# Patient Record
Sex: FEMALE | Race: BLACK OR AFRICAN AMERICAN | NOT HISPANIC OR LATINO | Employment: UNEMPLOYED | URBAN - METROPOLITAN AREA
[De-identification: names, ages, dates, MRNs, and addresses within clinical notes are randomized per-mention and may not be internally consistent; named-entity substitution may affect disease eponyms.]

---

## 2017-01-19 RX ORDER — AMLODIPINE BESYLATE 10 MG/1
TABLET ORAL
Refills: 1 | COMMUNITY
Start: 2016-10-29 | End: 2019-02-11 | Stop reason: SDUPTHER

## 2019-02-08 NOTE — PROGRESS NOTES
Subjective:       Patient ID: Shabnam Noble is a 63 y.o. female who  has a past medical history of CVA (cerebral vascular accident), DM2 (diabetes mellitus, type 2), and Essential hypertension.    Chief Complaint: Establish Care; Hypertension; and Diabetes     HPI    History was obtained from the patient and supplemented through chart review.  There were no prior records to review.  -Reviewed outside records from [facility] regarding HTN, CVA.  Used to see [facility], Dr. Emma Cabrera.    HTN:  Compliant with Lisinopril 40.  Ran out of Norvasc 10, HCTZ 12.5 yesterday.  Tolerating meds well. Pt denies cp/sob/vision or neuro changes.     DM2:  Currently pt is taking Metformin 500 once a day.  A1c improved from 10->6.9.  No results found for: HGBA1C  Already on an ACE.   No known end organ signs/symptoms. Unclear when last retinal exams or foot exams.    CVA: h/o 2 strokes at [facility].  Had weakness in her LLE, L facial drop 9/2017.  No residual weakness.   No longer taking Plavix 75 due to nausea.  On Crestor 40 (but too expensive), ASA 81.      Obesity:  Doesn't walk due to chronic ankle pain.  No show to PT at [facility].    Tobacco use:  She smokes 0.5 ppd.  Started smoking when she was a teenager.  Stress lately from her wife .     Review of Systems   Constitutional: Negative for fever and unexpected weight change.   HENT: Negative for rhinorrhea and sneezing.    Eyes: Negative for redness and itching.   Respiratory: Negative for shortness of breath and wheezing.    Cardiovascular: Negative for chest pain and palpitations.   Gastrointestinal: Negative for abdominal pain and constipation.   Genitourinary: Negative for dysuria and hematuria.   Musculoskeletal: Positive for arthralgias. Negative for gait problem.   Skin: Negative for color change and rash.   Neurological: Negative for dizziness and weakness.   Hematological: Negative for adenopathy.   Psychiatric/Behavioral: Negative for self-injury and suicidal ideas.        Past Medical History:   Diagnosis Date    CVA (cerebral vascular accident)     DM2 (diabetes mellitus, type 2)     Essential hypertension      Past Surgical History:   Procedure Laterality Date    ANKLE FRACTURE SURGERY Right     HYSTERECTOMY      no h/o  malignancy     Family History   Problem Relation Age of Onset    Alzheimer's disease Mother     Prostate cancer Father     Diabetes Father     Colon cancer Brother     Breast cancer Paternal Aunt     Heart disease Neg Hx     Stroke Neg Hx      Social History     Socioeconomic History    Marital status: Single     Spouse name: Not on file    Number of children: Not on file    Years of education: Not on file    Highest education level: Not on file   Social Needs    Financial resource strain: Not on file    Food insecurity - worry: Not on file    Food insecurity - inability: Not on file    Transportation needs - medical: Not on file    Transportation needs - non-medical: Not on file   Occupational History    Not on file   Tobacco Use    Smoking status: Light Tobacco Smoker    Smokeless tobacco: Never Used   Substance and Sexual Activity    Alcohol use: Not on file    Drug use: Not on file    Sexual activity: Not on file   Other Topics Concern    Not on file   Social History Narrative    Not on file     Objective:      Vitals:    02/11/19 1335   BP: (!) 148/88   Pulse: 102   SpO2: 98%   Weight: 87.3 kg (192 lb 7.4 oz)      Physical Exam   Constitutional: She appears well-developed and well-nourished. No distress.   HENT:   Head: Normocephalic and atraumatic.   Nose: Nose normal.   Mouth/Throat: Oropharynx is clear and moist. No oropharyngeal exudate.   Eyes: Conjunctivae and EOM are normal. Pupils are equal, round, and reactive to light. Right eye exhibits no discharge. Left eye exhibits no discharge. No scleral icterus.   Neck: Neck supple. No tracheal deviation present. No thyromegaly present.   Cardiovascular: Normal rate,  regular rhythm, normal heart sounds and intact distal pulses.   No murmur heard.  Pulmonary/Chest: Effort normal and breath sounds normal. No respiratory distress. She has no wheezes.   Abdominal: Soft. Bowel sounds are normal. There is no tenderness.   Musculoskeletal: She exhibits no edema or deformity.   Lymphadenopathy:     She has no cervical adenopathy.   Neurological: She is alert. She has normal strength. No cranial nerve deficit. Gait normal.   Skin: Skin is warm and dry. Capillary refill takes less than 2 seconds. She is not diaphoretic. No erythema.   Psychiatric: She has a normal mood and affect. Her behavior is normal.         Lab Results   Component Value Date    WBC 12.07 (H) 05/04/2005    HGB 13.1 05/04/2005    HCT 39.1 05/04/2005     05/04/2005    CHOL 195 06/30/2004    TRIG 92 06/30/2004    HDL 55.0 06/30/2004     05/04/2005    K 3.6 05/04/2005     05/04/2005    CREATININE 0.9 05/04/2005    BUN 14 05/04/2005    CO2 27 05/04/2005    TSH 0.83 05/04/2005       The ASCVD Risk score ([place] JAQUELIN Jr., et al., 2013) failed to calculate for the following reasons:    The patient has a prior MI or stroke diagnosis    (Imaging have been independently reviewed)  None    Assessment:       1. Essential hypertension    2. Type 2 diabetes mellitus with complication, without long-term current use of insulin    3. Cerebrovascular accident (CVA), unspecified mechanism    4. Light cigarette smoker (1-9 cigs/day)    5. Healthcare maintenance    6. Encounter for lipid screening for cardiovascular disease    7. Financial difficulties          Plan:       Shabnam was seen today for establish care, hypertension and diabetes.    Diagnoses and all orders for this visit:    Essential hypertension  Comments:  BP elevated, but ran out of meds. Nurse visit 1-2 wks for BP check. Refilling:  Orders:  -     amLODIPine (NORVASC) 10 MG tablet; Take 1 tablet (10 mg total) by mouth once daily.  -     hydroCHLOROthiazide  (HYDRODIURIL) 12.5 MG Tab; Take 1 tablet (12.5 mg total) by mouth once daily.  -     lisinopril (PRINIVIL,ZESTRIL) 40 MG tablet; Take 1 tablet (40 mg total) by mouth once daily.    Type 2 diabetes mellitus with complication, without long-term current use of insulin  Comments:  Much improved. Increase Metformin to 500 BID. Already on ACE. Unclear last foot, eye exam  Orders:  -     Hemoglobin A1c; Future  -     Microalbumin/creatinine urine ratio; Future  -     Ambulatory referral to Podiatry  -     Diabetic Eye Screening Photo; Future  -     metFORMIN (GLUCOPHAGE) 500 MG tablet; Take 1 tablet (500 mg total) by mouth 2 (two) times daily with meals.    Cerebrovascular accident (CVA), unspecified mechanism  Comments:  CVA x 2, no residual weakness. Switch from Crestor to Lipitor due to cost. Cont ASA. Refer to Neuro; unclear if she should take Plavix  Orders:  -     atorvastatin (LIPITOR) 80 MG tablet; Take 1 tablet (80 mg total) by mouth once daily.  -     Ambulatory Referral to Neurology  -     aspirin 81 MG Chew; Take 1 tablet (81 mg total) by mouth once daily.    Light cigarette smoker (1-9 cigs/day)  Comments:  Counseled on tobacco cessation for 5 min., especially in light of recurrent CVA    Healthcare maintenance  -     CBC auto differential; Future  -     Comprehensive metabolic panel; Future    Encounter for lipid screening for cardiovascular disease  Comments:  On high-intensity statin due to CVA  Orders:  -     Lipid panel; Future  -     atorvastatin (LIPITOR) 80 MG tablet; Take 1 tablet (80 mg total) by mouth once daily.    Financial difficulties  Comments:  Has difficulty affording her medications.  Orders:  -     Ambulatory Referral to Social Work    Other orders  -     Cancel: losartan (COZAAR) 50 MG tablet; Take 1 tablet (50 mg total) by mouth once daily.         Side effects of medication(s) were discussed in detail and patient voiced understanding.  Patient will call back for any issues or  complications.     RTC in 3 month(s) or sooner PRN for HTN, DM, CVA, or sooner depending on labs.  Nurse visit for BP check in 1-2 weeks.

## 2019-02-11 ENCOUNTER — OFFICE VISIT (OUTPATIENT)
Dept: INTERNAL MEDICINE | Facility: CLINIC | Age: 64
End: 2019-02-11
Payer: COMMERCIAL

## 2019-02-11 VITALS
HEART RATE: 102 BPM | SYSTOLIC BLOOD PRESSURE: 148 MMHG | DIASTOLIC BLOOD PRESSURE: 88 MMHG | WEIGHT: 192.44 LBS | OXYGEN SATURATION: 98 %

## 2019-02-11 DIAGNOSIS — Z59.9 FINANCIAL DIFFICULTIES: ICD-10-CM

## 2019-02-11 DIAGNOSIS — I63.9 CEREBROVASCULAR ACCIDENT (CVA), UNSPECIFIED MECHANISM: ICD-10-CM

## 2019-02-11 DIAGNOSIS — Z13.220 ENCOUNTER FOR LIPID SCREENING FOR CARDIOVASCULAR DISEASE: ICD-10-CM

## 2019-02-11 DIAGNOSIS — E11.8 TYPE 2 DIABETES MELLITUS WITH COMPLICATION, WITHOUT LONG-TERM CURRENT USE OF INSULIN: ICD-10-CM

## 2019-02-11 DIAGNOSIS — I10 ESSENTIAL HYPERTENSION: Primary | ICD-10-CM

## 2019-02-11 DIAGNOSIS — F17.210 LIGHT CIGARETTE SMOKER (1-9 CIGS/DAY): ICD-10-CM

## 2019-02-11 DIAGNOSIS — Z13.6 ENCOUNTER FOR LIPID SCREENING FOR CARDIOVASCULAR DISEASE: ICD-10-CM

## 2019-02-11 DIAGNOSIS — Z00.00 HEALTHCARE MAINTENANCE: ICD-10-CM

## 2019-02-11 PROCEDURE — 99205 PR OFFICE/OUTPT VISIT, NEW, LEVL V, 60-74 MIN: ICD-10-PCS | Mod: 25,S$GLB,, | Performed by: INTERNAL MEDICINE

## 2019-02-11 PROCEDURE — 99999 PR PBB SHADOW E&M-EST. PATIENT-LVL IV: ICD-10-PCS | Mod: PBBFAC,,, | Performed by: INTERNAL MEDICINE

## 2019-02-11 PROCEDURE — 99406 PR TOBACCO USE CESSATION INTERMEDIATE 3-10 MINUTES: ICD-10-PCS | Mod: S$GLB,,, | Performed by: INTERNAL MEDICINE

## 2019-02-11 PROCEDURE — 99999 PR PBB SHADOW E&M-EST. PATIENT-LVL IV: CPT | Mod: PBBFAC,,, | Performed by: INTERNAL MEDICINE

## 2019-02-11 PROCEDURE — 99205 OFFICE O/P NEW HI 60 MIN: CPT | Mod: 25,S$GLB,, | Performed by: INTERNAL MEDICINE

## 2019-02-11 PROCEDURE — 99406 BEHAV CHNG SMOKING 3-10 MIN: CPT | Mod: S$GLB,,, | Performed by: INTERNAL MEDICINE

## 2019-02-11 RX ORDER — HYDROCHLOROTHIAZIDE 12.5 MG/1
12.5 TABLET ORAL DAILY
COMMUNITY
End: 2019-02-11 | Stop reason: SDUPTHER

## 2019-02-11 RX ORDER — LISINOPRIL 40 MG/1
40 TABLET ORAL DAILY
COMMUNITY
End: 2019-02-11 | Stop reason: SDUPTHER

## 2019-02-11 RX ORDER — NAPROXEN SODIUM 220 MG/1
81 TABLET, FILM COATED ORAL DAILY
Qty: 90 TABLET | Refills: 0 | Status: SHIPPED | OUTPATIENT
Start: 2019-02-11 | End: 2020-07-21 | Stop reason: SDUPTHER

## 2019-02-11 RX ORDER — NAPROXEN SODIUM 220 MG/1
81 TABLET, FILM COATED ORAL DAILY
COMMUNITY
End: 2019-02-11 | Stop reason: SDUPTHER

## 2019-02-11 RX ORDER — LOSARTAN POTASSIUM 50 MG/1
50 TABLET ORAL DAILY
Qty: 30 TABLET | Refills: 1 | Status: CANCELLED | OUTPATIENT
Start: 2019-02-11 | End: 2020-02-11

## 2019-02-11 RX ORDER — HYDROCHLOROTHIAZIDE 12.5 MG/1
12.5 TABLET ORAL DAILY
Qty: 90 TABLET | Refills: 0 | Status: SHIPPED | OUTPATIENT
Start: 2019-02-11 | End: 2020-01-21 | Stop reason: SDUPTHER

## 2019-02-11 RX ORDER — CLOPIDOGREL BISULFATE 75 MG/1
TABLET ORAL
COMMUNITY
Start: 2018-12-27 | End: 2020-02-11 | Stop reason: SDUPTHER

## 2019-02-11 RX ORDER — METFORMIN HYDROCHLORIDE 500 MG/1
500 TABLET ORAL 2 TIMES DAILY WITH MEALS
COMMUNITY
End: 2019-02-11 | Stop reason: SDUPTHER

## 2019-02-11 RX ORDER — ATORVASTATIN CALCIUM 80 MG/1
80 TABLET, FILM COATED ORAL DAILY
Qty: 90 TABLET | Refills: 0 | Status: SHIPPED | OUTPATIENT
Start: 2019-02-11 | End: 2019-12-14 | Stop reason: SDUPTHER

## 2019-02-11 RX ORDER — LANCETS 28 GAUGE
1 EACH MISCELLANEOUS
COMMUNITY
Start: 2017-10-12 | End: 2020-11-20

## 2019-02-11 RX ORDER — GLIPIZIDE 5 MG/1
5 TABLET ORAL
COMMUNITY
Start: 2018-01-17 | End: 2020-07-21

## 2019-02-11 RX ORDER — AMLODIPINE BESYLATE 10 MG/1
10 TABLET ORAL DAILY
Qty: 90 TABLET | Refills: 0 | Status: SHIPPED | OUTPATIENT
Start: 2019-02-11 | End: 2020-01-21

## 2019-02-11 RX ORDER — METFORMIN HYDROCHLORIDE 500 MG/1
500 TABLET ORAL 2 TIMES DAILY WITH MEALS
Qty: 180 TABLET | Refills: 0 | Status: SHIPPED | OUTPATIENT
Start: 2019-02-11 | End: 2019-08-22 | Stop reason: SDUPTHER

## 2019-02-11 RX ORDER — ROSUVASTATIN CALCIUM 40 MG/1
10 TABLET, COATED ORAL NIGHTLY
COMMUNITY
End: 2019-02-11

## 2019-02-11 RX ORDER — LISINOPRIL 40 MG/1
40 TABLET ORAL DAILY
Qty: 90 TABLET | Refills: 0 | Status: SHIPPED | OUTPATIENT
Start: 2019-02-11 | End: 2020-01-21

## 2019-02-11 SDOH — SOCIAL DETERMINANTS OF HEALTH (SDOH): PROBLEM RELATED TO HOUSING AND ECONOMIC CIRCUMSTANCES, UNSPECIFIED: Z59.9

## 2019-02-15 DIAGNOSIS — Z12.11 COLON CANCER SCREENING: ICD-10-CM

## 2019-02-15 DIAGNOSIS — Z12.39 BREAST CANCER SCREENING: ICD-10-CM

## 2019-02-21 ENCOUNTER — HOSPITAL ENCOUNTER (EMERGENCY)
Facility: OTHER | Age: 64
Discharge: HOME | End: 2019-02-21
Attending: EMERGENCY MEDICINE

## 2019-02-21 VITALS
WEIGHT: 192 LBS | HEART RATE: 84 BPM | RESPIRATION RATE: 20 BRPM | SYSTOLIC BLOOD PRESSURE: 141 MMHG | HEIGHT: 65 IN | TEMPERATURE: 98 F | DIASTOLIC BLOOD PRESSURE: 65 MMHG | OXYGEN SATURATION: 98 % | BODY MASS INDEX: 31.99 KG/M2

## 2019-02-21 DIAGNOSIS — B34.9 VIRAL SYNDROME: Primary | ICD-10-CM

## 2019-02-21 DIAGNOSIS — R07.9 CHEST PAIN: ICD-10-CM

## 2019-02-21 DIAGNOSIS — R50.9 FEVER: ICD-10-CM

## 2019-02-21 LAB
ALBUMIN SERPL BCP-MCNC: 3.5 G/DL
ALP SERPL-CCNC: 79 U/L
ALT SERPL W/O P-5'-P-CCNC: 16 U/L
ANION GAP SERPL CALC-SCNC: 10 MMOL/L
AST SERPL-CCNC: 13 U/L
BACTERIA #/AREA URNS HPF: ABNORMAL /HPF
BASOPHILS # BLD AUTO: 0.01 K/UL
BASOPHILS NFR BLD: 0.1 %
BILIRUB SERPL-MCNC: 0.7 MG/DL
BILIRUB UR QL STRIP: NEGATIVE
BNP SERPL-MCNC: 118 PG/ML
BUN SERPL-MCNC: 15 MG/DL
CALCIUM SERPL-MCNC: 8.9 MG/DL
CHLORIDE SERPL-SCNC: 107 MMOL/L
CLARITY UR: CLEAR
CO2 SERPL-SCNC: 24 MMOL/L
COLOR UR: YELLOW
CREAT SERPL-MCNC: 0.9 MG/DL
DIFFERENTIAL METHOD: ABNORMAL
EOSINOPHIL # BLD AUTO: 0 K/UL
EOSINOPHIL NFR BLD: 0.2 %
ERYTHROCYTE [DISTWIDTH] IN BLOOD BY AUTOMATED COUNT: 13.5 %
EST. GFR  (AFRICAN AMERICAN): >60 ML/MIN/1.73 M^2
EST. GFR  (NON AFRICAN AMERICAN): >60 ML/MIN/1.73 M^2
GLUCOSE SERPL-MCNC: 103 MG/DL
GLUCOSE UR QL STRIP: NEGATIVE
HCT VFR BLD AUTO: 35.8 %
HGB BLD-MCNC: 12 G/DL
HGB UR QL STRIP: NEGATIVE
INFLUENZA A, MOLECULAR: NEGATIVE
INFLUENZA B, MOLECULAR: NEGATIVE
KETONES UR QL STRIP: NEGATIVE
LACTATE SERPL-SCNC: 1.1 MMOL/L
LEUKOCYTE ESTERASE UR QL STRIP: ABNORMAL
LYMPHOCYTES # BLD AUTO: 0.4 K/UL
LYMPHOCYTES NFR BLD: 5.2 %
MCH RBC QN AUTO: 27.8 PG
MCHC RBC AUTO-ENTMCNC: 33.5 G/DL
MCV RBC AUTO: 83 FL
MICROSCOPIC COMMENT: ABNORMAL
MONOCYTES # BLD AUTO: 0.6 K/UL
MONOCYTES NFR BLD: 6.6 %
NEUTROPHILS # BLD AUTO: 7.5 K/UL
NEUTROPHILS NFR BLD: 87.7 %
NITRITE UR QL STRIP: NEGATIVE
PH UR STRIP: 6 [PH] (ref 5–8)
PLATELET # BLD AUTO: 211 K/UL
PMV BLD AUTO: 10.9 FL
POTASSIUM SERPL-SCNC: 3.4 MMOL/L
PROT SERPL-MCNC: 7.2 G/DL
PROT UR QL STRIP: ABNORMAL
RBC # BLD AUTO: 4.32 M/UL
RBC #/AREA URNS HPF: 2 /HPF (ref 0–4)
SODIUM SERPL-SCNC: 141 MMOL/L
SP GR UR STRIP: 1.02 (ref 1–1.03)
SPECIMEN SOURCE: NORMAL
SQUAMOUS #/AREA URNS HPF: 5 /HPF
URN SPEC COLLECT METH UR: ABNORMAL
UROBILINOGEN UR STRIP-ACNC: ABNORMAL EU/DL
WBC # BLD AUTO: 8.5 K/UL
WBC #/AREA URNS HPF: 5 /HPF (ref 0–5)
YEAST URNS QL MICRO: ABNORMAL

## 2019-02-21 PROCEDURE — 85025 COMPLETE CBC W/AUTO DIFF WBC: CPT

## 2019-02-21 PROCEDURE — 93005 ELECTROCARDIOGRAM TRACING: CPT

## 2019-02-21 PROCEDURE — 87086 URINE CULTURE/COLONY COUNT: CPT

## 2019-02-21 PROCEDURE — 36415 COLL VENOUS BLD VENIPUNCTURE: CPT

## 2019-02-21 PROCEDURE — 93010 EKG 12-LEAD: ICD-10-PCS | Mod: ,,, | Performed by: INTERNAL MEDICINE

## 2019-02-21 PROCEDURE — 83605 ASSAY OF LACTIC ACID: CPT

## 2019-02-21 PROCEDURE — 63600175 PHARM REV CODE 636 W HCPCS: Performed by: EMERGENCY MEDICINE

## 2019-02-21 PROCEDURE — 87502 INFLUENZA DNA AMP PROBE: CPT

## 2019-02-21 PROCEDURE — 99285 EMERGENCY DEPT VISIT HI MDM: CPT | Mod: 25

## 2019-02-21 PROCEDURE — 87040 BLOOD CULTURE FOR BACTERIA: CPT

## 2019-02-21 PROCEDURE — 80053 COMPREHEN METABOLIC PANEL: CPT

## 2019-02-21 PROCEDURE — 83880 ASSAY OF NATRIURETIC PEPTIDE: CPT

## 2019-02-21 PROCEDURE — 93010 ELECTROCARDIOGRAM REPORT: CPT | Mod: ,,, | Performed by: INTERNAL MEDICINE

## 2019-02-21 PROCEDURE — 96375 TX/PRO/DX INJ NEW DRUG ADDON: CPT

## 2019-02-21 PROCEDURE — 96361 HYDRATE IV INFUSION ADD-ON: CPT

## 2019-02-21 PROCEDURE — 96374 THER/PROPH/DIAG INJ IV PUSH: CPT

## 2019-02-21 PROCEDURE — 81000 URINALYSIS NONAUTO W/SCOPE: CPT

## 2019-02-21 PROCEDURE — 25000003 PHARM REV CODE 250: Performed by: EMERGENCY MEDICINE

## 2019-02-21 RX ORDER — KETOROLAC TROMETHAMINE 30 MG/ML
30 INJECTION, SOLUTION INTRAMUSCULAR; INTRAVENOUS
Status: COMPLETED | OUTPATIENT
Start: 2019-02-21 | End: 2019-02-21

## 2019-02-21 RX ORDER — ACETAMINOPHEN 500 MG
1000 TABLET ORAL
Status: COMPLETED | OUTPATIENT
Start: 2019-02-21 | End: 2019-02-21

## 2019-02-21 RX ORDER — DIPHENHYDRAMINE HYDROCHLORIDE 50 MG/ML
25 INJECTION INTRAMUSCULAR; INTRAVENOUS
Status: COMPLETED | OUTPATIENT
Start: 2019-02-21 | End: 2019-02-21

## 2019-02-21 RX ORDER — METOCLOPRAMIDE HYDROCHLORIDE 5 MG/ML
10 INJECTION INTRAMUSCULAR; INTRAVENOUS
Status: COMPLETED | OUTPATIENT
Start: 2019-02-21 | End: 2019-02-21

## 2019-02-21 RX ADMIN — KETOROLAC TROMETHAMINE 30 MG: 30 INJECTION, SOLUTION INTRAMUSCULAR; INTRAVENOUS at 04:02

## 2019-02-21 RX ADMIN — ACETAMINOPHEN 1000 MG: 500 TABLET ORAL at 04:02

## 2019-02-21 RX ADMIN — DIPHENHYDRAMINE HYDROCHLORIDE 25 MG: 50 INJECTION, SOLUTION INTRAMUSCULAR; INTRAVENOUS at 07:02

## 2019-02-21 RX ADMIN — SODIUM CHLORIDE 1000 ML: 0.9 INJECTION, SOLUTION INTRAVENOUS at 04:02

## 2019-02-21 RX ADMIN — SODIUM CHLORIDE 1000 ML: 0.9 INJECTION, SOLUTION INTRAVENOUS at 07:02

## 2019-02-21 RX ADMIN — METOCLOPRAMIDE 10 MG: 5 INJECTION, SOLUTION INTRAMUSCULAR; INTRAVENOUS at 07:02

## 2019-02-21 NOTE — ED PROVIDER NOTES
Encounter Date: 2/21/2019    SCRIBE #1 NOTE: I, Haja Faustin, am scribing for, and in the presence of, Dr. Schwarz.       History     Chief Complaint   Patient presents with    Influenza     pt to er with c/o flu like symptoms  x 2 days . pt curenttly with 102.4 temp per ems     Time seen by provider: 4:12 PM    This is a 63 y.o. female who presents with complaint of myalgias that began yesterday. The patient reports that she is also experiencing chills, weakness, and headache. She has also been having a cough. She denies any sick contacts. The patient denies fever, sore throat, congestion, shortness of breath, chest pain, abdominal pain, vomiting, diarrhea, and dysuria. She states that she took aspirin prior to arrival.      The history is provided by the patient.     Review of patient's allergies indicates:  No Known Allergies  Past Medical History:   Diagnosis Date    CVA (cerebral vascular accident)     DM2 (diabetes mellitus, type 2)     Essential hypertension      Past Surgical History:   Procedure Laterality Date    ANKLE FRACTURE SURGERY Right     HYSTERECTOMY      no h/o  malignancy     Family History   Problem Relation Age of Onset    Alzheimer's disease Mother     Prostate cancer Father     Diabetes Father     Colon cancer Brother     Breast cancer Paternal Aunt     Heart disease Neg Hx     Stroke Neg Hx      Social History     Tobacco Use    Smoking status: Light Tobacco Smoker    Smokeless tobacco: Never Used   Substance Use Topics    Alcohol use: Not on file    Drug use: Not on file     Review of Systems   Constitutional: Positive for chills. Negative for fever.   HENT: Negative for congestion and sore throat.    Respiratory: Positive for cough. Negative for shortness of breath.    Cardiovascular: Negative for chest pain.   Gastrointestinal: Negative for abdominal pain, diarrhea, nausea and vomiting.   Genitourinary: Negative for dysuria.   Musculoskeletal: Positive for myalgias.  Negative for back pain.   Skin: Negative for rash.   Neurological: Positive for weakness and headaches.   Hematological: Does not bruise/bleed easily.       Physical Exam     Initial Vitals [02/21/19 1546]   BP Pulse Resp Temp SpO2   (!) 159/85 99 17 (!) 101.5 °F (38.6 °C) 96 %      MAP       --         Physical Exam    Nursing note and vitals reviewed.  Constitutional: She appears well-developed and well-nourished. She is not diaphoretic. No distress.   HENT:   Head: Normocephalic and atraumatic.   Mouth/Throat: Oropharynx is clear and moist.   Eyes: Conjunctivae and EOM are normal.   Neck: Normal range of motion. Neck supple.   No meningismus   Cardiovascular: Normal rate, regular rhythm and normal heart sounds.   Pulmonary/Chest: Breath sounds normal. No respiratory distress. She has no wheezes. She has no rhonchi. She has no rales.   Abdominal: Soft. Bowel sounds are normal. She exhibits no distension. There is no tenderness. There is no rebound and no guarding.   Musculoskeletal: Normal range of motion.   Neurological: She is alert and oriented to person, place, and time.   Ambulatory with steady gait.     Skin: Skin is warm and dry. No rash noted. No erythema. No pallor.   Psychiatric: She has a normal mood and affect. Her behavior is normal. Judgment and thought content normal.         ED Course   Procedures  Labs Reviewed   COMPREHENSIVE METABOLIC PANEL - Abnormal; Notable for the following components:       Result Value    Potassium 3.4 (*)     All other components within normal limits   CBC W/ AUTO DIFFERENTIAL - Abnormal; Notable for the following components:    Hematocrit 35.8 (*)     Lymph # 0.4 (*)     Gran% 87.7 (*)     Lymph% 5.2 (*)     All other components within normal limits   URINALYSIS - Abnormal; Notable for the following components:    Protein, UA Trace (*)     Urobilinogen, UA 2.0-3.0 (*)     Leukocytes, UA Trace (*)     All other components within normal limits   URINALYSIS MICROSCOPIC -  Abnormal; Notable for the following components:    Bacteria, UA Moderate (*)     Yeast, UA Rare (*)     All other components within normal limits   B-TYPE NATRIURETIC PEPTIDE - Abnormal; Notable for the following components:     (*)     All other components within normal limits   INFLUENZA A & B BY MOLECULAR   CULTURE, URINE   CULTURE, BLOOD   CULTURE, BLOOD   CULTURE, URINE   LACTIC ACID, PLASMA   B-TYPE NATRIURETIC PEPTIDE     EKG Readings: (Independently Interpreted)   16:07  Rate of 91. Normal sinus rhythm. Left axis deviation. Normal axis. Normal intervals. No ST or ischemic changes.          Imaging Results          X-Ray Chest AP Portable (Final result)  Result time 02/21/19 17:02:21    Final result by Bolivar Dozier MD (02/21/19 17:02:21)                 Impression:      1. Interstitial findings may reflect edema, correlation advised.      Electronically signed by: Bolivar Dozier MD  Date:    02/21/2019  Time:    17:02             Narrative:    EXAMINATION:  XR CHEST AP PORTABLE    CLINICAL HISTORY:  Fever, unspecified    TECHNIQUE:  Single frontal view of the chest was performed.    COMPARISON:  None    FINDINGS:  The cardiomediastinal silhouette is prominent noting calcification of the aorta..  There is no pleural effusion.  The trachea is midline.  The lungs are symmetrically expanded bilaterally with prominent interstitial attenuation bilaterally..  No large focal consolidation seen.  There is no pneumothorax.  The osseous structures are remarkable for degenerative changes..                              X-Rays:   Independently Interpreted Readings:   Chest X-Ray: Trachea midline. No cardiomegaly. No effusion, edema or pneumothorax.        Medical Decision Making:   History:   Old Medical Records: I decided to obtain old medical records.  Old Records Summarized: other records and records from clinic visits.  Initial Assessment:   4:12PM:  Patient is a 63-year-old female who presents to the  emergency department with fever, cough, myalgias, headache. Patient appears well, nontoxic.  She is febrile upon arrival.  She has no known sick contacts.  Will plan for labs, fluids, cultures, will continue to follow and reassess.  Independently Interpreted Test(s):   I have ordered and independently interpreted X-rays - see prior notes.  Clinical Tests:   Lab Tests: Ordered and Reviewed  Radiological Study: Ordered and Reviewed      8:15 PM: Pt doing well. She is feeling much better.  Her fever has defervesced and her vitals remained stable. Her labs are unremarkable with a normal white blood cell count and a normal lactic acid.  Her flu is negative and I suspect that she likely has a viral syndrome causing her symptoms. I updated the patient regarding the results and I counseled the patient regarding supportive care measures.  I have discussed with the pt ED return warnings and need for close PCP f/u.  Pt agreeable to plan and all questions answered.  I feel that pt is stable for discharge and management as an outpatient and no further intervention is needed at this time.  Pt is comfortable returning to the ED if needed.  Will DC home in stable condition.                Scribe Attestation:   Scribe #1: I performed the above scribed service and the documentation accurately describes the services I performed. I attest to the accuracy of the note.    Attending Attestation:           Physician Attestation for Scribe:  Physician Attestation Statement for Scribe #1: I, Dr. Schwarz, reviewed documentation, as scribed by Haja Faustin in my presence, and it is both accurate and complete.                    Clinical Impression:     1. Viral syndrome    2. Fever    3. Chest pain                                 Cassidy Schwarz MD  02/21/19 2058

## 2019-02-21 NOTE — ED TRIAGE NOTES
Pt brought in via EMS with c/o of cp, sob, and flu like symptoms and ha since yesterday. Pt reports having mild left sided cp this morning but has resolved upon arrival. Pt appears to be uncomfortable and complains that she is cold. Pt took one 81mg aspirin. Pt has hx of stroke pt does not display facial drop, body weakness, pt answers questions appropriately and has clear speech. Pt denies any numbness, or loss of sensation. Pt c/o L sided ha described as stabbing rating a 8 out of 10 but denies n/v or any visual changes.

## 2019-02-21 NOTE — ED NOTES
Pt laying in bed. Resp even and unlabored. NAD noted. Pt updated on poc. Side rails up x 2. Call bell within reach. Will continue to monitor.

## 2019-02-22 NOTE — DISCHARGE INSTRUCTIONS
Drink plenty of fluids.  Take tylenol/ibuprofen as needed for pain.    Please return to the ER if you have chest pain, difficulty breathing, fevers, altered mental status, dizziness, weakness, or any other concerns.      Follow up with your primary care physician.

## 2019-02-22 NOTE — ED NOTES
Pt states ha medication relieved ha completely. Pt had no complaints or needs at this time. Pt sitting up in bed comfortably. Side rails up x 2. Call light within reach. Will continue to monitor. Provider notified of status.

## 2019-02-22 NOTE — ED NOTES
Pt resting in bed. C/o ha, pt states it is worse and rates pain as a 9. NAD noted. Provider notified.

## 2019-02-23 LAB
BACTERIA UR CULT: NORMAL
BACTERIA UR CULT: NORMAL

## 2019-02-26 LAB
BACTERIA BLD CULT: NORMAL
BACTERIA BLD CULT: NORMAL

## 2019-04-30 ENCOUNTER — PATIENT OUTREACH (OUTPATIENT)
Dept: ADMINISTRATIVE | Facility: HOSPITAL | Age: 64
End: 2019-04-30

## 2019-04-30 PROBLEM — E11.9 DIABETES MELLITUS: Status: ACTIVE | Noted: 2019-04-30

## 2019-04-30 PROBLEM — F32.A DEPRESSION: Status: ACTIVE | Noted: 2018-01-22

## 2019-04-30 PROBLEM — R51.9 GENERALIZED HEADACHES: Status: ACTIVE | Noted: 2018-01-25

## 2019-04-30 PROBLEM — I16.1 HYPERTENSIVE EMERGENCY: Status: ACTIVE | Noted: 2019-04-30

## 2019-04-30 PROBLEM — R41.3 MEMORY DEFICIT: Status: ACTIVE | Noted: 2018-01-31

## 2019-04-30 PROBLEM — I63.9 STROKE: Status: ACTIVE | Noted: 2017-09-23

## 2019-04-30 PROBLEM — R63.4 WEIGHT LOSS, UNINTENTIONAL: Status: ACTIVE | Noted: 2018-01-31

## 2019-04-30 NOTE — PROGRESS NOTES
Chart review complete. The patient was phoned about scheduling her health maintenance. I left a voice message.    Krystal KAY LPN  Clinical Care Coordinator  Internal Medicine  [facility]

## 2019-08-22 DIAGNOSIS — E11.8 TYPE 2 DIABETES MELLITUS WITH COMPLICATION, WITHOUT LONG-TERM CURRENT USE OF INSULIN: ICD-10-CM

## 2019-08-23 RX ORDER — METFORMIN HYDROCHLORIDE 500 MG/1
TABLET ORAL
Qty: 180 TABLET | Refills: 0 | Status: SHIPPED | OUTPATIENT
Start: 2019-08-23 | End: 2020-02-11 | Stop reason: SDUPTHER

## 2019-12-14 DIAGNOSIS — I63.9 CEREBROVASCULAR ACCIDENT (CVA), UNSPECIFIED MECHANISM: ICD-10-CM

## 2019-12-14 DIAGNOSIS — Z13.6 ENCOUNTER FOR LIPID SCREENING FOR CARDIOVASCULAR DISEASE: ICD-10-CM

## 2019-12-14 DIAGNOSIS — Z13.220 ENCOUNTER FOR LIPID SCREENING FOR CARDIOVASCULAR DISEASE: ICD-10-CM

## 2019-12-16 RX ORDER — ATORVASTATIN CALCIUM 80 MG/1
TABLET, FILM COATED ORAL
Qty: 90 TABLET | Refills: 0 | Status: SHIPPED | OUTPATIENT
Start: 2019-12-16 | End: 2020-01-21 | Stop reason: SDUPTHER

## 2020-01-21 ENCOUNTER — HOSPITAL ENCOUNTER (OUTPATIENT)
Dept: RADIOLOGY | Facility: HOSPITAL | Age: 65
Discharge: HOME | End: 2020-01-21
Attending: FAMILY MEDICINE
Payer: MEDICARE

## 2020-01-21 ENCOUNTER — OFFICE VISIT (OUTPATIENT)
Dept: PRIMARY CARE CLINIC | Facility: CLINIC | Age: 65
End: 2020-01-21
Payer: MEDICARE

## 2020-01-21 VITALS
DIASTOLIC BLOOD PRESSURE: 90 MMHG | TEMPERATURE: 98 F | WEIGHT: 199.75 LBS | OXYGEN SATURATION: 99 % | HEART RATE: 75 BPM | BODY MASS INDEX: 34.1 KG/M2 | SYSTOLIC BLOOD PRESSURE: 166 MMHG | HEIGHT: 64 IN

## 2020-01-21 DIAGNOSIS — Z00.00 ANNUAL PHYSICAL EXAM: Primary | ICD-10-CM

## 2020-01-21 DIAGNOSIS — E11.9 ENCOUNTER FOR DIABETES TYPE 2 EYE EXAM: ICD-10-CM

## 2020-01-21 DIAGNOSIS — E66.09 CLASS 1 OBESITY DUE TO EXCESS CALORIES WITH SERIOUS COMORBIDITY AND BODY MASS INDEX (BMI) OF 34.0 TO 34.9 IN ADULT: ICD-10-CM

## 2020-01-21 DIAGNOSIS — I63.9 CEREBROVASCULAR ACCIDENT (CVA), UNSPECIFIED MECHANISM: ICD-10-CM

## 2020-01-21 DIAGNOSIS — Z01.00 ENCOUNTER FOR DIABETES TYPE 2 EYE EXAM: ICD-10-CM

## 2020-01-21 DIAGNOSIS — F17.200 NEEDS SMOKING CESSATION EDUCATION: ICD-10-CM

## 2020-01-21 DIAGNOSIS — E05.90 SUBCLINICAL HYPERTHYROIDISM: ICD-10-CM

## 2020-01-21 DIAGNOSIS — Z11.59 NEED FOR HEPATITIS C SCREENING TEST: ICD-10-CM

## 2020-01-21 DIAGNOSIS — Z13.6 ENCOUNTER FOR LIPID SCREENING FOR CARDIOVASCULAR DISEASE: ICD-10-CM

## 2020-01-21 DIAGNOSIS — Z23 NEED FOR SHINGLES VACCINE: ICD-10-CM

## 2020-01-21 DIAGNOSIS — Z13.220 ENCOUNTER FOR LIPID SCREENING FOR CARDIOVASCULAR DISEASE: ICD-10-CM

## 2020-01-21 DIAGNOSIS — E11.59 TYPE 2 DIABETES MELLITUS WITH OTHER CIRCULATORY COMPLICATION, WITHOUT LONG-TERM CURRENT USE OF INSULIN: ICD-10-CM

## 2020-01-21 DIAGNOSIS — I10 ESSENTIAL HYPERTENSION: ICD-10-CM

## 2020-01-21 DIAGNOSIS — E78.5 HYPERLIPIDEMIA LDL GOAL <70: ICD-10-CM

## 2020-01-21 DIAGNOSIS — R05.9 COUGH: ICD-10-CM

## 2020-01-21 DIAGNOSIS — Z23 NEED FOR INFLUENZA VACCINATION: ICD-10-CM

## 2020-01-21 DIAGNOSIS — F17.210 CIGARETTE NICOTINE DEPENDENCE WITHOUT COMPLICATION: ICD-10-CM

## 2020-01-21 PROCEDURE — 99406 PR TOBACCO USE CESSATION INTERMEDIATE 3-10 MINUTES: ICD-10-PCS | Mod: S$GLB,,, | Performed by: FAMILY MEDICINE

## 2020-01-21 PROCEDURE — 3078F PR MOST RECENT DIASTOLIC BLOOD PRESSURE < 80 MM HG: ICD-10-PCS | Mod: CPTII,S$GLB,, | Performed by: FAMILY MEDICINE

## 2020-01-21 PROCEDURE — 90686 IIV4 VACC NO PRSV 0.5 ML IM: CPT | Mod: S$GLB,,, | Performed by: FAMILY MEDICINE

## 2020-01-21 PROCEDURE — 99406 BEHAV CHNG SMOKING 3-10 MIN: CPT | Mod: S$GLB,,, | Performed by: FAMILY MEDICINE

## 2020-01-21 PROCEDURE — 71046 XR CHEST PA AND LATERAL: ICD-10-PCS | Mod: 26,,, | Performed by: RADIOLOGY

## 2020-01-21 PROCEDURE — 99214 PR OFFICE/OUTPT VISIT, EST, LEVL IV, 30-39 MIN: ICD-10-PCS | Mod: 25,S$GLB,, | Performed by: FAMILY MEDICINE

## 2020-01-21 PROCEDURE — 3078F DIAST BP <80 MM HG: CPT | Mod: CPTII,S$GLB,, | Performed by: FAMILY MEDICINE

## 2020-01-21 PROCEDURE — 99214 OFFICE O/P EST MOD 30 MIN: CPT | Mod: 25,S$GLB,, | Performed by: FAMILY MEDICINE

## 2020-01-21 PROCEDURE — 82043 UR ALBUMIN QUANTITATIVE: CPT

## 2020-01-21 PROCEDURE — 99999 PR PBB SHADOW E&M-EST. PATIENT-LVL IV: ICD-10-PCS | Mod: PBBFAC,,, | Performed by: FAMILY MEDICINE

## 2020-01-21 PROCEDURE — 71046 X-RAY EXAM CHEST 2 VIEWS: CPT | Mod: 26,,, | Performed by: RADIOLOGY

## 2020-01-21 PROCEDURE — G0008 ADMIN INFLUENZA VIRUS VAC: HCPCS | Mod: S$GLB,,, | Performed by: FAMILY MEDICINE

## 2020-01-21 PROCEDURE — 90686 FLU VACCINE (QUAD) GREATER THAN OR EQUAL TO 3YO PRESERVATIVE FREE IM: ICD-10-PCS | Mod: S$GLB,,, | Performed by: FAMILY MEDICINE

## 2020-01-21 PROCEDURE — G0008 FLU VACCINE (QUAD) GREATER THAN OR EQUAL TO 3YO PRESERVATIVE FREE IM: ICD-10-PCS | Mod: S$GLB,,, | Performed by: FAMILY MEDICINE

## 2020-01-21 PROCEDURE — 3077F SYST BP >= 140 MM HG: CPT | Mod: CPTII,S$GLB,, | Performed by: FAMILY MEDICINE

## 2020-01-21 PROCEDURE — 99999 PR PBB SHADOW E&M-EST. PATIENT-LVL IV: CPT | Mod: PBBFAC,,, | Performed by: FAMILY MEDICINE

## 2020-01-21 PROCEDURE — 3077F PR MOST RECENT SYSTOLIC BLOOD PRESSURE >= 140 MM HG: ICD-10-PCS | Mod: CPTII,S$GLB,, | Performed by: FAMILY MEDICINE

## 2020-01-21 PROCEDURE — 71046 X-RAY EXAM CHEST 2 VIEWS: CPT | Mod: TC,PN

## 2020-01-21 RX ORDER — ATORVASTATIN CALCIUM 80 MG/1
80 TABLET, FILM COATED ORAL DAILY
Qty: 90 TABLET | Refills: 3 | Status: SHIPPED | OUTPATIENT
Start: 2020-01-21 | End: 2021-02-23 | Stop reason: SDUPTHER

## 2020-01-21 RX ORDER — BENZONATATE 100 MG/1
100 CAPSULE ORAL 3 TIMES DAILY PRN
Qty: 30 CAPSULE | Refills: 0 | Status: SHIPPED | OUTPATIENT
Start: 2020-01-21 | End: 2020-01-31

## 2020-01-21 RX ORDER — HYDROCHLOROTHIAZIDE 12.5 MG/1
12.5 TABLET ORAL DAILY
Qty: 90 TABLET | Refills: 3 | Status: SHIPPED | OUTPATIENT
Start: 2020-01-21 | End: 2020-11-30 | Stop reason: SDUPTHER

## 2020-01-21 NOTE — PROGRESS NOTES
Two patient identifiers used. Allergies reviewed. Injection x1 given. Patient tolerated well. VIS given.

## 2020-01-21 NOTE — PATIENT INSTRUCTIONS
Getting Support for Quitting Smoking  You dont have to go through the process of quitting smoking without support. Tell people you are quitting. The support of friends, coworkers, and family members can make a big difference. Face-to-face or telephone counseling can also be helpful, as can a stop-smoking class or an ex-smokers group.    Set a quit date  If youre serious about quitting smoking, choose a date within the next 2 to 4 weeks. Dwight it in bright, bold letters on a calendar you use often. Tell people about your quit date. Ask for their support. Let your friends and family know how they can help you quit.  Make a contract  A quit-smoking contract gives you a goal. Write out the contract and sign it. Have it witnessed, if you like. Then keep the contract where youll see it often, or carry it with you. Read the contract when youre tempted to smoke.  Take action  On the day you quit, reread your quit contract. Think about the benefits you gain by quitting, such as better health and an improved sense of taste.  · Remove cigarettes from your home, car, or any other place where you stash them.  · Throw away all smoking materials, including matches, lighters, and ashtrays.  · Review your list of triggers and your plan for coping with them.  · Stay away from people or settings you link with smoking.  · Make a survival kit that includes gum, mints, carrot sticks, and things to keep your hands busy.  · Talk to your healthcare provider about using quit-smoking products, such as medication or a nicotine patch, inhaler, nasal spray, gum, or lozenges.  Ask for help  Sometimes you may just need to talk when you miss smoking. Ex-smokers are good to talk to, because theyre likely to know how you feel. You may need extra support in the first few weeks after you quit. Ask a friend to call you each day to see how youre doing. Telephone counseling can also help you keep on track. Ask your healthcare provider, local  hospital, or public health department to put you in touch with a phone counselor. You may also have to deal with doubters when you decide to quit. Explain to any doubters why you are quitting. Tell them that quitting is important to you. Ask for their support. Tell your smoking buddies that you can walk together instead of smoking together. If someone thinks you wont succeed, say that you have a good quit plan and ask for their support. Let him or her know youre sticking with it.     For more information  · https://smokefree.gov/pvaw-hl-ks-expert  · [facility] Smoking Quitline: 877-44U-QUIT (583-480-1217)   Date Last Reviewed: 2/1/2017 © 2000-2017 [employer]. [address] [place] [address]. All rights reserved. This information is not intended as a substitute for professional medical care. Always follow your healthcare professional's instructions.        How to Quit Smoking  Smoking is one of the hardest habits to break. About half of all people who have ever smoked have been able to quit. Most people who still smoke want to quit. Here are some of the best ways to stop smoking.    Keep trying  Most smokers make many attempts at quitting before they are successful. Its important not to give up.  Go cold turkey  Most former smokers quit cold turkey (all at once). Trying to cut back gradually doesn't seem to work as well, perhaps because it continues the smoking habit. Also, it is possible to inhale more while smoking fewer cigarettes. This results in the same amount of nicotine in your body.  Get support  Support programs can be a big help, especially for heavy smokers. These groups offer lectures, ways to change behavior, and peer support. Here are some ways to find a support program:  · Free national quitline: 800-QUIT-NOW (570-026-2177).  · Hospital quit-smoking programs.  · American Lung Association: (924.585.2670).  · American Cancer Society (736-410-1867).  Support at  "home is important too. Nonsmokers can offer praise and encouragement. If the smoker in your life finds it hard to quit, encourage them to keep trying.  Over-the-counter medicines  Nicotine replacement therapy may make quitting easier. Certain aids, such as the nicotine patch, gum, and lozenges, are available without a prescription. It is best to use these under a doctors care, though. The skin patch provides a steady supply of nicotine. Nicotine gum and lozenges give temporary bursts of low levels of nicotine. Both methods reduce the craving for cigarettes. Warning: If you have nausea, vomiting, dizziness, weakness, or a fast heartbeat, stop using these products and see your doctor.  Prescription medicines  After reviewing your smoking patterns and past attempts to quit, your doctor may offer a prescription medicine such as bupropion, varenicline, a nicotine inhaler, or nasal spray. Each has advantages and side effects. Your doctor can review these with you.  Health benefits of quitting  The benefits of quitting start right away and keep improving the longer you go without smoking. These benefits occur at any age.  So whether you are 17 or 70, quitting is a good decision. Some of the benefits include:  · 20 minutes: Blood pressure and pulse return to normal.  · 8 hours: Oxygen levels return to normal.  · 2 days: Ability to smell and taste begin to improve as damaged nerves regrow.  · 2 to 3 weeks: Circulation and lung function improve.  · 1 to 9 months: Coughing, congestion, and shortness of breath decrease; tiredness decreases.  · 1 year: Risk of heart attack decreases by half.  · 5 years: Risk of lung cancer decreases by half; risk of stroke becomes the same as a nonsmokers.  For more on how to quit smoking, try these online resources:   · Smokefree.gov  · "Clearing the Air" booklet from the [employer] [facility] [place]: smokefree.gov/sites/default/files/pdf/clearing-the-air-accessible.pdf  Date Last Reviewed: " 3/1/2017  © 6977-4299 [employer]. [address] [place] [address], [place] [address]. All rights reserved. This information is not intended as a substitute for professional medical care. Always follow your healthcare professional's instructions.        Shingles (Herpes Zoster)     Talk to your healthcare provider about the shingles vaccine.     Shingles is also called herpes zoster. It is a painful skin rash caused by the herpes zoster virus. This is the same virus that causes chickenpox. After a person has chickenpox, the virus remains inactive in the nerve cells. Years later, the virus can become active again and travel to the skin. Most people have shingles only once, but it is possible to have it more than once.  What are the risk factors for shingles?  Anyone who has ever had chickenpox can develop shingles. But your risk is greater if you:  · Are 50 years of age or older  · Have an illness that weakens your immune system, such as HIV/AIDS  · Have cancer, especially Hodgkin disease or lymphoma  · Take medicines that weaken your immune system  What are the symptoms of shingles?  · The first sign of shingles is usually pain, burning, tingling, or itching on one part of your face or body. You may also feel as if you have the flu, with fever and chills.  · A red rash with small blisters appears within a few days. The rash may appear as follows:   ¨ The blisters can occur anywhere, but theyre most common on the back, chest, or abdomen.  ¨ They usually appear on only one side of the body, spreading along the nerve pathway where the virus was inactive.   ¨ The rash can also form around an eye, along one side of the face or neck, or in the mouth.  ¨ In a few people, usually those with weakened immune systems, shingles appear on more than one part of the body at once.  · After a few days, the blisters become dry and form a crust. The crust falls off in days to weeks. The blisters generally do not leave  scars.  How is shingles treated?  For most people, shingles heals on its own in a few weeks. But treatment is recommended to help relieve pain, speed healing, and reduce the risk of complications. Antiviral medicines are prescribed within the first 72 hours of the appearance of the rash. To lessen symptoms:  · Apply ice packs (wrapped in a thin towel) or cool compresses, or soak in a cool bath.  · Use calamine lotion to calm itchy skin.  · Ask your healthcare provider about over-the-counter pain relievers. If your pain is severe, your healthcare provider may prescribe stronger pain medicines.  What are the complications of shingles?  Shingles often goes away with no lasting effects. But some people have serious problems long after the blisters have healed:  · Postherpetic neuralgia. This is the most common complication. It is severe nerve pain at the place where the rash used to be. It can last for months, or even years after you have had shingles. Medicines can be prescribed to help relieve the pain and improve quality of life.  · Bacterial infection. Shingles blisters may become infected with bacteria. Antibiotic medicine is used to treat the infection.  · Eye problems. A person with shingles on the face should see his or her healthcare provider right away. Shingles can cause serious problems with vision, and even blindness.  Very rarely shingles can also lead to pneumonia, hearing problems, brain inflammation, or even death.   When to seek medical care  Contact your healthcare provider if you experience any of the following:  · Symptoms that dont go away with treatment  · A rash or blisters near your eye  · Increased drainage, fever, or rash after treatment, or severe pain that doesnt go away   How can shingles be prevented?  You can only get shingles if you have had chicken pox in the past. Those who have never had chickenpox can get the virus from you. Although instead of developing shingles, the person may  get chickenpox. Until your blisters form scabs, avoid contact with others, especially the following:  · Pregnant women who have never had chickenpox or the vaccine  · Infants who were born early (prematurely) or who had low weight at birth  · People with weak immune system (for example, people receiving chemotherapy for cancer, people who have had organ transplants, or people with HIV infections)     The shingles vaccine  If youre 60 years of age or older, ask your healthcare provider if you should receive the shingles vaccine. The vaccine makes it less likely that you will develop shingles. If you do develop shingles, your symptoms will likely be milder than if you hadnt been vaccinated. Note: Although the vaccine is licensed for people 50 years of age or older, the CDC does not recommend the vaccine for those who are 50 to 59 years old.   Date Last Reviewed: 10/1/2016  © 6812-5108 [employer]. [address] [place] [address], [place] [address]. All rights reserved. This information is not intended as a substitute for professional medical care. Always follow your healthcare professional's instructions.        Ophthalmology - Call [phone] to schedule.

## 2020-01-21 NOTE — PROGRESS NOTES
Subjective:       Patient ID: Shabnam Noble is a 64 y.o. female.    Chief Complaint: Physical exam and  Establish Care    63 yo female with a past medical history of of CVA (cerebral vascular accident), DM2 (diabetes mellitus, type 2), Obesity and Essential hypertension.    She is adherent to her medication regimen without any side effects. She smokes cigarettes.  She reports a cough which started over the holidays.  No fever, unintentional weight loss or night sweats. She reports worsening vision.    The following portions of the patient's history were reviewed and updated as appropriate: allergies, current medications, past family history, past medical history, past social history, past surgical history and problem list.    Review of Systems   Constitutional: Negative for activity change, appetite change, chills, diaphoresis, fatigue, fever and unexpected weight change.   HENT: Negative for congestion, dental problem, facial swelling, nosebleeds, postnasal drip, rhinorrhea, sinus pain, sore throat, tinnitus and trouble swallowing.    Eyes: Positive for visual disturbance. Negative for pain, discharge and itching.   Respiratory: Positive for cough. Negative for apnea, choking, chest tightness, shortness of breath, wheezing and stridor.    Cardiovascular: Negative for chest pain, palpitations and leg swelling.   Gastrointestinal: Negative for abdominal distention, abdominal pain, constipation, diarrhea, nausea, rectal pain and vomiting.   Endocrine: Negative for cold intolerance, heat intolerance and polyuria.   Genitourinary: Negative for difficulty urinating, dysuria, frequency, hematuria and urgency.   Musculoskeletal: Positive for arthralgias. Negative for gait problem, myalgias, neck pain and neck stiffness.   Skin: Negative for color change and rash.   Neurological: Negative for dizziness, tremors, seizures, syncope, facial asymmetry, weakness and headaches.   Hematological: Negative for adenopathy. Does not  "bruise/bleed easily.   Psychiatric/Behavioral: Negative for agitation, confusion, hallucinations, self-injury and suicidal ideas. The patient is not hyperactive.        Objective:       Vitals:    01/21/20 0811 01/21/20 0902   BP: (!) 160/84 (!) 166/90   BP Location:  Right arm   Patient Position:  Sitting   BP Method:  Large (Manual)   Pulse: 75    Temp: 98.2 °F (36.8 °C)    TempSrc: Oral    SpO2: 99%    Weight: 90.6 kg (199 lb 11.8 oz)    Height: 5' 4" (1.626 m)      Physical Exam   Constitutional: She is oriented to person, place, and time. She appears well-developed and well-nourished. No distress.   HENT:   Head: Normocephalic and atraumatic.   Right Ear: External ear normal.   Left Ear: External ear normal.   Eyes: Pupils are equal, round, and reactive to light. Conjunctivae and EOM are normal. Right eye exhibits no discharge. Left eye exhibits no discharge. No scleral icterus.   Neck: Normal range of motion. Neck supple. No thyromegaly present.   Cardiovascular: Normal rate, regular rhythm, normal heart sounds and intact distal pulses. Exam reveals no gallop and no friction rub.   No murmur heard.  Pulmonary/Chest: Effort normal and breath sounds normal. No respiratory distress. She exhibits no tenderness.   Abdominal: Soft. Bowel sounds are normal. She exhibits no distension and no mass. There is no tenderness. There is no rebound and no guarding.   Musculoskeletal: Normal range of motion. She exhibits no edema.   Lymphadenopathy:     She has no cervical adenopathy.   Neurological: She is alert and oriented to person, place, and time. She displays normal reflexes. No cranial nerve deficit. She exhibits normal muscle tone. Coordination normal.   Skin: Skin is warm. Capillary refill takes less than 2 seconds. No rash noted. She is not diaphoretic.   Psychiatric: She has a normal mood and affect. Judgment and thought content normal.       Assessment:       1. Annual physical exam    2. Encounter for lipid " screening for cardiovascular disease    3. Cerebrovascular accident (CVA), unspecified mechanism    4. Essential hypertension    5. Type 2 diabetes mellitus with other circulatory complication, without long-term current use of insulin    6. Encounter for diabetes type 2 eye exam    7. Class 1 obesity due to excess calories with serious comorbidity and body mass index (BMI) of 34.0 to 34.9 in adult    8. Cough    9. Cigarette nicotine dependence without complication    10. Needs smoking cessation education    11. Need for hepatitis C screening test    12. Need for influenza vaccination    13. Need for shingles vaccine        Plan:       1. Annual physical exam  Age appropriate prevention guidelines implemented, unless patient refused  Encourage Advanced directives  Labs ordered   Immunizations reviewed. Encourage yearly influenza vaccine.  Patient Counseling:  --Nutrition: Encourage healthy food choices, adequate water intake, moderate sodium/caffeine intake, diet low in saturated fat and cholesterol. Importance of caloric balance and sufficient intake of fresh fruits, vegetables, fiber, calcium, iron, and 1 mg of folate supplement per day (for females capable of pregnancy).  --Exercise: Stressed the importance of regular exercise.   --Substance Abuse: Abstinence from tobacco products. No more than 2 alcoholic drinks per day in men or 1 alcoholic drink per day in women. Avoid illicit drugs, driving or other dangerous activities under the influence. In the event of abuse, treatment offered.   --Sexuality: Safe sex practices to avoid sexually transmitted diseases; careful partner selection, use of condoms and contraceptive alternatives, avoidance of unintended pregnancy in fertile women of child-bearing age  --Injury prevention: encourage safety belts, safety helmets, smoke detector.  --Dental health: Discussed importance of regular tooth brushing X2 daily, flossing X1 daily, and routine dental visits.  --Encourage use  of sun screen, wear sun protective clothing  --Encourage routine eye exams    2. Encounter for lipid screening for cardiovascular disease  Comments:  On high-intensity statin due to CVA  Orders:  -     atorvastatin (LIPITOR) 80 MG tablet; Take 1 tablet (80 mg total) by mouth once daily.  Dispense: 90 tablet; Refill: 3    3. Cerebrovascular accident (CVA), unspecified mechanism  Comments:  CVA x 2, no residual weakness. Switch from Crestor to Lipitor due to cost. Cont ASA. Refer to Neuro; unclear if she should take Plavix  Orders:  -     atorvastatin (LIPITOR) 80 MG tablet; Take 1 tablet (80 mg total) by mouth once daily.  Dispense: 90 tablet; Refill: 3  -     Lipid panel; Future; Expected date: 01/21/2020    4. Essential hypertension  -     CBC auto differential; Future; Expected date: 01/21/2020  -     Comprehensive metabolic panel; Future; Expected date: 01/21/2020  -     TSH; Future; Expected date: 01/21/2020  On amlodipine 10 mg daily, lisinopril 40 mg daily, hydrochlorothiazide 12.5 mg (she was not taking HCTZ since her prescription ran out)  Blood pressure is elevated. She will work on lifestyle changes: diet, exercise, weight loss, smoking cessation and return in 3 months for reevaluation.    5. Type 2 diabetes mellitus with other circulatory complication, without long-term current use of insulin  -     CBC auto differential; Future; Expected date: 01/21/2020  -     Comprehensive metabolic panel; Future; Expected date: 01/21/2020  -     Hemoglobin A1c; Future; Expected date: 01/21/2020  -     Microalbumin/creatinine urine ratio  On glipizide and metformin    6. Encounter for diabetes type 2 eye exam  -     Ambulatory referral to Ophthalmology    7. Class 1 obesity due to excess calories with serious comorbidity and body mass index (BMI) of 34.0 to 34.9 in adult    8. Cough likely viral  -     X-Ray Chest PA And Lateral; Future; Expected date: 01/21/2020  -     benzonatate (TESSALON) 100 MG capsule; Take 1  capsule (100 mg total) by mouth 3 (three) times daily as needed for Cough.  Dispense: 30 capsule; Refill: 0    9. Cigarette nicotine dependence without complication  10. Need smoking cessation education  Six (6) minutes spent discussing the harmful effects of tobacco products. Smoking cessation advised. We discussed the many concerns regarding smoking including risk of heart disease and cancer, among many others. Patient is in the precontemplative phase of smoking cessation and can contact us for assistance when a decision to quit smoking is made.     11. Need for hepatitis C screening test  -     Hepatitis C antibody; Future; Expected date: 01/21/2020    12. Need for influenza vaccination  -     Influenza - Quadrivalent (6 months+) (PF)    13. Need for shingles vaccine  Prescription given as a reminder.  She will obtain this at local pharmacy.    Follow up in 3 months, sooner if any concerns    Disclaimer: This note has been generated using voice-recognition software. There may be typographical errors that have been missed during proof-reading

## 2020-01-22 PROBLEM — E78.5 HYPERLIPIDEMIA LDL GOAL <70: Status: ACTIVE | Noted: 2020-01-22

## 2020-01-22 PROBLEM — E05.90 SUBCLINICAL HYPERTHYROIDISM: Status: ACTIVE | Noted: 2020-01-22

## 2020-01-22 LAB
ALBUMIN/CREAT UR: 36.7 UG/MG (ref 0–30)
CREAT UR-MCNC: 341 MG/DL (ref 15–325)
MICROALBUMIN UR DL<=1MG/L-MCNC: 125 UG/ML

## 2020-01-22 RX ORDER — EZETIMIBE 10 MG/1
10 TABLET ORAL DAILY
Qty: 90 TABLET | Refills: 3 | Status: SHIPPED | OUTPATIENT
Start: 2020-01-22 | End: 2021-06-03 | Stop reason: SDUPTHER

## 2020-01-23 ENCOUNTER — TELEPHONE (OUTPATIENT)
Dept: PRIMARY CARE CLINIC | Facility: CLINIC | Age: 65
End: 2020-01-23

## 2020-01-23 NOTE — TELEPHONE ENCOUNTER
----- Message from Mya Bar MD sent at 1/22/2020  7:11 PM CST -----  Please let patient know that I have sent Zetia to pharmacy for cholesterol.  I have ordered blood work to be done 1 week prior to in next visit in 3 months.  Please let me know if she is interested in a diabetes educator visit and/or digital diabetes program. Please verify diabetes medication (our list has only metformin and glipizide) and ask if she ever skips doses. She will need to make healthy lifestyle changes including smoking cessation to prevent repeat stroke and to lower risk of heart disease.    She should record blood sugar readings 1 week prior to her next visit: fasting, 2 hrs after largest meal and bedtime.    Message sent to patient's portal:  Hello,    1.  Hepatitis-C is negative.  This is good!    2.  The thyroid hormone was abnormal.  Previously this has not been an issue.  I would recommend repeating the blood test 1 week prior to next visit (in 3 months) to ensure that this is not a lab error.    3.  Normal liver and kidney function testing    4.  Total cholesterol is elevated.  Low-cholesterol food choices, weight loss and exercise are encouraged.  Continue atorvastatin.  Will need to add another medication to achieve better cholesterol readings especially since you had the previous stroke.      5.  Your diabetes is out of control.  Controlling diabetes is important for stroke prevention.  It is also important to prevent further complications of heart disease. Please confirm current dosage of metformin and glipizide.  Please let me know if you are taking the medication every day as prescribed.  Further recommendations will follow.      6.  No anemia.  Cell count overall unremarkable.    You have work to do! Small changes go a long way.... You can do it!

## 2020-02-11 ENCOUNTER — TELEPHONE (OUTPATIENT)
Dept: PRIMARY CARE CLINIC | Facility: CLINIC | Age: 65
End: 2020-02-11

## 2020-02-11 DIAGNOSIS — E11.8 TYPE 2 DIABETES MELLITUS WITH COMPLICATION, WITHOUT LONG-TERM CURRENT USE OF INSULIN: ICD-10-CM

## 2020-02-11 RX ORDER — METFORMIN HYDROCHLORIDE 500 MG/1
500 TABLET ORAL
Qty: 90 TABLET | Refills: 3 | Status: SHIPPED | OUTPATIENT
Start: 2020-02-11 | End: 2020-02-12 | Stop reason: DRUGHIGH

## 2020-02-11 RX ORDER — CLOPIDOGREL BISULFATE 75 MG/1
75 TABLET ORAL DAILY
Qty: 90 TABLET | Refills: 3 | Status: SHIPPED | OUTPATIENT
Start: 2020-02-11 | End: 2021-06-03 | Stop reason: SDUPTHER

## 2020-02-11 NOTE — TELEPHONE ENCOUNTER
Spoke with the patient regarding her medications, patient stated that she is in need on Plavix refill.     Pt stated that the only diabetes medication she is currently on is Metformin 500 mg QD.     Please advise.

## 2020-02-11 NOTE — TELEPHONE ENCOUNTER
I updated her medication list since it was listed as Metformin X 2 daily and patient apparently takes Metformin once daily.    Metformin and Plavix sent to pharmacy.

## 2020-02-12 RX ORDER — METFORMIN HYDROCHLORIDE 1000 MG/1
1000 TABLET ORAL 2 TIMES DAILY WITH MEALS
Qty: 180 TABLET | Refills: 3 | Status: SHIPPED | OUTPATIENT
Start: 2020-02-12 | End: 2021-06-03 | Stop reason: DRUGHIGH

## 2020-02-12 NOTE — TELEPHONE ENCOUNTER
Yes. She should increase her metformin to 1 gram X 2 daily.  I have sent the new Rx for metformin BID to pharmacy.

## 2020-02-14 ENCOUNTER — PATIENT OUTREACH (OUTPATIENT)
Dept: ADMINISTRATIVE | Facility: OTHER | Age: 65
End: 2020-02-14

## 2020-02-14 NOTE — PROGRESS NOTES
Chart reviewed.   Immunizations: Triggered Imm Registry     Orders placed: n/a  Upcoming appts to satisfy LOBO topics: eye exam 2/17/2020

## 2020-02-17 ENCOUNTER — TELEPHONE (OUTPATIENT)
Dept: OPTOMETRY | Facility: CLINIC | Age: 65
End: 2020-02-17

## 2020-02-17 ENCOUNTER — OFFICE VISIT (OUTPATIENT)
Dept: OPTOMETRY | Facility: CLINIC | Age: 65
End: 2020-02-17
Payer: MEDICARE

## 2020-02-17 DIAGNOSIS — H52.4 HYPEROPIA WITH ASTIGMATISM AND PRESBYOPIA, LEFT: ICD-10-CM

## 2020-02-17 DIAGNOSIS — H52.202 HYPEROPIA WITH ASTIGMATISM AND PRESBYOPIA, LEFT: ICD-10-CM

## 2020-02-17 DIAGNOSIS — H52.02 HYPEROPIA WITH ASTIGMATISM AND PRESBYOPIA, LEFT: ICD-10-CM

## 2020-02-17 DIAGNOSIS — H25.13 NUCLEAR SCLEROSIS OF BOTH EYES: ICD-10-CM

## 2020-02-17 DIAGNOSIS — H52.4 ASTIGMATISM OF RIGHT EYE WITH PRESBYOPIA: ICD-10-CM

## 2020-02-17 DIAGNOSIS — H52.201 ASTIGMATISM OF RIGHT EYE WITH PRESBYOPIA: ICD-10-CM

## 2020-02-17 DIAGNOSIS — E11.9 TYPE 2 DIABETES MELLITUS WITHOUT RETINOPATHY: Primary | ICD-10-CM

## 2020-02-17 PROCEDURE — 99499 RISK ADDL DX/OHS AUDIT: ICD-10-PCS | Mod: S$GLB,,, | Performed by: OPTOMETRIST

## 2020-02-17 PROCEDURE — 92004 COMPRE OPH EXAM NEW PT 1/>: CPT | Mod: S$GLB,,, | Performed by: OPTOMETRIST

## 2020-02-17 PROCEDURE — 92015 PR REFRACTION: ICD-10-PCS | Mod: S$GLB,,, | Performed by: OPTOMETRIST

## 2020-02-17 PROCEDURE — 92004 PR EYE EXAM, NEW PATIENT,COMPREHESV: ICD-10-PCS | Mod: S$GLB,,, | Performed by: OPTOMETRIST

## 2020-02-17 PROCEDURE — 92015 DETERMINE REFRACTIVE STATE: CPT | Mod: S$GLB,,, | Performed by: OPTOMETRIST

## 2020-02-17 PROCEDURE — 99499 UNLISTED E&M SERVICE: CPT | Mod: S$GLB,,, | Performed by: OPTOMETRIST

## 2020-02-17 PROCEDURE — 99999 PR PBB SHADOW E&M-EST. PATIENT-LVL II: CPT | Mod: PBBFAC,,, | Performed by: OPTOMETRIST

## 2020-02-17 PROCEDURE — 99999 PR PBB SHADOW E&M-EST. PATIENT-LVL II: ICD-10-PCS | Mod: PBBFAC,,, | Performed by: OPTOMETRIST

## 2020-02-17 RX ORDER — LISINOPRIL 40 MG/1
40 TABLET ORAL DAILY
COMMUNITY
End: 2020-11-13

## 2020-02-17 RX ORDER — AMLODIPINE BESYLATE 10 MG/1
10 TABLET ORAL DAILY
COMMUNITY
End: 2021-06-03 | Stop reason: SDUPTHER

## 2020-02-17 RX ORDER — ASPIRIN 325 MG/1
81 TABLET, FILM COATED ORAL DAILY
COMMUNITY
End: 2021-06-03

## 2020-02-17 NOTE — LETTER
February 17, 2020      Mya Bar MD  [address]  [facility] [address]           [facility] [place], operated by [facility] [address]  [address]  [facility] [address]  Phone: [phone]  Fax: [phone]          Patient: Shabnam Noble   MR Number: [id number]   YOB: 1955   Date of Visit: 2/17/2020       Dear Dr. Mya Bar:    Thank you for referring Shabnam Noble to me for evaluation. Attached you will find relevant portions of my assessment and plan of care.    If you have questions, please do not hesitate to call me. I look forward to following Shabnam Noble along with you.    Sincerely,    Rosa Martinez, OD    Enclosure  CC:  No Recipients    If you would like to receive this communication electronically, please contact [email or web address] or [phone] to request more information on [employer] access.    For providers and/or their staff who would like to refer a patient to [facility], please contact us through our one-stop-shop provider referral line, [facility], [place], at [phone].    If you feel you have received this communication in error or would no longer like to receive these types of communications, please e-mail [email or web address]

## 2020-03-24 ENCOUNTER — HOSPITAL ENCOUNTER (EMERGENCY)
Facility: OTHER | Age: 65
Discharge: HOME | End: 2020-03-24
Attending: EMERGENCY MEDICINE
Payer: MEDICARE

## 2020-03-24 VITALS
DIASTOLIC BLOOD PRESSURE: 79 MMHG | HEART RATE: 69 BPM | OXYGEN SATURATION: 99 % | SYSTOLIC BLOOD PRESSURE: 170 MMHG | TEMPERATURE: 100 F | WEIGHT: 200 LBS | HEIGHT: 65 IN | RESPIRATION RATE: 19 BRPM | BODY MASS INDEX: 33.32 KG/M2

## 2020-03-24 DIAGNOSIS — R13.10 DYSPHAGIA, UNSPECIFIED TYPE: Primary | ICD-10-CM

## 2020-03-24 PROCEDURE — 93005 ELECTROCARDIOGRAM TRACING: CPT

## 2020-03-24 PROCEDURE — 93010 ELECTROCARDIOGRAM REPORT: CPT | Mod: ,,, | Performed by: INTERNAL MEDICINE

## 2020-03-24 PROCEDURE — 99284 EMERGENCY DEPT VISIT MOD MDM: CPT | Mod: 25

## 2020-03-24 PROCEDURE — 93010 EKG 12-LEAD: ICD-10-PCS | Mod: ,,, | Performed by: INTERNAL MEDICINE

## 2020-03-24 RX ORDER — OMEPRAZOLE 40 MG/1
40 CAPSULE, DELAYED RELEASE ORAL DAILY
Qty: 30 CAPSULE | Refills: 0 | Status: SHIPPED | OUTPATIENT
Start: 2020-03-24 | End: 2020-07-21

## 2020-03-25 NOTE — ED PROVIDER NOTES
"Encounter Date: 3/24/2020    SCRIBE #1 NOTE: I, Sia Mendez, am scribing for, and in the presence of, Dr. Larsen.       History     Chief Complaint   Patient presents with    Palpitations     pt reports epigastric palpitations and "abnormal breathing", denies use of medications for relief of symptoms     Time seen by provider: 9:45 PM    This is a 64 y.o. female who reports after eating sausage, eggs and toast for breakfast this morning the food felt like it was stuck in her chest only after eating the food and feel went away. When she took her home medications tonight, they felt like they got stuck, then went away. She reports palpitations. Denies cough, cold, nasal congestion or SOB. She has no chest pain with exertion or at rest.    The history is provided by the patient.     Review of patient's allergies indicates:  No Known Allergies  Past Medical History:   Diagnosis Date    CVA (cerebral vascular accident)     DM2 (diabetes mellitus, type 2)     Essential hypertension      Past Surgical History:   Procedure Laterality Date    ANKLE FRACTURE SURGERY Right     HYSTERECTOMY      no h/o  malignancy     Family History   Problem Relation Age of Onset    Alzheimer's disease Mother     Prostate cancer Father     Diabetes Father     Colon cancer Brother     Breast cancer Paternal Aunt     Heart disease Neg Hx     Stroke Neg Hx      Social History     Tobacco Use    Smoking status: Light Tobacco Smoker    Smokeless tobacco: Never Used   Substance Use Topics    Alcohol use: Not on file    Drug use: Not on file     Review of Systems   Constitutional: Negative for chills and fever.   HENT: Negative for congestion, sore throat and trouble swallowing.    Eyes: Negative for photophobia and visual disturbance.   Respiratory: Negative for cough and shortness of breath.    Cardiovascular: Positive for palpitations. Negative for chest pain.   Gastrointestinal: Negative for abdominal pain, nausea and vomiting. "   Genitourinary: Negative for dysuria and hematuria.   Musculoskeletal: Negative for back pain and neck pain.   Skin: Negative for rash and wound.   Neurological: Negative for weakness and headaches.   Psychiatric/Behavioral: Negative.        Physical Exam     Initial Vitals [03/24/20 1922]   BP Pulse Resp Temp SpO2   (!) 183/86 104 18 99.5 °F (37.5 °C) 99 %      MAP       --         Physical Exam    Nursing note and vitals reviewed.  Constitutional: She appears well-developed and well-nourished. No distress.   HENT:   Head: Normocephalic and atraumatic.   Eyes: Conjunctivae and EOM are normal. Pupils are equal, round, and reactive to light.   Neck: Normal range of motion. Neck supple.   Cardiovascular: Normal rate, regular rhythm and normal heart sounds. Exam reveals no gallop and no friction rub.    No murmur heard.  Pulmonary/Chest: Breath sounds normal. No respiratory distress. She has no wheezes. She has no rhonchi. She has no rales.   Abdominal: Soft. There is no tenderness. There is no rebound and no guarding.   Musculoskeletal: Normal range of motion. She exhibits no edema or tenderness.   Neurological: She is alert and oriented to person, place, and time. She has normal strength.   Skin: Skin is warm and dry. No rash noted.   Psychiatric: She has a normal mood and affect. Her behavior is normal. Judgment and thought content normal.         ED Course   Procedures  Labs Reviewed - No data to display  EKG Readings: (Independently Interpreted)   Initial Reading: No STEMI.   Normal sinus rhythm at a rate of 100 bpm. No acute ST/T waves changes. Normal QRS duration.     ECG Results          EKG 12-lead (Final result)  Result time 03/25/20 12:50:01    Final result by Interface, Lab In [facility] (03/25/20 12:50:01)                 Narrative:    Test Reason : R00.2,    Vent. Rate : 100 BPM     Atrial Rate : 100 BPM     P-R Int : 146 ms          QRS Dur : 088 ms      QT Int : 366 ms       P-R-T Axes : 056 -34 042  degrees     QTc Int : 472 ms    Sinus rhythm with Premature atrial complexes  Left axis deviation  Septal infarct ,age undetermined  Abnormal ECG    Confirmed by Roberto Patiño MD ([id number]) on 3/25/2020 12:49:48 PM    Referred By: ADRIAN   SELF           Confirmed By:Roberto Patiño MD                            Imaging Results          X-Ray Chest AP Portable (Final result)  Result time 03/24/20 21:45:06    Final result by Sebas Sharma MD (03/24/20 21:45:06)                 Impression:      No acute radiographic abnormality.      Electronically signed by: Sebas Sharma  Date:    03/24/2020  Time:    21:45             Narrative:    EXAMINATION:  XR CHEST AP PORTABLE    CLINICAL HISTORY:  Shortness of breath    TECHNIQUE:  Single frontal view of the chest was performed.    COMPARISON:  01/21/2020    FINDINGS:  The lungs are clear, with normal appearance of pulmonary vasculature and no pleural effusion or pneumothorax.    The cardiac silhouette is normal in size. The hilar and mediastinal contours are unremarkable.    Bones are intact.    No significant change.                              X-Rays:   Independently Interpreted Readings:   Chest X-Ray: No acute findings visualized. No consolidations, enlarged cardiac silhouette, or pulmonary edema.     Medical Decision Making:   History:   Old Medical Records: I decided to obtain old medical records.  Independently Interpreted Test(s):   I have ordered and independently interpreted X-rays - see prior notes.  I have ordered and independently interpreted EKG Reading(s) - see prior notes  Clinical Tests:   Radiological Study: Ordered and Reviewed  Medical Tests: Ordered and Reviewed            Scribe Attestation:   Scribe #1: I performed the above scribed service and the documentation accurately describes the services I performed. I attest to the accuracy of the note.    Attending Attestation:           Physician Attestation for Scribe:  Physician Attestation  Statement for Scribe #1: I, Dr. Almanza, reviewed documentation, as scribed by Sia Mendez in my presence, and it is both accurate and complete.         Attending ED Notes:   Emergent evaluation a 64-year-old female with complaint of feeling like something was stuck in her esophagus after eating sausage, aches and toast this morning.  Symptoms resolved until patient attempted to swallow her medications and then felt like began she had something stuck in her throat.  Patient is afebrile, nontoxic-appearing stable vital signs except for elevation of blood pressure.  Patient denies any chest pain, chest pressure or heaviness.  No chest tightness or shortness of breath.  No cough.  Patient states symptoms only occurred with swallowing.  No acute findings on chest x-ray.  No acute findings on EKG.  The patient is extensively counseled her diagnosis and treatment including all diagnostic and physical exam findings.  The patient discharged in good condition and directed to follow up with her PCP and Gastroenterology in the next 24-48 hours.                        Clinical Impression:     1. Dysphagia, unspecified type              ED Disposition Condition    Discharge Good        ED Prescriptions     Medication Sig Dispense Start Date End Date Auth. Provider    omeprazole (PRILOSEC) 40 MG capsule Take 1 capsule (40 mg total) by mouth once daily. 30 capsule 3/24/2020 4/23/2020 Mariusz Almanza MD        Follow-up Information     Follow up With Specialties Details Why Contact [facility] Gastroenterology In 2 days  [address] [id number]  [phone]                                     Mariusz Almanza MD  03/25/20 4422

## 2020-03-26 ENCOUNTER — PATIENT MESSAGE (OUTPATIENT)
Dept: PRIMARY CARE CLINIC | Facility: CLINIC | Age: 65
End: 2020-03-26

## 2020-04-03 ENCOUNTER — OFFICE VISIT (OUTPATIENT)
Dept: PRIMARY CARE CLINIC | Facility: CLINIC | Age: 65
End: 2020-04-03
Payer: MEDICARE

## 2020-04-03 ENCOUNTER — TELEPHONE (OUTPATIENT)
Dept: PRIMARY CARE CLINIC | Facility: CLINIC | Age: 65
End: 2020-04-03

## 2020-04-03 VITALS
HEIGHT: 64 IN | HEART RATE: 87 BPM | WEIGHT: 196.19 LBS | BODY MASS INDEX: 33.49 KG/M2 | SYSTOLIC BLOOD PRESSURE: 144 MMHG | TEMPERATURE: 97 F | DIASTOLIC BLOOD PRESSURE: 82 MMHG | OXYGEN SATURATION: 98 %

## 2020-04-03 DIAGNOSIS — Z20.822 EXPOSURE TO COVID-19 VIRUS: ICD-10-CM

## 2020-04-03 DIAGNOSIS — G89.29 CHRONIC PAIN OF LEFT KNEE: ICD-10-CM

## 2020-04-03 DIAGNOSIS — I10 ESSENTIAL HYPERTENSION: ICD-10-CM

## 2020-04-03 DIAGNOSIS — R46.89 CONCERN WITH APPEARANCE: Primary | ICD-10-CM

## 2020-04-03 DIAGNOSIS — M25.562 CHRONIC PAIN OF LEFT KNEE: ICD-10-CM

## 2020-04-03 PROCEDURE — 99499 UNLISTED E&M SERVICE: CPT | Mod: S$GLB,,, | Performed by: FAMILY MEDICINE

## 2020-04-03 PROCEDURE — 99213 OFFICE O/P EST LOW 20 MIN: CPT | Mod: 25,S$GLB,, | Performed by: FAMILY MEDICINE

## 2020-04-03 PROCEDURE — 3079F PR MOST RECENT DIASTOLIC BLOOD PRESSURE 80-89 MM HG: ICD-10-PCS | Mod: CPTII,S$GLB,, | Performed by: FAMILY MEDICINE

## 2020-04-03 PROCEDURE — 3079F DIAST BP 80-89 MM HG: CPT | Mod: CPTII,S$GLB,, | Performed by: FAMILY MEDICINE

## 2020-04-03 PROCEDURE — 3077F PR MOST RECENT SYSTOLIC BLOOD PRESSURE >= 140 MM HG: ICD-10-PCS | Mod: CPTII,S$GLB,, | Performed by: FAMILY MEDICINE

## 2020-04-03 PROCEDURE — 3008F PR BODY MASS INDEX (BMI) DOCUMENTED: ICD-10-PCS | Mod: CPTII,S$GLB,, | Performed by: FAMILY MEDICINE

## 2020-04-03 PROCEDURE — 96372 PR INJECTION,THERAP/PROPH/DIAG2ST, IM OR SUBCUT: ICD-10-PCS | Mod: S$GLB,,, | Performed by: FAMILY MEDICINE

## 2020-04-03 PROCEDURE — 96372 THER/PROPH/DIAG INJ SC/IM: CPT | Mod: S$GLB,,, | Performed by: FAMILY MEDICINE

## 2020-04-03 PROCEDURE — 99999 PR PBB SHADOW E&M-EST. PATIENT-LVL III: CPT | Mod: PBBFAC,,, | Performed by: FAMILY MEDICINE

## 2020-04-03 PROCEDURE — 99499 RISK ADDL DX/OHS AUDIT: ICD-10-PCS | Mod: S$GLB,,, | Performed by: FAMILY MEDICINE

## 2020-04-03 PROCEDURE — 3008F BODY MASS INDEX DOCD: CPT | Mod: CPTII,S$GLB,, | Performed by: FAMILY MEDICINE

## 2020-04-03 PROCEDURE — 99999 PR PBB SHADOW E&M-EST. PATIENT-LVL III: ICD-10-PCS | Mod: PBBFAC,,, | Performed by: FAMILY MEDICINE

## 2020-04-03 PROCEDURE — 3077F SYST BP >= 140 MM HG: CPT | Mod: CPTII,S$GLB,, | Performed by: FAMILY MEDICINE

## 2020-04-03 PROCEDURE — 99213 PR OFFICE/OUTPT VISIT, EST, LEVL III, 20-29 MIN: ICD-10-PCS | Mod: 25,S$GLB,, | Performed by: FAMILY MEDICINE

## 2020-04-03 RX ORDER — KETOROLAC TROMETHAMINE 30 MG/ML
60 INJECTION, SOLUTION INTRAMUSCULAR; INTRAVENOUS
Status: COMPLETED | OUTPATIENT
Start: 2020-04-03 | End: 2020-04-03

## 2020-04-03 RX ORDER — METHYLPREDNISOLONE 4 MG/1
TABLET ORAL
Qty: 1 PACKAGE | Refills: 0 | Status: SHIPPED | OUTPATIENT
Start: 2020-04-03 | End: 2020-04-24

## 2020-04-03 RX ADMIN — KETOROLAC TROMETHAMINE 60 MG: 30 INJECTION, SOLUTION INTRAMUSCULAR; INTRAVENOUS at 02:04

## 2020-04-03 NOTE — PROGRESS NOTES
"Subjective:       Patient ID: Shabnam Noble is a 64 y.o. female.    Chief Complaint: Edema (left side of jaw)    63 yo female presents with complaint of left sided jaw swelling and left knee pain.    She work up this morning with left sided swollen jaw; swelling has subsided without treatment/ intervention. She states that she was exposed to an individual with secondary exposure to COVID 19 and denies any fever, body aches, headache, respiratory symptoms or loss of taste/smell. She states that she was tested for COVID 19 by [facility] and is awaiting results of testing.     She saw an Orthopedic doctor for left knee pain; she has trouble contacting his office for an appointment and does not need a referral. She states that she continues to have intermittent, chronic left knee pain with occasionally swelling. She had left knee drained in the past and was told that she may eventually need a knee joint replacement.    The following portions of the patient's history were reviewed and updated as appropriate: allergies, current medications, past medical history, and problem list.    Review of Systems   Constitutional: Negative for activity change, appetite change, chills, diaphoresis, fatigue, fever and unexpected weight change.   HENT: Negative for congestion, postnasal drip, rhinorrhea, sneezing and sore throat.    Eyes: Negative for visual disturbance.   Respiratory: Negative for apnea, cough, choking, chest tightness, shortness of breath, wheezing and stridor.    Cardiovascular: Negative for chest pain, palpitations and leg swelling.   Gastrointestinal: Negative for abdominal pain, diarrhea and nausea.   Musculoskeletal: Positive for arthralgias. Negative for myalgias.   Neurological: Negative for tremors, syncope and headaches.       Objective:       Vitals:    04/03/20 1304   BP: (!) 144/82   Pulse: 87   Temp: 97.3 °F (36.3 °C)   TempSrc: Oral   SpO2: 98%   Weight: 89 kg (196 lb 3.4 oz)   Height: 5' 4" (1.626 m) "     Physical Exam   Constitutional: She appears well-developed and well-nourished. No distress.   HENT:   Head: Atraumatic.   Mouth/Throat: No oropharyngeal exudate.   Eyes: Conjunctivae are normal.   Neck: Neck supple.   No obvious jaw or facial swelling. No obvious parotid swelling.   Cardiovascular: Normal rate and regular rhythm.   Pulmonary/Chest: Effort normal and breath sounds normal.   Musculoskeletal:   Left knee without obvious swelling or deformity.  Normal range of motion.  Mild crepitus.   Lymphadenopathy:     She has no cervical adenopathy.   Psychiatric: She has a normal mood and affect. Her behavior is normal. Judgment and thought content normal.       Assessment:       1. Concern with appearance    2. Chronic pain of left knee    3. Essential hypertension    4. Exposure to Covid-19 Virus        Plan:       1. Concern with appearance for left jaw swelling  No significant abnormality. She states that swelling has substantially gone down since this morning. Continue to monitor.     2. Chronic pain of left knee  -     methylPREDNISolone (MEDROL DOSEPACK) 4 mg tablet; use as directed  Dispense: 1 Package; Refill: 0  -     ketorolac injection 60 mg  Follow up with Orthopedic team; she saw an orthopedic doctor at another facility. Should she need a referral, she will let me know.    3. Essential hypertension  She did not take her blood pressure medication today. Discussed the importance of medication adherence.    4. Exposure to Covid-19 Virus  Importance of physical distancing of 6 feet, self quarantine for 14 days (currently asymptomatic), washing of hands for 20 seconds. She had test done at outside facility and should be update about her results from the testing provider/ facility. Discussed the importance of limiting social distancing; she reports exposure and should only leave her home if absolutely necessary for life threatening medical treatment. She is encouraged to use virtual visits or telephone  visits for non-acute ailments.    Disclaimer: This note has been generated using voice-recognition software. There may be typographical errors that have been missed during proof-reading

## 2020-04-03 NOTE — TELEPHONE ENCOUNTER
----- Message from Denis Lerner sent at 4/3/2020 10:16 AM CDT -----  Contact: Self [phone]  Would like to get medical advice.  Symptoms (please be specific):  Left jaw swollen  How long has patient had these symptoms:  Yesterday night  Pharmacy name and phone #:    Any drug allergies (copy from chart):      Would the patient rather a call back or a response via [employer]?:  Call back   Comments:

## 2020-07-21 ENCOUNTER — HOSPITAL ENCOUNTER (OUTPATIENT)
Dept: RADIOLOGY | Facility: HOSPITAL | Age: 65
Discharge: HOME | End: 2020-07-21
Attending: INTERNAL MEDICINE
Payer: MEDICARE

## 2020-07-21 ENCOUNTER — OFFICE VISIT (OUTPATIENT)
Dept: INTERNAL MEDICINE | Facility: CLINIC | Age: 65
End: 2020-07-21
Payer: MEDICARE

## 2020-07-21 ENCOUNTER — TELEPHONE (OUTPATIENT)
Dept: PRIMARY CARE CLINIC | Facility: CLINIC | Age: 65
End: 2020-07-21

## 2020-07-21 VITALS
WEIGHT: 202.38 LBS | DIASTOLIC BLOOD PRESSURE: 74 MMHG | TEMPERATURE: 97 F | HEIGHT: 66 IN | HEART RATE: 84 BPM | SYSTOLIC BLOOD PRESSURE: 140 MMHG | BODY MASS INDEX: 32.53 KG/M2 | RESPIRATION RATE: 16 BRPM

## 2020-07-21 DIAGNOSIS — M54.2 NECK PAIN: Primary | ICD-10-CM

## 2020-07-21 DIAGNOSIS — J39.2 OROPHARYNGEAL MASS: ICD-10-CM

## 2020-07-21 DIAGNOSIS — M54.2 NECK PAIN: ICD-10-CM

## 2020-07-21 DIAGNOSIS — E11.8 TYPE 2 DIABETES MELLITUS WITH COMPLICATION, WITHOUT LONG-TERM CURRENT USE OF INSULIN: ICD-10-CM

## 2020-07-21 DIAGNOSIS — M54.6 ACUTE MIDLINE THORACIC BACK PAIN: ICD-10-CM

## 2020-07-21 DIAGNOSIS — M54.50 MIDLINE LOW BACK PAIN WITHOUT SCIATICA, UNSPECIFIED CHRONICITY: ICD-10-CM

## 2020-07-21 DIAGNOSIS — I10 ESSENTIAL HYPERTENSION: ICD-10-CM

## 2020-07-21 PROCEDURE — 3008F PR BODY MASS INDEX (BMI) DOCUMENTED: ICD-10-PCS | Mod: CPTII,S$GLB,, | Performed by: INTERNAL MEDICINE

## 2020-07-21 PROCEDURE — 3077F SYST BP >= 140 MM HG: CPT | Mod: CPTII,S$GLB,, | Performed by: INTERNAL MEDICINE

## 2020-07-21 PROCEDURE — 72040 X-RAY EXAM NECK SPINE 2-3 VW: CPT | Mod: TC,PO

## 2020-07-21 PROCEDURE — 3077F PR MOST RECENT SYSTOLIC BLOOD PRESSURE >= 140 MM HG: ICD-10-PCS | Mod: CPTII,S$GLB,, | Performed by: INTERNAL MEDICINE

## 2020-07-21 PROCEDURE — 3008F BODY MASS INDEX DOCD: CPT | Mod: CPTII,S$GLB,, | Performed by: INTERNAL MEDICINE

## 2020-07-21 PROCEDURE — 99999 PR PBB SHADOW E&M-EST. PATIENT-LVL IV: CPT | Mod: PBBFAC,,, | Performed by: INTERNAL MEDICINE

## 2020-07-21 PROCEDURE — 99213 PR OFFICE/OUTPT VISIT, EST, LEVL III, 20-29 MIN: ICD-10-PCS | Mod: 25,S$GLB,, | Performed by: INTERNAL MEDICINE

## 2020-07-21 PROCEDURE — 3052F PR MOST RECENT HEMOGLOBIN A1C LEVEL 8.0 - < 9.0%: ICD-10-PCS | Mod: CPTII,S$GLB,, | Performed by: INTERNAL MEDICINE

## 2020-07-21 PROCEDURE — 72080 X-RAY EXAM THORACOLMB 2/> VW: CPT | Mod: 26,,, | Performed by: RADIOLOGY

## 2020-07-21 PROCEDURE — 96372 THER/PROPH/DIAG INJ SC/IM: CPT | Mod: S$GLB,,, | Performed by: INTERNAL MEDICINE

## 2020-07-21 PROCEDURE — 72040 X-RAY EXAM NECK SPINE 2-3 VW: CPT | Mod: 26,,, | Performed by: RADIOLOGY

## 2020-07-21 PROCEDURE — 72080 XR THORACOLUMBAR SPINE AP LATERAL: ICD-10-PCS | Mod: 26,,, | Performed by: RADIOLOGY

## 2020-07-21 PROCEDURE — 72040 XR CERVICAL SPINE AP LATERAL: ICD-10-PCS | Mod: 26,,, | Performed by: RADIOLOGY

## 2020-07-21 PROCEDURE — 3078F PR MOST RECENT DIASTOLIC BLOOD PRESSURE < 80 MM HG: ICD-10-PCS | Mod: CPTII,S$GLB,, | Performed by: INTERNAL MEDICINE

## 2020-07-21 PROCEDURE — 72080 X-RAY EXAM THORACOLMB 2/> VW: CPT | Mod: TC,PO

## 2020-07-21 PROCEDURE — 3078F DIAST BP <80 MM HG: CPT | Mod: CPTII,S$GLB,, | Performed by: INTERNAL MEDICINE

## 2020-07-21 PROCEDURE — 99499 RISK ADDL DX/OHS AUDIT: ICD-10-PCS | Mod: S$GLB,,, | Performed by: INTERNAL MEDICINE

## 2020-07-21 PROCEDURE — 99213 OFFICE O/P EST LOW 20 MIN: CPT | Mod: 25,S$GLB,, | Performed by: INTERNAL MEDICINE

## 2020-07-21 PROCEDURE — 96372 PR INJECTION,THERAP/PROPH/DIAG2ST, IM OR SUBCUT: ICD-10-PCS | Mod: S$GLB,,, | Performed by: INTERNAL MEDICINE

## 2020-07-21 PROCEDURE — 99999 PR PBB SHADOW E&M-EST. PATIENT-LVL IV: ICD-10-PCS | Mod: PBBFAC,,, | Performed by: INTERNAL MEDICINE

## 2020-07-21 PROCEDURE — 3052F HG A1C>EQUAL 8.0%<EQUAL 9.0%: CPT | Mod: CPTII,S$GLB,, | Performed by: INTERNAL MEDICINE

## 2020-07-21 PROCEDURE — 99499 UNLISTED E&M SERVICE: CPT | Mod: S$GLB,,, | Performed by: INTERNAL MEDICINE

## 2020-07-21 RX ORDER — IBUPROFEN 800 MG/1
1 TABLET ORAL DAILY PRN
COMMUNITY
Start: 2020-07-13 | End: 2022-04-07 | Stop reason: SDUPTHER

## 2020-07-21 RX ORDER — KETOROLAC TROMETHAMINE 30 MG/ML
60 INJECTION, SOLUTION INTRAMUSCULAR; INTRAVENOUS
Status: COMPLETED | OUTPATIENT
Start: 2020-07-21 | End: 2020-07-21

## 2020-07-21 RX ORDER — TRAMADOL HYDROCHLORIDE 50 MG/1
TABLET ORAL
Qty: 60 TABLET | Refills: 0 | Status: SHIPPED | OUTPATIENT
Start: 2020-07-21 | End: 2020-08-12

## 2020-07-21 RX ADMIN — KETOROLAC TROMETHAMINE 60 MG: 30 INJECTION, SOLUTION INTRAMUSCULAR; INTRAVENOUS at 04:07
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
## 2020-07-21 NOTE — TELEPHONE ENCOUNTER
Spoke with the pt, she advised that she normally take Ibuprofen which helps, but today she did not. Pt advised that she is in severe pain today, and walked into the clinic for an appointment.  scheduled the patient for an appointment with Dr. Ruiz today.

## 2020-07-21 NOTE — TELEPHONE ENCOUNTER
----- Message from Lenora Starkey sent at 7/21/2020 10:13 AM CDT -----  Regarding: Back pain  [phone]    Patient stated that she is in extreme pain from neck to lower back.  She is requesting something for pain.  Please call to adivse.

## 2020-07-21 NOTE — TELEPHONE ENCOUNTER
This sounds like a new issue.  If symptoms not improved with Tylenol, make a follow-up appointment.  Patient may be fit into the schedule appropriately.

## 2020-08-12 ENCOUNTER — LAB VISIT (OUTPATIENT)
Dept: LAB | Facility: HOSPITAL | Age: 65
End: 2020-08-12
Attending: FAMILY MEDICINE
Payer: MEDICARE

## 2020-08-12 ENCOUNTER — OFFICE VISIT (OUTPATIENT)
Dept: PRIMARY CARE CLINIC | Facility: CLINIC | Age: 65
End: 2020-08-12
Payer: MEDICARE

## 2020-08-12 VITALS
WEIGHT: 200.19 LBS | BODY MASS INDEX: 34.18 KG/M2 | SYSTOLIC BLOOD PRESSURE: 180 MMHG | HEIGHT: 64 IN | TEMPERATURE: 99 F | OXYGEN SATURATION: 99 % | HEART RATE: 76 BPM | DIASTOLIC BLOOD PRESSURE: 94 MMHG

## 2020-08-12 DIAGNOSIS — E78.5 HYPERLIPIDEMIA LDL GOAL <70: ICD-10-CM

## 2020-08-12 DIAGNOSIS — F17.210 LIGHT CIGARETTE SMOKER (1-9 CIGS/DAY): ICD-10-CM

## 2020-08-12 DIAGNOSIS — E11.9 ENCOUNTER FOR DIABETIC FOOT EXAM: ICD-10-CM

## 2020-08-12 DIAGNOSIS — Z86.73 HISTORY OF STROKE: ICD-10-CM

## 2020-08-12 DIAGNOSIS — E66.09 CLASS 1 OBESITY DUE TO EXCESS CALORIES WITH SERIOUS COMORBIDITY AND BODY MASS INDEX (BMI) OF 34.0 TO 34.9 IN ADULT: ICD-10-CM

## 2020-08-12 DIAGNOSIS — E05.90 SUBCLINICAL HYPERTHYROIDISM: ICD-10-CM

## 2020-08-12 DIAGNOSIS — Z91.199 PATIENT NON ADHERENCE: ICD-10-CM

## 2020-08-12 DIAGNOSIS — Z12.11 ENCOUNTER FOR SCREENING COLONOSCOPY: Primary | ICD-10-CM

## 2020-08-12 DIAGNOSIS — E11.59 TYPE 2 DIABETES MELLITUS WITH OTHER CIRCULATORY COMPLICATION, WITHOUT LONG-TERM CURRENT USE OF INSULIN: ICD-10-CM

## 2020-08-12 DIAGNOSIS — Z12.31 ENCOUNTER FOR SCREENING MAMMOGRAM FOR MALIGNANT NEOPLASM OF BREAST: ICD-10-CM

## 2020-08-12 DIAGNOSIS — I10 ELEVATED BLOOD PRESSURE READING WITH DIAGNOSIS OF HYPERTENSION: ICD-10-CM

## 2020-08-12 DIAGNOSIS — E78.2 MIXED HYPERLIPIDEMIA: ICD-10-CM

## 2020-08-12 PROBLEM — E66.811 CLASS 1 OBESITY DUE TO EXCESS CALORIES WITH SERIOUS COMORBIDITY AND BODY MASS INDEX (BMI) OF 34.0 TO 34.9 IN ADULT: Status: ACTIVE | Noted: 2020-08-12

## 2020-08-12 LAB
ANION GAP SERPL CALC-SCNC: 7 MMOL/L (ref 8–16)
BUN SERPL-MCNC: 17 MG/DL (ref 8–23)
CALCIUM SERPL-MCNC: 9.1 MG/DL (ref 8.7–10.5)
CHLORIDE SERPL-SCNC: 105 MMOL/L (ref 95–110)
CHOLEST SERPL-MCNC: 102 MG/DL (ref 120–199)
CHOLEST/HDLC SERPL: 2.5 {RATIO} (ref 2–5)
CO2 SERPL-SCNC: 28 MMOL/L (ref 23–29)
CREAT SERPL-MCNC: 1.1 MG/DL (ref 0.5–1.4)
EST. GFR  (AFRICAN AMERICAN): >60 ML/MIN/1.73 M^2
EST. GFR  (NON AFRICAN AMERICAN): 53.2 ML/MIN/1.73 M^2
ESTIMATED AVG GLUCOSE: 303 MG/DL (ref 68–131)
GLUCOSE SERPL-MCNC: 250 MG/DL (ref 70–110)
HBA1C MFR BLD HPLC: 12.2 % (ref 4–5.6)
HDLC SERPL-MCNC: 41 MG/DL (ref 40–75)
HDLC SERPL: 40.2 % (ref 20–50)
LDLC SERPL CALC-MCNC: 46.6 MG/DL (ref 63–159)
NONHDLC SERPL-MCNC: 61 MG/DL
POTASSIUM SERPL-SCNC: 3.7 MMOL/L (ref 3.5–5.1)
SODIUM SERPL-SCNC: 140 MMOL/L (ref 136–145)
T3 SERPL-MCNC: 115 NG/DL (ref 60–180)
T4 FREE SERPL-MCNC: 1.01 NG/DL (ref 0.71–1.51)
TRIGL SERPL-MCNC: 72 MG/DL (ref 30–150)
TSH SERPL DL<=0.005 MIU/L-ACNC: 1.01 UIU/ML (ref 0.4–4)

## 2020-08-12 PROCEDURE — 99214 OFFICE O/P EST MOD 30 MIN: CPT | Mod: S$GLB,,, | Performed by: FAMILY MEDICINE

## 2020-08-12 PROCEDURE — 3080F DIAST BP >= 90 MM HG: CPT | Mod: CPTII,S$GLB,, | Performed by: FAMILY MEDICINE

## 2020-08-12 PROCEDURE — 99499 RISK ADDL DX/OHS AUDIT: ICD-10-PCS | Mod: S$GLB,,, | Performed by: FAMILY MEDICINE

## 2020-08-12 PROCEDURE — 83036 HEMOGLOBIN GLYCOSYLATED A1C: CPT

## 2020-08-12 PROCEDURE — 3052F PR MOST RECENT HEMOGLOBIN A1C LEVEL 8.0 - < 9.0%: ICD-10-PCS | Mod: CPTII,S$GLB,, | Performed by: FAMILY MEDICINE

## 2020-08-12 PROCEDURE — 99499 UNLISTED E&M SERVICE: CPT | Mod: S$GLB,,, | Performed by: FAMILY MEDICINE

## 2020-08-12 PROCEDURE — 84439 ASSAY OF FREE THYROXINE: CPT

## 2020-08-12 PROCEDURE — 3008F BODY MASS INDEX DOCD: CPT | Mod: CPTII,S$GLB,, | Performed by: FAMILY MEDICINE

## 2020-08-12 PROCEDURE — 84480 ASSAY TRIIODOTHYRONINE (T3): CPT

## 2020-08-12 PROCEDURE — 3080F PR MOST RECENT DIASTOLIC BLOOD PRESSURE >= 90 MM HG: ICD-10-PCS | Mod: CPTII,S$GLB,, | Performed by: FAMILY MEDICINE

## 2020-08-12 PROCEDURE — 99214 PR OFFICE/OUTPT VISIT, EST, LEVL IV, 30-39 MIN: ICD-10-PCS | Mod: S$GLB,,, | Performed by: FAMILY MEDICINE

## 2020-08-12 PROCEDURE — 3077F SYST BP >= 140 MM HG: CPT | Mod: CPTII,S$GLB,, | Performed by: FAMILY MEDICINE

## 2020-08-12 PROCEDURE — 99999 PR PBB SHADOW E&M-EST. PATIENT-LVL V: CPT | Mod: PBBFAC,,, | Performed by: FAMILY MEDICINE

## 2020-08-12 PROCEDURE — 3077F PR MOST RECENT SYSTOLIC BLOOD PRESSURE >= 140 MM HG: ICD-10-PCS | Mod: CPTII,S$GLB,, | Performed by: FAMILY MEDICINE

## 2020-08-12 PROCEDURE — 36415 COLL VENOUS BLD VENIPUNCTURE: CPT | Mod: PN

## 2020-08-12 PROCEDURE — 3008F PR BODY MASS INDEX (BMI) DOCUMENTED: ICD-10-PCS | Mod: CPTII,S$GLB,, | Performed by: FAMILY MEDICINE

## 2020-08-12 PROCEDURE — 3052F HG A1C>EQUAL 8.0%<EQUAL 9.0%: CPT | Mod: CPTII,S$GLB,, | Performed by: FAMILY MEDICINE

## 2020-08-12 PROCEDURE — 80048 BASIC METABOLIC PNL TOTAL CA: CPT

## 2020-08-12 PROCEDURE — 99999 PR PBB SHADOW E&M-EST. PATIENT-LVL V: ICD-10-PCS | Mod: PBBFAC,,, | Performed by: FAMILY MEDICINE

## 2020-08-12 PROCEDURE — 80061 LIPID PANEL: CPT

## 2020-08-12 PROCEDURE — 84443 ASSAY THYROID STIM HORMONE: CPT

## 2020-08-12 NOTE — PROGRESS NOTES
Subjective:       Patient ID: Shabnam Noble is a 64 y.o. female.    Chief Complaint: Rectal Pain    63 yo female states that for at least 2 weeks she has felt something funny in her rectum particularly when she wakes up in the morning. She describes the sensation as if sitting on concrete. States that she has more rectal discomfort than pain. She wants a colonoscopy done by tomorrow because she is leaving [place] for an unknown duration. She says that her cousin is dying from cancer (unknown etiology) and she plans to visit her cousin.  Last bowel movement this morning.  No change in bowel movement; no change in shape of stool, no blood in stool, no mucus. No pain. No itching. No wounds. No vomiting. No fever.    Her blood pressure is uncontrolled. Denies chest pain, SOB, palpitations, Orthopnea, PND, vision changes, abdominal pain, lower extremity swelling. She did not take her blood pressure medication today. She reports home systolic readings 134-164 mmHg.    She is not up to date on mammogram or diabetic foot exam. Since cousin has been diagnosed from cancer she would like to get back on track.  She has yet to schedule labs ordered to track her diabetes and thyroid function.    The following portions of the patient's history were reviewed and updated as appropriate: allergies, current medications, past family history, past medical history, past social history, past surgical history and problem list.    Review of Systems   Constitutional: Negative for activity change, appetite change, chills, diaphoresis, fatigue, fever and unexpected weight change.   HENT: Negative for nasal congestion, dental problem, facial swelling, nosebleeds, postnasal drip, rhinorrhea, sore throat, tinnitus and trouble swallowing.    Eyes: Negative for pain, discharge, itching and visual disturbance.   Respiratory: Negative for apnea, chest tightness, shortness of breath, wheezing and stridor.    Cardiovascular: Negative for chest  "pain, palpitations and leg swelling.   Gastrointestinal: Negative for abdominal distention, abdominal pain, constipation, diarrhea, nausea, rectal pain and vomiting.   Endocrine: Negative for cold intolerance, heat intolerance and polyuria.   Genitourinary: Negative for difficulty urinating, dysuria, frequency, hematuria and urgency.   Musculoskeletal: Positive for arthralgias. Negative for gait problem, myalgias, neck pain and neck stiffness.   Integumentary:  Negative for color change and rash.   Neurological: Negative for dizziness, tremors, seizures, syncope, facial asymmetry, weakness and headaches.   Hematological: Negative for adenopathy. Does not bruise/bleed easily.   Psychiatric/Behavioral: Negative for agitation, confusion, hallucinations, self-injury and suicidal ideas. The patient is not hyperactive.          Objective:       Vitals:    08/12/20 0807   BP: (!) 180/94   Pulse: 76   Temp: 99 °F (37.2 °C)   TempSrc: Oral   SpO2: 99%   Weight: 90.8 kg (200 lb 2.8 oz)   Height: 5' 4" (1.626 m)     Physical Exam  Constitutional:       General: She is not in acute distress.     Appearance: She is well-developed. She is obese. She is not diaphoretic.   HENT:      Head: Normocephalic and atraumatic.   Eyes:      General: No scleral icterus.        Right eye: No discharge.         Left eye: No discharge.      Conjunctiva/sclera: Conjunctivae normal.   Neck:      Musculoskeletal: Normal range of motion and neck supple.      Thyroid: No thyromegaly.   Cardiovascular:      Rate and Rhythm: Normal rate and regular rhythm.      Pulses:           Dorsalis pedis pulses are 2+ on the right side and 2+ on the left side.        Posterior tibial pulses are 2+ on the right side and 2+ on the left side.      Heart sounds: Normal heart sounds. No murmur. No friction rub. No gallop.    Pulmonary:      Effort: Pulmonary effort is normal. No respiratory distress.      Breath sounds: Normal breath sounds.   Chest:      Chest wall: " No tenderness.   Abdominal:      General: Bowel sounds are normal. There is distension (obese).      Palpations: Abdomen is soft. There is no mass.      Tenderness: There is no abdominal tenderness. There is no guarding or rebound.   Genitourinary:     Comments: Rectal:  Normal sphincter tone, no hemorrhoids or masses palpable. High stool burden: brown without blood.  Musculoskeletal: Normal range of motion.      Right foot: Normal range of motion. No deformity, bunion, Charcot foot, foot drop or prominent metatarsal heads.      Left foot: Normal range of motion. No deformity, bunion, Charcot foot, foot drop or prominent metatarsal heads.   Feet:      Right foot:      Protective Sensation: 5 sites tested. 5 sites sensed.      Skin integrity: No ulcer, blister, skin breakdown, erythema, warmth, dry skin or fissure.      Toenail Condition: Right toenails are abnormally thick.      Left foot:      Protective Sensation: 5 sites tested. 5 sites sensed.      Skin integrity: No ulcer, blister, skin breakdown, erythema, warmth, dry skin or fissure.      Toenail Condition: Left toenails are abnormally thick.      Comments: Well clipped/ trimmed  Lymphadenopathy:      Cervical: No cervical adenopathy.   Neurological:      Mental Status: She is alert and oriented to person, place, and time. Mental status is at baseline.      Motor: No abnormal muscle tone.   Psychiatric:         Thought Content: Thought content normal.         Judgment: Judgment normal.         Assessment:       1. Encounter for screening colonoscopy    2. Encounter for screening mammogram for malignant neoplasm of breast     3. Elevated blood pressure reading with diagnosis of hypertension    4. Type 2 diabetes mellitus with other circulatory complication, without long-term current use of insulin    5. Encounter for diabetic foot exam    6. Subclinical hyperthyroidism    7. Hyperlipidemia LDL goal <70    8. History of stroke    9. Class 1 obesity due to  excess calories with serious comorbidity and body mass index (BMI) of 34.0 to 34.9 in adult    10. Light cigarette smoker (1-9 cigs/day)    11. Patient non adherence        Plan:       1. Encounter for screening colonoscopy  -     Case request GI: COLONOSCOPY: discussed that getting a colonoscopy by tomorrow is not feasible.  BP is elevated; did not take blood pressure medications. Reports that BP readings average systolic of 150 mmHg.    2. Encounter for screening mammogram for malignant neoplasm of breast   -     Mammo Digital Screening Bilat w/ [facility]; Future    3. Elevated blood pressure reading with diagnosis of hypertension  Discussed addition of Betablocker: metoprolol 24 hr (to decrease bill burden) preferred to labetalol twice daily dosing. Patient is hesitant because of pill burden. Offered to combine amlodipine-benazepril (instead of individual amlodipine and lisinopril), may consider stopping HCTZ 12.5 mg daily (either way this is a low dosage and I would not increase any further in a patient who is concerned about the side effect of frequent urination with a diurectic).  Patient is going out of town and plans to stay on her current regimen of amlodipine, lisinopril and HCTZ  Complications of uncontrolled BP discussed.    4. Type 2 diabetes mellitus with other circulatory complication, without long-term current use of insulin  Patient needs to schedule her A1c repeat testing which was already ordered  Encourage healthy lifestyle change through diet and exercise. On Metformin.    5. Diabetic foot exam  No ulcers. Foot precautions advised.    6. Subclinical hyperthyroidism  -     T3; Future  -     T4, free; Future  Need to schedule TSH    7. Hyperlipidemia LDL goal <70  -     Lipid Panel; Future    8. History of stroke  Smoking cessation. BP and blood sugar control. Weight loss. Exercise.  On aspirin, statin, Zetia. Aim for LDL < 70.    9. Class 1 obesity due to excess calories with serious comorbidity and  body mass index (BMI) of 34.0 to 34.9 in adult  The importance of optimum diet & exercise discussed. The goals for weight management of 5-10 % of total body weight reduction in 3 months addressed.     The consumption of a reduced calorie diet, frequent self-weighing, and participation in a lifestyle intervention program are strategies to help maintain weight loss.     10. Light cigarette smoker (1-9 cigs/day)  Smoking cessation advised. We discussed the many concerns regarding smoking including risk of heart disease and cancer, among many others. The danger of second hand smoke is also an issue. Patient is in the precontemplative phase of smoking cessation and can contact us for assistance when a decision to quit smoking is made.     11. Patient non adherence  Discussed the importance of not skipping her BP medication. Instructed to take blood pressure medication as soon as she goes home.    She decided by the end of the visit that she will return to [place] in 2 weeks and will follow up with me.    Disclaimer: This note has been generated using voice-recognition software. There may be typographical errors that have been missed during proof-reading

## 2020-08-13 ENCOUNTER — TELEPHONE (OUTPATIENT)
Dept: PRIMARY CARE CLINIC | Facility: CLINIC | Age: 65
End: 2020-08-13

## 2020-08-13 NOTE — TELEPHONE ENCOUNTER
Patient has been informed, and verbalized understanding.    Appointment with NP Dianelys scheduled.

## 2020-08-13 NOTE — TELEPHONE ENCOUNTER
----- Message from Mya Bar MD sent at 8/13/2020 11:52 AM CDT -----  Patient plans on returning to [place]. Please set her up an appointment with Jeannie at her earliest convenience to avoid complications of diabetes.    Hello,    Your diabetes is uncontrolled. I would like you to meet with our diabetic specialist. You went from an A1c of 9 to 12.2. Aim for A1c of 7 or less. Terrible!    Normal thyroid function.    Kidney function normal. Make sure to stay hydrated.  Normal liver function.    The cholesterol medication is working. Continue taking cholesterol medication. Excellent!

## 2020-10-09 ENCOUNTER — PATIENT MESSAGE (OUTPATIENT)
Dept: ADMINISTRATIVE | Facility: HOSPITAL | Age: 65
End: 2020-10-09

## 2020-10-12 ENCOUNTER — TELEPHONE (OUTPATIENT)
Dept: PRIMARY CARE CLINIC | Facility: CLINIC | Age: 65
End: 2020-10-12

## 2020-10-20 ENCOUNTER — TELEPHONE (OUTPATIENT)
Dept: PRIMARY CARE CLINIC | Facility: CLINIC | Age: 65
End: 2020-10-20

## 2020-10-20 NOTE — TELEPHONE ENCOUNTER
Patient calling in regards to having medical records release to . Informed patient when request is received it will be sent to the medical records office. Patient verbalized understanding.

## 2020-10-20 NOTE — TELEPHONE ENCOUNTER
----- Message from Radha Segura sent at 10/14/2020  8:22 AM CDT -----  Patient returning call from 10/12.

## 2020-11-13 ENCOUNTER — LAB VISIT (OUTPATIENT)
Dept: LAB | Facility: HOSPITAL | Age: 65
End: 2020-11-13
Attending: FAMILY MEDICINE
Payer: MEDICARE

## 2020-11-13 ENCOUNTER — OFFICE VISIT (OUTPATIENT)
Dept: PRIMARY CARE CLINIC | Facility: CLINIC | Age: 65
End: 2020-11-13
Payer: MEDICARE

## 2020-11-13 VITALS
TEMPERATURE: 99 F | BODY MASS INDEX: 33.73 KG/M2 | WEIGHT: 197.56 LBS | OXYGEN SATURATION: 98 % | DIASTOLIC BLOOD PRESSURE: 82 MMHG | HEART RATE: 77 BPM | SYSTOLIC BLOOD PRESSURE: 158 MMHG | HEIGHT: 64 IN

## 2020-11-13 DIAGNOSIS — E05.90 SUBCLINICAL HYPERTHYROIDISM: ICD-10-CM

## 2020-11-13 DIAGNOSIS — T78.3XXS ANGIOEDEMA, SEQUELA: Primary | ICD-10-CM

## 2020-11-13 DIAGNOSIS — Z90.710 S/P HYSTERECTOMY: ICD-10-CM

## 2020-11-13 DIAGNOSIS — Z12.11 ENCOUNTER FOR SCREENING COLONOSCOPY: ICD-10-CM

## 2020-11-13 DIAGNOSIS — Z86.73 HISTORY OF STROKE: ICD-10-CM

## 2020-11-13 DIAGNOSIS — E11.59 TYPE 2 DIABETES MELLITUS WITH OTHER CIRCULATORY COMPLICATION, WITHOUT LONG-TERM CURRENT USE OF INSULIN: ICD-10-CM

## 2020-11-13 DIAGNOSIS — Z79.899 ON ANGIOTENSIN-CONVERTING ENZYME (ACE) INHIBITORS: ICD-10-CM

## 2020-11-13 DIAGNOSIS — Z87.19 HISTORY OF HEMORRHOIDS: ICD-10-CM

## 2020-11-13 DIAGNOSIS — E78.5 HYPERLIPIDEMIA LDL GOAL <70: ICD-10-CM

## 2020-11-13 DIAGNOSIS — I10 ESSENTIAL HYPERTENSION: ICD-10-CM

## 2020-11-13 DIAGNOSIS — E66.09 CLASS 1 OBESITY DUE TO EXCESS CALORIES WITH SERIOUS COMORBIDITY AND BODY MASS INDEX (BMI) OF 33.0 TO 33.9 IN ADULT: ICD-10-CM

## 2020-11-13 DIAGNOSIS — T78.3XXS ANGIOEDEMA, SEQUELA: ICD-10-CM

## 2020-11-13 DIAGNOSIS — F17.210 LIGHT CIGARETTE SMOKER (1-9 CIGS/DAY): ICD-10-CM

## 2020-11-13 DIAGNOSIS — R63.4 WEIGHT LOSS, UNINTENTIONAL: ICD-10-CM

## 2020-11-13 LAB
ALBUMIN SERPL BCP-MCNC: 3.8 G/DL (ref 3.5–5.2)
ALP SERPL-CCNC: 78 U/L (ref 55–135)
ALT SERPL W/O P-5'-P-CCNC: 11 U/L (ref 10–44)
ANION GAP SERPL CALC-SCNC: 9 MMOL/L (ref 8–16)
AST SERPL-CCNC: 11 U/L (ref 10–40)
BILIRUB SERPL-MCNC: 0.3 MG/DL (ref 0.1–1)
BUN SERPL-MCNC: 20 MG/DL (ref 8–23)
CALCIUM SERPL-MCNC: 9.5 MG/DL (ref 8.7–10.5)
CHLORIDE SERPL-SCNC: 107 MMOL/L (ref 95–110)
CO2 SERPL-SCNC: 27 MMOL/L (ref 23–29)
CREAT SERPL-MCNC: 1 MG/DL (ref 0.5–1.4)
EST. GFR  (AFRICAN AMERICAN): >60 ML/MIN/1.73 M^2
EST. GFR  (NON AFRICAN AMERICAN): 59.3 ML/MIN/1.73 M^2
ESTIMATED AVG GLUCOSE: 237 MG/DL (ref 68–131)
GLUCOSE SERPL-MCNC: 155 MG/DL (ref 70–110)
HBA1C MFR BLD HPLC: 9.9 % (ref 4–5.6)
POTASSIUM SERPL-SCNC: 4.1 MMOL/L (ref 3.5–5.1)
PROT SERPL-MCNC: 7.6 G/DL (ref 6–8.4)
SODIUM SERPL-SCNC: 143 MMOL/L (ref 136–145)

## 2020-11-13 PROCEDURE — 80053 COMPREHEN METABOLIC PANEL: CPT

## 2020-11-13 PROCEDURE — 1101F PR PT FALLS ASSESS DOC 0-1 FALLS W/OUT INJ PAST YR: ICD-10-PCS | Mod: CPTII,S$GLB,, | Performed by: FAMILY MEDICINE

## 2020-11-13 PROCEDURE — 3046F HEMOGLOBIN A1C LEVEL >9.0%: CPT | Mod: CPTII,S$GLB,, | Performed by: FAMILY MEDICINE

## 2020-11-13 PROCEDURE — 1125F PR PAIN SEVERITY QUANTIFIED, PAIN PRESENT: ICD-10-PCS | Mod: S$GLB,,, | Performed by: FAMILY MEDICINE

## 2020-11-13 PROCEDURE — 3046F PR MOST RECENT HEMOGLOBIN A1C LEVEL > 9.0%: ICD-10-PCS | Mod: CPTII,S$GLB,, | Performed by: FAMILY MEDICINE

## 2020-11-13 PROCEDURE — 3079F DIAST BP 80-89 MM HG: CPT | Mod: CPTII,S$GLB,, | Performed by: FAMILY MEDICINE

## 2020-11-13 PROCEDURE — 86160 COMPLEMENT ANTIGEN: CPT

## 2020-11-13 PROCEDURE — 3008F PR BODY MASS INDEX (BMI) DOCUMENTED: ICD-10-PCS | Mod: CPTII,S$GLB,, | Performed by: FAMILY MEDICINE

## 2020-11-13 PROCEDURE — 3288F PR FALLS RISK ASSESSMENT DOCUMENTED: ICD-10-PCS | Mod: CPTII,S$GLB,, | Performed by: FAMILY MEDICINE

## 2020-11-13 PROCEDURE — 99999 PR PBB SHADOW E&M-EST. PATIENT-LVL V: CPT | Mod: PBBFAC,,, | Performed by: FAMILY MEDICINE

## 2020-11-13 PROCEDURE — 1101F PT FALLS ASSESS-DOCD LE1/YR: CPT | Mod: CPTII,S$GLB,, | Performed by: FAMILY MEDICINE

## 2020-11-13 PROCEDURE — 83036 HEMOGLOBIN GLYCOSYLATED A1C: CPT

## 2020-11-13 PROCEDURE — 3079F PR MOST RECENT DIASTOLIC BLOOD PRESSURE 80-89 MM HG: ICD-10-PCS | Mod: CPTII,S$GLB,, | Performed by: FAMILY MEDICINE

## 2020-11-13 PROCEDURE — 99214 PR OFFICE/OUTPT VISIT, EST, LEVL IV, 30-39 MIN: ICD-10-PCS | Mod: S$GLB,,, | Performed by: FAMILY MEDICINE

## 2020-11-13 PROCEDURE — 3288F FALL RISK ASSESSMENT DOCD: CPT | Mod: CPTII,S$GLB,, | Performed by: FAMILY MEDICINE

## 2020-11-13 PROCEDURE — 3077F PR MOST RECENT SYSTOLIC BLOOD PRESSURE >= 140 MM HG: ICD-10-PCS | Mod: CPTII,S$GLB,, | Performed by: FAMILY MEDICINE

## 2020-11-13 PROCEDURE — 3008F BODY MASS INDEX DOCD: CPT | Mod: CPTII,S$GLB,, | Performed by: FAMILY MEDICINE

## 2020-11-13 PROCEDURE — 99999 PR PBB SHADOW E&M-EST. PATIENT-LVL V: ICD-10-PCS | Mod: PBBFAC,,, | Performed by: FAMILY MEDICINE

## 2020-11-13 PROCEDURE — 3077F SYST BP >= 140 MM HG: CPT | Mod: CPTII,S$GLB,, | Performed by: FAMILY MEDICINE

## 2020-11-13 PROCEDURE — 1125F AMNT PAIN NOTED PAIN PRSNT: CPT | Mod: S$GLB,,, | Performed by: FAMILY MEDICINE

## 2020-11-13 PROCEDURE — 99214 OFFICE O/P EST MOD 30 MIN: CPT | Mod: S$GLB,,, | Performed by: FAMILY MEDICINE

## 2020-11-13 RX ORDER — DIPHENHYDRAMINE HCL 12.5MG/5ML
25 LIQUID (ML) ORAL EVERY 6 HOURS PRN
Qty: 118 ML | Refills: 0 | Status: SHIPPED | OUTPATIENT
Start: 2020-11-13 | End: 2021-06-03 | Stop reason: SDUPTHER

## 2020-11-13 RX ORDER — METOPROLOL SUCCINATE 50 MG/1
50 TABLET, EXTENDED RELEASE ORAL DAILY
Qty: 30 TABLET | Refills: 3 | Status: SHIPPED | OUTPATIENT
Start: 2020-11-13 | End: 2021-05-05 | Stop reason: SDUPTHER

## 2020-11-13 NOTE — PROGRESS NOTES
Subjective:       Patient ID: Shabnam Noble is a 65 y.o. female.    Chief Complaint: Oral Swelling      She is accompanied by her wife.    66 yo female presents with complaint of tongue swelling this AM; feeling like tongue is in the way when she was swollowing. She is able to swallow saliva. No drooling. No lip or other facial swelling.   She states that since initially noticing tongue swelling she is feeling better progressively.  She attributes tongue swelling to elevated blood sugar because she ate candy.   Previously had left jaw selling in the past but this resolved without any treatment.    She is currently on lisinopril, taking for at least 1 year.    She reports unintentional weight loss. She is not up to date with cancer screenings: colonoscopy, mammogram. She reports history of hemorrhoids which she sometimes feel prolapsing. She denies constipation- states it is usual for her to have a bowel movement every 2-3 days; no painful stool; no incomplete evacuation. No blood in stool. No persistent abdominal pain. No fever.     Her last A1c was uncontrolled.    The following portions of the patient's history were reviewed and updated as appropriate: allergies, current medications, past family history, past medical history, past social history, past surgical history and problem list.    Review of Systems   Constitutional: Positive for unexpected weight change. Negative for activity change, appetite change, chills, diaphoresis, fatigue and fever.   HENT: Positive for facial swelling (tongue). Negative for nasal congestion, dental problem, nosebleeds, postnasal drip, rhinorrhea, sore throat, tinnitus and trouble swallowing.    Eyes: Negative for pain, discharge, itching and visual disturbance.   Respiratory: Negative for apnea, chest tightness, shortness of breath, wheezing and stridor.    Cardiovascular: Negative for chest pain, palpitations and leg swelling.   Gastrointestinal: Negative for abdominal distention,  "abdominal pain, constipation, diarrhea, nausea, rectal pain and vomiting.   Endocrine: Negative for cold intolerance, heat intolerance and polyuria.   Genitourinary: Negative for difficulty urinating, dysuria, frequency, hematuria and urgency.   Musculoskeletal: Negative for arthralgias, gait problem, myalgias, neck pain and neck stiffness.   Integumentary:  Negative for color change and rash.   Neurological: Negative for dizziness, tremors, seizures, syncope, facial asymmetry, weakness and headaches.   Hematological: Negative for adenopathy. Does not bruise/bleed easily.   Psychiatric/Behavioral: Negative for agitation, confusion, hallucinations, self-injury and suicidal ideas. The patient is not hyperactive.           Objective:       Vitals:    11/13/20 0828   BP: (!) 158/82   Pulse: 77   Temp: 99 °F (37.2 °C)   TempSrc: Oral   SpO2: 98%   Weight: 89.6 kg (197 lb 8.5 oz)   Height: 5' 4" (1.626 m)     Physical Exam  Constitutional:       General: She is not in acute distress.     Appearance: She is well-developed. She is not diaphoretic.   HENT:      Head: Normocephalic and atraumatic.      Right Ear: External ear normal.      Left Ear: External ear normal.      Mouth/Throat:      Comments: Tongue swelling, normal appearance of lips  Eyes:      General: No scleral icterus.        Right eye: No discharge.         Left eye: No discharge.      Conjunctiva/sclera: Conjunctivae normal.   Neck:      Musculoskeletal: Neck supple.      Thyroid: No thyromegaly.   Cardiovascular:      Rate and Rhythm: Normal rate and regular rhythm.      Heart sounds: Normal heart sounds. No murmur. No friction rub. No gallop.    Pulmonary:      Effort: Pulmonary effort is normal. No respiratory distress.      Breath sounds: Normal breath sounds.   Chest:      Chest wall: No tenderness.   Abdominal:      General: Bowel sounds are normal. There is no distension.      Palpations: Abdomen is soft. There is no mass.      Tenderness: There is no " abdominal tenderness. There is no guarding or rebound.   Musculoskeletal: Normal range of motion.   Lymphadenopathy:      Cervical: No cervical adenopathy.   Skin:     General: Skin is warm.      Capillary Refill: Capillary refill takes less than 2 seconds.      Findings: No rash.   Neurological:      Mental Status: She is alert and oriented to person, place, and time.      Cranial Nerves: No cranial nerve deficit.      Motor: No abnormal muscle tone.      Coordination: Coordination normal.      Deep Tendon Reflexes: Reflexes normal.   Psychiatric:         Thought Content: Thought content normal.         Judgment: Judgment normal.         Assessment:       1. Angioedema, sequela    2. On angiotensin-converting enzyme (ACE) inhibitors    3. Weight loss, unintentional    4. Subclinical hyperthyroidism    5. Light cigarette smoker (1-9 cigs/day)    6. Type 2 diabetes mellitus with other circulatory complication, without long-term current use of insulin    7. Essential hypertension    8. Hyperlipidemia LDL goal <70    9. History of stroke    10. Class 1 obesity due to excess calories with serious comorbidity and body mass index (BMI) of 33.0 to 33.9 in adult    11. Encounter for screening colonoscopy    12. History of hemorrhoids    13. S/P hysterectomy        Plan:       1. Angioedema, sequela  -     C1 ESTERASE INHIBITOR PANEL; Future; Expected date: 11/13/2020  -     Ambulatory referral/consult to Allergy; Future; Expected date: 11/20/2020  -     diphenhydrAMINE (BENADRYL ALLERGY) 12.5 mg/5 mL liquid; Take 10 mLs (25 mg total) by mouth every 6 (six) hours as needed for Allergies.  Dispense: 118 mL; Refill: 0  Stop lisinopril  Patient has elevated blood pressure and is not comfortable with taking prednisone in the event of temporary increase, even further, of Blood pressure.  Report to ED if any alarm symptoms: drooling, confusion, respiratory distress.    2. On angiotensin-converting enzyme (ACE)  inhibitors  Discussed the potential of lisinopril to cause swelling of tongue    3. Weight loss, unintentional  Discussed the importance of following through with cancer screening: colonoscopy and mammogram. Will assist with scheduling.    4. History of Subclinical hyperthyroidism  TSH, T3 and free T4 on 8/12/2020 all normal    5. Light cigarette smoker (1-9 cigs/day)  Has been counseled extensively about the ill effects of smoking.    6. Type 2 diabetes mellitus with other circulatory complication, without long-term current use of insulin  -     Hemoglobin A1C; Future; Expected date: 11/13/2020  -     Comprehensive Metabolic Panel; Future; Expected date: 11/13/2020  Will see our diabetes educator. Will start logging blood sugar readings. Discussed the role of continuous glucose monitor in minimizing finger sticks.    7. Essential hypertension  -     metoprolol succinate (TOPROL-XL) 50 MG 24 hr tablet; Take 1 tablet (50 mg total) by mouth once daily.  Dispense: 30 tablet; Refill: 3  She does not routinely check her blood pressure. Has yet to take medications today: continue on amlodipine and HCTZ (she is only on 12.5  Mg and is not interested in increasing medication because of concern for increased urination). Stop lisinopril. Instead start metoprolol.    8. Hyperlipidemia LDL goal <70  On atorvastatin.    9. History of stroke  Blood pressure control. Absence from tobacco products. Diabetes control. On statin.    10. Class 1 obesity due to excess calories with serious comorbidity and body mass index (BMI) of 33.0 to 33.9 in adult  Encourage healthy lifestyle changes through diet and exercise.    11. Encounter for screening colonoscopy  -     Case request GI: COLONOSCOPY    12. History of hemorrhoids  -     Ambulatory referral/consult to Colorectal Surgery; Future; Expected date: 11/20/2020    13. S/P hysterectomy    Advised to bring in all medications to follow up visit.    Disclaimer: This note has been generated  using voice-recognition software. There may be typographical errors that have been missed during proof-reading

## 2020-11-16 ENCOUNTER — HOSPITAL ENCOUNTER (OUTPATIENT)
Dept: RADIOLOGY | Facility: HOSPITAL | Age: 65
Discharge: HOME | End: 2020-11-16
Attending: FAMILY MEDICINE
Payer: MEDICARE

## 2020-11-16 DIAGNOSIS — Z12.31 ENCOUNTER FOR SCREENING MAMMOGRAM FOR MALIGNANT NEOPLASM OF BREAST: ICD-10-CM

## 2020-11-16 PROCEDURE — 77067 MAMMO DIGITAL SCREENING BILAT WITH TOMOSYNTHESIS_CAD: ICD-10-PCS | Mod: 26,,, | Performed by: RADIOLOGY

## 2020-11-16 PROCEDURE — 77063 BREAST TOMOSYNTHESIS BI: CPT | Mod: 26,,, | Performed by: RADIOLOGY

## 2020-11-16 PROCEDURE — 77063 MAMMO DIGITAL SCREENING BILAT WITH TOMOSYNTHESIS_CAD: ICD-10-PCS | Mod: 26,,, | Performed by: RADIOLOGY

## 2020-11-16 PROCEDURE — 77067 SCR MAMMO BI INCL CAD: CPT | Mod: 26,,, | Performed by: RADIOLOGY

## 2020-11-16 PROCEDURE — 77067 SCR MAMMO BI INCL CAD: CPT | Mod: TC,PN

## 2020-11-18 ENCOUNTER — TELEPHONE (OUTPATIENT)
Dept: ENDOSCOPY | Facility: HOSPITAL | Age: 65
End: 2020-11-18

## 2020-11-18 ENCOUNTER — TELEPHONE (OUTPATIENT)
Dept: PRIMARY CARE CLINIC | Facility: CLINIC | Age: 65
End: 2020-11-18

## 2020-11-18 NOTE — TELEPHONE ENCOUNTER
----- Message from Renee Allan sent at 11/18/2020 12:15 PM CST -----  Regarding: Self  She is needing a prescription for her diabetic meter , strips and everything that goes along with it sent to [facility] on file. Her insurance has also sent in a request for the pt to get a new machine so you should have the order request for this she states. Please advise once this been done.

## 2020-11-18 NOTE — TELEPHONE ENCOUNTER
----- Message from Mya Bar MD sent at 11/18/2020 11:13 AM CST -----  Please let patient know results     Hello,    Diabetes is not controlled.  Diabetes has improved from when measured 3 months ago.  Went from an A1c of 12.2-9.9 in 3 months.  The aim is to get to 7 or less than 7 without low blood sugar readings.  You will meet with the diabetic specialist.    Normal liver and kidney function testing

## 2020-11-18 NOTE — TELEPHONE ENCOUNTER
Pt has order for colonoscopy- Currently taking plavix. Can we hold x5 days prior to procedure per protocol. Please advise.

## 2020-11-19 NOTE — TELEPHONE ENCOUNTER
My Open Encounters    Mya Bar MD routed conversation to You 23 hours ago (12:21 PM)      Mya Bar MD 23 hours ago (12:21 PM)        Ok to hold plavix 5 days prior to procedure.         Documentation       Anika Jones MA routed conversation to Mya Bar MD 23 hours ago (12:17 PM)      You routed conversation to Mya Bar MD; Yosvany Roque Staff 23 hours ago (12:12 PM)      You 23 hours ago (12:12 PM)        Pt has order for colonoscopy- Currently taking plavix. Can we hold x5 days prior to procedure per protocol. Please advise.         Documentation

## 2020-11-20 ENCOUNTER — TELEPHONE (OUTPATIENT)
Dept: RADIOLOGY | Facility: HOSPITAL | Age: 65
End: 2020-11-20

## 2020-11-20 DIAGNOSIS — Z01.818 PRE-OP TESTING: ICD-10-CM

## 2020-11-20 DIAGNOSIS — Z12.11 SPECIAL SCREENING FOR MALIGNANT NEOPLASMS, COLON: Primary | ICD-10-CM

## 2020-11-20 DIAGNOSIS — E11.59 TYPE 2 DIABETES MELLITUS WITH OTHER CIRCULATORY COMPLICATION, WITHOUT LONG-TERM CURRENT USE OF INSULIN: Primary | ICD-10-CM

## 2020-11-20 LAB — C1INH SERPL-MCNC: 40 MG/DL (ref 21–39)

## 2020-11-20 RX ORDER — POLYETHYLENE GLYCOL 3350, SODIUM SULFATE ANHYDROUS, SODIUM BICARBONATE, SODIUM CHLORIDE, POTASSIUM CHLORIDE 236; 22.74; 6.74; 5.86; 2.97 G/4L; G/4L; G/4L; G/4L; G/4L
4 POWDER, FOR SOLUTION ORAL ONCE
Qty: 4000 ML | Refills: 0 | Status: SHIPPED | OUTPATIENT
Start: 2020-11-20 | End: 2020-11-20

## 2020-11-20 RX ORDER — INSULIN PUMP SYRINGE, 3 ML
EACH MISCELLANEOUS
Qty: 1 EACH | Refills: 0 | Status: SHIPPED | OUTPATIENT
Start: 2020-11-20 | End: 2020-11-30 | Stop reason: SDUPTHER

## 2020-11-20 NOTE — TELEPHONE ENCOUNTER
Pt is requesting orders for new testing supplies and a letter or order to be sent to her insurance recommending a home health aide.     Pt states that she needs an aide to be with her at night because she wakes up with confusion in the mild of the night, has been forgetting things, and needs assistance with medication management. Please advise if this can be done. F/U scheduled for 11/30/2020

## 2020-11-20 NOTE — TELEPHONE ENCOUNTER
Spoke with patient and explained mammogram findings.Patient expressed understanding of results. Patient scheduled abnormal mammogram follow up appointment at [facility] on 11/24/2020.

## 2020-11-24 PROBLEM — E11.69 HYPERLIPIDEMIA ASSOCIATED WITH TYPE 2 DIABETES MELLITUS: Status: ACTIVE | Noted: 2020-11-24

## 2020-11-24 PROBLEM — E78.5 HYPERLIPIDEMIA ASSOCIATED WITH TYPE 2 DIABETES MELLITUS: Status: ACTIVE | Noted: 2020-11-24

## 2020-11-30 ENCOUNTER — OFFICE VISIT (OUTPATIENT)
Dept: PRIMARY CARE CLINIC | Facility: CLINIC | Age: 65
End: 2020-11-30
Payer: MEDICARE

## 2020-11-30 VITALS
WEIGHT: 201.5 LBS | HEART RATE: 72 BPM | SYSTOLIC BLOOD PRESSURE: 150 MMHG | TEMPERATURE: 99 F | DIASTOLIC BLOOD PRESSURE: 80 MMHG | HEIGHT: 64 IN | OXYGEN SATURATION: 100 % | BODY MASS INDEX: 34.4 KG/M2

## 2020-11-30 DIAGNOSIS — E66.09 CLASS 1 OBESITY DUE TO EXCESS CALORIES WITH SERIOUS COMORBIDITY AND BODY MASS INDEX (BMI) OF 34.0 TO 34.9 IN ADULT: ICD-10-CM

## 2020-11-30 DIAGNOSIS — Z86.73 HISTORY OF STROKE: ICD-10-CM

## 2020-11-30 DIAGNOSIS — Z79.899 MEDICATION MANAGEMENT: Primary | ICD-10-CM

## 2020-11-30 DIAGNOSIS — I69.311 MEMORY DEFICIT AFTER CEREBRAL INFARCTION: ICD-10-CM

## 2020-11-30 DIAGNOSIS — E78.5 HYPERLIPIDEMIA LDL GOAL <70: ICD-10-CM

## 2020-11-30 DIAGNOSIS — I10 ESSENTIAL HYPERTENSION: ICD-10-CM

## 2020-11-30 DIAGNOSIS — E05.90 SUBCLINICAL HYPERTHYROIDISM: ICD-10-CM

## 2020-11-30 DIAGNOSIS — E11.59 TYPE 2 DIABETES MELLITUS WITH OTHER CIRCULATORY COMPLICATION, WITHOUT LONG-TERM CURRENT USE OF INSULIN: ICD-10-CM

## 2020-11-30 DIAGNOSIS — Z72.0 CURRENTLY ATTEMPTING TO QUIT SMOKING: ICD-10-CM

## 2020-11-30 PROCEDURE — 3046F HEMOGLOBIN A1C LEVEL >9.0%: CPT | Mod: CPTII,S$GLB,, | Performed by: FAMILY MEDICINE

## 2020-11-30 PROCEDURE — 1101F PR PT FALLS ASSESS DOC 0-1 FALLS W/OUT INJ PAST YR: ICD-10-PCS | Mod: CPTII,S$GLB,, | Performed by: FAMILY MEDICINE

## 2020-11-30 PROCEDURE — 1101F PT FALLS ASSESS-DOCD LE1/YR: CPT | Mod: CPTII,S$GLB,, | Performed by: FAMILY MEDICINE

## 2020-11-30 PROCEDURE — 99999 PR PBB SHADOW E&M-EST. PATIENT-LVL V: ICD-10-PCS | Mod: PBBFAC,,, | Performed by: FAMILY MEDICINE

## 2020-11-30 PROCEDURE — 3288F PR FALLS RISK ASSESSMENT DOCUMENTED: ICD-10-PCS | Mod: CPTII,S$GLB,, | Performed by: FAMILY MEDICINE

## 2020-11-30 PROCEDURE — 3008F PR BODY MASS INDEX (BMI) DOCUMENTED: ICD-10-PCS | Mod: CPTII,S$GLB,, | Performed by: FAMILY MEDICINE

## 2020-11-30 PROCEDURE — 3008F BODY MASS INDEX DOCD: CPT | Mod: CPTII,S$GLB,, | Performed by: FAMILY MEDICINE

## 2020-11-30 PROCEDURE — 1126F PR PAIN SEVERITY QUANTIFIED, NO PAIN PRESENT: ICD-10-PCS | Mod: S$GLB,,, | Performed by: FAMILY MEDICINE

## 2020-11-30 PROCEDURE — 99214 OFFICE O/P EST MOD 30 MIN: CPT | Mod: S$GLB,,, | Performed by: FAMILY MEDICINE

## 2020-11-30 PROCEDURE — 99999 PR PBB SHADOW E&M-EST. PATIENT-LVL V: CPT | Mod: PBBFAC,,, | Performed by: FAMILY MEDICINE

## 2020-11-30 PROCEDURE — 1126F AMNT PAIN NOTED NONE PRSNT: CPT | Mod: S$GLB,,, | Performed by: FAMILY MEDICINE

## 2020-11-30 PROCEDURE — 3077F PR MOST RECENT SYSTOLIC BLOOD PRESSURE >= 140 MM HG: ICD-10-PCS | Mod: CPTII,S$GLB,, | Performed by: FAMILY MEDICINE

## 2020-11-30 PROCEDURE — 99214 PR OFFICE/OUTPT VISIT, EST, LEVL IV, 30-39 MIN: ICD-10-PCS | Mod: S$GLB,,, | Performed by: FAMILY MEDICINE

## 2020-11-30 PROCEDURE — 3079F DIAST BP 80-89 MM HG: CPT | Mod: CPTII,S$GLB,, | Performed by: FAMILY MEDICINE

## 2020-11-30 PROCEDURE — 3077F SYST BP >= 140 MM HG: CPT | Mod: CPTII,S$GLB,, | Performed by: FAMILY MEDICINE

## 2020-11-30 PROCEDURE — 3046F PR MOST RECENT HEMOGLOBIN A1C LEVEL > 9.0%: ICD-10-PCS | Mod: CPTII,S$GLB,, | Performed by: FAMILY MEDICINE

## 2020-11-30 PROCEDURE — 3079F PR MOST RECENT DIASTOLIC BLOOD PRESSURE 80-89 MM HG: ICD-10-PCS | Mod: CPTII,S$GLB,, | Performed by: FAMILY MEDICINE

## 2020-11-30 PROCEDURE — 3288F FALL RISK ASSESSMENT DOCD: CPT | Mod: CPTII,S$GLB,, | Performed by: FAMILY MEDICINE

## 2020-11-30 RX ORDER — IBUPROFEN 200 MG
1 TABLET ORAL DAILY
Qty: 30 PATCH | Refills: 0 | Status: SHIPPED | OUTPATIENT
Start: 2020-12-30 | End: 2021-01-29

## 2020-11-30 RX ORDER — POLYETHYLENE GLYCOL-3350 AND ELECTROLYTES 236; 6.74; 5.86; 2.97; 22.74 G/274.31G; G/274.31G; G/274.31G; G/274.31G; G/274.31G
POWDER, FOR SOLUTION ORAL
Status: ON HOLD | COMMUNITY
Start: 2020-11-20 | End: 2020-12-11

## 2020-11-30 RX ORDER — HYDROCHLOROTHIAZIDE 25 MG/1
25 TABLET ORAL DAILY
Qty: 90 TABLET | Refills: 3
Start: 2020-11-30 | End: 2021-06-03 | Stop reason: DRUGHIGH

## 2020-11-30 RX ORDER — NICOTINE 7MG/24HR
1 PATCH, TRANSDERMAL 24 HOURS TRANSDERMAL DAILY
Qty: 30 PATCH | Refills: 0 | Status: SHIPPED | OUTPATIENT
Start: 2021-01-29 | End: 2021-02-28

## 2020-11-30 RX ORDER — IBUPROFEN 200 MG
1 TABLET ORAL DAILY
Qty: 30 PATCH | Refills: 0 | Status: SHIPPED | OUTPATIENT
Start: 2020-11-30 | End: 2020-12-30

## 2020-11-30 RX ORDER — INSULIN PUMP SYRINGE, 3 ML
EACH MISCELLANEOUS
Qty: 1 EACH | Refills: 0 | Status: SHIPPED | OUTPATIENT
Start: 2020-11-30 | End: 2022-12-05

## 2020-11-30 NOTE — PROGRESS NOTES
Subjective:       Patient ID: Shabnam Noble is a 65 y.o. female.    Chief Complaint: Diabetes      She is unaccompanied.    66 yo female presents for follow up.    She has no further episodes of tongue swelling since discontinuing lisinopril. She continues on amlodipine, metoprolol and HCTZ (only 12.5 mg daily). Her home Blood pressure readings are also elevated with systolic's of 150's. She is asymptomatic.  She has not been able to check her blood sugars; she is working with someone to get a meter.  She has her colonoscopy scheduled for routine screening and plans to meet with colorectal team re: hemorrhoids.     She expresses a concern for memory loss. She would like home health care. She quit smoking on 11/13/2020. She is not interested in joining a smoking cessation program.    The following portions of the patient's history were reviewed and updated as appropriate: allergies, current medications, past family history, past medical history, past social history, past surgical history and problem list.    Review of Systems   Constitutional: Positive for unexpected weight change. Negative for activity change, appetite change, chills, diaphoresis, fatigue and fever.   HENT: Negative for nasal congestion, dental problem, nosebleeds, postnasal drip, rhinorrhea, sore throat, tinnitus and trouble swallowing.    Eyes: Negative for pain, discharge, itching and visual disturbance.   Respiratory: Negative for apnea, chest tightness, shortness of breath, wheezing and stridor.    Cardiovascular: Negative for chest pain, palpitations and leg swelling.   Gastrointestinal: Negative for abdominal distention, abdominal pain, constipation, diarrhea, nausea, rectal pain and vomiting.   Endocrine: Negative for cold intolerance, heat intolerance and polyuria.   Genitourinary: Negative for difficulty urinating, dysuria, frequency, hematuria and urgency.   Musculoskeletal: Negative for arthralgias, gait problem, myalgias, neck pain  "and neck stiffness.   Integumentary:  Negative for color change and rash.   Neurological: Negative for dizziness, tremors, seizures, syncope, facial asymmetry, weakness and headaches.   Hematological: Negative for adenopathy. Does not bruise/bleed easily.   Psychiatric/Behavioral: Negative for agitation, confusion, hallucinations, self-injury and suicidal ideas. The patient is not hyperactive.           Objective:       Vitals:    11/30/20 0951   BP: (!) 150/80   Pulse: 72   Temp: 98.7 °F (37.1 °C)   TempSrc: Oral   SpO2: 100%   Weight: 91.4 kg (201 lb 8 oz)   Height: 5' 4" (1.626 m)     Physical Exam  Constitutional:       General: She is not in acute distress.     Appearance: She is well-developed. She is not diaphoretic.   HENT:      Head: Normocephalic and atraumatic.      Right Ear: External ear normal.      Left Ear: External ear normal.      Mouth/Throat:   Eyes:      General: No scleral icterus.        Right eye: No discharge.         Left eye: No discharge.      Conjunctiva/sclera: Conjunctivae normal.   Neck:      Musculoskeletal: Neck supple.      Thyroid: No thyromegaly.   Cardiovascular:      Rate and Rhythm: Normal rate and regular rhythm.      Heart sounds: Normal heart sounds. No murmur. No friction rub. No gallop.    Pulmonary:      Effort: Pulmonary effort is normal. No respiratory distress.      Breath sounds: Normal breath sounds.   Chest:      Chest wall: No tenderness.   Abdominal:      General: Bowel sounds are normal. There is no distension.      Palpations: Abdomen is soft. There is no mass.      Tenderness: There is no abdominal tenderness. There is no guarding or rebound.   Skin:     General: Skin is warm.      Findings: No rash.   Neurological:      Mental Status: She is alert and oriented to person, place, and time.   Psychiatric:         Thought Content: Thought content normal.         Judgment: Judgment normal.         Assessment:       1. Medication management    2. 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 History of stroke    4. Hyperlipidemia LDL goal <70    5. Subclinical hyperthyroidism    6. Essential hypertension    7. Type 2 diabetes mellitus with other circulatory complication, without long-term current use of insulin    8. Currently attempting to quit smoking    9. Class 1 obesity due to excess calories with serious comorbidity and body mass index (BMI) of 34.0 to 34.9 in adult        Plan:       1. Medication management  -     Ambulatory referral/consult to Home Health; Future; Expected date: 12/07/2020    2. 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 History of stroke  -     Ambulatory referral/consult to Neurology; Future; Expected date: 12/07/2020  Blood pressure control, cholesterol control, diabetes control. Exercise.    4. Hyperlipidemia LDL goal <70  On Zetia and atorvastatin.    5. History of Subclinical hyperthyroidism  TSH, T3 and free T4 on 8/12/2020 all normal    6. Essential hypertension  -     hydroCHLOROthiazide (HYDRODIURIL) 25 MG tablet; Take 1 tablet (25 mg total) by mouth once daily.  Dispense: 90 tablet; Refill: 3  Take two 12.5 mg HCTZ = 25 mg daily  On metoprolol and amlodipine  She has snoring but denies symptoms of MITZY- if BP persistently elevated will refer for sleep study  -     Ambulatory referral/consult to Home Health; Future; Expected date: 12/07/2020    7. Type 2 diabetes mellitus with other circulatory complication, without long-term current use of insulin  -     Ambulatory referral/consult to Home Health; Future; Expected date: 12/07/2020  -     blood-glucose meter kit; Use as instructed. ICD 10 code E11.59  Dispense: 1 each; Refill: 0  -     blood sugar diagnostic Strp; 1 strip by Misc.(Non-Drug; Combo Route) route 3 (three) times daily with meals. ICD 10 code: E11.59  Dispense: 300 strip; Refill: 3  -     blood glucose control, high Soln; 1 drop by Misc.(Non-Drug; Combo Route) route once daily. ICD 10 code: E11.59  Dispense: 3 each; Refill:  3  On metformin. She expressed concern for night sweats- I will like to have her check blood sugar routine and at the time of her night sweats. She needs meter and testing supplies.  She has an upcoming appointment on 12/7/2020 with the diabetic specialist.    8. Currently attempting to quit smoking  -     nicotine (NICODERM CQ) 14 mg/24 hr; Place 1 patch onto the skin once daily. Start 12/30/2020  Dispense: 30 patch; Refill: 0  -     nicotine (NICODERM CQ) 21 mg/24 hr; Place 1 patch onto the skin once daily. Start 11/30/2020  Dispense: 30 patch; Refill: 0  -     nicotine (NICODERM CQ) 7 mg/24 hr; Place 1 patch onto the skin once daily. Start 1/29/2021  Dispense: 30 patch; Refill: 0  Quit smoking on  11/13/2020 but discussed the importance of not starting again; she declines smoking cessation program.    9. Class 1 obesity due to excess calories with serious comorbidity and body mass index (BMI) of 34.0 to 34.9 in adult  Encourage healthy lifestyle changes through diet and exercise.    Follow up in 1 month.    Disclaimer: This note has been generated using voice-recognition software. There may be typographical errors that have been missed during proof-reading

## 2020-12-01 ENCOUNTER — TELEPHONE (OUTPATIENT)
Dept: RADIOLOGY | Facility: HOSPITAL | Age: 65
End: 2020-12-01

## 2020-12-01 ENCOUNTER — TELEPHONE (OUTPATIENT)
Dept: PRIMARY CARE CLINIC | Facility: CLINIC | Age: 65
End: 2020-12-01

## 2020-12-01 NOTE — TELEPHONE ENCOUNTER
----- Message from [place] Gaitan sent at 12/1/2020 10:10 AM CST -----  Contact: Jonane @ [facility] [phone]  Good Morning,  [facility] review last clinic visit and need clarification on why patient need a home health aid for Tuesday,Thurs and Sat as well a nurse visit for Monday,Wed,Fri.   Is patient homebound ?    [facility] also want to know why patient need to see a .      Claim order is pending and need clarification before it can be approved. Please call and advise.    Also [facility] asked for call before 4 pm

## 2020-12-01 NOTE — TELEPHONE ENCOUNTER
Patient was scheduled for a abnormal mammogram follow up on 11/24/2020 and did not show for follow up, called patient to reschedule. Scheduled patient for 1/11/2021 at [facility]. Per patient request.

## 2020-12-01 NOTE — TELEPHONE ENCOUNTER
Spoke with Joanne (Phone - [phone]) from [facility] regarding the HH referral. Pt does not qualify for HH Aide. Joanne asked if the patient is able to drive herself, I told her this information would have to be verified with the pt. Joanne stated that if the patient can & is not homebound then she will not qualify for HH, but a  can still be sent out to the pt.     Spoke with pt, who advised that she is driving. Advised the pt that since she can leave the house on her own she will not qualify for HH or the aide, but a  can be sent out. Pt agreed to the .     Joanne at [facility] advised of the conversation with the pt. Joanne stated that the HH order can be used for the  & that a new order is not required. PCP advised.

## 2020-12-01 NOTE — PROGRESS NOTES
CRS Office Visit History and Physical    Referring Md:   Mya Guallpa Md  [address] [place] [address]    SUBJECTIVE:     Chief Complaint: Hemorrhoids    History of Present Illness:  The patient is new patient to this practice.   Course is as follows:  Patient is a 65 y.o. female presents with below.  When she gets up to go the bathroom at night she feels something hanging out of her bottom when she goes to urinate.  Does not happened during the day.  Goes back in on it's own.  She has never actually felt this with her fingers.  It is just a vague sensation that she has.  She states that she is quite anxious, had 2 previous strokes overnight, and just wanted to get the area checked out to make sure nothing bad was happening.  Does not happen with bowel movements.  Denies straining to urinate or have a BM.  She is not able to reproduce this sensation with straining.  Symptoms have been present for 1-2 months.  No bleeding.  Previous anorectal procedures: no   Prior hysterectomy  Blood thinners: No    Last Colonoscopy: Scheduled soon, last was at age 50 (had polyps)  Family history of colorectal cancer or IBD: Unknown, dad and brother with cancer but she is unsure of what kind..    Review of patient's allergies indicates:   Allergen Reactions    Lisinopril        Past Medical History:   Diagnosis Date    CVA (cerebral vascular accident)     DM2 (diabetes mellitus, type 2)     Essential hypertension      Past Surgical History:   Procedure Laterality Date    ANKLE FRACTURE SURGERY Right     HYSTERECTOMY      no h/o  malignancy     Family History   Problem Relation Age of Onset    Alzheimer's disease Mother     Prostate cancer Father     Diabetes Father     Colon cancer Brother     Breast cancer Paternal Aunt     Heart disease Neg Hx     Stroke Neg Hx      Social History     Tobacco Use    Smoking status: Light Tobacco Smoker    Smokeless tobacco: Never Used   Substance Use  "Topics    Alcohol use: Not on file    Drug use: Not on file        Review of Systems:  Review of Systems   All other systems reviewed and are negative.      OBJECTIVE:     Vital Signs (Most Recent)  /78 (BP Location: Left arm, Patient Position: Sitting, BP Method: Large (Automatic))   Pulse 82   Ht 5' 7" (1.702 m)   Wt 91.8 kg (202 lb 6.1 oz)   LMP  (LMP Unknown)   BMI 31.70 kg/m²     Physical Exam:  General: Black or  female in no distress   Neuro: Alert and oriented x 4.  Moves all extremities.     HEENT: No icterus.  Trachea midline  Respiratory: Respirations are even and unlabored  Cardiac: Regular rate  Extremities: Warm dry and intact  Skin: No rashes    Anorectal:   External exam:  Perianal skin appears healthy.  No evidence of fissure.  No prolapse of hemorrhoids or rectal tissue with Valsalva.  No vaginal prolapse with Valsalva.  Digital exam revealed normal tone.  No palpable mass.  No gross blood.  Strong squeeze.  I am not able to appreciate any intussusception or prolapse with Valsalva.    Anoscopy:  Verbal consent was obtained.   Clear plastic anoscope inserted.    Grade I-II hemorrhoids seen.      ASSESSMENT/PLAN:     65-year-old female presenting with what sounds like intermittent hemorrhoid prolapse occurring occasionally in the evening when urinating, which is minimally symptomatic.  She is unable to reproduce this prolapse with straining.  Anorectal exam in the office today normal.  She is undergoing colonoscopy this month.  Discussed that I would not recommend any intervention at this time.  If symptoms progress or become more bothersome would recommend that she come back to clinic for repeat examination.  She seemed quite relieved at this recommendation, and states that she just wanted to get the area checked out to make sure that there was nothing dangerous.    Jeni Wilkes MD  Staff Surgeon, Colon and Rectal Surgery  [facility]    "

## 2020-12-02 ENCOUNTER — OFFICE VISIT (OUTPATIENT)
Dept: SURGERY | Facility: OTHER | Age: 65
End: 2020-12-02
Attending: COLON & RECTAL SURGERY
Payer: MEDICARE

## 2020-12-02 VITALS
WEIGHT: 202.38 LBS | HEART RATE: 82 BPM | HEIGHT: 67 IN | SYSTOLIC BLOOD PRESSURE: 128 MMHG | DIASTOLIC BLOOD PRESSURE: 78 MMHG | BODY MASS INDEX: 31.76 KG/M2

## 2020-12-02 DIAGNOSIS — Z87.19 HISTORY OF HEMORRHOIDS: ICD-10-CM

## 2020-12-02 PROCEDURE — 3288F PR FALLS RISK ASSESSMENT DOCUMENTED: ICD-10-PCS | Mod: CPTII,S$GLB,, | Performed by: COLON & RECTAL SURGERY

## 2020-12-02 PROCEDURE — 99203 PR OFFICE/OUTPT VISIT, NEW, LEVL III, 30-44 MIN: ICD-10-PCS | Mod: 25,S$GLB,, | Performed by: COLON & RECTAL SURGERY

## 2020-12-02 PROCEDURE — 46600 DIAGNOSTIC ANOSCOPY SPX: CPT | Mod: S$GLB,,, | Performed by: COLON & RECTAL SURGERY

## 2020-12-02 PROCEDURE — 1126F PR PAIN SEVERITY QUANTIFIED, NO PAIN PRESENT: ICD-10-PCS | Mod: S$GLB,,, | Performed by: COLON & RECTAL SURGERY

## 2020-12-02 PROCEDURE — 3008F BODY MASS INDEX DOCD: CPT | Mod: CPTII,S$GLB,, | Performed by: COLON & RECTAL SURGERY

## 2020-12-02 PROCEDURE — 3008F PR BODY MASS INDEX (BMI) DOCUMENTED: ICD-10-PCS | Mod: CPTII,S$GLB,, | Performed by: COLON & RECTAL SURGERY

## 2020-12-02 PROCEDURE — 1101F PT FALLS ASSESS-DOCD LE1/YR: CPT | Mod: CPTII,S$GLB,, | Performed by: COLON & RECTAL SURGERY

## 2020-12-02 PROCEDURE — 99999 PR PBB SHADOW E&M-EST. PATIENT-LVL V: ICD-10-PCS | Mod: PBBFAC,,, | Performed by: COLON & RECTAL SURGERY

## 2020-12-02 PROCEDURE — 46600 PR DIAG2STIC A2SCOPY: ICD-10-PCS | Mod: S$GLB,,, | Performed by: COLON & RECTAL SURGERY

## 2020-12-02 PROCEDURE — 3078F PR MOST RECENT DIASTOLIC BLOOD PRESSURE < 80 MM HG: ICD-10-PCS | Mod: CPTII,S$GLB,, | Performed by: COLON & RECTAL SURGERY

## 2020-12-02 PROCEDURE — 3288F FALL RISK ASSESSMENT DOCD: CPT | Mod: CPTII,S$GLB,, | Performed by: COLON & RECTAL SURGERY

## 2020-12-02 PROCEDURE — 99203 OFFICE O/P NEW LOW 30 MIN: CPT | Mod: 25,S$GLB,, | Performed by: COLON & RECTAL SURGERY

## 2020-12-02 PROCEDURE — 3074F SYST BP LT 130 MM HG: CPT | Mod: CPTII,S$GLB,, | Performed by: COLON & RECTAL SURGERY

## 2020-12-02 PROCEDURE — 1126F AMNT PAIN NOTED NONE PRSNT: CPT | Mod: S$GLB,,, | Performed by: COLON & RECTAL SURGERY

## 2020-12-02 PROCEDURE — 1101F PR PT FALLS ASSESS DOC 0-1 FALLS W/OUT INJ PAST YR: ICD-10-PCS | Mod: CPTII,S$GLB,, | Performed by: COLON & RECTAL SURGERY

## 2020-12-02 PROCEDURE — 99999 PR PBB SHADOW E&M-EST. PATIENT-LVL V: CPT | Mod: PBBFAC,,, | Performed by: COLON & RECTAL SURGERY

## 2020-12-02 PROCEDURE — 3078F DIAST BP <80 MM HG: CPT | Mod: CPTII,S$GLB,, | Performed by: COLON & RECTAL SURGERY

## 2020-12-02 PROCEDURE — 3074F PR MOST RECENT SYSTOLIC BLOOD PRESSURE < 130 MM HG: ICD-10-PCS | Mod: CPTII,S$GLB,, | Performed by: COLON & RECTAL SURGERY

## 2020-12-02 NOTE — LETTER
December 3, 2020      Mya Bar MD  [address] [facility] [address]           [facility] [address]  [address], [address]  [facility] [address]  Phone: [phone]  Fax: [phone]          Patient: Shabnam Noble   MR Number: [id number]   YOB: 1955   Date of Visit: 12/2/2020       Dear Dr. Mya Bar:    Thank you for referring Shabnam Noble to me for evaluation. Attached you will find relevant portions of my assessment and plan of care.    If you have questions, please do not hesitate to call me. I look forward to following Shabnam Noble along with you.    Sincerely,    Jeni Wilkes MD    Enclosure  CC:  No Recipients    If you would like to receive this communication electronically, please contact [email or web address] or [phone] to request more information on [employer] access.    For providers and/or their staff who would like to refer a patient to [facility], please contact us through our one-stop-shop provider referral line, [facility], at [phone].    If you feel you have received this communication in error or would no longer like to receive these types of communications, please e-mail [email or web address]

## 2020-12-07 ENCOUNTER — PATIENT OUTREACH (OUTPATIENT)
Dept: ADMINISTRATIVE | Facility: OTHER | Age: 65
End: 2020-12-07

## 2020-12-07 ENCOUNTER — OFFICE VISIT (OUTPATIENT)
Dept: PRIMARY CARE CLINIC | Facility: CLINIC | Age: 65
End: 2020-12-07
Payer: MEDICARE

## 2020-12-07 VITALS
TEMPERATURE: 98 F | DIASTOLIC BLOOD PRESSURE: 82 MMHG | RESPIRATION RATE: 16 BRPM | WEIGHT: 202.19 LBS | HEIGHT: 65 IN | SYSTOLIC BLOOD PRESSURE: 140 MMHG | HEART RATE: 72 BPM | BODY MASS INDEX: 33.69 KG/M2

## 2020-12-07 DIAGNOSIS — E11.69 HYPERLIPIDEMIA ASSOCIATED WITH TYPE 2 DIABETES MELLITUS: ICD-10-CM

## 2020-12-07 DIAGNOSIS — E11.59 HYPERTENSION ASSOCIATED WITH DIABETES: ICD-10-CM

## 2020-12-07 DIAGNOSIS — I15.2 HYPERTENSION ASSOCIATED WITH DIABETES: ICD-10-CM

## 2020-12-07 DIAGNOSIS — E11.59 TYPE 2 DIABETES MELLITUS WITH OTHER CIRCULATORY COMPLICATION, WITHOUT LONG-TERM CURRENT USE OF INSULIN: Primary | ICD-10-CM

## 2020-12-07 DIAGNOSIS — E05.90 SUBCLINICAL HYPERTHYROIDISM: ICD-10-CM

## 2020-12-07 DIAGNOSIS — E66.09 CLASS 1 OBESITY DUE TO EXCESS CALORIES WITH SERIOUS COMORBIDITY AND BODY MASS INDEX (BMI) OF 34.0 TO 34.9 IN ADULT: ICD-10-CM

## 2020-12-07 DIAGNOSIS — Z86.73 HISTORY OF STROKE: ICD-10-CM

## 2020-12-07 DIAGNOSIS — R41.3 MEMORY DEFICIT: ICD-10-CM

## 2020-12-07 DIAGNOSIS — E78.5 HYPERLIPIDEMIA ASSOCIATED WITH TYPE 2 DIABETES MELLITUS: ICD-10-CM

## 2020-12-07 LAB — GLUCOSE SERPL-MCNC: 192 MG/DL (ref 70–110)

## 2020-12-07 PROCEDURE — 3288F FALL RISK ASSESSMENT DOCD: CPT | Mod: CPTII,S$GLB,, | Performed by: NURSE PRACTITIONER

## 2020-12-07 PROCEDURE — 99999 PR PBB SHADOW E&M-EST. PATIENT-LVL V: ICD-10-PCS | Mod: PBBFAC,,, | Performed by: NURSE PRACTITIONER

## 2020-12-07 PROCEDURE — 3079F PR MOST RECENT DIASTOLIC BLOOD PRESSURE 80-89 MM HG: ICD-10-PCS | Mod: CPTII,S$GLB,, | Performed by: NURSE PRACTITIONER

## 2020-12-07 PROCEDURE — 3079F DIAST BP 80-89 MM HG: CPT | Mod: CPTII,S$GLB,, | Performed by: NURSE PRACTITIONER

## 2020-12-07 PROCEDURE — 1101F PT FALLS ASSESS-DOCD LE1/YR: CPT | Mod: CPTII,S$GLB,, | Performed by: NURSE PRACTITIONER

## 2020-12-07 PROCEDURE — 3077F SYST BP >= 140 MM HG: CPT | Mod: CPTII,S$GLB,, | Performed by: NURSE PRACTITIONER

## 2020-12-07 PROCEDURE — 99214 OFFICE O/P EST MOD 30 MIN: CPT | Mod: S$GLB,,, | Performed by: NURSE PRACTITIONER

## 2020-12-07 PROCEDURE — 82962 GLUCOSE BLOOD TEST: CPT | Mod: S$GLB,,, | Performed by: NURSE PRACTITIONER

## 2020-12-07 PROCEDURE — 3008F PR BODY MASS INDEX (BMI) DOCUMENTED: ICD-10-PCS | Mod: CPTII,S$GLB,, | Performed by: NURSE PRACTITIONER

## 2020-12-07 PROCEDURE — 99214 PR OFFICE/OUTPT VISIT, EST, LEVL IV, 30-39 MIN: ICD-10-PCS | Mod: S$GLB,,, | Performed by: NURSE PRACTITIONER

## 2020-12-07 PROCEDURE — 3288F PR FALLS RISK ASSESSMENT DOCUMENTED: ICD-10-PCS | Mod: CPTII,S$GLB,, | Performed by: NURSE PRACTITIONER

## 2020-12-07 PROCEDURE — 1126F AMNT PAIN NOTED NONE PRSNT: CPT | Mod: S$GLB,,, | Performed by: NURSE PRACTITIONER

## 2020-12-07 PROCEDURE — 3046F PR MOST RECENT HEMOGLOBIN A1C LEVEL > 9.0%: ICD-10-PCS | Mod: CPTII,S$GLB,, | Performed by: NURSE PRACTITIONER

## 2020-12-07 PROCEDURE — 1126F PR PAIN SEVERITY QUANTIFIED, NO PAIN PRESENT: ICD-10-PCS | Mod: S$GLB,,, | Performed by: NURSE PRACTITIONER

## 2020-12-07 PROCEDURE — 1101F PR PT FALLS ASSESS DOC 0-1 FALLS W/OUT INJ PAST YR: ICD-10-PCS | Mod: CPTII,S$GLB,, | Performed by: NURSE PRACTITIONER

## 2020-12-07 PROCEDURE — 99499 UNLISTED E&M SERVICE: CPT | Mod: S$GLB,,, | Performed by: NURSE PRACTITIONER

## 2020-12-07 PROCEDURE — 82962 POCT GLUCOSE, HAND-HELD DEVICE: ICD-10-PCS | Mod: S$GLB,,, | Performed by: NURSE PRACTITIONER

## 2020-12-07 PROCEDURE — 3008F BODY MASS INDEX DOCD: CPT | Mod: CPTII,S$GLB,, | Performed by: NURSE PRACTITIONER

## 2020-12-07 PROCEDURE — 3046F HEMOGLOBIN A1C LEVEL >9.0%: CPT | Mod: CPTII,S$GLB,, | Performed by: NURSE PRACTITIONER

## 2020-12-07 PROCEDURE — 99499 RISK ADDL DX/OHS AUDIT: ICD-10-PCS | Mod: S$GLB,,, | Performed by: NURSE PRACTITIONER

## 2020-12-07 PROCEDURE — 99999 PR PBB SHADOW E&M-EST. PATIENT-LVL V: CPT | Mod: PBBFAC,,, | Performed by: NURSE PRACTITIONER

## 2020-12-07 PROCEDURE — 3077F PR MOST RECENT SYSTOLIC BLOOD PRESSURE >= 140 MM HG: ICD-10-PCS | Mod: CPTII,S$GLB,, | Performed by: NURSE PRACTITIONER

## 2020-12-07 RX ORDER — EMPAGLIFLOZIN 10 MG/1
10 TABLET, FILM COATED ORAL DAILY
Qty: 30 TABLET | Refills: 6 | Status: SHIPPED | OUTPATIENT
Start: 2020-12-07 | End: 2020-12-08 | Stop reason: SDUPTHER

## 2020-12-07 NOTE — PATIENT INSTRUCTIONS
"Continue metformin 1000 mg po twice per day.     Start jardiance 10 mg tablet daily.     Start checking sugars every morning and write it down in log.     Bring log with you to next office visit.   Normal fasting blood sugars should be 90 - 120's.     Low Carb Snacks & Diabetes friendly foods   Aim for 30 - 40 grams of carbs for 3 meals per day (or 2 meals and  1 - 2 snacks - however you prefer)  Snacks can be 15 - 20 grams of carbs.     Eating small, frequent meals will help you to feel more satisfied, and more full so that you don't over eat or eat the wrong foods later.   Also, renetta meals/snacks allow you to spread carbohydrates evenly, which may stabilize blood sugars.   Below you will find a list of ideas of foods that I like/prefer.   Feel free to give me your ideas and tips if you find good ones too!   Overall - please remember to limit refined sugars such as soft drinks, juices, rices, pastas, breads, cakes.   Look at the back of the label - look at the amount of "carbohydrates", then look at the amount of "fiber" - subtract the amount of fiber from the amount of carbs - this is your "net carb intake".  The more fiber a food has, the better generally, as you get to subtract this from the net carbs.     0-5 gm carb   Crystal Light flavoring (I like fruit punch flavor!)   Vitamin Water Zero   Katja antioxidant drinks.    Sparkling ice (long skinny flavored water bottles for $1 that are sugar free).    Luisana water, WaterLoo - carbonated flavored haskins, various flavors.   Diet coke, diet barqs, sprite zero.   Diet sunkist (orange drink)   Sugar free powerade   Herbal tea, unsweetened   2 tsp peanut butter on celery or carrots   Indio's original Candies - (the Sugar Free ones!)   1/2 cup sugar-free jell-o   1 sugar-free popsicle   ¼ cup blueberries or strawberries   8oz Blue Olga unsweetened almond milk (or there is sugar free vanilla flavored as well).   5 baby carrots & celery sticks, " "cucumbers, bell peppers dipped in ¼ cup salsa, 2Tbsp light ranch dressing or 2Tbsp plain Greek yogurt   10 Goldfish crackers   ½ oz low-fat cheese or string cheese   1 closed handful of nuts, unsalted (example-->almonds, pistachios, cashews, peanuts, etc).   1 Tbsp of sunflower seeds, unsalted   1 cup Smart Pop popcorn   1 whole grain brown rice cake    "Think Thin" protein bars - my personal favorite is Creamy Peanut butter, chocolate brownie, or oreo flavors.   "Tang" bars  - can be found at [employer] grocery store - they have 5 grams of net carbohydrates.   Quest bars (my favorite is birthday cake, and also cinnamon roll is good melted for 10 seconds in the microwave - please remove the wrapper first!)   Premier protein shakes - sold at [employer], or other brand alternatives - usually 1 - 2 grams of carbs (strawberry, vanilla, chocolate flavors) Coffee flavor is my new morning favorite!    Scrambled eggs! Or a fried egg or boiled eggs - add sliced tomatoes, cilantro or some chopped green onions.    Eggs are good with any and all veggies! You can even eat them for dinner or in a low carb tortilla, or served with your favorite grilled meat/sausage or recinos is my favorite.    Veggie spirals - zucchini - can buy in the frozen foods section. Birds eye brand is usually cheap. Tip - saute with oil/onions or italian seasoning and use this as a pasta substitution.   Milk - is usually high in carbs/sugar - use Ium milk brand instead - it is "filtered" milk - with half the amount of carbs of regular milk. (next to the milk in milk aisle).    Smuckers sugar free Breakfast syrup (or 1 tablespoon of low sugar breakfast syrup) instead of regular syrup.        15 gm carb   1 small piece of fruit or ½ banana or 1/2 cup lite canned fruit   3 josé miguel cracker squares   3 cups Smart Pop popcorn, top spray butter, Lee lite salt or cinnamon and Truvia   5 Vanilla Wafers   ½ cup low fat, no added sugar ice " "cream or frozen yogurt (Blue bell, Blue Bunny, Weight Watchers, Skinny Cow)   1/2 - 1 cup Light n' fit Vanilla yogurt (has added protein in it to make you feel full).    ½ turkey, ham, or chicken sandwich   ½ c fruit with ½ c Cottage cheese   4-6 unsalted wheat crackers with 1 oz low fat cheese or 1 tbsp peanut butter    30-45 goldfish crackers (depending on flavor)    7-8 Catholic mini brown rice cakes (caramel, apple cinnamon, chocolate)    12 Catholic mini brown rice cakes (cheddar, bbq, ranch)    1/3 cup hummus dip with raw veg   1/2 whole wheat angeli, 1Tbsp hummus   Mini Pizza (1/2 whole wheat English muffin, low-fat  cheese, tomato sauce)   100 calorie snack pack (Oreo, Chips Ahoy, Ritz Mix, Baked Cheetos)   4-6 oz. light or Greek Style yogurt (Chobani, Yoplait, Okios, Stoneyfield)   ½ cup sugar-free pudding     6 in. wheat tortilla or angeli oven toasted chips (topped with spray butter flavoring, cinnamon, Truvia OR spray butter, garlic powder, chili powder)    18 BBQ Popchips (available at [employer], Whole Foods, [employer] Market)   Mini bagel (small size) toasted - add fat free cream cheese or avocado and sliced tomato on top - yum!    1/2 cup Halo top icecream - birthday cake is my go-to flavor.    Truth Bars - can be found at Fresh market - Net carbs is 11 - 12 grams depending on the flavor.    Kind bars = mostly nuts and dried berries - find at most local groceries stores, drug stores, whole foods, fresh market. Net carbs is around 10 grams.     Smoothie Jayson - I'm often asked "what smoothie is healthy for me".   Do not be decieved to think that all smoothies are "healthy". In fact, most are loaded with hidden sugars.   Here are some from the menu that you are allowed to have being diabetic:   The gladiator - any flavor.   Keto champ (berry, chocolate or coffee - all have 10 grams of carbs).   The Lean 1 smoothies all have 15 - 20 grams of carbs, so this is slightly more. But would be ok for a meal " "substitute or snack.   The Shredder in Vanilla or chocolate only. (the strawberry has more sugar considerably)    Tips and Tricks:     Eat an extra vegetable once per day - green veggies (such as broccoli, cauliflower, green beans) - make you feel full and are low in sugar.     My favorite "secret" seasoning to make things extra yummy is cinnamon for sweet taste   For an added secret "salty" taste - add "everything but the bagel" seasoning (you can get on amazon.com or at  joes. I have seen at local groceries such as [employer] too!).     Dark colored fruits are your friend -- blueberries, strawberries, raspberries, blackberries. They have a lower sugar content. So will be the best choice for you.   Light colored fruits are NOT your friend - they tend to be higher in sugar and are not the best options for an ADA diet - examples are oranges, bananas, peaches, watermelon, -- you can eat these, but carefully and in moderation.     WATER. I cannot emphasize drinking water enough - it will hydrate you, make you full. Your body needs it - it's a natural appetite suppressant.   Sometimes hunger and thirst can feel the same. Try drinking some water first, then eat if you are still hungry.     Other snack choices    Celery with peanut butter   Celery with tuna salad   Dill pickles and cheddar cheese (no kidding, it's a great combo)   Nuts (keep raw ones in the freezer if you think you'll overeat them)   Sunflower seeds (get them in the shell so it will take longer to eat them)   Other seeds (How to Toast Pumpkin or Squash Seeds)    Pistachios or almonds - will fill you up and are tasty!    Low-Carb Trail Mix   Jerky (beef or turkey -- try to find low-sugar varieties)   Salami slices (you can find in the deli section)   Cheese sticks, such as string cheese   Sugar-free Jello, alone or with cottage cheese and a sprinkling of nuts. Make sugar-free lime Jello with part coconut milk -- For a large package, dissolve " "the powder in a cup of boiling water, add a can of coconut milk, and then add the rest of the water. Stir well.   Pepperoni "chips" -- Zap the slices in the microwave   Cheese with a few apple slices   4-ounce plain or sugar-free yogurt with berries and flax seed meal   Smoked salmon and cream cheese on cucumber slices   Lettuce Roll-ups -- Roll luncheon meat, egg salad, tuna or other filling and veggies in lettuce leaves   Lunch Meat Roll-ups -- Roll cheese or veggies in lunch meat (read the labels for carbs on the lunch meat)   Spread bean dip, spinach dip, or other low-carb dip or spread on the lunch meat or lettuce and then roll it up   Raw veggies and spinach dip, or other low-carb dip   Pork rinds (Chicharrón), with or without dip   Ricotta cheese with fruit and/or nuts and/or flax seed meal   Mushrooms with cheese spread inside (or other spreads or dips)   Low-carb snack bars (watch out for sugar alcohols, especially maltitol)   Product Review: Atkins Advantage Bars   Pepperoni Chips -- Microwave pepperoni slices until crisp. Great with cheeses and dips   Garlic Parmesan Flax Seed Crackers   Parmesan Crisps -- Good when you want a crunchy snack.   Peanut Butter Protein Balls        "

## 2020-12-07 NOTE — PROGRESS NOTES
HPI: Shabnam Noble is a 65 y.o.  female c/I for visit to address Diabetes Type 2  This is the first time I am seeing them for care, follows with Dr. Mya Bar MD for primary care needs.   Has not seen endocrinology or diabetes specialist in the past.     was diagnosed with T2DM in 2018  Started on metformin 1000mg twice daily and is still taking.   Is not always remember to take medications.   States her best friend comes to visit her/makes sure she is taking her medication - but was often forgetting prior.     Has not steadily been checking her blood sugars recently.   Today's sugar 3 hour past prandial is 190 - ate sausage, egg and cheese biscuit - and hashbrowns.   Has never been hospitalized r/t DM.  Denies missing doses of DM medication.     Past medical History:   Past Medical History:   Diagnosis Date    CVA (cerebral vascular accident)     DM2 (diabetes mellitus, type 2)     Essential hypertension       Family hx:   Family History   Problem Relation Age of Onset    Alzheimer's disease Mother     Prostate cancer Father     Diabetes Father     Colon cancer Brother     Breast cancer Paternal Aunt     Heart disease Neg Hx     Stroke Neg Hx       Current meds:   Current Outpatient Medications:     amLODIPine (NORVASC) 10 MG tablet, Take 10 mg by mouth once daily., Disp: , Rfl:     aspirin (BUFFERIN) 325 MG Tab, Take 81 mg by mouth once daily., Disp: , Rfl:     atorvastatin (LIPITOR) 80 MG tablet, Take 1 tablet (80 mg total) by mouth once daily., Disp: 90 tablet, Rfl: 3    blood glucose control, high Soln, 1 drop by Misc.(Non-Drug; Combo Route) route once daily. ICD 10 code: E11.59, Disp: 3 each, Rfl: 3    blood sugar diagnostic Strp, 1 strip by Misc.(Non-Drug; Combo Route) route 3 (three) times daily with meals. ICD 10 code: E11.59, Disp: 300 strip, Rfl: 3    blood-glucose meter kit, Use as instructed. ICD 10 code E11.59, Disp: 1 each, Rfl: 0    clopidogreL (PLAVIX) 75 mg tablet,  Take 1 tablet (75 mg total) by mouth once daily., Disp: 90 tablet, Rfl: 3    diphenhydrAMINE (BENADRYL ALLERGY) 12.5 mg/5 mL liquid, Take 10 mLs (25 mg total) by mouth every 6 (six) hours as needed for Allergies., Disp: 118 mL, Rfl: 0    ezetimibe (ZETIA) 10 mg tablet, Take 1 tablet (10 mg total) by mouth once daily., Disp: 90 tablet, Rfl: 3    GAVILYTE-G 236-22.74-6.74 -5.86 gram suspension, , Disp: , Rfl:     hydroCHLOROthiazide (HYDRODIURIL) 25 MG tablet, Take 1 tablet (25 mg total) by mouth once daily., Disp: 90 tablet, Rfl: 3     mg tablet, , Disp: , Rfl:     lancets 32 gauge Misc, 1 lancet by Misc.(Non-Drug; Combo Route) route 3 (three) times daily with meals., Disp: 300 each, Rfl: 3    metFORMIN (GLUCOPHAGE) 1000 MG tablet, Take 1 tablet (1,000 mg total) by mouth 2 (two) times daily with meals., Disp: 180 tablet, Rfl: 3    metoprolol succinate (TOPROL-XL) 50 MG 24 hr tablet, Take 1 tablet (50 mg total) by mouth once daily., Disp: 30 tablet, Rfl: 3    [START ON 12/30/2020] nicotine (NICODERM CQ) 14 mg/24 hr, Place 1 patch onto the skin once daily. Start 12/30/2020, Disp: 30 patch, Rfl: 0    nicotine (NICODERM CQ) 21 mg/24 hr, Place 1 patch onto the skin once daily. Start 11/30/2020, Disp: 30 patch, Rfl: 0    [START ON 1/29/2021] nicotine (NICODERM CQ) 7 mg/24 hr, Place 1 patch onto the skin once daily. Start 1/29/2021, Disp: 30 patch, Rfl: 0    empagliflozin (JARDIANCE) 10 mg tablet, Take 1 tablet (10 mg total) by mouth once daily., Disp: 30 tablet, Rfl: 6     Current Diabetes medications:   Metformin 1000mg bid with food    Medications Tried and Failed:   Metformin as above.     Review of Pertinent co-morbidities/risk factors:   CV: history of 2 strokes. Deficit - stuttering, memory loss - so now has home health set up now.   CAD: yes   Takes aspirin 81mg tablet daily and plavix daily.   BP: has history of HTN  Statin: Taking  ACE/ARB: Not taking    Social History     Tobacco Use   Smoking  "Status Light Tobacco Smoker   Smokeless Tobacco Never Used      Social:   Lives at home by herself.   Life changes/stressors currently:   Diet: following ADA diet   Meals: 3 per day and snacks.        Breakfast - breakfast sandwich, hashbrowns. Chicken sausage.        Lunch - hot lunches with pastas, poboys       Dinner - spaghetti, baked macaroni,        Snacks - chips, peanut butter crackers, pecans,         Drinks -  Exercise: none.   Activities: not working, was a  then had to stop when the pandemic hit.     Glucose Monitoring:   Is not checking sugars.   Just got a new glucometer yesterday.     Standards of care:   Eyes: .: 02/17/2020  Foot exam: : 12/07/2020   Diabetes education: None.    Vital Signs  BP (!) 140/82 (BP Location: Right arm, Patient Position: Sitting, BP Method: Medium (Manual))   Pulse 72   Temp 97.6 °F (36.4 °C) (Temporal)   Resp 16   Ht 5' 5" (1.651 m)   Wt 91.7 kg (202 lb 2.6 oz)   LMP  (LMP Unknown)   BMI 33.64 kg/m²     Pertinent Labs:   Hgba1c   Lab Results   Component Value Date    HGBA1C 9.9 (H) 11/13/2020    HGBA1C 12.2 (H) 08/12/2020    HGBA1C 9.0 (H) 01/21/2020     Lipid panel   Lab Results   Component Value Date    CHOL 102 (L) 08/12/2020    CHOL 202 (H) 01/21/2020    CHOL 195 06/30/2004     Lab Results   Component Value Date    HDL 41 08/12/2020    HDL 48 01/21/2020    HDL 55.0 06/30/2004     Lab Results   Component Value Date    LDLCALC 46.6 (L) 08/12/2020    LDLCALC 135.4 01/21/2020    LDLCALC 121.6 06/30/2004     Lab Results   Component Value Date    TRIG 72 08/12/2020    TRIG 93 01/21/2020    TRIG 92 06/30/2004     Lab Results   Component Value Date    CHOLHDL 40.2 08/12/2020    CHOLHDL 23.8 01/21/2020    CHOLHDL 28.2 06/30/2004      CMP  Glucose   Date Value Ref Range Status   11/13/2020 155 (H) 70 - 110 mg/dL Final     BUN   Date Value Ref Range Status   11/13/2020 20 8 - 23 mg/dL Final     Creatinine   Date Value Ref Range Status   11/13/2020 1.0 0.5 - 1.4 " mg/dL Final     eGFR if    Date Value Ref Range Status   11/13/2020 >60.0 >60 mL/min/1.73 m^2 Final     eGFR if non    Date Value Ref Range Status   11/13/2020 59.3 (A) >60 mL/min/1.73 m^2 Final     Comment:     Calculation used to obtain the estimated glomerular filtration  rate (eGFR) is the CKD-EPI equation.        AST   Date Value Ref Range Status   11/13/2020 11 10 - 40 U/L Final     ALT   Date Value Ref Range Status   11/13/2020 11 10 - 44 U/L Final     Microalbumin creatinine ratio:   Lab Results   Component Value Date    MICALBCREAT 36.7 (H) 01/21/2020       Review Of Systems:   Gen: Appetite good, no weight gain or loss, denies fatigue and weakness. Denies polydipsia.  Skin: Skin is intact and heals well, denies any rashes or hair changes.   EENT: Denies any acute visual disturbances, nor blurred vision. Denies nasal congestion, no compressive symptoms.   Resp: Denies SOB or Dyspnea on exertion, denies cough.   Cardiac: Denies chest pain, palpitations, or swelling.   GI: Denies abdominal pain, nausea or vomiting, diarrhea, or constipation.   /GYN: Denies nocturia, nor burning, frequency or pain on urination.  MS/Neuro: Denies numbness/ tingling in BLE; Gait steady, speech clear  Psych: Denies drug/ETOH abuse, no hx of depression.  Other systems: negative.    Physical Exam:   GENERAL: Well developed, well nourished in appearance.   PSYCH: AAOx3, appropriate mood and affect, pleasant expression, conversant, appears relaxed, well groomed.   EYES: PERRL, Conjunctiva and corneas clear  NECK: Soft and Supple, trachea midline, No thyroid enlargement noted.  CHEST: Even, regular, and unlabored respirations  ABDOMEN: Soft, non-tender, non-distended. Normal bowel sounds.   VASCULAR: pedal pulses palpable bilaterally, no edema.  NEURO:  cranial nerves II - XII intact   MUSCULOSKELETAL: Good ROM, equal strength, equal hand grasp, steady gait.   SKIN: Skin warm, dry, and intact      Assessment and Plan of Care:     Shabnam was seen today for diabetes.    Diagnoses and all orders for this visit:    Type 2 diabetes mellitus with other circulatory complication, without long-term current use of insulin  -     POCT Glucose, Hand-Held Device  -     Ambulatory referral/consult to Diabetes Education; Future    Subclinical hyperthyroidism    Class 1 obesity due to excess calories with serious comorbidity and body mass index (BMI) of 34.0 to 34.9 in adult    Hyperlipidemia associated with type 2 diabetes mellitus    Hypertension associated with diabetes    History of stroke    Memory deficit    Other orders  -     Discontinue: empagliflozin (JARDIANCE) 10 mg tablet; Take 1 tablet (10 mg total) by mouth once daily.  -     empagliflozin (JARDIANCE) 10 mg tablet; Take 1 tablet (10 mg total) by mouth once daily.     1. T2DM with hyperglycemia- Hgba1c goal is 7.5% or less without hypoglycemia - 9%---> 12.2%---> 9.9% current.   Continue metformin 1000 bid.   Start jardiance 10mg tablet daily.   discussed DM, progression of disease, long term complications, CV risk factors and tx options.   Discussed she is already high risk due to her history of smoking and stroke, so the importance of getting her diabetes under control is more important now more than ever.   Advise compliance with ADA diet and encourage exercise- gave list of diabetes friendly foods and discussed carbohydrates.    Instructed to monitor Blood glucose 2x/day - fasting in a.m. and bring meter/ log to every clinic visit.     2. HTN - controlled, continue meds as previously prescribed and monitor.   Mild clinical urine MAC     3. HLD- LDL goal < 100. At goal.   Currently on statin therapy    4. Weight - BMI Body mass index is 33.64 kg/m².   Encourage Ada diet and exercise.     5. Renal Function - stable at present.   Continue metformin - will continue to monitor.     6. Smoker - cessation advised - quitting smoking could be the single most  important thing you can do for your health.   She is precontemplative.     7. S/p 2 CVA's - residual memory loss and dysphagia.   On statin, aspirin 325 and plavix.        Follow up in 6 weeks with OV and bg logs.

## 2020-12-07 NOTE — PROGRESS NOTES
Care Everywhere: updated  Immunization: updated, delay in links  Health Maintenance: updated  Media Review:   Legacy Review:   Order placed:   Upcoming appts:colonoscopy 12.11

## 2020-12-08 ENCOUNTER — TELEPHONE (OUTPATIENT)
Dept: PRIMARY CARE CLINIC | Facility: CLINIC | Age: 65
End: 2020-12-08

## 2020-12-08 ENCOUNTER — TELEPHONE (OUTPATIENT)
Dept: INTERNAL MEDICINE | Facility: CLINIC | Age: 65
End: 2020-12-08

## 2020-12-08 ENCOUNTER — LAB VISIT (OUTPATIENT)
Dept: INTERNAL MEDICINE | Facility: CLINIC | Age: 65
End: 2020-12-08
Payer: MEDICARE

## 2020-12-08 DIAGNOSIS — Z01.818 PRE-OP TESTING: ICD-10-CM

## 2020-12-08 PROBLEM — I10 ELEVATED BLOOD PRESSURE READING WITH DIAGNOSIS OF HYPERTENSION: Status: RESOLVED | Noted: 2020-08-12 | Resolved: 2020-12-08

## 2020-12-08 PROCEDURE — U0003 INFECTIOUS AGENT DETECTION BY NUCLEIC ACID (DNA OR RNA); SEVERE ACUTE RESPIRATORY SYNDROME CORONAVIRUS 2 (SARS-COV-2) (CORONAVIRUS DISEASE [COVID-19]), AMPLIFIED PROBE TECHNIQUE, MAKING USE OF HIGH THROUGHPUT TECHNOLOGIES AS DESCRIBED BY CMS-2020-01-R: HCPCS

## 2020-12-08 RX ORDER — EMPAGLIFLOZIN 10 MG/1
10 TABLET, FILM COATED ORAL DAILY
Qty: 30 TABLET | Refills: 6 | Status: SHIPPED | OUTPATIENT
Start: 2020-12-08 | End: 2021-06-03 | Stop reason: SDUPTHER

## 2020-12-08 NOTE — TELEPHONE ENCOUNTER
----- Message from Rosa Howell sent at 12/8/2020 11:15 AM CST -----  Regarding: Patient would like a call back from Dr. Cunha's nurse  Contact: [phone]  Patient would like a call back from Dr. Cunha's nurse

## 2020-12-08 NOTE — TELEPHONE ENCOUNTER
----- Message from Pricila Pisano sent at 12/8/2020 10:20 AM CST -----  Contact: FATMATA PARR [[id number]] @ [phone]  The new Rx given to her yesterday is to expensive. Her co pay is $45.00. Please call her about a different Rx covered by her insurance.       Armando Hagen   [phone] (Phone) or [phone] (Fax)

## 2020-12-08 NOTE — TELEPHONE ENCOUNTER
Spoke with pt and advised she will have to call the colonoscopy department directly about scheduling and what she needs to do prior to that appt

## 2020-12-08 NOTE — TELEPHONE ENCOUNTER
Called and s/w patient.   Discussed option of glipizide or glimepiride, but considering how high risk she is (smoker and overweight and history of stroke) - I think jardiance would be the wisest decision for her.   She agrees and will go pick it up.   [facility] to [facility] Sent rx. Agrees to take,   Thanks,  andrew

## 2020-12-09 ENCOUNTER — TELEPHONE (OUTPATIENT)
Dept: ENDOSCOPY | Facility: HOSPITAL | Age: 65
End: 2020-12-09

## 2020-12-09 ENCOUNTER — TELEPHONE (OUTPATIENT)
Dept: PRIMARY CARE CLINIC | Facility: CLINIC | Age: 65
End: 2020-12-09

## 2020-12-09 LAB — SARS-COV-2 RNA RESP QL NAA+PROBE: NOT DETECTED

## 2020-12-09 NOTE — TELEPHONE ENCOUNTER
Spoke with pt and advised per note on 12/1/20 pt doesn't qualify for home health aide because she drives and moves around on her own.

## 2020-12-09 NOTE — TELEPHONE ENCOUNTER
----- Message from Azra Manjarrez sent at 12/9/2020  9:35 AM CST -----  Contact: [phone]  Pt would like call back regarding order that was canceled

## 2020-12-09 NOTE — TELEPHONE ENCOUNTER
Returned patient's call regarding colonoscopy scheduled on 12/11/20. Patient stated she didn't receive prep instructions. Explained to patient her prep instructions were mailed on the day she scheduled procedure. Asked if she stopped taking her plavix and she stated she stopped taking it Saturday (12/6). Gol[employer] prep instructions emailed to [email or web address].

## 2020-12-10 ENCOUNTER — ANESTHESIA EVENT (OUTPATIENT)
Dept: ENDOSCOPY | Facility: HOSPITAL | Age: 65
End: 2020-12-10
Payer: MEDICARE

## 2020-12-11 ENCOUNTER — ANESTHESIA (OUTPATIENT)
Dept: ENDOSCOPY | Facility: HOSPITAL | Age: 65
End: 2020-12-11
Payer: MEDICARE

## 2020-12-11 ENCOUNTER — HOSPITAL ENCOUNTER (OUTPATIENT)
Facility: HOSPITAL | Age: 65
Discharge: HOME | End: 2020-12-11
Attending: COLON & RECTAL SURGERY | Admitting: COLON & RECTAL SURGERY
Payer: MEDICARE

## 2020-12-11 VITALS
BODY MASS INDEX: 33.32 KG/M2 | HEIGHT: 65 IN | TEMPERATURE: 98 F | WEIGHT: 200 LBS | RESPIRATION RATE: 18 BRPM | HEART RATE: 83 BPM | OXYGEN SATURATION: 100 % | SYSTOLIC BLOOD PRESSURE: 174 MMHG | DIASTOLIC BLOOD PRESSURE: 84 MMHG

## 2020-12-11 DIAGNOSIS — Z12.11 ENCOUNTER FOR SCREENING COLONOSCOPY: ICD-10-CM

## 2020-12-11 LAB — POCT GLUCOSE: 239 MG/DL (ref 70–110)

## 2020-12-11 PROCEDURE — 25000003 PHARM REV CODE 250: Performed by: COLON & RECTAL SURGERY

## 2020-12-11 PROCEDURE — 63600175 PHARM REV CODE 636 W HCPCS: Performed by: NURSE ANESTHETIST, CERTIFIED REGISTERED

## 2020-12-11 PROCEDURE — G0121 COLON CA SCRN NOT HI RSK IND: HCPCS | Mod: ,,, | Performed by: COLON & RECTAL SURGERY

## 2020-12-11 PROCEDURE — G0121 COLON CA SCRN NOT HI RSK IND: ICD-10-PCS | Mod: ,,, | Performed by: COLON & RECTAL SURGERY

## 2020-12-11 PROCEDURE — E9220 PRA ENDO ANESTHESIA: ICD-10-PCS | Mod: ,,, | Performed by: NURSE ANESTHETIST, CERTIFIED REGISTERED

## 2020-12-11 PROCEDURE — E9220 PRA ENDO ANESTHESIA: HCPCS | Mod: ,,, | Performed by: NURSE ANESTHETIST, CERTIFIED REGISTERED

## 2020-12-11 PROCEDURE — G0121 COLON CA SCRN NOT HI RSK IND: HCPCS | Performed by: COLON & RECTAL SURGERY

## 2020-12-11 PROCEDURE — 82962 GLUCOSE BLOOD TEST: CPT | Performed by: COLON & RECTAL SURGERY

## 2020-12-11 PROCEDURE — 25000003 PHARM REV CODE 250: Performed by: NURSE ANESTHETIST, CERTIFIED REGISTERED

## 2020-12-11 PROCEDURE — 37000008 HC ANESTHESIA 1ST 15 MINUTES: Performed by: COLON & RECTAL SURGERY

## 2020-12-11 PROCEDURE — 37000009 HC ANESTHESIA EA ADD 15 MINS: Performed by: COLON & RECTAL SURGERY

## 2020-12-11 RX ORDER — PROPOFOL 10 MG/ML
VIAL (ML) INTRAVENOUS CONTINUOUS PRN
Status: DISCONTINUED | OUTPATIENT
Start: 2020-12-11 | End: 2020-12-11

## 2020-12-11 RX ORDER — SODIUM CHLORIDE 9 MG/ML
INJECTION, SOLUTION INTRAVENOUS CONTINUOUS
Status: DISCONTINUED | OUTPATIENT
Start: 2020-12-11 | End: 2020-12-11 | Stop reason: HOSPADM

## 2020-12-11 RX ORDER — PROPOFOL 10 MG/ML
VIAL (ML) INTRAVENOUS
Status: DISCONTINUED | OUTPATIENT
Start: 2020-12-11 | End: 2020-12-11

## 2020-12-11 RX ORDER — LIDOCAINE HYDROCHLORIDE 20 MG/ML
INJECTION INTRAVENOUS
Status: DISCONTINUED | OUTPATIENT
Start: 2020-12-11 | End: 2020-12-11

## 2020-12-11 RX ADMIN — PROPOFOL 50 MG: 10 INJECTION, EMULSION INTRAVENOUS at 12:12

## 2020-12-11 RX ADMIN — SODIUM CHLORIDE 10 ML/HR: 0.9 INJECTION, SOLUTION INTRAVENOUS at 10:12

## 2020-12-11 RX ADMIN — LIDOCAINE HYDROCHLORIDE 80 MG: 20 INJECTION, SOLUTION INTRAVENOUS at 12:12

## 2020-12-11 RX ADMIN — PROPOFOL 150 MCG/KG/MIN: 10 INJECTION, EMULSION INTRAVENOUS at 12:12

## 2020-12-11 NOTE — H&P
Endoscopy H&P    Procedure : Colonoscopy      asymptomatic screening exam      Past Medical History:   Diagnosis Date    Colon polyp     CVA (cerebral vascular accident)     DM2 (diabetes mellitus, type 2)     Essential hypertension        Family History   Problem Relation Age of Onset    Alzheimer's disease Mother     Prostate cancer Father     Diabetes Father     Colon cancer Brother     Breast cancer Paternal Aunt     Heart disease Neg Hx     Stroke Neg Hx        Social History     Socioeconomic History    Marital status: Single     Spouse name: Not on file    Number of children: Not on file    Years of education: Not on file    Highest education level: Not on file   Occupational History    Not on file   Social Needs    Financial resource strain: Not on file    Food insecurity     Worry: Not on file     Inability: Not on file    Transportation needs     Medical: Not on file     Non-medical: Not on file   Tobacco Use    Smoking status: Light Tobacco Smoker     Packs/day: 0.25     Years: 45.00     Pack years: 11.25     Types: Cigarettes    Smokeless tobacco: Never Used   Substance and Sexual Activity    Alcohol use: Yes     Comment: OCC    Drug use: Not Currently    Sexual activity: Not on file   Lifestyle    Physical activity     Days per week: Not on file     Minutes per session: Not on file    Stress: Not on file   Relationships    Social connections     Talks on phone: Not on file     Gets together: Not on file     Attends [employer] service: Not on file     Active member of club or organization: Not on file     Attends meetings of clubs or organizations: Not on file     Relationship status: Not on file   Other Topics Concern    Not on file   Social History Narrative    Not on file       Review of Systems:  Respiratory ROS: no cough, shortness of breath, or wheezing  Cardiovascular ROS: no chest pain or dyspnea  on exertion  Gastrointestinal ROS: no abdominal pain, change in bowel habits, or black or bloody stools  Musculoskeletal ROS: negative  Neurological ROS: no TIA or stroke symptoms        Physical Exam:  General: no distress  Head: normocephalic  Neck: supple, symmetrical, trachea midline  Lungs:  clear to auscultation bilaterally and normal respiratory effort  Heart: regular rate and rhythm, S1, S2 normal, no murmur, rub or gallop  Abdomen: soft, non-tender non-distented; bowel sounds normal; no masses,  no organomegaly  Extremities: no cyanosis or edema, or clubbing       Deep Sedation: Mallampati Score II (hard and soft palate, upper portion of tonsils anduvula visible)    II    Assessment and Plan:  Proceed with Colonoscopy  Reviewed CRNA note

## 2020-12-11 NOTE — [employer] PATIENT INSTRUCTIONS
Discharge Summary/Instructions after an Endoscopic Procedure  Patient Name: Shabnam Noble  Patient MRN: [id number]  Patient YOB: 1955  Friday, December 11, 2020  Marshal Rogers MD  RESTRICTIONS:  During your procedure today, you received medications for sedation.  These   medications may affect your judgment, balance and coordination.  Therefore,   for 24 hours, you have the following restrictions:   - DO NOT drive a car, operate machinery, make legal/financial decisions,   sign important papers or drink alcohol.    ACTIVITY:  Today: no heavy lifting, straining or running due to procedural   sedation/anesthesia.  The following day: return to full activity including work.  DIET:  Eat and drink normally unless instructed otherwise.     TREATMENT FOR COMMON SIDE EFFECTS:  - Mild abdominal pain, nausea, belching, bloating or excessive gas:  rest,   eat lightly and use a heating pad.  - Sore Throat: treat with throat lozenges and/or gargle with warm salt   water.  - Because air was used during the procedure, expelling large amounts of air   from your rectum or belching is normal.  - If a bowel prep was taken, you may not have a bowel movement for 1-3 days.    This is normal.  SYMPTOMS TO WATCH FOR AND REPORT TO YOUR PHYSICIAN:  1. Abdominal pain or bloating, other than gas cramps.  2. Chest pain.  3. Back pain.  4. Signs of infection such as: chills or fever occurring within 24 hours   after the procedure.  5. Rectal bleeding, which would show as bright red, maroon, or black stools.   (A tablespoon of blood from the rectum is not serious, especially if   hemorrhoids are present.)  6. Vomiting.  7. Weakness or dizziness.  GO DIRECTLY TO THE NEAREST EMERGENCY ROOM IF YOU HAVE ANY OF THE FOLLOWING:      Difficulty breathing              Chills and/or fever over 101 F   Persistent vomiting and/or vomiting blood   Severe abdominal pain   Severe chest pain   Black, tarry stools   Bleeding- more than one  tablespoon   Any other symptom or condition that you feel may need urgent attention  Your doctor recommends these additional instructions:  If any biopsies were taken, your doctors clinic will contact you in 1 to 2   weeks with any results.  - Discharge patient to home (ambulatory).   - Resume previous diet indefinitely.   - Continue present medications. restart plavix today  - Repeat colonoscopy in 10 years for screening purposes.  For questions, problems or results please call your physician - Marshal Rogers MD at Work:  [phone].  [facility], EMERGENCY ROOM PHONE NUMBER: [phone]  IF A COMPLICATION OR EMERGENCY SITUATION ARISES AND YOU ARE UNABLE TO REACH   YOUR PHYSICIAN - GO DIRECTLY TO THE EMERGENCY ROOM.  Marshal Rogers MD  12/11/2020 1:19:09 PM  This report has been verified and signed electronically.  [employer]

## 2020-12-11 NOTE — TRANSFER OF CARE
"Anesthesia Transfer of Care Note    Patient: Shabnam Noble    Procedure(s) Performed: Procedure(s) (LRB):  COLONOSCOPY (N/A)    Patient location: GI    Anesthesia Type: general    Transport from OR: Transported from OR on room air with adequate spontaneous ventilation    Post pain: adequate analgesia    Post assessment: no apparent anesthetic complications and tolerated procedure well    Post vital signs: stable    Level of consciousness: awake and alert    Nausea/Vomiting: no nausea/vomiting    Complications: none    Transfer of care protocol was followed      Last vitals:   Visit Vitals  BP (!) 179/53 (BP Location: Left arm, Patient Position: Lying)   Pulse 70   Temp 36.6 °C (97.9 °F) (Temporal)   Resp 20   Ht 5' 5" (1.651 m)   Wt 90.7 kg (200 lb)   LMP  (LMP Unknown)   SpO2 100%   Breastfeeding No   BMI 33.28 kg/m²     "

## 2020-12-11 NOTE — ANESTHESIA PREPROCEDURE EVALUATION
12/11/2020  Shabnam Noble is a 65 y.o., female.    Patient Active Problem List   Diagnosis    DM2 (diabetes mellitus, type 2)    Financial difficulties    History of stroke    Light cigarette smoker (1-9 cigs/day)    Depression    Generalized headaches    Memory deficit    Weight loss, unintentional    Subclinical hyperthyroidism    Class 1 obesity due to excess calories with serious comorbidity and body mass index (BMI) of 34.0 to 34.9 in adult    S/P hysterectomy    Hyperlipidemia associated with type 2 diabetes mellitus    Hypertension associated with diabetes     Past Medical History:   Diagnosis Date    Colon polyp     CVA (cerebral vascular accident)     DM2 (diabetes mellitus, type 2)     Essential hypertension      Past Surgical History:   Procedure Laterality Date    ANKLE FRACTURE SURGERY Right     COLONOSCOPY W/ POLYPECTOMY      HYSTERECTOMY      no h/o  malignancy         Anesthesia Evaluation    I have reviewed the Patient Summary Reports.      I have reviewed the Medications.     Review of Systems  Anesthesia Hx:   Denies Personal Hx of Anesthesia complications.       Physical Exam  General:  Well nourished    Airway/Jaw/Neck:  Airway Findings: Mouth Opening: Normal Tongue: Normal  General Airway Assessment: Adult  Mallampati: II  TM Distance: Normal, at least 6 cm      Dental:  Dental Findings: In tact   Chest/Lungs:  Chest/Lungs Findings: Clear to auscultation, Normal Respiratory Rate     Heart/Vascular:  Heart Findings: Rate: Normal  Rhythm: Regular Rhythm  Sounds: Normal        Mental Status:  Mental Status Findings:  Cooperative, Alert and Oriented         Anesthesia Plan  Type of Anesthesia, risks & benefits discussed:  Anesthesia Type:  general  Patient's Preference:   Intra-op Monitoring Plan: standard ASA monitors  Intra-op Monitoring Plan Comments:   Post Op Pain  Control Plan: per primary service following discharge from PACU  Post Op Pain Control Plan Comments:   Induction:   IV  Beta Blocker:  Patient is not currently on a Beta-Blocker (No further documentation required).       Informed Consent: Patient understands risks and agrees with Anesthesia plan.  Questions answered. Anesthesia consent signed with patient.  ASA Score: 3     Day of Surgery Review of History & Physical: I have interviewed and examined the patient. I have reviewed the patient's H&P dated:  There are no significant changes.  H&P update referred to the provider.         Ready For Surgery From Anesthesia Perspective.

## 2020-12-11 NOTE — ANESTHESIA POSTPROCEDURE EVALUATION
Anesthesia Post Evaluation    Patient: Shabnam Noble    Procedure(s) Performed: Procedure(s) (LRB):  COLONOSCOPY (N/A)    Final Anesthesia Type: general    Patient location during evaluation: PACU  Patient participation: Yes- Able to Participate  Level of consciousness: awake and alert and oriented  Post-procedure vital signs: reviewed and stable  Pain management: adequate  Airway patency: patent    PONV status at discharge: No PONV  Anesthetic complications: no      Cardiovascular status: blood pressure returned to baseline and hemodynamically stable  Respiratory status: unassisted  Hydration status: euvolemic  Follow-up not needed.          Vitals Value Taken Time   /80 12/11/20 1333   Temp 36.5 °C (97.7 °F) 12/11/20 1318   Pulse 87 12/11/20 1333   Resp 20 12/11/20 1333   SpO2 98 % 12/11/20 1333         No case tracking events are documented in the log.      Pain/Yanira Score: Yanira Score: 10 (12/11/2020  1:19 PM)

## 2020-12-16 ENCOUNTER — TELEPHONE (OUTPATIENT)
Dept: PRIMARY CARE CLINIC | Facility: CLINIC | Age: 65
End: 2020-12-16

## 2020-12-16 NOTE — TELEPHONE ENCOUNTER
Spoke with patient, informed of test results per provider message and recommendations.  Patient verbalized understanding.

## 2020-12-16 NOTE — TELEPHONE ENCOUNTER
----- Message from Mya Bar MD sent at 12/11/2020  4:55 PM CST -----  Hello,    Good news, normal Colonoscopy!    A repeat in 10 years is recommended. Restart Plavix as advised.    Message sent to portal.

## 2021-01-15 ENCOUNTER — TELEPHONE (OUTPATIENT)
Dept: PHARMACY | Facility: CLINIC | Age: 66
End: 2021-01-15

## 2021-01-19 ENCOUNTER — TELEPHONE (OUTPATIENT)
Dept: RADIOLOGY | Facility: HOSPITAL | Age: 66
End: 2021-01-19

## 2021-01-25 ENCOUNTER — TELEPHONE (OUTPATIENT)
Dept: RADIOLOGY | Facility: HOSPITAL | Age: 66
End: 2021-01-25

## 2021-01-27 ENCOUNTER — TELEPHONE (OUTPATIENT)
Dept: RADIOLOGY | Facility: HOSPITAL | Age: 66
End: 2021-01-27

## 2021-02-23 ENCOUNTER — PATIENT OUTREACH (OUTPATIENT)
Dept: ADMINISTRATIVE | Facility: OTHER | Age: 66
End: 2021-02-23

## 2021-02-23 DIAGNOSIS — I63.9 CEREBROVASCULAR ACCIDENT (CVA), UNSPECIFIED MECHANISM: ICD-10-CM

## 2021-02-23 DIAGNOSIS — Z13.220 ENCOUNTER FOR LIPID SCREENING FOR CARDIOVASCULAR DISEASE: ICD-10-CM

## 2021-02-23 DIAGNOSIS — Z13.6 ENCOUNTER FOR LIPID SCREENING FOR CARDIOVASCULAR DISEASE: ICD-10-CM

## 2021-02-23 RX ORDER — ATORVASTATIN CALCIUM 80 MG/1
80 TABLET, FILM COATED ORAL DAILY
Qty: 90 TABLET | Refills: 3 | Status: SHIPPED | OUTPATIENT
Start: 2021-02-23 | End: 2021-06-03 | Stop reason: SDUPTHER

## 2021-04-19 ENCOUNTER — PATIENT OUTREACH (OUTPATIENT)
Dept: ADMINISTRATIVE | Facility: OTHER | Age: 66
End: 2021-04-19

## 2021-04-19 DIAGNOSIS — E11.9 TYPE 2 DIABETES MELLITUS WITHOUT COMPLICATION, UNSPECIFIED WHETHER LONG TERM INSULIN USE: Primary | ICD-10-CM

## 2021-04-27 ENCOUNTER — TELEPHONE (OUTPATIENT)
Dept: ADMINISTRATIVE | Facility: HOSPITAL | Age: 66
End: 2021-04-27

## 2021-05-05 DIAGNOSIS — I10 ESSENTIAL HYPERTENSION: ICD-10-CM

## 2021-05-05 RX ORDER — METOPROLOL SUCCINATE 50 MG/1
50 TABLET, EXTENDED RELEASE ORAL DAILY
Qty: 30 TABLET | Refills: 3 | Status: SHIPPED | OUTPATIENT
Start: 2021-05-05 | End: 2021-06-03 | Stop reason: SDUPTHER

## 2021-05-17 ENCOUNTER — TELEPHONE (OUTPATIENT)
Dept: PRIMARY CARE CLINIC | Facility: CLINIC | Age: 66
End: 2021-05-17

## 2021-06-01 ENCOUNTER — TELEPHONE (OUTPATIENT)
Dept: ADMINISTRATIVE | Facility: HOSPITAL | Age: 66
End: 2021-06-01

## 2021-06-03 ENCOUNTER — OFFICE VISIT (OUTPATIENT)
Dept: PRIMARY CARE CLINIC | Facility: CLINIC | Age: 66
End: 2021-06-03
Payer: MEDICARE

## 2021-06-03 ENCOUNTER — LAB VISIT (OUTPATIENT)
Dept: LAB | Facility: HOSPITAL | Age: 66
End: 2021-06-03
Attending: FAMILY MEDICINE
Payer: MEDICARE

## 2021-06-03 ENCOUNTER — TELEPHONE (OUTPATIENT)
Dept: PRIMARY CARE CLINIC | Facility: CLINIC | Age: 66
End: 2021-06-03

## 2021-06-03 VITALS
HEART RATE: 73 BPM | BODY MASS INDEX: 32.65 KG/M2 | DIASTOLIC BLOOD PRESSURE: 82 MMHG | OXYGEN SATURATION: 98 % | WEIGHT: 196 LBS | HEIGHT: 65 IN | SYSTOLIC BLOOD PRESSURE: 134 MMHG

## 2021-06-03 DIAGNOSIS — Z91.09 ENVIRONMENTAL ALLERGIES: ICD-10-CM

## 2021-06-03 DIAGNOSIS — F33.41 RECURRENT MAJOR DEPRESSIVE DISORDER, IN PARTIAL REMISSION: ICD-10-CM

## 2021-06-03 DIAGNOSIS — E11.69 HYPERLIPIDEMIA ASSOCIATED WITH TYPE 2 DIABETES MELLITUS: ICD-10-CM

## 2021-06-03 DIAGNOSIS — E11.59 HYPERTENSION ASSOCIATED WITH DIABETES: ICD-10-CM

## 2021-06-03 DIAGNOSIS — E11.59 TYPE 2 DIABETES MELLITUS WITH OTHER CIRCULATORY COMPLICATION, WITHOUT LONG-TERM CURRENT USE OF INSULIN: ICD-10-CM

## 2021-06-03 DIAGNOSIS — M17.12 PRIMARY OSTEOARTHRITIS OF LEFT KNEE: ICD-10-CM

## 2021-06-03 DIAGNOSIS — E78.5 HYPERLIPIDEMIA ASSOCIATED WITH TYPE 2 DIABETES MELLITUS: ICD-10-CM

## 2021-06-03 DIAGNOSIS — I69.311 MEMORY DEFICIT AFTER CEREBRAL INFARCTION: ICD-10-CM

## 2021-06-03 DIAGNOSIS — I15.2 HYPERTENSION ASSOCIATED WITH DIABETES: ICD-10-CM

## 2021-06-03 DIAGNOSIS — Z86.73 HISTORY OF STROKE: ICD-10-CM

## 2021-06-03 DIAGNOSIS — E78.5 HYPERLIPIDEMIA LDL GOAL <70: ICD-10-CM

## 2021-06-03 DIAGNOSIS — I10 ESSENTIAL HYPERTENSION: Primary | ICD-10-CM

## 2021-06-03 DIAGNOSIS — F17.200 CURRENT SMOKER ON SOME DAYS: ICD-10-CM

## 2021-06-03 DIAGNOSIS — E66.09 CLASS 1 OBESITY DUE TO EXCESS CALORIES WITH SERIOUS COMORBIDITY AND BODY MASS INDEX (BMI) OF 32.0 TO 32.9 IN ADULT: ICD-10-CM

## 2021-06-03 PROBLEM — E05.90 SUBCLINICAL HYPERTHYROIDISM: Status: RESOLVED | Noted: 2020-01-22 | Resolved: 2021-06-03

## 2021-06-03 PROBLEM — R63.4 WEIGHT LOSS, UNINTENTIONAL: Status: RESOLVED | Noted: 2018-01-31 | Resolved: 2021-06-03

## 2021-06-03 PROBLEM — Z12.11 ENCOUNTER FOR SCREENING COLONOSCOPY: Status: RESOLVED | Noted: 2020-12-11 | Resolved: 2021-06-03

## 2021-06-03 PROBLEM — F17.210 LIGHT CIGARETTE SMOKER (1-9 CIGS/DAY): Status: RESOLVED | Noted: 2019-02-11 | Resolved: 2021-06-03

## 2021-06-03 LAB
ALBUMIN SERPL BCP-MCNC: 3.4 G/DL (ref 3.5–5.2)
ALBUMIN/CREAT UR: 49 UG/MG (ref 0–30)
ALP SERPL-CCNC: 82 U/L (ref 55–135)
ALT SERPL W/O P-5'-P-CCNC: 12 U/L (ref 10–44)
ANION GAP SERPL CALC-SCNC: 11 MMOL/L (ref 8–16)
AST SERPL-CCNC: 12 U/L (ref 10–40)
BASOPHILS # BLD AUTO: 0.02 K/UL (ref 0–0.2)
BASOPHILS NFR BLD: 0.3 % (ref 0–1.9)
BILIRUB SERPL-MCNC: 0.2 MG/DL (ref 0.1–1)
BUN SERPL-MCNC: 21 MG/DL (ref 8–23)
CALCIUM SERPL-MCNC: 9.4 MG/DL (ref 8.7–10.5)
CHLORIDE SERPL-SCNC: 105 MMOL/L (ref 95–110)
CHOLEST SERPL-MCNC: 183 MG/DL (ref 120–199)
CHOLEST/HDLC SERPL: 4.6 {RATIO} (ref 2–5)
CO2 SERPL-SCNC: 25 MMOL/L (ref 23–29)
CREAT SERPL-MCNC: 1.1 MG/DL (ref 0.5–1.4)
CREAT UR-MCNC: 359 MG/DL (ref 15–325)
DIFFERENTIAL METHOD: NORMAL
EOSINOPHIL # BLD AUTO: 0.1 K/UL (ref 0–0.5)
EOSINOPHIL NFR BLD: 1.2 % (ref 0–8)
ERYTHROCYTE [DISTWIDTH] IN BLOOD BY AUTOMATED COUNT: 14.5 % (ref 11.5–14.5)
EST. GFR  (AFRICAN AMERICAN): >60 ML/MIN/1.73 M^2
EST. GFR  (NON AFRICAN AMERICAN): 52.8 ML/MIN/1.73 M^2
ESTIMATED AVG GLUCOSE: 217 MG/DL (ref 68–131)
GLUCOSE SERPL-MCNC: 232 MG/DL (ref 70–110)
HBA1C MFR BLD: 9.2 % (ref 4–5.6)
HCT VFR BLD AUTO: 37.5 % (ref 37–48.5)
HDLC SERPL-MCNC: 40 MG/DL (ref 40–75)
HDLC SERPL: 21.9 % (ref 20–50)
HGB BLD-MCNC: 12 G/DL (ref 12–16)
IMM GRANULOCYTES # BLD AUTO: 0.02 K/UL (ref 0–0.04)
IMM GRANULOCYTES NFR BLD AUTO: 0.3 % (ref 0–0.5)
LDLC SERPL CALC-MCNC: 117.4 MG/DL (ref 63–159)
LYMPHOCYTES # BLD AUTO: 1.8 K/UL (ref 1–4.8)
LYMPHOCYTES NFR BLD: 27.8 % (ref 18–48)
MCH RBC QN AUTO: 27.2 PG (ref 27–31)
MCHC RBC AUTO-ENTMCNC: 32 G/DL (ref 32–36)
MCV RBC AUTO: 85 FL (ref 82–98)
MICROALBUMIN UR DL<=1MG/L-MCNC: 176 UG/ML
MONOCYTES # BLD AUTO: 0.4 K/UL (ref 0.3–1)
MONOCYTES NFR BLD: 6.7 % (ref 4–15)
NEUTROPHILS # BLD AUTO: 4.1 K/UL (ref 1.8–7.7)
NEUTROPHILS NFR BLD: 63.7 % (ref 38–73)
NONHDLC SERPL-MCNC: 143 MG/DL
NRBC BLD-RTO: 0 /100 WBC
PLATELET # BLD AUTO: 230 K/UL (ref 150–450)
PMV BLD AUTO: 11.8 FL (ref 9.2–12.9)
POTASSIUM SERPL-SCNC: 3.5 MMOL/L (ref 3.5–5.1)
PROT SERPL-MCNC: 7.4 G/DL (ref 6–8.4)
RBC # BLD AUTO: 4.41 M/UL (ref 4–5.4)
SODIUM SERPL-SCNC: 141 MMOL/L (ref 136–145)
TRIGL SERPL-MCNC: 128 MG/DL (ref 30–150)
TSH SERPL DL<=0.005 MIU/L-ACNC: 0.56 UIU/ML (ref 0.4–4)
WBC # BLD AUTO: 6.41 K/UL (ref 3.9–12.7)

## 2021-06-03 PROCEDURE — 85025 COMPLETE CBC W/AUTO DIFF WBC: CPT | Performed by: FAMILY MEDICINE

## 2021-06-03 PROCEDURE — 36415 COLL VENOUS BLD VENIPUNCTURE: CPT | Mod: PN | Performed by: FAMILY MEDICINE

## 2021-06-03 PROCEDURE — 3288F FALL RISK ASSESSMENT DOCD: CPT | Mod: CPTII,S$GLB,, | Performed by: FAMILY MEDICINE

## 2021-06-03 PROCEDURE — 99999 PR PBB SHADOW E&M-EST. PATIENT-LVL III: ICD-10-PCS | Mod: PBBFAC,,, | Performed by: FAMILY MEDICINE

## 2021-06-03 PROCEDURE — 3008F PR BODY MASS INDEX (BMI) DOCUMENTED: ICD-10-PCS | Mod: CPTII,S$GLB,, | Performed by: FAMILY MEDICINE

## 2021-06-03 PROCEDURE — 99499 UNLISTED E&M SERVICE: CPT | Mod: S$GLB,,, | Performed by: FAMILY MEDICINE

## 2021-06-03 PROCEDURE — 3079F DIAST BP 80-89 MM HG: CPT | Mod: CPTII,S$GLB,, | Performed by: FAMILY MEDICINE

## 2021-06-03 PROCEDURE — 83036 HEMOGLOBIN GLYCOSYLATED A1C: CPT | Performed by: FAMILY MEDICINE

## 2021-06-03 PROCEDURE — 3075F SYST BP GE 130 - 139MM HG: CPT | Mod: CPTII,S$GLB,, | Performed by: FAMILY MEDICINE

## 2021-06-03 PROCEDURE — 3075F PR MOST RECENT SYSTOLIC BLOOD PRESS GE 130-139MM HG: ICD-10-PCS | Mod: CPTII,S$GLB,, | Performed by: FAMILY MEDICINE

## 2021-06-03 PROCEDURE — 99499 RISK ADDL DX/OHS AUDIT: ICD-10-PCS | Mod: S$GLB,,, | Performed by: FAMILY MEDICINE

## 2021-06-03 PROCEDURE — 3079F PR MOST RECENT DIASTOLIC BLOOD PRESSURE 80-89 MM HG: ICD-10-PCS | Mod: CPTII,S$GLB,, | Performed by: FAMILY MEDICINE

## 2021-06-03 PROCEDURE — 82043 UR ALBUMIN QUANTITATIVE: CPT | Performed by: FAMILY MEDICINE

## 2021-06-03 PROCEDURE — 99214 PR OFFICE/OUTPT VISIT, EST, LEVL IV, 30-39 MIN: ICD-10-PCS | Mod: S$GLB,,, | Performed by: FAMILY MEDICINE

## 2021-06-03 PROCEDURE — 99999 PR PBB SHADOW E&M-EST. PATIENT-LVL III: CPT | Mod: PBBFAC,,, | Performed by: FAMILY MEDICINE

## 2021-06-03 PROCEDURE — 1101F PT FALLS ASSESS-DOCD LE1/YR: CPT | Mod: CPTII,S$GLB,, | Performed by: FAMILY MEDICINE

## 2021-06-03 PROCEDURE — 82570 ASSAY OF URINE CREATININE: CPT | Performed by: FAMILY MEDICINE

## 2021-06-03 PROCEDURE — 80061 LIPID PANEL: CPT | Performed by: FAMILY MEDICINE

## 2021-06-03 PROCEDURE — 3008F BODY MASS INDEX DOCD: CPT | Mod: CPTII,S$GLB,, | Performed by: FAMILY MEDICINE

## 2021-06-03 PROCEDURE — 80053 COMPREHEN METABOLIC PANEL: CPT | Performed by: FAMILY MEDICINE

## 2021-06-03 PROCEDURE — 1101F PR PT FALLS ASSESS DOC 0-1 FALLS W/OUT INJ PAST YR: ICD-10-PCS | Mod: CPTII,S$GLB,, | Performed by: FAMILY MEDICINE

## 2021-06-03 PROCEDURE — 84443 ASSAY THYROID STIM HORMONE: CPT | Performed by: FAMILY MEDICINE

## 2021-06-03 PROCEDURE — 1126F PR PAIN SEVERITY QUANTIFIED, NO PAIN PRESENT: ICD-10-PCS | Mod: S$GLB,,, | Performed by: FAMILY MEDICINE

## 2021-06-03 PROCEDURE — 99214 OFFICE O/P EST MOD 30 MIN: CPT | Mod: S$GLB,,, | Performed by: FAMILY MEDICINE

## 2021-06-03 PROCEDURE — 1126F AMNT PAIN NOTED NONE PRSNT: CPT | Mod: S$GLB,,, | Performed by: FAMILY MEDICINE

## 2021-06-03 PROCEDURE — 3288F PR FALLS RISK ASSESSMENT DOCUMENTED: ICD-10-PCS | Mod: CPTII,S$GLB,, | Performed by: FAMILY MEDICINE

## 2021-06-03 RX ORDER — METFORMIN HYDROCHLORIDE 1000 MG/1
1000 TABLET ORAL 2 TIMES DAILY WITH MEALS
Qty: 180 TABLET | Refills: 3 | Status: SHIPPED | OUTPATIENT
Start: 2021-06-03 | End: 2022-05-09 | Stop reason: SDUPTHER

## 2021-06-03 RX ORDER — AMLODIPINE BESYLATE 10 MG/1
10 TABLET ORAL DAILY
Qty: 90 TABLET | Refills: 3 | Status: ON HOLD | OUTPATIENT
Start: 2021-06-03 | End: 2021-07-10 | Stop reason: HOSPADM

## 2021-06-03 RX ORDER — DIPHENHYDRAMINE HCL 12.5MG/5ML
25 LIQUID (ML) ORAL EVERY 6 HOURS PRN
Qty: 118 ML | Refills: 3 | Status: SHIPPED | OUTPATIENT
Start: 2021-06-03 | End: 2022-04-07 | Stop reason: ALTCHOICE

## 2021-06-03 RX ORDER — ROSUVASTATIN CALCIUM 20 MG/1
20 TABLET, COATED ORAL NIGHTLY
Qty: 30 TABLET | Refills: 11 | Status: SHIPPED | OUTPATIENT
Start: 2021-06-03 | End: 2022-05-09 | Stop reason: SDUPTHER

## 2021-06-03 RX ORDER — EMPAGLIFLOZIN 10 MG/1
10 TABLET, FILM COATED ORAL DAILY
Qty: 90 TABLET | Refills: 3 | Status: SHIPPED | OUTPATIENT
Start: 2021-06-03 | End: 2022-05-09 | Stop reason: SDUPTHER

## 2021-06-03 RX ORDER — ATORVASTATIN CALCIUM 80 MG/1
80 TABLET, FILM COATED ORAL DAILY
Qty: 90 TABLET | Refills: 3 | Status: SHIPPED | OUTPATIENT
Start: 2021-06-03 | End: 2021-06-03

## 2021-06-03 RX ORDER — EZETIMIBE 10 MG/1
10 TABLET ORAL DAILY
Qty: 90 TABLET | Refills: 3 | Status: SHIPPED | OUTPATIENT
Start: 2021-06-03 | End: 2022-05-09 | Stop reason: SDUPTHER

## 2021-06-03 RX ORDER — METFORMIN HYDROCHLORIDE 500 MG/1
500 TABLET, EXTENDED RELEASE ORAL
Qty: 90 TABLET | Refills: 3 | Status: SHIPPED | OUTPATIENT
Start: 2021-06-03 | End: 2021-06-03

## 2021-06-03 RX ORDER — METOPROLOL SUCCINATE 50 MG/1
50 TABLET, EXTENDED RELEASE ORAL DAILY
Qty: 90 TABLET | Refills: 3 | Status: SHIPPED | OUTPATIENT
Start: 2021-06-03 | End: 2022-04-07 | Stop reason: SDUPTHER

## 2021-06-03 RX ORDER — CLOPIDOGREL BISULFATE 75 MG/1
75 TABLET ORAL DAILY
Qty: 90 TABLET | Refills: 3 | Status: SHIPPED | OUTPATIENT
Start: 2021-06-03 | End: 2022-08-19

## 2021-06-03 RX ORDER — METFORMIN HYDROCHLORIDE 500 MG/1
500 TABLET ORAL DAILY
COMMUNITY
Start: 2021-01-28 | End: 2021-06-03

## 2021-06-03 RX ORDER — ASPIRIN 81 MG/1
81 TABLET ORAL DAILY
Qty: 90 TABLET | Refills: 3 | Status: SHIPPED | OUTPATIENT
Start: 2021-06-03 | End: 2022-12-05 | Stop reason: SDUPTHER

## 2021-06-08 ENCOUNTER — TELEPHONE (OUTPATIENT)
Dept: PRIMARY CARE CLINIC | Facility: CLINIC | Age: 66
End: 2021-06-08

## 2021-06-08 ENCOUNTER — TELEPHONE (OUTPATIENT)
Dept: ADMINISTRATIVE | Facility: HOSPITAL | Age: 66
End: 2021-06-08

## 2021-06-10 ENCOUNTER — HOSPITAL ENCOUNTER (EMERGENCY)
Facility: HOSPITAL | Age: 66
Discharge: HOME | End: 2021-06-10
Attending: EMERGENCY MEDICINE
Payer: MEDICARE

## 2021-06-10 VITALS
HEART RATE: 59 BPM | HEIGHT: 65 IN | DIASTOLIC BLOOD PRESSURE: 77 MMHG | OXYGEN SATURATION: 100 % | RESPIRATION RATE: 16 BRPM | WEIGHT: 196 LBS | BODY MASS INDEX: 32.65 KG/M2 | SYSTOLIC BLOOD PRESSURE: 171 MMHG | TEMPERATURE: 99 F

## 2021-06-10 DIAGNOSIS — R05.9 COUGH: ICD-10-CM

## 2021-06-10 DIAGNOSIS — B34.9 VIRAL SYNDROME: Primary | ICD-10-CM

## 2021-06-10 DIAGNOSIS — M79.10 MYALGIA: ICD-10-CM

## 2021-06-10 LAB
BACTERIA #/AREA URNS AUTO: NORMAL /HPF
BASOPHILS # BLD AUTO: 0.02 K/UL (ref 0–0.2)
BASOPHILS NFR BLD: 0.4 % (ref 0–1.9)
BILIRUB UR QL STRIP: NEGATIVE
BUN SERPL-MCNC: 16 MG/DL (ref 6–30)
CHLORIDE SERPL-SCNC: 106 MMOL/L (ref 95–110)
CLARITY UR REFRACT.AUTO: CLEAR
COLOR UR AUTO: YELLOW
CREAT SERPL-MCNC: 1.1 MG/DL (ref 0.5–1.4)
CTP QC/QA: YES
CTP QC/QA: YES
DIFFERENTIAL METHOD: ABNORMAL
EOSINOPHIL # BLD AUTO: 0 K/UL (ref 0–0.5)
EOSINOPHIL NFR BLD: 0 % (ref 0–8)
ERYTHROCYTE [DISTWIDTH] IN BLOOD BY AUTOMATED COUNT: 14.3 % (ref 11.5–14.5)
GLUCOSE SERPL-MCNC: 176 MG/DL (ref 70–110)
GLUCOSE UR QL STRIP: ABNORMAL
HCT VFR BLD AUTO: 37.7 % (ref 37–48.5)
HCT VFR BLD CALC: 37 %PCV (ref 36–54)
HGB BLD-MCNC: 11.9 G/DL (ref 12–16)
HGB UR QL STRIP: ABNORMAL
HYALINE CASTS UR QL AUTO: 0 /LPF
IMM GRANULOCYTES # BLD AUTO: 0.02 K/UL (ref 0–0.04)
IMM GRANULOCYTES NFR BLD AUTO: 0.4 % (ref 0–0.5)
KETONES UR QL STRIP: NEGATIVE
LEUKOCYTE ESTERASE UR QL STRIP: NEGATIVE
LYMPHOCYTES # BLD AUTO: 0.6 K/UL (ref 1–4.8)
LYMPHOCYTES NFR BLD: 12.5 % (ref 18–48)
MCH RBC QN AUTO: 26.6 PG (ref 27–31)
MCHC RBC AUTO-ENTMCNC: 31.6 G/DL (ref 32–36)
MCV RBC AUTO: 84 FL (ref 82–98)
MICROSCOPIC COMMENT: NORMAL
MONOCYTES # BLD AUTO: 0.4 K/UL (ref 0.3–1)
MONOCYTES NFR BLD: 7.9 % (ref 4–15)
NEUTROPHILS # BLD AUTO: 4 K/UL (ref 1.8–7.7)
NEUTROPHILS NFR BLD: 78.8 % (ref 38–73)
NITRITE UR QL STRIP: NEGATIVE
NRBC BLD-RTO: 0 /100 WBC
PH UR STRIP: 5 [PH] (ref 5–8)
PLATELET # BLD AUTO: 209 K/UL (ref 150–450)
PMV BLD AUTO: 10.9 FL (ref 9.2–12.9)
POC IONIZED CALCIUM: 1.12 MMOL/L (ref 1.06–1.42)
POC MOLECULAR INFLUENZA A AGN: NEGATIVE
POC MOLECULAR INFLUENZA B AGN: NEGATIVE
POC TCO2 (MEASURED): 22 MMOL/L (ref 23–29)
POTASSIUM BLD-SCNC: 3.5 MMOL/L (ref 3.5–5.1)
PROT UR QL STRIP: ABNORMAL
RBC # BLD AUTO: 4.48 M/UL (ref 4–5.4)
RBC #/AREA URNS AUTO: 2 /HPF (ref 0–4)
SAMPLE: ABNORMAL
SARS-COV-2 RDRP RESP QL NAA+PROBE: NEGATIVE
SODIUM BLD-SCNC: 142 MMOL/L (ref 136–145)
SP GR UR STRIP: >=1.03 (ref 1–1.03)
SQUAMOUS #/AREA URNS AUTO: 0 /HPF
URN SPEC COLLECT METH UR: ABNORMAL
WBC # BLD AUTO: 5.06 K/UL (ref 3.9–12.7)
WBC #/AREA URNS AUTO: 4 /HPF (ref 0–5)
YEAST UR QL AUTO: NORMAL

## 2021-06-10 PROCEDURE — 63600175 PHARM REV CODE 636 W HCPCS: Performed by: PHYSICIAN ASSISTANT

## 2021-06-10 PROCEDURE — 93010 EKG 12-LEAD: ICD-10-PCS | Mod: ,,, | Performed by: INTERNAL MEDICINE

## 2021-06-10 PROCEDURE — 81001 URINALYSIS AUTO W/SCOPE: CPT | Performed by: PHYSICIAN ASSISTANT

## 2021-06-10 PROCEDURE — 80047 BASIC METABLC PNL IONIZED CA: CPT

## 2021-06-10 PROCEDURE — 99282 PR EMERGENCY DEPT VISIT,LEVEL II: ICD-10-PCS | Mod: CR,CS,, | Performed by: PHYSICIAN ASSISTANT

## 2021-06-10 PROCEDURE — 93010 ELECTROCARDIOGRAM REPORT: CPT | Mod: ,,, | Performed by: INTERNAL MEDICINE

## 2021-06-10 PROCEDURE — 85025 COMPLETE CBC W/AUTO DIFF WBC: CPT | Performed by: PHYSICIAN ASSISTANT

## 2021-06-10 PROCEDURE — 99282 EMERGENCY DEPT VISIT SF MDM: CPT | Mod: CR,CS,, | Performed by: PHYSICIAN ASSISTANT

## 2021-06-10 PROCEDURE — 87502 INFLUENZA DNA AMP PROBE: CPT

## 2021-06-10 PROCEDURE — 93005 ELECTROCARDIOGRAM TRACING: CPT

## 2021-06-10 PROCEDURE — 96374 THER/PROPH/DIAG INJ IV PUSH: CPT

## 2021-06-10 PROCEDURE — U0002 COVID-19 LAB TEST NON-CDC: HCPCS | Performed by: PHYSICIAN ASSISTANT

## 2021-06-10 PROCEDURE — 99285 EMERGENCY DEPT VISIT HI MDM: CPT | Mod: 25

## 2021-06-10 RX ORDER — KETOROLAC TROMETHAMINE 30 MG/ML
10 INJECTION, SOLUTION INTRAMUSCULAR; INTRAVENOUS
Status: COMPLETED | OUTPATIENT
Start: 2021-06-10 | End: 2021-06-10

## 2021-06-10 RX ADMIN — KETOROLAC TROMETHAMINE 10 MG: 30 INJECTION, SOLUTION INTRAMUSCULAR; INTRAVENOUS at 07:06

## 2021-06-24 ENCOUNTER — DOCUMENTATION ONLY (OUTPATIENT)
Dept: ADMINISTRATIVE | Facility: HOSPITAL | Age: 66
End: 2021-06-24

## 2021-06-24 ENCOUNTER — PATIENT OUTREACH (OUTPATIENT)
Dept: ADMINISTRATIVE | Facility: HOSPITAL | Age: 66
End: 2021-06-24

## 2021-06-24 DIAGNOSIS — E11.9 DIABETIC EYE EXAM: Primary | ICD-10-CM

## 2021-06-24 DIAGNOSIS — Z01.00 DIABETIC EYE EXAM: Primary | ICD-10-CM

## 2021-07-07 ENCOUNTER — HOSPITAL ENCOUNTER (INPATIENT)
Facility: HOSPITAL | Age: 66
LOS: 3 days | Discharge: HOME | DRG: 871 | End: 2021-07-10
Attending: EMERGENCY MEDICINE | Admitting: INTERNAL MEDICINE
Payer: MEDICARE

## 2021-07-07 DIAGNOSIS — R50.9 FEVER: ICD-10-CM

## 2021-07-07 DIAGNOSIS — A41.9 SEPSIS, DUE TO UNSPECIFIED ORGANISM, UNSPECIFIED WHETHER ACUTE ORGAN DYSFUNCTION PRESENT: Primary | ICD-10-CM

## 2021-07-07 DIAGNOSIS — K57.92 DIVERTICULITIS: ICD-10-CM

## 2021-07-07 DIAGNOSIS — K57.32 DIVERTICULITIS OF LARGE INTESTINE WITHOUT PERFORATION OR ABSCESS WITHOUT BLEEDING: ICD-10-CM

## 2021-07-07 DIAGNOSIS — Z09 HOSPITAL DISCHARGE FOLLOW-UP: ICD-10-CM

## 2021-07-07 DIAGNOSIS — R07.9 CHEST PAIN: ICD-10-CM

## 2021-07-07 PROBLEM — E87.29 INCREASED ANION GAP METABOLIC ACIDOSIS: Status: ACTIVE | Noted: 2021-07-07

## 2021-07-07 PROBLEM — J18.9 PNEUMONIA: Status: ACTIVE | Noted: 2021-07-07

## 2021-07-07 PROBLEM — R79.89 HIGH SERUM LACTATE: Status: ACTIVE | Noted: 2021-07-07

## 2021-07-07 PROBLEM — F19.91 HISTORY OF DRUG USE: Status: ACTIVE | Noted: 2021-07-07

## 2021-07-07 LAB
ALBUMIN SERPL BCP-MCNC: 3.2 G/DL (ref 3.5–5.2)
ALP SERPL-CCNC: 94 U/L (ref 55–135)
ALT SERPL W/O P-5'-P-CCNC: 12 U/L (ref 10–44)
AMPHET+METHAMPHET UR QL: NEGATIVE
ANION GAP SERPL CALC-SCNC: 13 MMOL/L (ref 8–16)
ANISOCYTOSIS BLD QL SMEAR: SLIGHT
AST SERPL-CCNC: 15 U/L (ref 10–40)
B-OH-BUTYR BLD STRIP-SCNC: 0.1 MMOL/L (ref 0–0.5)
BACTERIA #/AREA URNS AUTO: ABNORMAL /HPF
BARBITURATES UR QL SCN>200 NG/ML: NEGATIVE
BASOPHILS # BLD AUTO: 0.01 K/UL (ref 0–0.2)
BASOPHILS NFR BLD: 0.3 % (ref 0–1.9)
BENZODIAZ UR QL SCN>200 NG/ML: NEGATIVE
BILIRUB SERPL-MCNC: 0.5 MG/DL (ref 0.1–1)
BILIRUB UR QL STRIP: NEGATIVE
BUN SERPL-MCNC: 17 MG/DL (ref 8–23)
BZE UR QL SCN: NEGATIVE
CALCIUM SERPL-MCNC: 8.5 MG/DL (ref 8.7–10.5)
CANNABINOIDS UR QL SCN: NEGATIVE
CHLORIDE SERPL-SCNC: 109 MMOL/L (ref 95–110)
CLARITY UR REFRACT.AUTO: CLEAR
CO2 SERPL-SCNC: 19 MMOL/L (ref 23–29)
COLOR UR AUTO: YELLOW
CREAT SERPL-MCNC: 1.2 MG/DL (ref 0.5–1.4)
CREAT UR-MCNC: 65 MG/DL (ref 15–325)
CTP QC/QA: YES
DIFFERENTIAL METHOD: ABNORMAL
EOSINOPHIL # BLD AUTO: 0 K/UL (ref 0–0.5)
EOSINOPHIL NFR BLD: 0.3 % (ref 0–8)
ERYTHROCYTE [DISTWIDTH] IN BLOOD BY AUTOMATED COUNT: 14.9 % (ref 11.5–14.5)
EST. GFR  (AFRICAN AMERICAN): 54.8 ML/MIN/1.73 M^2
EST. GFR  (NON AFRICAN AMERICAN): 47.5 ML/MIN/1.73 M^2
ETHANOL UR-MCNC: <10 MG/DL
GLUCOSE SERPL-MCNC: 237 MG/DL (ref 70–110)
GLUCOSE UR QL STRIP: ABNORMAL
HCT VFR BLD AUTO: 36.4 % (ref 37–48.5)
HGB BLD-MCNC: 11.2 G/DL (ref 12–16)
HGB UR QL STRIP: NEGATIVE
HYPOCHROMIA BLD QL SMEAR: ABNORMAL
IMM GRANULOCYTES # BLD AUTO: 0.01 K/UL (ref 0–0.04)
IMM GRANULOCYTES NFR BLD AUTO: 0.3 % (ref 0–0.5)
KETONES UR QL STRIP: NEGATIVE
LACTATE SERPL-SCNC: 1.8 MMOL/L (ref 0.5–2.2)
LACTATE SERPL-SCNC: 2.8 MMOL/L (ref 0.5–2.2)
LACTATE SERPL-SCNC: 3.1 MMOL/L (ref 0.5–2.2)
LEUKOCYTE ESTERASE UR QL STRIP: NEGATIVE
LIPASE SERPL-CCNC: 17 U/L (ref 4–60)
LYMPHOCYTES # BLD AUTO: 0.5 K/UL (ref 1–4.8)
LYMPHOCYTES NFR BLD: 12.8 % (ref 18–48)
MCH RBC QN AUTO: 26.4 PG (ref 27–31)
MCHC RBC AUTO-ENTMCNC: 30.8 G/DL (ref 32–36)
MCV RBC AUTO: 86 FL (ref 82–98)
METHADONE UR QL SCN>300 NG/ML: NEGATIVE
MICROSCOPIC COMMENT: ABNORMAL
MONOCYTES # BLD AUTO: 0 K/UL (ref 0.3–1)
MONOCYTES NFR BLD: 0.8 % (ref 4–15)
NEUTROPHILS # BLD AUTO: 3.4 K/UL (ref 1.8–7.7)
NEUTROPHILS NFR BLD: 85.5 % (ref 38–73)
NITRITE UR QL STRIP: NEGATIVE
NRBC BLD-RTO: 1 /100 WBC
OPIATES UR QL SCN: NEGATIVE
PCP UR QL SCN>25 NG/ML: NEGATIVE
PH UR STRIP: 5 [PH] (ref 5–8)
PHOSPHATE SERPL-MCNC: 2.7 MG/DL (ref 2.7–4.5)
PLATELET # BLD AUTO: 162 K/UL (ref 150–450)
PLATELET BLD QL SMEAR: ABNORMAL
PMV BLD AUTO: 11.5 FL (ref 9.2–12.9)
POCT GLUCOSE: 213 MG/DL (ref 70–110)
POCT GLUCOSE: 230 MG/DL (ref 70–110)
POTASSIUM SERPL-SCNC: 3.6 MMOL/L (ref 3.5–5.1)
PROT SERPL-MCNC: 6.6 G/DL (ref 6–8.4)
PROT UR QL STRIP: NEGATIVE
RBC # BLD AUTO: 4.25 M/UL (ref 4–5.4)
RBC #/AREA URNS AUTO: 0 /HPF (ref 0–4)
SARS-COV-2 RDRP RESP QL NAA+PROBE: NEGATIVE
SARS-COV-2 RDRP RESP QL NAA+PROBE: NEGATIVE
SODIUM SERPL-SCNC: 141 MMOL/L (ref 136–145)
SP GR UR STRIP: 1.01 (ref 1–1.03)
TOXICOLOGY INFORMATION: NORMAL
TROPONIN I SERPL DL<=0.01 NG/ML-MCNC: 0.02 NG/ML (ref 0–0.03)
URN SPEC COLLECT METH UR: ABNORMAL
WBC # BLD AUTO: 3.99 K/UL (ref 3.9–12.7)
WBC #/AREA URNS AUTO: 0 /HPF (ref 0–5)
YEAST UR QL AUTO: ABNORMAL

## 2021-07-07 PROCEDURE — 80053 COMPREHEN METABOLIC PANEL: CPT | Performed by: PHYSICIAN ASSISTANT

## 2021-07-07 PROCEDURE — 25000003 PHARM REV CODE 250: Performed by: STUDENT IN AN ORGANIZED HEALTH CARE EDUCATION/TRAINING PROGRAM

## 2021-07-07 PROCEDURE — 25000003 PHARM REV CODE 250: Performed by: PHYSICIAN ASSISTANT

## 2021-07-07 PROCEDURE — 84484 ASSAY OF TROPONIN QUANT: CPT

## 2021-07-07 PROCEDURE — 93010 ELECTROCARDIOGRAM REPORT: CPT | Mod: ,,, | Performed by: INTERNAL MEDICINE

## 2021-07-07 PROCEDURE — 93005 ELECTROCARDIOGRAM TRACING: CPT

## 2021-07-07 PROCEDURE — 25000003 PHARM REV CODE 250

## 2021-07-07 PROCEDURE — 63600175 PHARM REV CODE 636 W HCPCS

## 2021-07-07 PROCEDURE — 85025 COMPLETE CBC W/AUTO DIFF WBC: CPT | Performed by: PHYSICIAN ASSISTANT

## 2021-07-07 PROCEDURE — U0002 COVID-19 LAB TEST NON-CDC: HCPCS | Performed by: EMERGENCY MEDICINE

## 2021-07-07 PROCEDURE — 96361 HYDRATE IV INFUSION ADD-ON: CPT

## 2021-07-07 PROCEDURE — 99223 PR INITIAL HOSPITAL CARE,LEVL III: ICD-10-PCS | Mod: AI,,, | Performed by: INTERNAL MEDICINE

## 2021-07-07 PROCEDURE — 84100 ASSAY OF PHOSPHORUS: CPT

## 2021-07-07 PROCEDURE — 63600175 PHARM REV CODE 636 W HCPCS: Performed by: STUDENT IN AN ORGANIZED HEALTH CARE EDUCATION/TRAINING PROGRAM

## 2021-07-07 PROCEDURE — U0002 COVID-19 LAB TEST NON-CDC: HCPCS | Performed by: PHYSICIAN ASSISTANT

## 2021-07-07 PROCEDURE — 83605 ASSAY OF LACTIC ACID: CPT | Mod: 91 | Performed by: PHYSICIAN ASSISTANT

## 2021-07-07 PROCEDURE — 82010 KETONE BODYS QUAN: CPT | Performed by: STUDENT IN AN ORGANIZED HEALTH CARE EDUCATION/TRAINING PROGRAM

## 2021-07-07 PROCEDURE — 80307 DRUG TEST PRSMV CHEM ANLYZR: CPT | Performed by: STUDENT IN AN ORGANIZED HEALTH CARE EDUCATION/TRAINING PROGRAM

## 2021-07-07 PROCEDURE — 87186 SC STD MICRODIL/AGAR DIL: CPT | Performed by: PHYSICIAN ASSISTANT

## 2021-07-07 PROCEDURE — 82962 GLUCOSE BLOOD TEST: CPT

## 2021-07-07 PROCEDURE — 96365 THER/PROPH/DIAG IV INF INIT: CPT

## 2021-07-07 PROCEDURE — 25500020 PHARM REV CODE 255: Performed by: EMERGENCY MEDICINE

## 2021-07-07 PROCEDURE — 81001 URINALYSIS AUTO W/SCOPE: CPT | Performed by: PHYSICIAN ASSISTANT

## 2021-07-07 PROCEDURE — 83605 ASSAY OF LACTIC ACID: CPT | Mod: 91 | Performed by: STUDENT IN AN ORGANIZED HEALTH CARE EDUCATION/TRAINING PROGRAM

## 2021-07-07 PROCEDURE — 99223 1ST HOSP IP/OBS HIGH 75: CPT | Mod: AI,,, | Performed by: INTERNAL MEDICINE

## 2021-07-07 PROCEDURE — 96367 TX/PROPH/DG ADDL SEQ IV INF: CPT

## 2021-07-07 PROCEDURE — 11000001 HC ACUTE MED/SURG PRIVATE ROOM

## 2021-07-07 PROCEDURE — C9399 UNCLASSIFIED DRUGS OR BIOLOG: HCPCS

## 2021-07-07 PROCEDURE — 63600175 PHARM REV CODE 636 W HCPCS: Performed by: PHYSICIAN ASSISTANT

## 2021-07-07 PROCEDURE — 99285 PR EMERGENCY DEPT VISIT,LEVEL V: ICD-10-PCS | Mod: CR,CS,, | Performed by: PHYSICIAN ASSISTANT

## 2021-07-07 PROCEDURE — 87077 CULTURE AEROBIC IDENTIFY: CPT | Mod: 59 | Performed by: PHYSICIAN ASSISTANT

## 2021-07-07 PROCEDURE — 36415 COLL VENOUS BLD VENIPUNCTURE: CPT

## 2021-07-07 PROCEDURE — 87040 BLOOD CULTURE FOR BACTERIA: CPT | Mod: 59 | Performed by: PHYSICIAN ASSISTANT

## 2021-07-07 PROCEDURE — 99285 EMERGENCY DEPT VISIT HI MDM: CPT | Mod: CR,CS,, | Performed by: PHYSICIAN ASSISTANT

## 2021-07-07 PROCEDURE — 99285 EMERGENCY DEPT VISIT HI MDM: CPT | Mod: 25

## 2021-07-07 PROCEDURE — 83690 ASSAY OF LIPASE: CPT | Performed by: PHYSICIAN ASSISTANT

## 2021-07-07 PROCEDURE — 87076 CULTURE ANAEROBE IDENT EACH: CPT | Mod: 59 | Performed by: PHYSICIAN ASSISTANT

## 2021-07-07 PROCEDURE — 93010 EKG 12-LEAD: ICD-10-PCS | Mod: ,,, | Performed by: INTERNAL MEDICINE

## 2021-07-07 RX ORDER — SODIUM CHLORIDE, SODIUM LACTATE, POTASSIUM CHLORIDE, CALCIUM CHLORIDE 600; 310; 30; 20 MG/100ML; MG/100ML; MG/100ML; MG/100ML
INJECTION, SOLUTION INTRAVENOUS CONTINUOUS
Status: DISCONTINUED | OUTPATIENT
Start: 2021-07-07 | End: 2021-07-07

## 2021-07-07 RX ORDER — GLUCAGON 1 MG
1 KIT INJECTION
Status: DISCONTINUED | OUTPATIENT
Start: 2021-07-07 | End: 2021-07-10 | Stop reason: HOSPADM

## 2021-07-07 RX ORDER — SODIUM CHLORIDE 0.9 % (FLUSH) 0.9 %
10 SYRINGE (ML) INJECTION
Status: DISCONTINUED | OUTPATIENT
Start: 2021-07-07 | End: 2021-07-10 | Stop reason: HOSPADM

## 2021-07-07 RX ORDER — TALC
6 POWDER (GRAM) TOPICAL NIGHTLY PRN
Status: DISCONTINUED | OUTPATIENT
Start: 2021-07-07 | End: 2021-07-10 | Stop reason: HOSPADM

## 2021-07-07 RX ORDER — ACETAMINOPHEN 325 MG/1
650 TABLET ORAL EVERY 6 HOURS PRN
Status: DISCONTINUED | OUTPATIENT
Start: 2021-07-07 | End: 2021-07-09

## 2021-07-07 RX ORDER — CLOPIDOGREL BISULFATE 75 MG/1
75 TABLET ORAL DAILY
Status: DISCONTINUED | OUTPATIENT
Start: 2021-07-07 | End: 2021-07-10 | Stop reason: HOSPADM

## 2021-07-07 RX ORDER — ACETAMINOPHEN 500 MG
1000 TABLET ORAL
Status: COMPLETED | OUTPATIENT
Start: 2021-07-07 | End: 2021-07-07

## 2021-07-07 RX ORDER — IBUPROFEN 200 MG
16 TABLET ORAL
Status: DISCONTINUED | OUTPATIENT
Start: 2021-07-07 | End: 2021-07-10 | Stop reason: HOSPADM

## 2021-07-07 RX ORDER — IBUPROFEN 200 MG
24 TABLET ORAL
Status: DISCONTINUED | OUTPATIENT
Start: 2021-07-07 | End: 2021-07-10 | Stop reason: HOSPADM

## 2021-07-07 RX ORDER — ENOXAPARIN SODIUM 100 MG/ML
40 INJECTION SUBCUTANEOUS EVERY 24 HOURS
Status: DISCONTINUED | OUTPATIENT
Start: 2021-07-07 | End: 2021-07-10 | Stop reason: HOSPADM

## 2021-07-07 RX ORDER — METOPROLOL SUCCINATE 50 MG/1
50 TABLET, EXTENDED RELEASE ORAL DAILY
Status: DISCONTINUED | OUTPATIENT
Start: 2021-07-07 | End: 2021-07-10 | Stop reason: HOSPADM

## 2021-07-07 RX ORDER — SIMETHICONE 80 MG
1 TABLET,CHEWABLE ORAL 3 TIMES DAILY PRN
Status: DISCONTINUED | OUTPATIENT
Start: 2021-07-07 | End: 2021-07-10 | Stop reason: HOSPADM

## 2021-07-07 RX ORDER — ASPIRIN 81 MG/1
81 TABLET ORAL DAILY
Status: DISCONTINUED | OUTPATIENT
Start: 2021-07-07 | End: 2021-07-10 | Stop reason: HOSPADM

## 2021-07-07 RX ADMIN — ACETAMINOPHEN 650 MG: 325 TABLET ORAL at 09:07

## 2021-07-07 RX ADMIN — IOHEXOL 75 ML: 350 INJECTION, SOLUTION INTRAVENOUS at 04:07

## 2021-07-07 RX ADMIN — ACETAMINOPHEN 1000 MG: 500 TABLET ORAL at 03:07

## 2021-07-07 RX ADMIN — SODIUM CHLORIDE, SODIUM LACTATE, POTASSIUM CHLORIDE, AND CALCIUM CHLORIDE 1000 ML: .6; .31; .03; .02 INJECTION, SOLUTION INTRAVENOUS at 12:07

## 2021-07-07 RX ADMIN — ENOXAPARIN SODIUM 40 MG: 40 INJECTION SUBCUTANEOUS at 05:07

## 2021-07-07 RX ADMIN — CLOPIDOGREL BISULFATE 75 MG: 75 TABLET, FILM COATED ORAL at 10:07

## 2021-07-07 RX ADMIN — PIPERACILLIN SODIUM AND TAZOBACTAM SODIUM 4.5 G: 4; .5 INJECTION, POWDER, FOR SOLUTION INTRAVENOUS at 02:07

## 2021-07-07 RX ADMIN — SODIUM CHLORIDE, SODIUM LACTATE, POTASSIUM CHLORIDE, AND CALCIUM CHLORIDE: .6; .31; .03; .02 INJECTION, SOLUTION INTRAVENOUS at 10:07

## 2021-07-07 RX ADMIN — METOPROLOL SUCCINATE 50 MG: 50 TABLET, EXTENDED RELEASE ORAL at 11:07

## 2021-07-07 RX ADMIN — PIPERACILLIN SODIUM AND TAZOBACTAM SODIUM 4.5 G: 4; .5 INJECTION, POWDER, FOR SOLUTION INTRAVENOUS at 09:07

## 2021-07-07 RX ADMIN — SODIUM CHLORIDE, SODIUM LACTATE, POTASSIUM CHLORIDE, AND CALCIUM CHLORIDE 1710 ML: .6; .31; .03; .02 INJECTION, SOLUTION INTRAVENOUS at 03:07

## 2021-07-07 RX ADMIN — INSULIN DETEMIR 2 UNITS: 100 INJECTION, SOLUTION SUBCUTANEOUS at 09:07

## 2021-07-07 RX ADMIN — VANCOMYCIN HYDROCHLORIDE 2000 MG: 10 INJECTION, POWDER, LYOPHILIZED, FOR SOLUTION INTRAVENOUS at 05:07

## 2021-07-07 RX ADMIN — ASPIRIN 81 MG: 81 TABLET, COATED ORAL at 10:07

## 2021-07-07 RX ADMIN — PIPERACILLIN SODIUM AND TAZOBACTAM SODIUM 4.5 G: 4; .5 INJECTION, POWDER, FOR SOLUTION INTRAVENOUS at 05:07

## 2021-07-07 RX ADMIN — SODIUM CHLORIDE, SODIUM LACTATE, POTASSIUM CHLORIDE, AND CALCIUM CHLORIDE: .6; .31; .03; .02 INJECTION, SOLUTION INTRAVENOUS at 05:07

## 2021-07-07 RX ADMIN — AZITHROMYCIN MONOHYDRATE 500 MG: 500 INJECTION, POWDER, LYOPHILIZED, FOR SOLUTION INTRAVENOUS at 11:07

## 2021-07-08 LAB
ANION GAP SERPL CALC-SCNC: 11 MMOL/L (ref 8–16)
ANISOCYTOSIS BLD QL SMEAR: SLIGHT
BASOPHILS # BLD AUTO: 0.04 K/UL (ref 0–0.2)
BASOPHILS NFR BLD: 0.2 % (ref 0–1.9)
BUN SERPL-MCNC: 21 MG/DL (ref 8–23)
C DIFF GDH STL QL: NEGATIVE
C DIFF TOX A+B STL QL IA: NEGATIVE
CALCIUM SERPL-MCNC: 8 MG/DL (ref 8.7–10.5)
CHLORIDE SERPL-SCNC: 108 MMOL/L (ref 95–110)
CO2 SERPL-SCNC: 20 MMOL/L (ref 23–29)
CREAT SERPL-MCNC: 1.3 MG/DL (ref 0.5–1.4)
DIFFERENTIAL METHOD: ABNORMAL
EOSINOPHIL # BLD AUTO: 0.1 K/UL (ref 0–0.5)
EOSINOPHIL NFR BLD: 0.7 % (ref 0–8)
ERYTHROCYTE [DISTWIDTH] IN BLOOD BY AUTOMATED COUNT: 15.3 % (ref 11.5–14.5)
EST. GFR  (AFRICAN AMERICAN): 49.7 ML/MIN/1.73 M^2
EST. GFR  (NON AFRICAN AMERICAN): 43.2 ML/MIN/1.73 M^2
GLUCOSE SERPL-MCNC: 122 MG/DL (ref 70–110)
HCT VFR BLD AUTO: 32.3 % (ref 37–48.5)
HGB BLD-MCNC: 10.4 G/DL (ref 12–16)
HYPOCHROMIA BLD QL SMEAR: ABNORMAL
IMM GRANULOCYTES # BLD AUTO: 0.13 K/UL (ref 0–0.04)
IMM GRANULOCYTES NFR BLD AUTO: 0.8 % (ref 0–0.5)
LYMPHOCYTES # BLD AUTO: 0.8 K/UL (ref 1–4.8)
LYMPHOCYTES NFR BLD: 4.9 % (ref 18–48)
MAGNESIUM SERPL-MCNC: 1.3 MG/DL (ref 1.6–2.6)
MCH RBC QN AUTO: 26.9 PG (ref 27–31)
MCHC RBC AUTO-ENTMCNC: 32.2 G/DL (ref 32–36)
MCV RBC AUTO: 84 FL (ref 82–98)
MONOCYTES # BLD AUTO: 0.8 K/UL (ref 0.3–1)
MONOCYTES NFR BLD: 4.9 % (ref 4–15)
NEUTROPHILS # BLD AUTO: 14.6 K/UL (ref 1.8–7.7)
NEUTROPHILS NFR BLD: 88.5 % (ref 38–73)
NRBC BLD-RTO: 0 /100 WBC
OVALOCYTES BLD QL SMEAR: ABNORMAL
PLATELET # BLD AUTO: 125 K/UL (ref 150–450)
PMV BLD AUTO: 11.6 FL (ref 9.2–12.9)
POCT GLUCOSE: 106 MG/DL (ref 70–110)
POCT GLUCOSE: 126 MG/DL (ref 70–110)
POCT GLUCOSE: 163 MG/DL (ref 70–110)
POIKILOCYTOSIS BLD QL SMEAR: SLIGHT
POLYCHROMASIA BLD QL SMEAR: ABNORMAL
POTASSIUM SERPL-SCNC: 3.5 MMOL/L (ref 3.5–5.1)
RBC # BLD AUTO: 3.86 M/UL (ref 4–5.4)
SODIUM SERPL-SCNC: 139 MMOL/L (ref 136–145)
WBC # BLD AUTO: 16.54 K/UL (ref 3.9–12.7)

## 2021-07-08 PROCEDURE — 25000003 PHARM REV CODE 250: Performed by: STUDENT IN AN ORGANIZED HEALTH CARE EDUCATION/TRAINING PROGRAM

## 2021-07-08 PROCEDURE — 25000003 PHARM REV CODE 250: Performed by: PHYSICIAN ASSISTANT

## 2021-07-08 PROCEDURE — 99233 SBSQ HOSP IP/OBS HIGH 50: CPT | Mod: GC,,, | Performed by: INTERNAL MEDICINE

## 2021-07-08 PROCEDURE — 87040 BLOOD CULTURE FOR BACTERIA: CPT | Performed by: STUDENT IN AN ORGANIZED HEALTH CARE EDUCATION/TRAINING PROGRAM

## 2021-07-08 PROCEDURE — 85025 COMPLETE CBC W/AUTO DIFF WBC: CPT

## 2021-07-08 PROCEDURE — 87449 NOS EACH ORGANISM AG IA: CPT

## 2021-07-08 PROCEDURE — 97165 OT EVAL LOW COMPLEX 30 MIN: CPT

## 2021-07-08 PROCEDURE — 99233 PR SUBSEQUENT HOSPITAL CARE,LEVL III: ICD-10-PCS | Mod: GC,,, | Performed by: INTERNAL MEDICINE

## 2021-07-08 PROCEDURE — 11000001 HC ACUTE MED/SURG PRIVATE ROOM

## 2021-07-08 PROCEDURE — 97530 THERAPEUTIC ACTIVITIES: CPT

## 2021-07-08 PROCEDURE — 25000003 PHARM REV CODE 250

## 2021-07-08 PROCEDURE — 63600175 PHARM REV CODE 636 W HCPCS: Performed by: STUDENT IN AN ORGANIZED HEALTH CARE EDUCATION/TRAINING PROGRAM

## 2021-07-08 PROCEDURE — 87324 CLOSTRIDIUM AG IA: CPT

## 2021-07-08 PROCEDURE — 63700000 PHARM REV CODE 250 ALT 637 W/O HCPCS: Performed by: INTERNAL MEDICINE

## 2021-07-08 PROCEDURE — 80048 BASIC METABOLIC PNL TOTAL CA: CPT

## 2021-07-08 PROCEDURE — 36415 COLL VENOUS BLD VENIPUNCTURE: CPT

## 2021-07-08 PROCEDURE — 63600175 PHARM REV CODE 636 W HCPCS

## 2021-07-08 PROCEDURE — 83735 ASSAY OF MAGNESIUM: CPT

## 2021-07-08 RX ORDER — POTASSIUM CHLORIDE 750 MG/1
40 CAPSULE, EXTENDED RELEASE ORAL ONCE
Status: COMPLETED | OUTPATIENT
Start: 2021-07-08 | End: 2021-07-08

## 2021-07-08 RX ORDER — MAGNESIUM SULFATE HEPTAHYDRATE 40 MG/ML
2 INJECTION, SOLUTION INTRAVENOUS
Status: COMPLETED | OUTPATIENT
Start: 2021-07-08 | End: 2021-07-08

## 2021-07-08 RX ORDER — AZITHROMYCIN 250 MG/1
500 TABLET, FILM COATED ORAL DAILY
Status: COMPLETED | OUTPATIENT
Start: 2021-07-08 | End: 2021-07-09

## 2021-07-08 RX ADMIN — ASPIRIN 81 MG: 81 TABLET, COATED ORAL at 08:07

## 2021-07-08 RX ADMIN — MAGNESIUM SULFATE IN WATER 2 G: 40 INJECTION, SOLUTION INTRAVENOUS at 05:07

## 2021-07-08 RX ADMIN — PIPERACILLIN SODIUM AND TAZOBACTAM SODIUM 4.5 G: 4; .5 INJECTION, POWDER, FOR SOLUTION INTRAVENOUS at 01:07

## 2021-07-08 RX ADMIN — CLOPIDOGREL BISULFATE 75 MG: 75 TABLET, FILM COATED ORAL at 08:07

## 2021-07-08 RX ADMIN — POTASSIUM CHLORIDE 40 MEQ: 750 CAPSULE, EXTENDED RELEASE ORAL at 01:07

## 2021-07-08 RX ADMIN — ACETAMINOPHEN 650 MG: 325 TABLET ORAL at 07:07

## 2021-07-08 RX ADMIN — AZITHROMYCIN MONOHYDRATE 500 MG: 250 TABLET ORAL at 10:07

## 2021-07-08 RX ADMIN — MELATONIN TAB 3 MG 6 MG: 3 TAB at 09:07

## 2021-07-08 RX ADMIN — PIPERACILLIN SODIUM AND TAZOBACTAM SODIUM 4.5 G: 4; .5 INJECTION, POWDER, FOR SOLUTION INTRAVENOUS at 09:07

## 2021-07-08 RX ADMIN — METOPROLOL SUCCINATE 50 MG: 50 TABLET, EXTENDED RELEASE ORAL at 08:07

## 2021-07-08 RX ADMIN — PIPERACILLIN SODIUM AND TAZOBACTAM SODIUM 4.5 G: 4; .5 INJECTION, POWDER, FOR SOLUTION INTRAVENOUS at 05:07

## 2021-07-08 RX ADMIN — MAGNESIUM SULFATE IN WATER 2 G: 40 INJECTION, SOLUTION INTRAVENOUS at 01:07

## 2021-07-09 PROBLEM — E87.29 INCREASED ANION GAP METABOLIC ACIDOSIS: Status: RESOLVED | Noted: 2021-07-07 | Resolved: 2021-07-09

## 2021-07-09 PROBLEM — R79.89 HIGH SERUM LACTATE: Status: RESOLVED | Noted: 2021-07-07 | Resolved: 2021-07-09

## 2021-07-09 LAB
ANION GAP SERPL CALC-SCNC: 9 MMOL/L (ref 8–16)
ANISOCYTOSIS BLD QL SMEAR: SLIGHT
BACTERIA #/AREA URNS AUTO: NORMAL /HPF
BASOPHILS # BLD AUTO: 0.02 K/UL (ref 0–0.2)
BASOPHILS NFR BLD: 0.2 % (ref 0–1.9)
BILIRUB UR QL STRIP: NEGATIVE
BUN SERPL-MCNC: 13 MG/DL (ref 8–23)
CALCIUM SERPL-MCNC: 8.2 MG/DL (ref 8.7–10.5)
CHLORIDE SERPL-SCNC: 111 MMOL/L (ref 95–110)
CLARITY UR REFRACT.AUTO: ABNORMAL
CO2 SERPL-SCNC: 21 MMOL/L (ref 23–29)
COLOR UR AUTO: YELLOW
CREAT SERPL-MCNC: 1 MG/DL (ref 0.5–1.4)
DIFFERENTIAL METHOD: ABNORMAL
EOSINOPHIL # BLD AUTO: 0.2 K/UL (ref 0–0.5)
EOSINOPHIL NFR BLD: 1.5 % (ref 0–8)
ERYTHROCYTE [DISTWIDTH] IN BLOOD BY AUTOMATED COUNT: 15 % (ref 11.5–14.5)
EST. GFR  (AFRICAN AMERICAN): >60 ML/MIN/1.73 M^2
EST. GFR  (NON AFRICAN AMERICAN): 59.3 ML/MIN/1.73 M^2
GLUCOSE SERPL-MCNC: 133 MG/DL (ref 70–110)
GLUCOSE UR QL STRIP: NEGATIVE
HCT VFR BLD AUTO: 32.7 % (ref 37–48.5)
HGB BLD-MCNC: 10.3 G/DL (ref 12–16)
HGB UR QL STRIP: ABNORMAL
HYALINE CASTS UR QL AUTO: 0 /LPF
IMM GRANULOCYTES # BLD AUTO: 0.08 K/UL (ref 0–0.04)
IMM GRANULOCYTES NFR BLD AUTO: 0.8 % (ref 0–0.5)
KETONES UR QL STRIP: NEGATIVE
LEUKOCYTE ESTERASE UR QL STRIP: ABNORMAL
LYMPHOCYTES # BLD AUTO: 0.9 K/UL (ref 1–4.8)
LYMPHOCYTES NFR BLD: 8.9 % (ref 18–48)
MAGNESIUM SERPL-MCNC: 2 MG/DL (ref 1.6–2.6)
MCH RBC QN AUTO: 26.8 PG (ref 27–31)
MCHC RBC AUTO-ENTMCNC: 31.5 G/DL (ref 32–36)
MCV RBC AUTO: 85 FL (ref 82–98)
MICROSCOPIC COMMENT: NORMAL
MONOCYTES # BLD AUTO: 0.5 K/UL (ref 0.3–1)
MONOCYTES NFR BLD: 4.6 % (ref 4–15)
NEUTROPHILS # BLD AUTO: 8.5 K/UL (ref 1.8–7.7)
NEUTROPHILS NFR BLD: 84 % (ref 38–73)
NITRITE UR QL STRIP: NEGATIVE
NRBC BLD-RTO: 0 /100 WBC
OVALOCYTES BLD QL SMEAR: ABNORMAL
PH UR STRIP: 5 [PH] (ref 5–8)
PLATELET # BLD AUTO: 133 K/UL (ref 150–450)
PLATELET BLD QL SMEAR: ABNORMAL
PMV BLD AUTO: 12 FL (ref 9.2–12.9)
POCT GLUCOSE: 120 MG/DL (ref 70–110)
POCT GLUCOSE: 137 MG/DL (ref 70–110)
POCT GLUCOSE: 181 MG/DL (ref 70–110)
POCT GLUCOSE: 181 MG/DL (ref 70–110)
POCT GLUCOSE: 209 MG/DL (ref 70–110)
POIKILOCYTOSIS BLD QL SMEAR: SLIGHT
POTASSIUM SERPL-SCNC: 3.6 MMOL/L (ref 3.5–5.1)
PROT UR QL STRIP: ABNORMAL
RBC # BLD AUTO: 3.84 M/UL (ref 4–5.4)
RBC #/AREA URNS AUTO: 1 /HPF (ref 0–4)
SODIUM SERPL-SCNC: 141 MMOL/L (ref 136–145)
SP GR UR STRIP: 1.02 (ref 1–1.03)
SQUAMOUS #/AREA URNS AUTO: 5 /HPF
URN SPEC COLLECT METH UR: ABNORMAL
WBC # BLD AUTO: 10.16 K/UL (ref 3.9–12.7)
WBC #/AREA URNS AUTO: 4 /HPF (ref 0–5)

## 2021-07-09 PROCEDURE — 36415 COLL VENOUS BLD VENIPUNCTURE: CPT

## 2021-07-09 PROCEDURE — 87040 BLOOD CULTURE FOR BACTERIA: CPT | Performed by: STUDENT IN AN ORGANIZED HEALTH CARE EDUCATION/TRAINING PROGRAM

## 2021-07-09 PROCEDURE — 63600175 PHARM REV CODE 636 W HCPCS: Performed by: STUDENT IN AN ORGANIZED HEALTH CARE EDUCATION/TRAINING PROGRAM

## 2021-07-09 PROCEDURE — 97116 GAIT TRAINING THERAPY: CPT

## 2021-07-09 PROCEDURE — 63700000 PHARM REV CODE 250 ALT 637 W/O HCPCS: Performed by: INTERNAL MEDICINE

## 2021-07-09 PROCEDURE — 81001 URINALYSIS AUTO W/SCOPE: CPT | Performed by: STUDENT IN AN ORGANIZED HEALTH CARE EDUCATION/TRAINING PROGRAM

## 2021-07-09 PROCEDURE — 25000003 PHARM REV CODE 250: Performed by: STUDENT IN AN ORGANIZED HEALTH CARE EDUCATION/TRAINING PROGRAM

## 2021-07-09 PROCEDURE — 97161 PT EVAL LOW COMPLEX 20 MIN: CPT

## 2021-07-09 PROCEDURE — 63600175 PHARM REV CODE 636 W HCPCS

## 2021-07-09 PROCEDURE — 25000003 PHARM REV CODE 250

## 2021-07-09 PROCEDURE — 11000001 HC ACUTE MED/SURG PRIVATE ROOM

## 2021-07-09 PROCEDURE — 36415 COLL VENOUS BLD VENIPUNCTURE: CPT | Performed by: STUDENT IN AN ORGANIZED HEALTH CARE EDUCATION/TRAINING PROGRAM

## 2021-07-09 PROCEDURE — 83735 ASSAY OF MAGNESIUM: CPT

## 2021-07-09 PROCEDURE — 85025 COMPLETE CBC W/AUTO DIFF WBC: CPT

## 2021-07-09 PROCEDURE — 80048 BASIC METABOLIC PNL TOTAL CA: CPT

## 2021-07-09 PROCEDURE — 99232 SBSQ HOSP IP/OBS MODERATE 35: CPT | Mod: GC,,, | Performed by: INTERNAL MEDICINE

## 2021-07-09 PROCEDURE — 99232 PR SUBSEQUENT HOSPITAL CARE,LEVL II: ICD-10-PCS | Mod: GC,,, | Performed by: INTERNAL MEDICINE

## 2021-07-09 RX ORDER — BENZONATATE 100 MG/1
100 CAPSULE ORAL 3 TIMES DAILY PRN
Status: DISCONTINUED | OUTPATIENT
Start: 2021-07-09 | End: 2021-07-10 | Stop reason: HOSPADM

## 2021-07-09 RX ORDER — AMLODIPINE BESYLATE 10 MG/1
10 TABLET ORAL DAILY
Status: DISCONTINUED | OUTPATIENT
Start: 2021-07-09 | End: 2021-07-10

## 2021-07-09 RX ORDER — LOPERAMIDE HYDROCHLORIDE 2 MG/1
2 CAPSULE ORAL 4 TIMES DAILY PRN
Status: DISCONTINUED | OUTPATIENT
Start: 2021-07-09 | End: 2021-07-10 | Stop reason: HOSPADM

## 2021-07-09 RX ORDER — ACETAMINOPHEN 325 MG/1
650 TABLET ORAL EVERY 6 HOURS PRN
Status: DISCONTINUED | OUTPATIENT
Start: 2021-07-09 | End: 2021-07-10 | Stop reason: HOSPADM

## 2021-07-09 RX ORDER — POTASSIUM CHLORIDE 750 MG/1
20 CAPSULE, EXTENDED RELEASE ORAL
Status: COMPLETED | OUTPATIENT
Start: 2021-07-09 | End: 2021-07-09

## 2021-07-09 RX ADMIN — AZITHROMYCIN MONOHYDRATE 500 MG: 250 TABLET ORAL at 09:07

## 2021-07-09 RX ADMIN — AMLODIPINE BESYLATE 10 MG: 10 TABLET ORAL at 04:07

## 2021-07-09 RX ADMIN — ENOXAPARIN SODIUM 40 MG: 40 INJECTION SUBCUTANEOUS at 04:07

## 2021-07-09 RX ADMIN — POTASSIUM CHLORIDE 20 MEQ: 10 CAPSULE, COATED, EXTENDED RELEASE ORAL at 09:07

## 2021-07-09 RX ADMIN — PIPERACILLIN SODIUM AND TAZOBACTAM SODIUM 4.5 G: 4; .5 INJECTION, POWDER, FOR SOLUTION INTRAVENOUS at 11:07

## 2021-07-09 RX ADMIN — METOPROLOL SUCCINATE 50 MG: 50 TABLET, EXTENDED RELEASE ORAL at 09:07

## 2021-07-09 RX ADMIN — PIPERACILLIN SODIUM AND TAZOBACTAM SODIUM 4.5 G: 4; .5 INJECTION, POWDER, FOR SOLUTION INTRAVENOUS at 09:07

## 2021-07-09 RX ADMIN — ASPIRIN 81 MG: 81 TABLET, COATED ORAL at 09:07

## 2021-07-09 RX ADMIN — PIPERACILLIN SODIUM AND TAZOBACTAM SODIUM 4.5 G: 4; .5 INJECTION, POWDER, FOR SOLUTION INTRAVENOUS at 04:07

## 2021-07-09 RX ADMIN — POTASSIUM CHLORIDE 20 MEQ: 10 CAPSULE, COATED, EXTENDED RELEASE ORAL at 11:07

## 2021-07-09 RX ADMIN — CLOPIDOGREL BISULFATE 75 MG: 75 TABLET, FILM COATED ORAL at 09:07

## 2021-07-09 RX ADMIN — ACETAMINOPHEN 650 MG: 325 TABLET ORAL at 04:07

## 2021-07-10 VITALS
RESPIRATION RATE: 16 BRPM | BODY MASS INDEX: 34.6 KG/M2 | WEIGHT: 207.69 LBS | TEMPERATURE: 98 F | DIASTOLIC BLOOD PRESSURE: 71 MMHG | HEIGHT: 65 IN | SYSTOLIC BLOOD PRESSURE: 150 MMHG | HEART RATE: 72 BPM | OXYGEN SATURATION: 94 %

## 2021-07-10 LAB
ANION GAP SERPL CALC-SCNC: 10 MMOL/L (ref 8–16)
BACTERIA BLD CULT: ABNORMAL
BASOPHILS # BLD AUTO: 0.04 K/UL (ref 0–0.2)
BASOPHILS NFR BLD: 0.5 % (ref 0–1.9)
BUN SERPL-MCNC: 9 MG/DL (ref 8–23)
CALCIUM SERPL-MCNC: 8.8 MG/DL (ref 8.7–10.5)
CHLORIDE SERPL-SCNC: 107 MMOL/L (ref 95–110)
CO2 SERPL-SCNC: 22 MMOL/L (ref 23–29)
CREAT SERPL-MCNC: 0.9 MG/DL (ref 0.5–1.4)
DIFFERENTIAL METHOD: ABNORMAL
EOSINOPHIL # BLD AUTO: 0.2 K/UL (ref 0–0.5)
EOSINOPHIL NFR BLD: 1.7 % (ref 0–8)
ERYTHROCYTE [DISTWIDTH] IN BLOOD BY AUTOMATED COUNT: 14.9 % (ref 11.5–14.5)
EST. GFR  (AFRICAN AMERICAN): >60 ML/MIN/1.73 M^2
EST. GFR  (NON AFRICAN AMERICAN): >60 ML/MIN/1.73 M^2
GLUCOSE SERPL-MCNC: 166 MG/DL (ref 70–110)
HCT VFR BLD AUTO: 34.1 % (ref 37–48.5)
HGB BLD-MCNC: 10.9 G/DL (ref 12–16)
IMM GRANULOCYTES # BLD AUTO: 0.06 K/UL (ref 0–0.04)
IMM GRANULOCYTES NFR BLD AUTO: 0.7 % (ref 0–0.5)
LYMPHOCYTES # BLD AUTO: 1.2 K/UL (ref 1–4.8)
LYMPHOCYTES NFR BLD: 14.3 % (ref 18–48)
MAGNESIUM SERPL-MCNC: 1.5 MG/DL (ref 1.6–2.6)
MCH RBC QN AUTO: 27 PG (ref 27–31)
MCHC RBC AUTO-ENTMCNC: 32 G/DL (ref 32–36)
MCV RBC AUTO: 84 FL (ref 82–98)
MONOCYTES # BLD AUTO: 0.8 K/UL (ref 0.3–1)
MONOCYTES NFR BLD: 9.1 % (ref 4–15)
NEUTROPHILS # BLD AUTO: 6.4 K/UL (ref 1.8–7.7)
NEUTROPHILS NFR BLD: 73.7 % (ref 38–73)
NRBC BLD-RTO: 0 /100 WBC
PLATELET # BLD AUTO: 146 K/UL (ref 150–450)
PMV BLD AUTO: 11.3 FL (ref 9.2–12.9)
POCT GLUCOSE: 188 MG/DL (ref 70–110)
POCT GLUCOSE: 207 MG/DL (ref 70–110)
POCT GLUCOSE: 289 MG/DL (ref 70–110)
POTASSIUM SERPL-SCNC: 3.9 MMOL/L (ref 3.5–5.1)
RBC # BLD AUTO: 4.04 M/UL (ref 4–5.4)
SODIUM SERPL-SCNC: 139 MMOL/L (ref 136–145)
WBC # BLD AUTO: 8.66 K/UL (ref 3.9–12.7)

## 2021-07-10 PROCEDURE — 99499 UNLISTED E&M SERVICE: CPT | Mod: ,,, | Performed by: PHYSICIAN ASSISTANT

## 2021-07-10 PROCEDURE — 99239 PR HOSPITAL DISCHARGE DAY,>30 MIN: ICD-10-PCS | Mod: GC,,, | Performed by: INTERNAL MEDICINE

## 2021-07-10 PROCEDURE — 83735 ASSAY OF MAGNESIUM: CPT

## 2021-07-10 PROCEDURE — 80048 BASIC METABOLIC PNL TOTAL CA: CPT

## 2021-07-10 PROCEDURE — 63600175 PHARM REV CODE 636 W HCPCS: Performed by: STUDENT IN AN ORGANIZED HEALTH CARE EDUCATION/TRAINING PROGRAM

## 2021-07-10 PROCEDURE — 25000003 PHARM REV CODE 250: Performed by: STUDENT IN AN ORGANIZED HEALTH CARE EDUCATION/TRAINING PROGRAM

## 2021-07-10 PROCEDURE — 99239 HOSP IP/OBS DSCHRG MGMT >30: CPT | Mod: GC,,, | Performed by: INTERNAL MEDICINE

## 2021-07-10 PROCEDURE — 25000003 PHARM REV CODE 250

## 2021-07-10 PROCEDURE — 99499 NO LOS: ICD-10-PCS | Mod: ,,, | Performed by: PHYSICIAN ASSISTANT

## 2021-07-10 PROCEDURE — 85025 COMPLETE CBC W/AUTO DIFF WBC: CPT | Performed by: INTERNAL MEDICINE

## 2021-07-10 PROCEDURE — 36415 COLL VENOUS BLD VENIPUNCTURE: CPT | Performed by: INTERNAL MEDICINE

## 2021-07-10 PROCEDURE — 36415 COLL VENOUS BLD VENIPUNCTURE: CPT

## 2021-07-10 RX ORDER — CIPROFLOXACIN 500 MG/1
500 TABLET ORAL EVERY 12 HOURS
Status: DISCONTINUED | OUTPATIENT
Start: 2021-07-10 | End: 2021-07-10 | Stop reason: HOSPADM

## 2021-07-10 RX ORDER — METRONIDAZOLE 500 MG/1
500 TABLET ORAL EVERY 8 HOURS
Status: DISCONTINUED | OUTPATIENT
Start: 2021-07-10 | End: 2021-07-10 | Stop reason: HOSPADM

## 2021-07-10 RX ORDER — CIPROFLOXACIN 500 MG/1
500 TABLET ORAL EVERY 12 HOURS
Qty: 21 TABLET | Refills: 0 | Status: SHIPPED | OUTPATIENT
Start: 2021-07-10 | End: 2021-07-21

## 2021-07-10 RX ORDER — LOPERAMIDE HYDROCHLORIDE 2 MG/1
2 CAPSULE ORAL 4 TIMES DAILY PRN
Qty: 40 CAPSULE | Refills: 0 | Status: SHIPPED | OUTPATIENT
Start: 2021-07-10 | End: 2021-07-20

## 2021-07-10 RX ORDER — METRONIDAZOLE 500 MG/1
500 TABLET ORAL EVERY 8 HOURS
Qty: 31 TABLET | Refills: 0 | Status: SHIPPED | OUTPATIENT
Start: 2021-07-10 | End: 2021-07-21

## 2021-07-10 RX ORDER — NIFEDIPINE 60 MG/1
60 TABLET, EXTENDED RELEASE ORAL DAILY
Status: DISCONTINUED | OUTPATIENT
Start: 2021-07-10 | End: 2021-07-10 | Stop reason: HOSPADM

## 2021-07-10 RX ORDER — VALACYCLOVIR HYDROCHLORIDE 500 MG/1
1000 TABLET, FILM COATED ORAL 2 TIMES DAILY
Status: DISCONTINUED | OUTPATIENT
Start: 2021-07-10 | End: 2021-07-10 | Stop reason: HOSPADM

## 2021-07-10 RX ORDER — NIFEDIPINE 60 MG/1
60 TABLET, EXTENDED RELEASE ORAL DAILY
Qty: 30 TABLET | Refills: 11 | Status: SHIPPED | OUTPATIENT
Start: 2021-07-11 | End: 2022-05-09 | Stop reason: SDUPTHER

## 2021-07-10 RX ORDER — VALACYCLOVIR HYDROCHLORIDE 1 G/1
1000 TABLET, FILM COATED ORAL 2 TIMES DAILY
Qty: 14 TABLET | Refills: 0 | Status: SHIPPED | OUTPATIENT
Start: 2021-07-10 | End: 2022-04-07 | Stop reason: ALTCHOICE

## 2021-07-10 RX ADMIN — NIFEDIPINE 60 MG: 60 TABLET, FILM COATED, EXTENDED RELEASE ORAL at 12:07

## 2021-07-10 RX ADMIN — METOPROLOL SUCCINATE 50 MG: 50 TABLET, EXTENDED RELEASE ORAL at 09:07

## 2021-07-10 RX ADMIN — PIPERACILLIN SODIUM AND TAZOBACTAM SODIUM 4.5 G: 4; .5 INJECTION, POWDER, FOR SOLUTION INTRAVENOUS at 05:07

## 2021-07-10 RX ADMIN — CLOPIDOGREL BISULFATE 75 MG: 75 TABLET, FILM COATED ORAL at 09:07

## 2021-07-10 RX ADMIN — AMLODIPINE BESYLATE 10 MG: 10 TABLET ORAL at 09:07

## 2021-07-10 RX ADMIN — ASPIRIN 81 MG: 81 TABLET, COATED ORAL at 09:07

## 2021-07-10 RX ADMIN — PIPERACILLIN SODIUM AND TAZOBACTAM SODIUM 4.5 G: 4; .5 INJECTION, POWDER, FOR SOLUTION INTRAVENOUS at 12:07

## 2021-07-12 ENCOUNTER — TELEPHONE (OUTPATIENT)
Dept: PRIMARY CARE CLINIC | Facility: CLINIC | Age: 66
End: 2021-07-12

## 2021-07-13 LAB
BACTERIA BLD CULT: NORMAL
BACTERIA BLD CULT: NORMAL

## 2021-07-14 LAB
BACTERIA BLD CULT: NORMAL
BACTERIA BLD CULT: NORMAL

## 2021-07-15 ENCOUNTER — OFFICE VISIT (OUTPATIENT)
Dept: PRIMARY CARE CLINIC | Facility: CLINIC | Age: 66
End: 2021-07-15
Payer: MEDICARE

## 2021-07-15 VITALS
BODY MASS INDEX: 32.18 KG/M2 | SYSTOLIC BLOOD PRESSURE: 148 MMHG | TEMPERATURE: 98 F | HEIGHT: 65 IN | HEART RATE: 64 BPM | DIASTOLIC BLOOD PRESSURE: 74 MMHG | WEIGHT: 193.13 LBS | OXYGEN SATURATION: 97 %

## 2021-07-15 DIAGNOSIS — E66.09 CLASS 1 OBESITY DUE TO EXCESS CALORIES WITH SERIOUS COMORBIDITY AND BODY MASS INDEX (BMI) OF 32.0 TO 32.9 IN ADULT: ICD-10-CM

## 2021-07-15 DIAGNOSIS — Z12.11 SPECIAL SCREENING FOR MALIGNANT NEOPLASMS, COLON: Primary | ICD-10-CM

## 2021-07-15 DIAGNOSIS — N76.0 ACUTE VAGINITIS: ICD-10-CM

## 2021-07-15 DIAGNOSIS — I10 ESSENTIAL HYPERTENSION: ICD-10-CM

## 2021-07-15 DIAGNOSIS — I69.311 MEMORY DEFICIT AFTER CEREBRAL INFARCTION: ICD-10-CM

## 2021-07-15 DIAGNOSIS — Z09 HOSPITAL DISCHARGE FOLLOW-UP: Primary | ICD-10-CM

## 2021-07-15 DIAGNOSIS — Z86.73 HISTORY OF STROKE: ICD-10-CM

## 2021-07-15 DIAGNOSIS — E11.59 TYPE 2 DIABETES MELLITUS WITH OTHER CIRCULATORY COMPLICATION, WITHOUT LONG-TERM CURRENT USE OF INSULIN: ICD-10-CM

## 2021-07-15 DIAGNOSIS — Z87.19 HISTORY OF DIVERTICULITIS: ICD-10-CM

## 2021-07-15 DIAGNOSIS — A59.01 TRICHOMONAS VAGINITIS: ICD-10-CM

## 2021-07-15 PROCEDURE — 3046F PR MOST RECENT HEMOGLOBIN A1C LEVEL > 9.0%: ICD-10-PCS | Mod: CPTII,S$GLB,, | Performed by: FAMILY MEDICINE

## 2021-07-15 PROCEDURE — 1101F PT FALLS ASSESS-DOCD LE1/YR: CPT | Mod: CPTII,S$GLB,, | Performed by: FAMILY MEDICINE

## 2021-07-15 PROCEDURE — 99499 UNLISTED E&M SERVICE: CPT | Mod: S$GLB,,, | Performed by: FAMILY MEDICINE

## 2021-07-15 PROCEDURE — 3008F BODY MASS INDEX DOCD: CPT | Mod: CPTII,S$GLB,, | Performed by: FAMILY MEDICINE

## 2021-07-15 PROCEDURE — 1126F PR PAIN SEVERITY QUANTIFIED, NO PAIN PRESENT: ICD-10-PCS | Mod: S$GLB,,, | Performed by: FAMILY MEDICINE

## 2021-07-15 PROCEDURE — 1111F DSCHRG MED/CURRENT MED MERGE: CPT | Mod: CPTII,S$GLB,, | Performed by: FAMILY MEDICINE

## 2021-07-15 PROCEDURE — 3288F PR FALLS RISK ASSESSMENT DOCUMENTED: ICD-10-PCS | Mod: CPTII,S$GLB,, | Performed by: FAMILY MEDICINE

## 2021-07-15 PROCEDURE — 3288F FALL RISK ASSESSMENT DOCD: CPT | Mod: CPTII,S$GLB,, | Performed by: FAMILY MEDICINE

## 2021-07-15 PROCEDURE — 1126F AMNT PAIN NOTED NONE PRSNT: CPT | Mod: S$GLB,,, | Performed by: FAMILY MEDICINE

## 2021-07-15 PROCEDURE — 99999 PR PBB SHADOW E&M-EST. PATIENT-LVL V: CPT | Mod: PBBFAC,,, | Performed by: FAMILY MEDICINE

## 2021-07-15 PROCEDURE — 3078F PR MOST RECENT DIASTOLIC BLOOD PRESSURE < 80 MM HG: ICD-10-PCS | Mod: CPTII,S$GLB,, | Performed by: FAMILY MEDICINE

## 2021-07-15 PROCEDURE — 99499 RISK ADDL DX/OHS AUDIT: ICD-10-PCS | Mod: S$GLB,,, | Performed by: FAMILY MEDICINE

## 2021-07-15 PROCEDURE — 3046F HEMOGLOBIN A1C LEVEL >9.0%: CPT | Mod: CPTII,S$GLB,, | Performed by: FAMILY MEDICINE

## 2021-07-15 PROCEDURE — 1111F PR DISCHARGE MEDS RECONCILED W/ CURRENT OUTPATIENT MED LIST: ICD-10-PCS | Mod: CPTII,S$GLB,, | Performed by: FAMILY MEDICINE

## 2021-07-15 PROCEDURE — 3077F PR MOST RECENT SYSTOLIC BLOOD PRESSURE >= 140 MM HG: ICD-10-PCS | Mod: CPTII,S$GLB,, | Performed by: FAMILY MEDICINE

## 2021-07-15 PROCEDURE — 3078F DIAST BP <80 MM HG: CPT | Mod: CPTII,S$GLB,, | Performed by: FAMILY MEDICINE

## 2021-07-15 PROCEDURE — 1101F PR PT FALLS ASSESS DOC 0-1 FALLS W/OUT INJ PAST YR: ICD-10-PCS | Mod: CPTII,S$GLB,, | Performed by: FAMILY MEDICINE

## 2021-07-15 PROCEDURE — 87086 URINE CULTURE/COLONY COUNT: CPT | Performed by: FAMILY MEDICINE

## 2021-07-15 PROCEDURE — 99214 PR OFFICE/OUTPT VISIT, EST, LEVL IV, 30-39 MIN: ICD-10-PCS | Mod: S$GLB,,, | Performed by: FAMILY MEDICINE

## 2021-07-15 PROCEDURE — 99999 PR PBB SHADOW E&M-EST. PATIENT-LVL V: ICD-10-PCS | Mod: PBBFAC,,, | Performed by: FAMILY MEDICINE

## 2021-07-15 PROCEDURE — 87481 CANDIDA DNA AMP PROBE: CPT | Mod: 59 | Performed by: FAMILY MEDICINE

## 2021-07-15 PROCEDURE — 3077F SYST BP >= 140 MM HG: CPT | Mod: CPTII,S$GLB,, | Performed by: FAMILY MEDICINE

## 2021-07-15 PROCEDURE — 3008F PR BODY MASS INDEX (BMI) DOCUMENTED: ICD-10-PCS | Mod: CPTII,S$GLB,, | Performed by: FAMILY MEDICINE

## 2021-07-15 PROCEDURE — 99214 OFFICE O/P EST MOD 30 MIN: CPT | Mod: S$GLB,,, | Performed by: FAMILY MEDICINE

## 2021-07-15 RX ORDER — TERCONAZOLE 4 MG/G
1 CREAM VAGINAL NIGHTLY
Qty: 45 G | Refills: 0 | Status: SHIPPED | OUTPATIENT
Start: 2021-07-15 | End: 2022-04-07 | Stop reason: ALTCHOICE

## 2021-07-15 RX ORDER — SODIUM, POTASSIUM,MAG SULFATES 17.5-3.13G
1 SOLUTION, RECONSTITUTED, ORAL ORAL DAILY
Qty: 1 KIT | Refills: 0 | Status: SHIPPED | OUTPATIENT
Start: 2021-07-15 | End: 2021-07-17

## 2021-07-16 ENCOUNTER — TELEPHONE (OUTPATIENT)
Dept: PRIMARY CARE CLINIC | Facility: CLINIC | Age: 66
End: 2021-07-16

## 2021-07-16 PROBLEM — Z87.19 HISTORY OF DIVERTICULITIS: Status: ACTIVE | Noted: 2021-07-16

## 2021-07-16 LAB — BACTERIA UR CULT: NO GROWTH

## 2021-07-19 LAB
BACTERIAL VAGINOSIS DNA: NEGATIVE
CANDIDA GLABRATA DNA: POSITIVE
CANDIDA KRUSEI DNA: NEGATIVE
CANDIDA RRNA VAG QL PROBE: POSITIVE
T VAGINALIS RRNA GENITAL QL PROBE: POSITIVE

## 2021-07-20 ENCOUNTER — TELEPHONE (OUTPATIENT)
Dept: PRIMARY CARE CLINIC | Facility: CLINIC | Age: 66
End: 2021-07-20

## 2021-07-20 PROBLEM — A59.01 TRICHOMONAS VAGINITIS: Status: ACTIVE | Noted: 2021-07-20

## 2021-07-21 ENCOUNTER — TELEPHONE (OUTPATIENT)
Dept: PRIMARY CARE CLINIC | Facility: CLINIC | Age: 66
End: 2021-07-21

## 2021-07-21 ENCOUNTER — OFFICE VISIT (OUTPATIENT)
Dept: PRIMARY CARE CLINIC | Facility: CLINIC | Age: 66
End: 2021-07-21
Payer: MEDICARE

## 2021-07-21 DIAGNOSIS — R89.9 ABNORMAL LABORATORY TEST RESULT: Primary | ICD-10-CM

## 2021-07-21 PROCEDURE — 3046F PR MOST RECENT HEMOGLOBIN A1C LEVEL > 9.0%: ICD-10-PCS | Mod: CPTII,95,, | Performed by: FAMILY MEDICINE

## 2021-07-21 PROCEDURE — 3046F HEMOGLOBIN A1C LEVEL >9.0%: CPT | Mod: CPTII,95,, | Performed by: FAMILY MEDICINE

## 2021-07-21 PROCEDURE — 99442 PR PHYSICIAN TELEPHONE EVALUATION 11-20 MIN: ICD-10-PCS | Mod: 95,,, | Performed by: FAMILY MEDICINE

## 2021-07-21 PROCEDURE — 99442 PR PHYSICIAN TELEPHONE EVALUATION 11-20 MIN: CPT | Mod: 95,,, | Performed by: FAMILY MEDICINE

## 2021-09-27 ENCOUNTER — TELEPHONE (OUTPATIENT)
Dept: PRIMARY CARE CLINIC | Facility: CLINIC | Age: 66
End: 2021-09-27

## 2021-10-13 ENCOUNTER — PATIENT MESSAGE (OUTPATIENT)
Dept: ENDOSCOPY | Facility: HOSPITAL | Age: 66
End: 2021-10-13
Payer: MEDICARE

## 2021-11-23 ENCOUNTER — TELEPHONE (OUTPATIENT)
Dept: INTERNAL MEDICINE | Facility: CLINIC | Age: 66
End: 2021-11-23
Payer: MEDICARE

## 2021-11-30 ENCOUNTER — TELEPHONE (OUTPATIENT)
Dept: PRIMARY CARE CLINIC | Facility: CLINIC | Age: 66
End: 2021-11-30
Payer: MEDICARE

## 2021-12-09 ENCOUNTER — TELEPHONE (OUTPATIENT)
Dept: PRIMARY CARE CLINIC | Facility: CLINIC | Age: 66
End: 2021-12-09
Payer: MEDICARE

## 2022-01-18 ENCOUNTER — HOSPITAL ENCOUNTER (OUTPATIENT)
Dept: RADIOLOGY | Facility: HOSPITAL | Age: 67
Discharge: HOME | End: 2022-01-18
Attending: FAMILY MEDICINE
Payer: MEDICARE

## 2022-01-18 ENCOUNTER — OFFICE VISIT (OUTPATIENT)
Dept: PRIMARY CARE CLINIC | Facility: CLINIC | Age: 67
End: 2022-01-18
Payer: MEDICARE

## 2022-01-18 ENCOUNTER — PATIENT MESSAGE (OUTPATIENT)
Dept: ADMINISTRATIVE | Facility: HOSPITAL | Age: 67
End: 2022-01-18
Payer: MEDICARE

## 2022-01-18 VITALS
SYSTOLIC BLOOD PRESSURE: 134 MMHG | DIASTOLIC BLOOD PRESSURE: 84 MMHG | OXYGEN SATURATION: 99 % | TEMPERATURE: 98 F | HEART RATE: 74 BPM | WEIGHT: 196.44 LBS | HEIGHT: 65 IN | BODY MASS INDEX: 32.73 KG/M2

## 2022-01-18 DIAGNOSIS — Z12.31 ENCOUNTER FOR SCREENING MAMMOGRAM FOR MALIGNANT NEOPLASM OF BREAST: Primary | ICD-10-CM

## 2022-01-18 DIAGNOSIS — Z13.820 OSTEOPOROSIS SCREENING: ICD-10-CM

## 2022-01-18 DIAGNOSIS — Z12.31 ENCOUNTER FOR SCREENING MAMMOGRAM FOR MALIGNANT NEOPLASM OF BREAST: ICD-10-CM

## 2022-01-18 DIAGNOSIS — F33.41 RECURRENT MAJOR DEPRESSIVE DISORDER, IN PARTIAL REMISSION: ICD-10-CM

## 2022-01-18 DIAGNOSIS — D69.6 THROMBOCYTOPENIA, UNSPECIFIED: ICD-10-CM

## 2022-01-18 DIAGNOSIS — M62.838 CERVICAL PARASPINOUS MUSCLE SPASM: ICD-10-CM

## 2022-01-18 DIAGNOSIS — E11.59 TYPE 2 DIABETES MELLITUS WITH OTHER CIRCULATORY COMPLICATION, WITHOUT LONG-TERM CURRENT USE OF INSULIN: ICD-10-CM

## 2022-01-18 DIAGNOSIS — M62.830 SPASM OF THORACIC BACK MUSCLE: ICD-10-CM

## 2022-01-18 DIAGNOSIS — E11.9 TYPE 2 DIABETES MELLITUS WITHOUT COMPLICATION, WITHOUT LONG-TERM CURRENT USE OF INSULIN: ICD-10-CM

## 2022-01-18 DIAGNOSIS — F17.200 CURRENT SMOKER ON SOME DAYS: ICD-10-CM

## 2022-01-18 DIAGNOSIS — N95.9 UNSPECIFIED MENOPAUSAL AND PERIMENOPAUSAL DISORDER: ICD-10-CM

## 2022-01-18 PROCEDURE — 1101F PR PT FALLS ASSESS DOC 0-1 FALLS W/OUT INJ PAST YR: ICD-10-PCS | Mod: CPTII,S$GLB,, | Performed by: FAMILY MEDICINE

## 2022-01-18 PROCEDURE — 72070 X-RAY EXAM THORAC SPINE 2VWS: CPT | Mod: TC,PN

## 2022-01-18 PROCEDURE — 1159F MED LIST DOCD IN RCRD: CPT | Mod: CPTII,S$GLB,, | Performed by: FAMILY MEDICINE

## 2022-01-18 PROCEDURE — 77067 SCR MAMMO BI INCL CAD: CPT | Mod: 26,,, | Performed by: RADIOLOGY

## 2022-01-18 PROCEDURE — 77063 MAMMO DIGITAL SCREENING BILAT WITH TOMO: ICD-10-PCS | Mod: 26,,, | Performed by: RADIOLOGY

## 2022-01-18 PROCEDURE — 99214 OFFICE O/P EST MOD 30 MIN: CPT | Mod: S$GLB,,, | Performed by: FAMILY MEDICINE

## 2022-01-18 PROCEDURE — 3079F DIAST BP 80-89 MM HG: CPT | Mod: CPTII,S$GLB,, | Performed by: FAMILY MEDICINE

## 2022-01-18 PROCEDURE — 1160F RVW MEDS BY RX/DR IN RCRD: CPT | Mod: CPTII,S$GLB,, | Performed by: FAMILY MEDICINE

## 2022-01-18 PROCEDURE — 1101F PT FALLS ASSESS-DOCD LE1/YR: CPT | Mod: CPTII,S$GLB,, | Performed by: FAMILY MEDICINE

## 2022-01-18 PROCEDURE — 72040 X-RAY EXAM NECK SPINE 2-3 VW: CPT | Mod: 26,,, | Performed by: RADIOLOGY

## 2022-01-18 PROCEDURE — 3075F PR MOST RECENT SYSTOLIC BLOOD PRESS GE 130-139MM HG: ICD-10-PCS | Mod: CPTII,S$GLB,, | Performed by: FAMILY MEDICINE

## 2022-01-18 PROCEDURE — 1160F PR REVIEW ALL MEDS BY PRESCRIBER/CLIN PHARMACIST DOCUMENTED: ICD-10-PCS | Mod: CPTII,S$GLB,, | Performed by: FAMILY MEDICINE

## 2022-01-18 PROCEDURE — 3008F BODY MASS INDEX DOCD: CPT | Mod: CPTII,S$GLB,, | Performed by: FAMILY MEDICINE

## 2022-01-18 PROCEDURE — 3079F PR MOST RECENT DIASTOLIC BLOOD PRESSURE 80-89 MM HG: ICD-10-PCS | Mod: CPTII,S$GLB,, | Performed by: FAMILY MEDICINE

## 2022-01-18 PROCEDURE — 3288F FALL RISK ASSESSMENT DOCD: CPT | Mod: CPTII,S$GLB,, | Performed by: FAMILY MEDICINE

## 2022-01-18 PROCEDURE — 72070 XR THORACIC SPINE AP LATERAL: ICD-10-PCS | Mod: 26,,, | Performed by: RADIOLOGY

## 2022-01-18 PROCEDURE — 72040 X-RAY EXAM NECK SPINE 2-3 VW: CPT | Mod: TC,PN

## 2022-01-18 PROCEDURE — 72070 X-RAY EXAM THORAC SPINE 2VWS: CPT | Mod: 26,,, | Performed by: RADIOLOGY

## 2022-01-18 PROCEDURE — 99499 UNLISTED E&M SERVICE: CPT | Mod: S$GLB,,, | Performed by: FAMILY MEDICINE

## 2022-01-18 PROCEDURE — 1159F PR MEDICATION LIST DOCUMENTED IN MEDICAL RECORD: ICD-10-PCS | Mod: CPTII,S$GLB,, | Performed by: FAMILY MEDICINE

## 2022-01-18 PROCEDURE — 77063 BREAST TOMOSYNTHESIS BI: CPT | Mod: 26,,, | Performed by: RADIOLOGY

## 2022-01-18 PROCEDURE — 3008F PR BODY MASS INDEX (BMI) DOCUMENTED: ICD-10-PCS | Mod: CPTII,S$GLB,, | Performed by: FAMILY MEDICINE

## 2022-01-18 PROCEDURE — 77067 SCR MAMMO BI INCL CAD: CPT | Mod: TC,PN

## 2022-01-18 PROCEDURE — 77067 MAMMO DIGITAL SCREENING BILAT WITH TOMO: ICD-10-PCS | Mod: 26,,, | Performed by: RADIOLOGY

## 2022-01-18 PROCEDURE — 77063 BREAST TOMOSYNTHESIS BI: CPT | Mod: TC,PN

## 2022-01-18 PROCEDURE — 99499 RISK ADDL DX/OHS AUDIT: ICD-10-PCS | Mod: S$GLB,,, | Performed by: FAMILY MEDICINE

## 2022-01-18 PROCEDURE — 99999 PR PBB SHADOW E&M-EST. PATIENT-LVL V: ICD-10-PCS | Mod: PBBFAC,,, | Performed by: FAMILY MEDICINE

## 2022-01-18 PROCEDURE — 99214 PR OFFICE/OUTPT VISIT, EST, LEVL IV, 30-39 MIN: ICD-10-PCS | Mod: S$GLB,,, | Performed by: FAMILY MEDICINE

## 2022-01-18 PROCEDURE — 3075F SYST BP GE 130 - 139MM HG: CPT | Mod: CPTII,S$GLB,, | Performed by: FAMILY MEDICINE

## 2022-01-18 PROCEDURE — 99999 PR PBB SHADOW E&M-EST. PATIENT-LVL V: CPT | Mod: PBBFAC,,, | Performed by: FAMILY MEDICINE

## 2022-01-18 PROCEDURE — 72040 XR CERVICAL SPINE AP LATERAL: ICD-10-PCS | Mod: 26,,, | Performed by: RADIOLOGY

## 2022-01-18 PROCEDURE — 1125F PR PAIN SEVERITY QUANTIFIED, PAIN PRESENT: ICD-10-PCS | Mod: CPTII,S$GLB,, | Performed by: FAMILY MEDICINE

## 2022-01-18 PROCEDURE — 1125F AMNT PAIN NOTED PAIN PRSNT: CPT | Mod: CPTII,S$GLB,, | Performed by: FAMILY MEDICINE

## 2022-01-18 PROCEDURE — 3288F PR FALLS RISK ASSESSMENT DOCUMENTED: ICD-10-PCS | Mod: CPTII,S$GLB,, | Performed by: FAMILY MEDICINE

## 2022-01-18 RX ORDER — CYCLOBENZAPRINE HCL 5 MG
5 TABLET ORAL NIGHTLY
Qty: 10 TABLET | Refills: 0 | Status: SHIPPED | OUTPATIENT
Start: 2022-01-18 | End: 2022-04-07 | Stop reason: ALTCHOICE

## 2022-01-18 RX ORDER — METHOCARBAMOL 500 MG/1
500 TABLET, FILM COATED ORAL 3 TIMES DAILY
Qty: 30 TABLET | Refills: 0 | Status: SHIPPED | OUTPATIENT
Start: 2022-01-18 | End: 2022-04-07 | Stop reason: ALTCHOICE

## 2022-01-18 RX ORDER — DICLOFENAC SODIUM 75 MG/1
75 TABLET, DELAYED RELEASE ORAL 2 TIMES DAILY
Qty: 20 TABLET | Refills: 0 | Status: SHIPPED | OUTPATIENT
Start: 2022-01-18 | End: 2022-04-07 | Stop reason: ALTCHOICE

## 2022-01-18 NOTE — PROGRESS NOTES
Subjective:   Patient ID: Shabnam Noble is a 66 y.o. female.    Chief Complaint: Back Pain      HPI    65 yo female with uncontrolled type 2 diabetes mellitus with last hemoglobin A1c greater than 9 fall in Nov 2021. Hurt left shoulder and didn't really have upper back pain or other back pain or any other pain. This was outside [place] and there were incidental finding of thoracic back arthritis per the patient but no fractures or dislocations..    Symptoms of upper back discomfort started over past approx 4 days.     Is to the right near the scapula but more towards the midline.    Not taking any otc meds.    Somewhat disturbing her sleep    Not taking guarding is regarding her type 2 diabetes mellitus secondary to cost    No headaches or other complaints of pain related to fall            Patient queried and denies any further complaints.    ALLERGIES AND MEDICATIONS: updated and reviewed.  Review of patient's allergies indicates:   Allergen Reactions    Atorvastatin     Hydrodiuril [hydrochlorothiazide]     Lisinopril        Current Outpatient Medications:     aspirin (ECOTRIN) 81 MG EC tablet, Take 1 tablet (81 mg total) by mouth once daily., Disp: 90 tablet, Rfl: 3    clopidogreL (PLAVIX) 75 mg tablet, Take 1 tablet (75 mg total) by mouth once daily., Disp: 90 tablet, Rfl: 3    empagliflozin (JARDIANCE) 10 mg tablet, Take 1 tablet (10 mg total) by mouth once daily., Disp: 90 tablet, Rfl: 3    metFORMIN (GLUCOPHAGE) 1000 MG tablet, Take 1 tablet (1,000 mg total) by mouth 2 (two) times daily with meals., Disp: 180 tablet, Rfl: 3    metoprolol succinate (TOPROL-XL) 50 MG 24 hr tablet, Take 1 tablet (50 mg total) by mouth once daily., Disp: 90 tablet, Rfl: 3    rosuvastatin (CRESTOR) 20 MG tablet, Take 1 tablet (20 mg total) by mouth every evening. Patient states she cannot tolerate atorvastatin, Disp: 30 tablet, Rfl: 11    blood glucose control, high Soln, 1 drop by Misc.(Non-Drug; Combo Route) route once  daily. ICD 10 code: E11.59, Disp: 3 each, Rfl: 3    blood-glucose meter kit, Use as instructed. ICD 10 code E11.59, Disp: 1 each, Rfl: 0    cyclobenzaprine (FLEXERIL) 5 MG tablet, Take 1 tablet (5 mg total) by mouth nightly. Prn muscle spasms; will cause drowsiness, Disp: 10 tablet, Rfl: 0    diclofenac (VOLTAREN) 75 MG EC tablet, Take 1 tablet (75 mg total) by mouth 2 (two) times daily. w food and water for 3-10 days for pain and inflammation, Disp: 20 tablet, Rfl: 0    diphenhydrAMINE (BENADRYL ALLERGY) 12.5 mg/5 mL liquid, Take 10 mLs (25 mg total) by mouth every 6 (six) hours as needed for Allergies. (Patient not taking: Reported on 1/18/2022), Disp: 118 mL, Rfl: 3    ezetimibe (ZETIA) 10 mg tablet, Take 1 tablet (10 mg total) by mouth once daily., Disp: 90 tablet, Rfl: 3     mg tablet, Take 1 tablet by mouth daily as needed., Disp: , Rfl:     methocarbamoL (ROBAXIN) 500 MG Tab, Take 1 tablet (500 mg total) by mouth 3 (three) times daily. w meals for muscle spasms, Disp: 30 tablet, Rfl: 0    NIFEdipine (PROCARDIA-XL) 60 MG (OSM) 24 hr tablet, Take 1 tablet (60 mg total) by mouth once daily., Disp: 30 tablet, Rfl: 11    terconazole (TERAZOL 7) 0.4 % Crea, Place 1 applicator vaginally every evening. (Patient not taking: Reported on 1/18/2022), Disp: 45 g, Rfl: 0    valACYclovir (VALTREX) 1000 MG tablet, Take 1 tablet (1,000 mg total) by mouth 2 (two) times daily. for 7 days (Patient not taking: Reported on 1/18/2022), Disp: 14 tablet, Rfl: 0    Review of Systems   Constitutional: Negative for activity change, appetite change, chills, diaphoresis, fatigue, fever and unexpected weight change.   HENT: Negative for congestion, ear discharge, ear pain, facial swelling, hearing loss, nosebleeds, postnasal drip, rhinorrhea, sinus pressure, sneezing, sore throat, tinnitus, trouble swallowing and voice change.    Eyes: Negative for photophobia, pain, discharge, redness, itching and visual disturbance.    Respiratory: Negative for cough, chest tightness, shortness of breath and wheezing.    Cardiovascular: Negative for chest pain, palpitations and leg swelling.   Gastrointestinal: Negative for abdominal distention, abdominal pain, anal bleeding, blood in stool, constipation, diarrhea, nausea, rectal pain and vomiting.   Endocrine: Negative for cold intolerance, heat intolerance, polydipsia, polyphagia and polyuria.   Genitourinary: Negative for difficulty urinating, dysuria and flank pain.   Musculoskeletal: Positive for back pain. Negative for arthralgias, joint swelling, myalgias and neck pain.   Skin: Negative for rash.   Neurological: Negative for dizziness, tremors, seizures, syncope, speech difficulty, weakness, light-headedness, numbness and headaches.   Psychiatric/Behavioral: Negative for behavioral problems, confusion, decreased concentration, dysphoric mood, sleep disturbance and suicidal ideas. The patient is not nervous/anxious and is not hyperactive.        Lab Results   Component Value Date    WBC 8.66 07/10/2021    HGB 10.9 (L) 07/10/2021    HCT 34.1 (L) 07/10/2021    MCV 84 07/10/2021     (L) 07/10/2021         CMP  Sodium   Date Value Ref Range Status   07/10/2021 139 136 - 145 mmol/L Final     Potassium   Date Value Ref Range Status   07/10/2021 3.9 3.5 - 5.1 mmol/L Final     Chloride   Date Value Ref Range Status   07/10/2021 107 95 - 110 mmol/L Final     CO2   Date Value Ref Range Status   07/10/2021 22 (L) 23 - 29 mmol/L Final     Glucose   Date Value Ref Range Status   07/10/2021 166 (H) 70 - 110 mg/dL Final     BUN   Date Value Ref Range Status   07/10/2021 9 8 - 23 mg/dL Final     Creatinine   Date Value Ref Range Status   07/10/2021 0.9 0.5 - 1.4 mg/dL Final     Calcium   Date Value Ref Range Status   07/10/2021 8.8 8.7 - 10.5 mg/dL Final     Total Protein   Date Value Ref Range Status   07/07/2021 6.6 6.0 - 8.4 g/dL Final     Albumin   Date Value Ref Range Status   07/07/2021 3.2  "(L) 3.5 - 5.2 g/dL Final     Total Bilirubin   Date Value Ref Range Status   07/07/2021 0.5 0.1 - 1.0 mg/dL Final     Comment:     For infants and newborns, interpretation of results should be based  on gestational age, weight and in agreement with clinical  observations.    Premature Infant recommended reference ranges:  Up to 24 hours.............<8.0 mg/dL  Up to 48 hours............<12.0 mg/dL  3-5 days..................<15.0 mg/dL  6-29 days.................<15.0 mg/dL       Alkaline Phosphatase   Date Value Ref Range Status   07/07/2021 94 55 - 135 U/L Final     AST   Date Value Ref Range Status   07/07/2021 15 10 - 40 U/L Final     ALT   Date Value Ref Range Status   07/07/2021 12 10 - 44 U/L Final     Anion Gap   Date Value Ref Range Status   07/10/2021 10 8 - 16 mmol/L Final     eGFR if    Date Value Ref Range Status   07/10/2021 >60.0 >60 mL/min/1.73 m^2 Final     eGFR if non    Date Value Ref Range Status   07/10/2021 >60.0 >60 mL/min/1.73 m^2 Final     Comment:     Calculation used to obtain the estimated glomerular filtration  rate (eGFR) is the CKD-EPI equation.           Lab Results   Component Value Date    HGBA1C 9.2 (H) 06/03/2021        Objective:     Vitals:    01/18/22 0822 01/18/22 0833   BP: (!) 150/84 134/84   Pulse: 74    Temp: 98 °F (36.7 °C)    TempSrc: Oral    SpO2: 99%    Weight: 89.1 kg (196 lb 6.9 oz)    Height: 5' 5" (1.651 m)    PainSc:   4    PainLoc: Back      Body mass index is 32.69 kg/m².    Physical Exam  Vitals and nursing note reviewed.   Constitutional:       General: She is not in acute distress.     Appearance: She is well-developed and well-nourished. She is not sickly-appearing or diaphoretic.   HENT:      Head: Normocephalic and atraumatic.      Right Ear: Hearing, tympanic membrane, ear canal and external ear normal. No tenderness.      Left Ear: Hearing, tympanic membrane, ear canal and external ear normal. No tenderness.      Nose: " Nose normal.      Mouth/Throat:      Mouth: Oropharynx is clear and moist.      Dentition: Normal dentition.      Pharynx: No oropharyngeal exudate, posterior oropharyngeal edema or posterior oropharyngeal erythema.   Eyes:      General: Lids are normal. No scleral icterus.        Right eye: No discharge.         Left eye: No discharge.      Extraocular Movements:      Right eye: Normal extraocular motion.      Left eye: Normal extraocular motion.      Conjunctiva/sclera: Conjunctivae normal.      Right eye: Right conjunctiva is not injected.      Left eye: Left conjunctiva is not injected.   Neck:      Thyroid: No thyromegaly.      Vascular: No carotid bruit or JVD.      Trachea: No tracheal deviation.   Cardiovascular:      Rate and Rhythm: Normal rate and regular rhythm.      Pulses: Normal pulses.      Heart sounds: Normal heart sounds. No murmur heard.  No friction rub.   Pulmonary:      Effort: Pulmonary effort is normal. No accessory muscle usage or respiratory distress.      Breath sounds: Normal breath sounds. No wheezing, rhonchi or rales.   Musculoskeletal:         General: No edema.      Cervical back: Normal range of motion and neck supple. Spasms present. No edema.      Thoracic back: Spasms and tenderness present.   Lymphadenopathy:      Head:      Right side of head: No submandibular adenopathy.      Left side of head: No submandibular adenopathy.      Cervical: No cervical adenopathy.   Skin:     General: Skin is warm and dry.   Neurological:      Mental Status: She is alert and oriented to person, place, and time.   Psychiatric:         Mood and Affect: Mood is not anxious or depressed. Affect is not labile, angry or inappropriate.         Speech: Speech normal.         Behavior: Behavior normal. Behavior is cooperative.         Thought Content: Thought content normal.         Assessment and Plan:   Shabnam was seen today for back pain.    Diagnoses and all orders for this visit:    Encounter for  screening mammogram for malignant neoplasm of breast  -     Mammo Digital Screening Bilat; Future    Cervical paraspinous muscle spasm  -     X-Ray Cervical Spine AP And Lateral; Future    Type 2 diabetes mellitus without complication, without long-term current use of insulin  -     Comprehensive Metabolic Panel; Future  -     Hemoglobin A1C; Future    Thrombocytopenia, unspecified    Recurrent major depressive disorder, in partial remission    Type 2 diabetes mellitus with other circulatory complication, without long-term current use of insulin    Spasm of thoracic back muscle  -     X-Ray Thoracic Spine AP Lateral; Future    Osteoporosis screening  -     DXA Bone Density Spine And Hip; Future    Unspecified menopausal and perimenopausal disorder   -     DXA Bone Density Spine And Hip; Future    Current smoker on some days    Other orders  -     diclofenac (VOLTAREN) 75 MG EC tablet; Take 1 tablet (75 mg total) by mouth 2 (two) times daily. w food and water for 3-10 days for pain and inflammation  -     methocarbamoL (ROBAXIN) 500 MG Tab; Take 1 tablet (500 mg total) by mouth 3 (three) times daily. w meals for muscle spasms  -     cyclobenzaprine (FLEXERIL) 5 MG tablet; Take 1 tablet (5 mg total) by mouth nightly. Prn muscle spasms; will cause drowsiness    Set up appointment again with diabetes manager.    Needs mammogram    Needs DEXA    Muscle spasm medications and anti-inflammatories as above with education    If not improving in 2 weeks then refer to physical therapy    Patient expresses understanding of the plan as evidenced by brief summary back to me.    No follow-ups on file.    THIS NOTE WILL BE SHARED WITH THE PATIENT.

## 2022-01-20 ENCOUNTER — PES CALL (OUTPATIENT)
Dept: ADMINISTRATIVE | Facility: CLINIC | Age: 67
End: 2022-01-20
Payer: MEDICARE

## 2022-02-11 ENCOUNTER — PATIENT OUTREACH (OUTPATIENT)
Dept: ADMINISTRATIVE | Facility: HOSPITAL | Age: 67
End: 2022-02-11
Payer: MEDICARE

## 2022-02-11 NOTE — PROGRESS NOTES
Health Maintenance Due   Topic Date Due    DEXA SCAN  Never done    Shingles Vaccine (1 of 2) Never done    Eye Exam  02/17/2021    Foot Exam  12/07/2021     Triggered LINKS & reconciled immunizations.  Updated Care Everywhere.  Checked for outside lab results in [facility].  Chart was reviewed as part of the PHN Attestation for 2021.

## 2022-03-11 ENCOUNTER — NURSE TRIAGE (OUTPATIENT)
Dept: ADMINISTRATIVE | Facility: CLINIC | Age: 67
End: 2022-03-11
Payer: MEDICARE

## 2022-03-11 NOTE — TELEPHONE ENCOUNTER
Pt contacted [facility]OC. Pt initially stated she felt like she was having an irregular heartbeat and she thought her heart was beating slowly. It was making her feel bad. Then pt admitted to crushing pressure-like chest pain with severe difficulty breathing. Pt sounded in distress making grunting noises while speaking. Advised pt to hang up and call 911. Pt was hesitant at first saying an ambulance costs too much. Asked if it was ok to have someone drive her to the clinic. Advised pt it is important for her to receive immediate medical attention and that she needs evaluation in an ED not in a clinic. The recommendation is to call 911. Pt verbalized understanding and stated she will call. Gave number to [facility]OC for further concerns.    Reason for Disposition   SEVERE difficulty breathing (e.g., struggling for each breath, speaks in single words)    Protocols used: CHEST PAIN-A-[place]

## 2022-04-07 ENCOUNTER — LAB VISIT (OUTPATIENT)
Dept: LAB | Facility: HOSPITAL | Age: 67
End: 2022-04-07
Attending: INTERNAL MEDICINE
Payer: MEDICARE

## 2022-04-07 ENCOUNTER — TELEPHONE (OUTPATIENT)
Dept: NEUROLOGY | Facility: CLINIC | Age: 67
End: 2022-04-07
Payer: MEDICARE

## 2022-04-07 ENCOUNTER — OFFICE VISIT (OUTPATIENT)
Dept: PRIMARY CARE CLINIC | Facility: CLINIC | Age: 67
End: 2022-04-07
Payer: MEDICARE

## 2022-04-07 VITALS
SYSTOLIC BLOOD PRESSURE: 160 MMHG | HEIGHT: 65 IN | OXYGEN SATURATION: 97 % | HEART RATE: 80 BPM | TEMPERATURE: 98 F | DIASTOLIC BLOOD PRESSURE: 80 MMHG | WEIGHT: 206.13 LBS | BODY MASS INDEX: 34.34 KG/M2 | RESPIRATION RATE: 18 BRPM

## 2022-04-07 DIAGNOSIS — E66.09 CLASS 1 OBESITY DUE TO EXCESS CALORIES WITH SERIOUS COMORBIDITY AND BODY MASS INDEX (BMI) OF 32.0 TO 32.9 IN ADULT: ICD-10-CM

## 2022-04-07 DIAGNOSIS — I69.311 MEMORY DEFICIT AFTER CEREBRAL INFARCTION: ICD-10-CM

## 2022-04-07 DIAGNOSIS — I63.9 CEREBROVASCULAR ACCIDENT (CVA), UNSPECIFIED MECHANISM: ICD-10-CM

## 2022-04-07 DIAGNOSIS — M79.89 LEG SWELLING: ICD-10-CM

## 2022-04-07 DIAGNOSIS — E11.65 UNCONTROLLED TYPE 2 DIABETES MELLITUS WITH HYPERGLYCEMIA: ICD-10-CM

## 2022-04-07 DIAGNOSIS — R51.9 GENERALIZED HEADACHES: Primary | ICD-10-CM

## 2022-04-07 DIAGNOSIS — I10 ESSENTIAL HYPERTENSION: ICD-10-CM

## 2022-04-07 DIAGNOSIS — R41.3 MEMORY LOSS: ICD-10-CM

## 2022-04-07 LAB
ESTIMATED AVG GLUCOSE: 212 MG/DL (ref 68–131)
HBA1C MFR BLD: 9 % (ref 4–5.6)

## 2022-04-07 PROCEDURE — 3077F SYST BP >= 140 MM HG: CPT | Mod: CPTII,S$GLB,, | Performed by: INTERNAL MEDICINE

## 2022-04-07 PROCEDURE — 1159F PR MEDICATION LIST DOCUMENTED IN MEDICAL RECORD: ICD-10-PCS | Mod: CPTII,S$GLB,, | Performed by: INTERNAL MEDICINE

## 2022-04-07 PROCEDURE — 1101F PR PT FALLS ASSESS DOC 0-1 FALLS W/OUT INJ PAST YR: ICD-10-PCS | Mod: CPTII,S$GLB,, | Performed by: INTERNAL MEDICINE

## 2022-04-07 PROCEDURE — 99999 PR PBB SHADOW E&M-EST. PATIENT-LVL V: ICD-10-PCS | Mod: PBBFAC,,, | Performed by: INTERNAL MEDICINE

## 2022-04-07 PROCEDURE — 1160F RVW MEDS BY RX/DR IN RCRD: CPT | Mod: CPTII,S$GLB,, | Performed by: INTERNAL MEDICINE

## 2022-04-07 PROCEDURE — 36415 COLL VENOUS BLD VENIPUNCTURE: CPT | Mod: PN | Performed by: INTERNAL MEDICINE

## 2022-04-07 PROCEDURE — 3288F PR FALLS RISK ASSESSMENT DOCUMENTED: ICD-10-PCS | Mod: CPTII,S$GLB,, | Performed by: INTERNAL MEDICINE

## 2022-04-07 PROCEDURE — 99999 PR PBB SHADOW E&M-EST. PATIENT-LVL V: CPT | Mod: PBBFAC,,, | Performed by: INTERNAL MEDICINE

## 2022-04-07 PROCEDURE — 1126F PR PAIN SEVERITY QUANTIFIED, NO PAIN PRESENT: ICD-10-PCS | Mod: CPTII,S$GLB,, | Performed by: INTERNAL MEDICINE

## 2022-04-07 PROCEDURE — 3008F PR BODY MASS INDEX (BMI) DOCUMENTED: ICD-10-PCS | Mod: CPTII,S$GLB,, | Performed by: INTERNAL MEDICINE

## 2022-04-07 PROCEDURE — 83036 HEMOGLOBIN GLYCOSYLATED A1C: CPT | Performed by: INTERNAL MEDICINE

## 2022-04-07 PROCEDURE — 1160F PR REVIEW ALL MEDS BY PRESCRIBER/CLIN PHARMACIST DOCUMENTED: ICD-10-PCS | Mod: CPTII,S$GLB,, | Performed by: INTERNAL MEDICINE

## 2022-04-07 PROCEDURE — 3052F HG A1C>EQUAL 8.0%<EQUAL 9.0%: CPT | Mod: CPTII,S$GLB,, | Performed by: INTERNAL MEDICINE

## 2022-04-07 PROCEDURE — 99214 OFFICE O/P EST MOD 30 MIN: CPT | Mod: S$GLB,,, | Performed by: INTERNAL MEDICINE

## 2022-04-07 PROCEDURE — 1101F PT FALLS ASSESS-DOCD LE1/YR: CPT | Mod: CPTII,S$GLB,, | Performed by: INTERNAL MEDICINE

## 2022-04-07 PROCEDURE — 3077F PR MOST RECENT SYSTOLIC BLOOD PRESSURE >= 140 MM HG: ICD-10-PCS | Mod: CPTII,S$GLB,, | Performed by: INTERNAL MEDICINE

## 2022-04-07 PROCEDURE — 3288F FALL RISK ASSESSMENT DOCD: CPT | Mod: CPTII,S$GLB,, | Performed by: INTERNAL MEDICINE

## 2022-04-07 PROCEDURE — 1126F AMNT PAIN NOTED NONE PRSNT: CPT | Mod: CPTII,S$GLB,, | Performed by: INTERNAL MEDICINE

## 2022-04-07 PROCEDURE — 99214 PR OFFICE/OUTPT VISIT, EST, LEVL IV, 30-39 MIN: ICD-10-PCS | Mod: S$GLB,,, | Performed by: INTERNAL MEDICINE

## 2022-04-07 PROCEDURE — 3008F BODY MASS INDEX DOCD: CPT | Mod: CPTII,S$GLB,, | Performed by: INTERNAL MEDICINE

## 2022-04-07 PROCEDURE — 1159F MED LIST DOCD IN RCRD: CPT | Mod: CPTII,S$GLB,, | Performed by: INTERNAL MEDICINE

## 2022-04-07 PROCEDURE — 3052F PR MOST RECENT HEMOGLOBIN A1C LEVEL 8.0 - < 9.0%: ICD-10-PCS | Mod: CPTII,S$GLB,, | Performed by: INTERNAL MEDICINE

## 2022-04-07 PROCEDURE — 3079F PR MOST RECENT DIASTOLIC BLOOD PRESSURE 80-89 MM HG: ICD-10-PCS | Mod: CPTII,S$GLB,, | Performed by: INTERNAL MEDICINE

## 2022-04-07 PROCEDURE — 3079F DIAST BP 80-89 MM HG: CPT | Mod: CPTII,S$GLB,, | Performed by: INTERNAL MEDICINE

## 2022-04-07 RX ORDER — METOPROLOL SUCCINATE 100 MG/1
100 TABLET, EXTENDED RELEASE ORAL DAILY
Qty: 90 TABLET | Refills: 1 | Status: SHIPPED | OUTPATIENT
Start: 2022-04-07 | End: 2022-07-19 | Stop reason: ALTCHOICE

## 2022-04-07 RX ORDER — AMLODIPINE BESYLATE 10 MG/1
10 TABLET ORAL DAILY
COMMUNITY
Start: 2022-03-08 | End: 2022-05-09 | Stop reason: SDUPTHER

## 2022-04-07 RX ORDER — IBUPROFEN 800 MG/1
800 TABLET ORAL 3 TIMES DAILY PRN
Qty: 90 TABLET | Refills: 1 | Status: SHIPPED | OUTPATIENT
Start: 2022-04-07

## 2022-04-07 RX ORDER — FUROSEMIDE 20 MG/1
20 TABLET ORAL DAILY PRN
Qty: 90 TABLET | Refills: 1 | Status: SHIPPED | OUTPATIENT
Start: 2022-04-07 | End: 2023-08-07 | Stop reason: SDUPTHER

## 2022-04-07 NOTE — PROGRESS NOTES
Subjective:      Patient ID: Shabnam Noble is a 66 y.o. female.    Chief Complaint: Memory Loss    Here today for general exam.     66 yo female with a past medical history of of CVA (cerebral vascular accident) with memory deficit, DM2 (diabetes mellitus, type 2), Obesity, some day cigarette smoker, Essential hypertension, left knee OA, illicit drug use (cocaine) here for memory loss.  She is accompanied by her friend who lives next door.    Memory loss/Hx CVA: She has hx of remote CVA. She was also admitted to the hospital for stroke like symptoms (left arm weakness) in 2017. CT head at that time showed an old infarct. Her stroke work up at the time was negative and her exacerbation of sx was thought to be 2/2 to poorly controlled HTN. She was seen by neurology on 10/2017, and then she was lost to follow up.     She is here today to discuss her continued memory loss. Her BP at the office is elevated to 160/80. She is unsure which medication she takes for her BP. She thinks she is on amlodipine and metoprolol. Her friend who is here with her states that they will bring all her medication at next visit. She does seem to be compliant with ASA and plavix. We discussed today the importance of controlling her BP. I discussed that her memory may continue to get worse and she may suffer from another CVA if we are unable to control her BP. She agreed. I have refilled her blood pressure medications and increased her dose of metoprolol. She may benefit from ACE-I which can be added at her next visit in 6 weeks. Ordering echocardiogram to assess her heart function.     Poorly controlled DM2: Last A1c elevated to 8.4. She is taking jardiance and metformin. Does not check glucose consistently. Would benefit from diabetes advanced NP referral to help manage her diabetes better. Referral made at this visit. A1c ordered at this visit.     HLD: Continues on crestor 20mg qhs.     Denies any chest pain, shortness of breath, nausea  vomiting constipation diarrhea, blood in stool, heartburn    Review of Systems   Constitutional: Negative for chills, fever and weight loss.   HENT: Negative for congestion, ear pain and sore throat.    Eyes: Negative for double vision.   Respiratory: Negative for cough and shortness of breath.    Cardiovascular: Negative for chest pain, palpitations and leg swelling.   Gastrointestinal: Negative for abdominal pain, heartburn, nausea and vomiting.   Skin: Negative for rash.   Neurological: Positive for headaches. Negative for dizziness and tingling.   Psychiatric/Behavioral: Positive for memory loss. Negative for depression.         Current Outpatient Medications:     amLODIPine (NORVASC) 10 MG tablet, Take 10 mg by mouth once daily., Disp: , Rfl:     aspirin (ECOTRIN) 81 MG EC tablet, Take 1 tablet (81 mg total) by mouth once daily., Disp: 90 tablet, Rfl: 3    blood glucose control, high Soln, 1 drop by Misc.(Non-Drug; Combo Route) route once daily. ICD 10 code: E11.59, Disp: 3 each, Rfl: 3    blood-glucose meter kit, Use as instructed. ICD 10 code E11.59, Disp: 1 each, Rfl: 0    clopidogreL (PLAVIX) 75 mg tablet, Take 1 tablet (75 mg total) by mouth once daily., Disp: 90 tablet, Rfl: 3    empagliflozin (JARDIANCE) 10 mg tablet, Take 1 tablet (10 mg total) by mouth once daily., Disp: 90 tablet, Rfl: 3    ezetimibe (ZETIA) 10 mg tablet, Take 1 tablet (10 mg total) by mouth once daily., Disp: 90 tablet, Rfl: 3    metFORMIN (GLUCOPHAGE) 1000 MG tablet, Take 1 tablet (1,000 mg total) by mouth 2 (two) times daily with meals., Disp: 180 tablet, Rfl: 3    NIFEdipine (PROCARDIA-XL) 60 MG (OSM) 24 hr tablet, Take 1 tablet (60 mg total) by mouth once daily., Disp: 30 tablet, Rfl: 11    rosuvastatin (CRESTOR) 20 MG tablet, Take 1 tablet (20 mg total) by mouth every evening. Patient states she cannot tolerate atorvastatin, Disp: 30 tablet, Rfl: 11    furosemide (LASIX) 20 MG tablet, Take 1 tablet (20 mg total) by  mouth daily as needed (leg swelling)., Disp: 90 tablet, Rfl: 1    ibuprofen (ADVIL,MOTRIN) 800 MG tablet, Take 1 tablet (800 mg total) by mouth 3 (three) times daily as needed for Pain., Disp: 90 tablet, Rfl: 1    metoprolol succinate (TOPROL-XL) 100 MG 24 hr tablet, Take 1 tablet (100 mg total) by mouth once daily., Disp: 90 tablet, Rfl: 1    Lab Results   Component Value Date    HGBA1C 8.4 (H) 01/18/2022    HGBA1C 9.2 (H) 06/03/2021    HGBA1C 9.9 (H) 11/13/2020     Lab Results   Component Value Date    MICALBCREAT 49.0 (H) 06/03/2021     Lab Results   Component Value Date    LDLCALC 117.4 06/03/2021    LDLCALC 46.6 (L) 08/12/2020    CHOL 183 06/03/2021    HDL 40 06/03/2021    TRIG 128 06/03/2021       Lab Results   Component Value Date     01/18/2022    K 3.8 01/18/2022     01/18/2022    CO2 26 01/18/2022     (H) 01/18/2022    BUN 16 01/18/2022    CREATININE 1.0 01/18/2022    CALCIUM 9.3 01/18/2022    PROT 7.4 01/18/2022    ALBUMIN 3.6 01/18/2022    BILITOT 0.4 01/18/2022    ALKPHOS 74 01/18/2022    AST 11 01/18/2022    ALT 11 01/18/2022    ANIONGAP 10 01/18/2022    ESTGFRAFRICA >60.0 01/18/2022    EGFRNONAA 58.8 (A) 01/18/2022    WBC 8.66 07/10/2021    HGB 10.9 (L) 07/10/2021    HGB 10.3 (L) 07/09/2021    HCT 34.1 (L) 07/10/2021    MCV 84 07/10/2021     (L) 07/10/2021    TSH 0.560 06/03/2021    HEPCAB Negative 01/21/2020       No results found for: LH, FSH, TOTALTESTOST, PROGESTERONE, ESTRADIOL, JCLMOACM00GM, AVCADJKY84, FERRITIN, IRON, TRANSFERRIN, TIBC, FESATURATED, ZINC      Past Medical History:   Diagnosis Date    Colon polyp     CVA (cerebral vascular accident)     DM2 (diabetes mellitus, type 2)     Essential hypertension      Past Surgical History:   Procedure Laterality Date    ANKLE FRACTURE SURGERY Right     ANKLE FRACTURE SURGERY      COLONOSCOPY N/A 12/11/2020    Procedure: COLONOSCOPY;  Surgeon: Marshal Rogers MD;  Location: [facility] ([address] [place] [address]);  Service:  "Endoscopy;  Laterality: N/A;  11/18-plavix hold ok see yared-covid pcw 12/8-tb    COLONOSCOPY W/ POLYPECTOMY      HYSTERECTOMY      no h/o  malignancy     Social History     Social History Narrative    Not on file     Family History   Problem Relation Age of Onset    Alzheimer's disease Mother     Prostate cancer Father     Diabetes Father     Colon cancer Brother     Breast cancer Paternal Aunt     Heart disease Neg Hx     Stroke Neg Hx      Vitals:    04/07/22 1325   BP: (!) 160/80   Pulse: 80   Resp: 18   Temp: 98 °F (36.7 °C)   SpO2: 97%   Weight: 93.5 kg (206 lb 2.1 oz)   Height: 5' 5" (1.651 m)   PainSc: 0-No pain     Objective:   Physical Exam  Vitals reviewed.   Constitutional:       Appearance: Normal appearance.   HENT:      Head: Normocephalic.      Right Ear: Tympanic membrane, ear canal and external ear normal.      Left Ear: Tympanic membrane, ear canal and external ear normal.      Nose: Nose normal.      Mouth/Throat:      Mouth: Mucous membranes are moist.      Pharynx: Oropharynx is clear.   Eyes:      Conjunctiva/sclera: Conjunctivae normal.      Pupils: Pupils are equal, round, and reactive to light.   Cardiovascular:      Rate and Rhythm: Normal rate and regular rhythm.      Pulses: Normal pulses.   Pulmonary:      Effort: Pulmonary effort is normal.      Breath sounds: Normal breath sounds.   Abdominal:      General: Abdomen is flat. Bowel sounds are normal.      Palpations: Abdomen is soft.   Musculoskeletal:      Cervical back: Neck supple.   Skin:     General: Skin is warm.   Neurological:      General: No focal deficit present.      Mental Status: She is alert and oriented to person, place, and time.      Cranial Nerves: No cranial nerve deficit.      Sensory: No sensory deficit.      Motor: No weakness.      Coordination: Coordination normal.      Gait: Gait abnormal.   Psychiatric:         Mood and Affect: Mood normal.       Assessment:     1. Generalized headaches    2. " Essential hypertension    3. Uncontrolled type 2 diabetes mellitus with hyperglycemia    4. Memory loss    5. Cerebrovascular accident (CVA), unspecified mechanism    6. Leg swelling    7. Class 1 obesity due to excess calories with serious comorbidity and body mass index (BMI) of 32.0 to 32.9 in adult    8. Memory deficit after cerebral infarction      Plan:     Orders Placed This Encounter    Hemoglobin A1C    Ambulatory referral/consult to Neurology    Ambulatory referral/consult to Diabetic Advanced Practice Providers (Medical Management)    Echo    metoprolol succinate (TOPROL-XL) 100 MG 24 hr tablet    ibuprofen (ADVIL,MOTRIN) 800 MG tablet    furosemide (LASIX) 20 MG tablet       Patient Instructions   1. 6 weeks for well visit or sooner if needed.   2. This is a reminder to be on time for your appointments. When I see patients, I want to be fair and give my time equally to all of my patients. We have 20 minutes together, and 10 minutes of that time is usually spent on rooming. Even small delays can make a huge difference in your visit so I advise you to check-in 10 minutes before your appointment time. This ensures that I can spend as much time with you as possible instead of needing to cut your visit short. Thank you for your consideration.

## 2022-04-07 NOTE — TELEPHONE ENCOUNTER
----- Message from Christal Lemons sent at 4/7/2022  2:53 PM CDT -----  Dr. Janet Yeo has put in a referral for Consult to Neurology. Please assist in scheduling.    Memory loss [R41.3]  Cerebrovascular accident (CVA), unspecified mechanism [I63.9]    Thanks

## 2022-04-07 NOTE — PATIENT INSTRUCTIONS
6 weeks for well visit or sooner if needed.   This is a reminder to be on time for your appointments. When I see patients, I want to be fair and give my time equally to all of my patients. We have 20 minutes together, and 10 minutes of that time is usually spent on rooming. Even small delays can make a huge difference in your visit so I advise you to check-in 10 minutes before your appointment time. This ensures that I can spend as much time with you as possible instead of needing to cut your visit short. Thank you for your consideration.

## 2022-04-13 ENCOUNTER — PATIENT OUTREACH (OUTPATIENT)
Dept: ADMINISTRATIVE | Facility: HOSPITAL | Age: 67
End: 2022-04-13
Payer: MEDICARE

## 2022-04-13 NOTE — PROGRESS NOTES
PHN colon cancer screening outreach.  Called patient, no answer.  Left message for patient to return my call.

## 2022-05-04 ENCOUNTER — PES CALL (OUTPATIENT)
Dept: ADMINISTRATIVE | Facility: CLINIC | Age: 67
End: 2022-05-04
Payer: MEDICARE

## 2022-05-05 ENCOUNTER — PES CALL (OUTPATIENT)
Dept: ADMINISTRATIVE | Facility: CLINIC | Age: 67
End: 2022-05-05
Payer: MEDICARE

## 2022-05-09 ENCOUNTER — OFFICE VISIT (OUTPATIENT)
Dept: PRIMARY CARE CLINIC | Facility: CLINIC | Age: 67
End: 2022-05-09
Payer: MEDICARE

## 2022-05-09 VITALS
WEIGHT: 199.06 LBS | BODY MASS INDEX: 33.16 KG/M2 | RESPIRATION RATE: 13 BRPM | SYSTOLIC BLOOD PRESSURE: 160 MMHG | HEIGHT: 65 IN | HEART RATE: 78 BPM | OXYGEN SATURATION: 97 % | DIASTOLIC BLOOD PRESSURE: 100 MMHG | TEMPERATURE: 98 F

## 2022-05-09 DIAGNOSIS — Z86.73 HISTORY OF STROKE: ICD-10-CM

## 2022-05-09 DIAGNOSIS — E11.69 HYPERLIPIDEMIA ASSOCIATED WITH TYPE 2 DIABETES MELLITUS: ICD-10-CM

## 2022-05-09 DIAGNOSIS — I15.2 HYPERTENSION ASSOCIATED WITH DIABETES: ICD-10-CM

## 2022-05-09 DIAGNOSIS — I69.311 MEMORY DEFICIT AFTER CEREBRAL INFARCTION: ICD-10-CM

## 2022-05-09 DIAGNOSIS — F19.91 HISTORY OF DRUG USE: ICD-10-CM

## 2022-05-09 DIAGNOSIS — E11.65 UNCONTROLLED TYPE 2 DIABETES MELLITUS WITH HYPERGLYCEMIA: ICD-10-CM

## 2022-05-09 DIAGNOSIS — F17.200 CURRENT SMOKER ON SOME DAYS: ICD-10-CM

## 2022-05-09 DIAGNOSIS — E78.5 HYPERLIPIDEMIA ASSOCIATED WITH TYPE 2 DIABETES MELLITUS: ICD-10-CM

## 2022-05-09 DIAGNOSIS — R41.3 MEMORY LOSS: Primary | ICD-10-CM

## 2022-05-09 DIAGNOSIS — E78.5 HYPERLIPIDEMIA LDL GOAL <70: ICD-10-CM

## 2022-05-09 DIAGNOSIS — E11.59 HYPERTENSION ASSOCIATED WITH DIABETES: ICD-10-CM

## 2022-05-09 DIAGNOSIS — E11.65 TYPE 2 DIABETES MELLITUS WITH HYPERGLYCEMIA, WITHOUT LONG-TERM CURRENT USE OF INSULIN: ICD-10-CM

## 2022-05-09 DIAGNOSIS — F33.41 RECURRENT MAJOR DEPRESSIVE DISORDER, IN PARTIAL REMISSION: ICD-10-CM

## 2022-05-09 DIAGNOSIS — E66.09 CLASS 1 OBESITY DUE TO EXCESS CALORIES WITH SERIOUS COMORBIDITY AND BODY MASS INDEX (BMI) OF 32.0 TO 32.9 IN ADULT: ICD-10-CM

## 2022-05-09 DIAGNOSIS — E11.59 TYPE 2 DIABETES MELLITUS WITH OTHER CIRCULATORY COMPLICATION, WITHOUT LONG-TERM CURRENT USE OF INSULIN: ICD-10-CM

## 2022-05-09 LAB — GLUCOSE SERPL-MCNC: 213 MG/DL (ref 70–110)

## 2022-05-09 PROCEDURE — 1126F AMNT PAIN NOTED NONE PRSNT: CPT | Mod: CPTII,S$GLB,, | Performed by: NURSE PRACTITIONER

## 2022-05-09 PROCEDURE — 3080F DIAST BP >= 90 MM HG: CPT | Mod: CPTII,S$GLB,, | Performed by: NURSE PRACTITIONER

## 2022-05-09 PROCEDURE — 1101F PT FALLS ASSESS-DOCD LE1/YR: CPT | Mod: CPTII,S$GLB,, | Performed by: NURSE PRACTITIONER

## 2022-05-09 PROCEDURE — 99499 UNLISTED E&M SERVICE: CPT | Mod: S$GLB,,, | Performed by: NURSE PRACTITIONER

## 2022-05-09 PROCEDURE — 1160F RVW MEDS BY RX/DR IN RCRD: CPT | Mod: CPTII,S$GLB,, | Performed by: NURSE PRACTITIONER

## 2022-05-09 PROCEDURE — 82962 GLUCOSE BLOOD TEST: CPT | Mod: S$GLB,,, | Performed by: NURSE PRACTITIONER

## 2022-05-09 PROCEDURE — 99214 PR OFFICE/OUTPT VISIT, EST, LEVL IV, 30-39 MIN: ICD-10-PCS | Mod: S$GLB,,, | Performed by: NURSE PRACTITIONER

## 2022-05-09 PROCEDURE — 3288F FALL RISK ASSESSMENT DOCD: CPT | Mod: CPTII,S$GLB,, | Performed by: NURSE PRACTITIONER

## 2022-05-09 PROCEDURE — 3008F BODY MASS INDEX DOCD: CPT | Mod: CPTII,S$GLB,, | Performed by: NURSE PRACTITIONER

## 2022-05-09 PROCEDURE — 3052F HG A1C>EQUAL 8.0%<EQUAL 9.0%: CPT | Mod: CPTII,S$GLB,, | Performed by: NURSE PRACTITIONER

## 2022-05-09 PROCEDURE — 1159F PR MEDICATION LIST DOCUMENTED IN MEDICAL RECORD: ICD-10-PCS | Mod: CPTII,S$GLB,, | Performed by: NURSE PRACTITIONER

## 2022-05-09 PROCEDURE — 82962 POCT GLUCOSE, HAND-HELD DEVICE: ICD-10-PCS | Mod: S$GLB,,, | Performed by: NURSE PRACTITIONER

## 2022-05-09 PROCEDURE — 3080F PR MOST RECENT DIASTOLIC BLOOD PRESSURE >= 90 MM HG: ICD-10-PCS | Mod: CPTII,S$GLB,, | Performed by: NURSE PRACTITIONER

## 2022-05-09 PROCEDURE — 3077F PR MOST RECENT SYSTOLIC BLOOD PRESSURE >= 140 MM HG: ICD-10-PCS | Mod: CPTII,S$GLB,, | Performed by: NURSE PRACTITIONER

## 2022-05-09 PROCEDURE — 99499 RISK ADDL DX/OHS AUDIT: ICD-10-PCS | Mod: S$GLB,,, | Performed by: NURSE PRACTITIONER

## 2022-05-09 PROCEDURE — 1126F PR PAIN SEVERITY QUANTIFIED, NO PAIN PRESENT: ICD-10-PCS | Mod: CPTII,S$GLB,, | Performed by: NURSE PRACTITIONER

## 2022-05-09 PROCEDURE — 3008F PR BODY MASS INDEX (BMI) DOCUMENTED: ICD-10-PCS | Mod: CPTII,S$GLB,, | Performed by: NURSE PRACTITIONER

## 2022-05-09 PROCEDURE — 1159F MED LIST DOCD IN RCRD: CPT | Mod: CPTII,S$GLB,, | Performed by: NURSE PRACTITIONER

## 2022-05-09 PROCEDURE — 3288F PR FALLS RISK ASSESSMENT DOCUMENTED: ICD-10-PCS | Mod: CPTII,S$GLB,, | Performed by: NURSE PRACTITIONER

## 2022-05-09 PROCEDURE — 99999 PR PBB SHADOW E&M-EST. PATIENT-LVL V: ICD-10-PCS | Mod: PBBFAC,,, | Performed by: NURSE PRACTITIONER

## 2022-05-09 PROCEDURE — 99214 OFFICE O/P EST MOD 30 MIN: CPT | Mod: S$GLB,,, | Performed by: NURSE PRACTITIONER

## 2022-05-09 PROCEDURE — 3077F SYST BP >= 140 MM HG: CPT | Mod: CPTII,S$GLB,, | Performed by: NURSE PRACTITIONER

## 2022-05-09 PROCEDURE — 99999 PR PBB SHADOW E&M-EST. PATIENT-LVL V: CPT | Mod: PBBFAC,,, | Performed by: NURSE PRACTITIONER

## 2022-05-09 PROCEDURE — 1101F PR PT FALLS ASSESS DOC 0-1 FALLS W/OUT INJ PAST YR: ICD-10-PCS | Mod: CPTII,S$GLB,, | Performed by: NURSE PRACTITIONER

## 2022-05-09 PROCEDURE — 3052F PR MOST RECENT HEMOGLOBIN A1C LEVEL 8.0 - < 9.0%: ICD-10-PCS | Mod: CPTII,S$GLB,, | Performed by: NURSE PRACTITIONER

## 2022-05-09 PROCEDURE — 1160F PR REVIEW ALL MEDS BY PRESCRIBER/CLIN PHARMACIST DOCUMENTED: ICD-10-PCS | Mod: CPTII,S$GLB,, | Performed by: NURSE PRACTITIONER

## 2022-05-09 RX ORDER — DULAGLUTIDE 0.75 MG/.5ML
0.75 INJECTION, SOLUTION SUBCUTANEOUS WEEKLY
Qty: 4 PEN | Refills: 4 | Status: SHIPPED | OUTPATIENT
Start: 2022-05-09 | End: 2022-07-19 | Stop reason: ALTCHOICE

## 2022-05-09 RX ORDER — ROSUVASTATIN CALCIUM 20 MG/1
20 TABLET, COATED ORAL NIGHTLY
Qty: 90 TABLET | Refills: 3 | Status: SHIPPED | OUTPATIENT
Start: 2022-05-09 | End: 2022-12-05 | Stop reason: ALTCHOICE

## 2022-05-09 RX ORDER — EZETIMIBE 10 MG/1
10 TABLET ORAL DAILY
Qty: 90 TABLET | Refills: 3 | Status: SHIPPED | OUTPATIENT
Start: 2022-05-09 | End: 2023-05-09

## 2022-05-09 RX ORDER — NIFEDIPINE 60 MG/1
60 TABLET, EXTENDED RELEASE ORAL DAILY
Qty: 30 TABLET | Refills: 11 | Status: SHIPPED | OUTPATIENT
Start: 2022-05-09 | End: 2022-12-05 | Stop reason: ALTCHOICE

## 2022-05-09 RX ORDER — EMPAGLIFLOZIN 10 MG/1
10 TABLET, FILM COATED ORAL DAILY
Qty: 90 TABLET | Refills: 1 | Status: SHIPPED | OUTPATIENT
Start: 2022-05-09 | End: 2022-06-30 | Stop reason: SDUPTHER

## 2022-05-09 RX ORDER — METFORMIN HYDROCHLORIDE 1000 MG/1
1000 TABLET ORAL 2 TIMES DAILY WITH MEALS
Qty: 180 TABLET | Refills: 3 | Status: SHIPPED | OUTPATIENT
Start: 2022-05-09 | End: 2023-05-09

## 2022-05-09 RX ORDER — AMLODIPINE BESYLATE 10 MG/1
10 TABLET ORAL DAILY
Qty: 90 TABLET | Refills: 3 | Status: SHIPPED | OUTPATIENT
Start: 2022-05-09 | End: 2023-08-07 | Stop reason: SDUPTHER

## 2022-05-09 NOTE — PATIENT INSTRUCTIONS
"Continue metformin 1000mg tablets twice per day.   Continue jardiance 10mg tablet daily.   Adding trulicity injection once per week   Rotate injection sites -   This will curb your appetite, eat small frequent meals -   Stay hydrated, drink plenty of water.     Please restart your blood pressure medications and your cholesterol medication at night time.     Check your blood sugar daily before you eat in the morning.   Normal fasting blood sugars should range 80 - 120's.     Low Carb Snacks & Diabetes friendly foods   Aim for 30 - 40 grams of carbs for 3 meals per day (or 2 meals and  1 - 2 snacks - however you prefer)  Snacks can be 15 - 20 grams of carbs.     Eating small, frequent meals will help you to feel more satisfied, and more full so that you don't over eat or eat the wrong foods later.   Also, renetta meals/snacks allow you to spread carbohydrates evenly, which may stabilize blood sugars.   Below you will find a list of ideas of foods that I like/prefer.   Feel free to give me your ideas and tips if you find good ones too!   Overall - please remember to limit refined sugars such as soft drinks, juices, rices, pastas, breads, cakes.   Look at the back of the label - look at the amount of "carbohydrates", then look at the amount of "fiber" - subtract the amount of fiber from the amount of carbs - this is your "net carb intake".  The more fiber a food has, the better generally, as you get to subtract this from the net carbs.     0-5 gm carb  Crystal Light flavoring (I like fruit punch flavor!)  Vitamin Water Zero  Katja antioxidant drinks.   Sparkling ice (long skinny flavored water bottles for $1 that are sugar free).   Luisana water, WaterLoo - carbonated flavored haskins, various flavors.  Diet coke, diet barqs, sprite zero.  Diet sunkist (orange drink)  Sugar free powerade  Herbal tea, unsweetened  2 tsp peanut butter on celery or carrots  Indio's original Candies - (the Sugar Free ones!)  1/2 cup " "sugar-free jell-o  1 sugar-free popsicle  ¼ cup blueberries or strawberries  8oz Blue Olga unsweetened almond milk (or there is sugar free vanilla flavored as well).  5 baby carrots & celery sticks, cucumbers, bell peppers dipped in ¼ cup salsa, 2Tbsp light ranch dressing or 2Tbsp plain Greek yogurt  10 Goldfish crackers  ½ oz low-fat cheese or string cheese  1 closed handful of nuts, unsalted (example-->almonds, pistachios, cashews, peanuts, etc).  1 Tbsp of sunflower seeds, unsalted  1 cup Smart Pop popcorn  1 whole grain brown rice cake   "Think Thin" protein bars - my personal favorite is Creamy Peanut butter, chocolate brownie, or oreo flavors.  "Tang" bars  - can be found at [employer] grocery store - they have 5 grams of net carbohydrates.  Quest bars (my favorite is birthday cake, and also cinnamon roll is good melted for 10 seconds in the microwave - please remove the wrapper first!)  Premier protein shakes - sold at [employer], or other brand alternatives - usually 1 - 2 grams of carbs (strawberry, vanilla, chocolate flavors) Coffee flavor is my new morning favorite!   Scrambled eggs! Or a fried egg or boiled eggs - add sliced tomatoes, cilantro or some chopped green onions.   Eggs are good with any and all veggies! You can even eat them for dinner or in a low carb tortilla, or served with your favorite grilled meat/sausage or recinos is my favorite.   Check out pre-made egg scrambles in the egg grocery store section - Ore Lizbet "just crack an egg" is premixed cheese, recinos and small amount of pototes, small cup size portions for your breakfast - very convenient!   Susan mccullough - zucchini - can buy in the frozen foods section. Birds eye brand is usually cheap. Tip - saute with oil/onions or italian seasoning and use this as a pasta substitution.  Milk - is usually high in carbs/sugar - use [employer] milk brand instead - it is "filtered" milk - with half the amount of carbs of regular milk. (next to the milk " "in milk aisle).   Smuckers sugar free Breakfast syrup (or 1 tablespoon of low sugar breakfast syrup) instead of regular syrup.        15 gm carb  1 small piece of fruit or ½ banana or 1/2 cup lite canned fruit  3 josé miguel cracker squares  3 cups Smart Pop popcorn, top spray butter, Lee lite salt or cinnamon and Truvia  5 Vanilla Wafers  ½ cup low fat, no added sugar ice cream or frozen yogurt (Blue bell, Blue Bunny, Weight Watchers, Skinny Cow)  1/2 - 1 cup Light n' fit Vanilla yogurt (has added protein in it to make you feel full).   ½ turkey, ham, or chicken sandwich  ½ c fruit with ½ c Cottage cheese  4-6 unsalted wheat crackers with 1 oz low fat cheese or 1 tbsp peanut butter   30-45 goldfish crackers (depending on flavor)   7-8 Orthodox mini brown rice cakes (caramel, apple cinnamon, chocolate)   12 Orthodox mini brown rice cakes (cheddar, bbq, ranch)   1/3 cup hummus dip with raw veg  1/2 whole wheat angeli, 1Tbsp hummus  Mini Pizza (1/2 whole wheat English muffin, low-fat  cheese, tomato sauce)  100 calorie snack pack (Oreo, Chips Ahoy, Ritz Mix, Baked Cheetos)  4-6 oz. light or Greek Style yogurt (Chobani, Yoplait, Okios, [place])  ½ cup sugar-free pudding    6 in. wheat tortilla or angeli oven toasted chips (topped with spray butter flavoring, cinnamon, Truvia OR spray butter, garlic powder, chili powder)   18 BBQ Popchips (available at [employer], [employer], [employer])  Mini bagel (small size) toasted - add fat free cream cheese or avocado and sliced tomato on top - yum!   1/2 cup Halo top icecream - birthday cake is my go-to flavor.   Truth Bars - can be found at [employer] - Net carbs is 11 - 12 grams depending on the flavor.   Kind bars = mostly nuts and dried berries - find at most local groceries stores, drug stores, whole foods, fresh market. Net carbs is around 10 grams.     Cesar Tyler - I'm often asked "what smoothie is healthy for me". Get the 20 oz. Size.   Do not be decieved to think that all " "smoothies are "healthy". In fact, most are loaded with hidden sugars.   Here are some from the menu that you are allowed to have being diabetic:   The gladiator - any flavor. This is the lowest in carbs (3 - 5 grams only)  Keto champ (berry, chocolate or coffee - all have 14 - 19 grams of carbs in 20 oz size).   The Lean 1 smoothies - vanilla, strawberry and chocolate have under 30 grams of carbs, so this is slightly more. But would be ok for a meal substitute or snack.   The Shredder in Vanilla or chocolate only.  17 grams of carbs - (the strawberry has more sugar considerably)    Tips and Tricks:     Eat an extra vegetable once per day - green veggies (such as broccoli, cauliflower, green beans) - make you feel full and are low in sugar.     My favorite "secret" seasoning to make things extra yummy is cinnamon for sweet taste   For an added secret "salty" taste - add "everything but the bagel" seasoning (you can get on amazon.com or at  joes. I have seen at local groceries such as [employer] too!).     Dark colored fruits are your friend -- blueberries, strawberries, raspberries, blackberries. They have a lower sugar content. So will be the best choice for you.   Light colored fruits are NOT your friend - they tend to be higher in sugar and are not the best options for an ADA diet - examples are oranges, bananas, peaches, watermelon, -- you can eat these, but carefully and in moderation.     WATER. I cannot emphasize drinking water enough - it will hydrate you, make you full. Your body needs it - it's a natural appetite suppressant.   Sometimes hunger and thirst can feel the same. Try drinking some water first, then eat if you are still hungry.     Other snack choices   Celery with peanut butter  Celery with tuna salad  Dill pickles and cheddar cheese (no kidding, it's a great combo)  Nuts (keep raw ones in the freezer if you think you'll overeat them)  Sunflower seeds (get them in the shell so it will take " "longer to eat them)  Other seeds (How to Toast Pumpkin or Squash Seeds)   Pistachios or almonds - will fill you up and are tasty!   Low-Carb Trail Mix  Jerky (beef or turkey -- try to find low-sugar varieties)  Salami slices (you can find in the deli section)  Cheese sticks, such as string cheese  Sugar-free Jello, alone or with cottage cheese and a sprinkling of nuts. Make sugar-free lime Jello with part coconut milk -- For a large package, dissolve the powder in a cup of boiling water, add a can of coconut milk, and then add the rest of the water. Stir well.  Pepperoni "chips" -- Zap the slices in the microwave  Cheese with a few apple slices  4-ounce plain or sugar-free yogurt with berries and flax seed meal  Smoked salmon and cream cheese on cucumber slices  Lettuce Roll-ups -- Roll luncheon meat, egg salad, tuna or other filling and veggies in lettuce leaves  Lunch Meat Roll-ups -- Roll cheese or veggies in lunch meat (read the labels for carbs on the lunch meat)  Spread bean dip, spinach dip, or other low-carb dip or spread on the lunch meat or lettuce and then roll it up  Raw veggies and spinach dip, or other low-carb dip  Pork rinds (Chicharrón), with or without dip  Ricotta cheese with fruit and/or nuts and/or flax seed meal  Mushrooms with cheese spread inside (or other spreads or dips)  Low-carb snack bars (watch out for sugar alcohols, especially maltitol)  Product Review: Atkins Advantage Bars  Pepperoni Chips -- Microwave pepperoni slices until crisp. Great with cheeses and dips  Garlic Parmesan Flax Seed Crackers  Parmesan Crisps -- Good when you want a crunchy snack.  Peanut Butter Protein Balls     "

## 2022-05-09 NOTE — PROGRESS NOTES
"66 y.o. female here for 1.5 year follow up visit for T2dm management   Last seen for initial visit in December 2020 -those notes are below.     Hgba1c has come down, still not to goal -   a1c down from 2020 to 12.1% to 9%   She is taking metformin 1000 bid.   And jardiance 10 daily.   Admits that she skips days that she takes her medications.   States takes meds 3 days out of the week.   Admits that she feels like her memory loss is getting worse.   She is s/p strokes. Is not following with a neurologist.   Noted bp is high at 160/100 - also reports that she doesn't take bp meds or cholesterol medications.   Does not check blood sugars regularly - no recall what glucose levels, has home meter.   States checks on sundays only.   Sugar fasting today in clinic is at level of 213.   She is feeling well and has no acute complaints today's visit.   She did ask that I call her friend "william" while I was on the phone today -       Last visit notes from December 2020 as follows:   HPI: Shabnam Noble is a 66 y.o.  female c/I for visit to address Diabetes Type 2  This is the first time I am seeing them for care, follows with Dr. Krys Michael MD for primary care needs.   Has not seen endocrinology or diabetes specialist in the past.     was diagnosed with T2DM in 2018  Started on metformin 1000mg twice daily and is still taking.   Is not always remember to take medications.   States her best friend comes to visit her/makes sure she is taking her medication - but was often forgetting prior.     Has not steadily been checking her blood sugars recently.   Today's sugar 3 hour past prandial is 190 - ate sausage, egg and cheese biscuit - and hashbrowns.   Has never been hospitalized r/t DM.  Denies missing doses of DM medication.     Past medical History:   Past Medical History:   Diagnosis Date    Colon polyp     CVA (cerebral vascular accident)     DM2 (diabetes mellitus, type 2)     Essential hypertension       Family hx: "   Family History   Problem Relation Age of Onset    Alzheimer's disease Mother     Prostate cancer Father     Diabetes Father     Colon cancer Brother     Breast cancer Paternal Aunt     Heart disease Neg Hx     Stroke Neg Hx       Current meds:   Current Outpatient Medications:     aspirin (ECOTRIN) 81 MG EC tablet, Take 1 tablet (81 mg total) by mouth once daily., Disp: 90 tablet, Rfl: 3    clopidogreL (PLAVIX) 75 mg tablet, Take 1 tablet (75 mg total) by mouth once daily., Disp: 90 tablet, Rfl: 3    furosemide (LASIX) 20 MG tablet, Take 1 tablet (20 mg total) by mouth daily as needed (leg swelling)., Disp: 90 tablet, Rfl: 1    ibuprofen (ADVIL,MOTRIN) 800 MG tablet, Take 1 tablet (800 mg total) by mouth 3 (three) times daily as needed for Pain., Disp: 90 tablet, Rfl: 1    metoprolol succinate (TOPROL-XL) 100 MG 24 hr tablet, Take 1 tablet (100 mg total) by mouth once daily., Disp: 90 tablet, Rfl: 1    amLODIPine (NORVASC) 10 MG tablet, Take 1 tablet (10 mg total) by mouth once daily., Disp: 90 tablet, Rfl: 3    blood glucose control, high Soln, 1 drop by Misc.(Non-Drug; Combo Route) route once daily. ICD 10 code: E11.59, Disp: 3 each, Rfl: 3    blood-glucose meter kit, Use as instructed. ICD 10 code E11.59, Disp: 1 each, Rfl: 0    dulaglutide (TRULICITY) 0.75 mg/0.5 mL pen injector, Inject 0.75 mg into the skin once a week., Disp: 4 pen, Rfl: 4    empagliflozin (JARDIANCE) 10 mg tablet, Take 1 tablet (10 mg total) by mouth once daily., Disp: 90 tablet, Rfl: 1    ezetimibe (ZETIA) 10 mg tablet, Take 1 tablet (10 mg total) by mouth once daily., Disp: 90 tablet, Rfl: 3    metFORMIN (GLUCOPHAGE) 1000 MG tablet, Take 1 tablet (1,000 mg total) by mouth 2 (two) times daily with meals., Disp: 180 tablet, Rfl: 3    NIFEdipine (PROCARDIA-XL) 60 MG (OSM) 24 hr tablet, Take 1 tablet (60 mg total) by mouth once daily., Disp: 30 tablet, Rfl: 11    rosuvastatin (CRESTOR) 20 MG tablet, Take 1 tablet (20 mg  "total) by mouth every evening. Patient states she cannot tolerate atorvastatin, Disp: 90 tablet, Rfl: 3     Current Diabetes medications:   Metformin 1000mg bid with food  jardiance 10mg tablet daily.     Medications Tried and Failed:   Metformin as above.     Review of Pertinent co-morbidities/risk factors:   CV: history of 2 strokes. Deficit - stuttering, memory loss - so now has home health set up now.   CAD: yes   Takes aspirin 81mg tablet daily and plavix daily.   BP: has history of HTN  Statin: Taking  ACE/ARB: Not taking    Social History     Tobacco Use   Smoking Status Former Smoker    Packs/day: 0.25    Years: 45.00    Pack years: 11.25    Types: Cigarettes    Quit date: 2021    Years since quittin.8   Smokeless Tobacco Never Used      Social:   Lives at home by herself.   Life changes/stressors currently:    Diet: following ADA diet   Meals: 3 per day and snacks.        Breakfast - breakfast sandwich, hashbrowns. Chicken sausage.        Lunch - hot lunches with pastas, poboys       Dinner - spaghetti, baked macaroni,        Snacks - chips, peanut butter crackers, pecans,         Drinks - water,   Exercise: none. Coke zero  Activities: not working, was a  then had to stop when the pandemic hit.   uber PRN.     Glucose Monitoring:   checjking sugars 1 time per week per her report. She cannot recall.     Standards of care:   Eyes: .: 2020  Foot exam: : 2020   Diabetes education: None.    Vital Signs  BP (!) 160/100 (BP Location: Left arm, Patient Position: Sitting, BP Method: Medium (Manual))   Pulse 78   Temp 97.7 °F (36.5 °C) (Temporal)   Resp 13   Ht 5' 5" (1.651 m)   Wt 90.3 kg (199 lb 1.2 oz)   LMP  (LMP Unknown)   SpO2 97%   BMI 33.13 kg/m²     Pertinent Labs:   Hgba1c   Lab Results   Component Value Date    HGBA1C 9.0 (H) 2022    HGBA1C 8.4 (H) 2022    HGBA1C 9.2 (H) 2021     Lipid panel   Lab Results   Component Value Date    CHOL 183 " 06/03/2021    CHOL 102 (L) 08/12/2020    CHOL 202 (H) 01/21/2020     Lab Results   Component Value Date    HDL 40 06/03/2021    HDL 41 08/12/2020    HDL 48 01/21/2020     Lab Results   Component Value Date    LDLCALC 117.4 06/03/2021    LDLCALC 46.6 (L) 08/12/2020    LDLCALC 135.4 01/21/2020     Lab Results   Component Value Date    TRIG 128 06/03/2021    TRIG 72 08/12/2020    TRIG 93 01/21/2020     Lab Results   Component Value Date    CHOLHDL 21.9 06/03/2021    CHOLHDL 40.2 08/12/2020    CHOLHDL 23.8 01/21/2020      CMP  Glucose   Date Value Ref Range Status   01/18/2022 183 (H) 70 - 110 mg/dL Final     BUN   Date Value Ref Range Status   01/18/2022 16 8 - 23 mg/dL Final     Creatinine   Date Value Ref Range Status   01/18/2022 1.0 0.5 - 1.4 mg/dL Final     eGFR if    Date Value Ref Range Status   01/18/2022 >60.0 >60 mL/min/1.73 m^2 Final     eGFR if non    Date Value Ref Range Status   01/18/2022 58.8 (A) >60 mL/min/1.73 m^2 Final     Comment:     Calculation used to obtain the estimated glomerular filtration  rate (eGFR) is the CKD-EPI equation.        AST   Date Value Ref Range Status   01/18/2022 11 10 - 40 U/L Final     ALT   Date Value Ref Range Status   01/18/2022 11 10 - 44 U/L Final     Microalbumin creatinine ratio:   Lab Results   Component Value Date    MICALBCREAT 49.0 (H) 06/03/2021     Review Of Systems:   Gen: Appetite good, no weight gain or loss, denies fatigue and weakness. Denies polydipsia.  Skin: Skin is intact and heals well, denies any rashes or hair changes.   EENT: Denies any acute visual disturbances, nor blurred vision.   Resp: Denies SOB or Dyspnea on exertion, denies cough.   Cardiac: Denies chest pain, palpitations, or swelling.   GI: Denies abdominal pain, nausea or vomiting, diarrhea, or constipation.   /GYN: Denies nocturia, nor burning, frequency or pain on urination.  MS/Neuro: Denies numbness/ tingling in BLE; Gait steady, speech clear  Psych:  Denies drug/ETOH abuse, no hx of depression.  Other systems: negative.    Physical Exam:   GENERAL: Well developed, well nourished in appearance.   PSYCH: AAOx3, appropriate mood and affect, pleasant expression, conversant, appears relaxed, well groomed.   EYES: PERRL, Conjunctiva and corneas clear  NECK: Soft and Supple, trachea midline, No thyroid enlargement noted.  CHEST: Even, regular, and unlabored respirations  ABDOMEN: Soft, non-tender, non-distended.  VASCULAR: pedal pulses palpable bilaterally, no edema.  NEURO:  cranial nerves II - XII intact   MUSCULOSKELETAL: Good ROM, equal strength, equal hand grasp, steady gait.   SKIN: Skin warm, dry, and intact     Assessment and Plan of Care:     Shabnma was seen today for diabetes and follow-up.    Diagnoses and all orders for this visit:    Memory loss  -     Ambulatory referral/consult to Neurology; Future    Uncontrolled type 2 diabetes mellitus with hyperglycemia  -     Ambulatory referral/consult to Diabetic Advanced Practice Providers (Medical Management)  -     POCT Glucose, Hand-Held Device  -     Hemoglobin A1C; Future  -     Comprehensive Metabolic Panel; Future  -     Ambulatory referral/consult to Diabetes Education; Future  -     dulaglutide (TRULICITY) 0.75 mg/0.5 mL pen injector; Inject 0.75 mg into the skin once a week.    History of stroke    Hypertension associated with diabetes    Current smoker on some days    Class 1 obesity due to excess calories with serious comorbidity and body mass index (BMI) of 32.0 to 32.9 in adult    Type 2 diabetes mellitus with hyperglycemia, without long-term current use of insulin    Hyperlipidemia associated with type 2 diabetes mellitus    Type 2 diabetes mellitus with other circulatory complication, without long-term current use of insulin  -     metFORMIN (GLUCOPHAGE) 1000 MG tablet; Take 1 tablet (1,000 mg total) by mouth 2 (two) times daily with meals.  -     empagliflozin (JARDIANCE) 10 mg tablet; Take 1  tablet (10 mg total) by mouth once daily.    Hyperlipidemia LDL goal <70  -     ezetimibe (ZETIA) 10 mg tablet; Take 1 tablet (10 mg total) by mouth once daily.    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  -     rosuvastatin (CRESTOR) 20 MG tablet; Take 1 tablet (20 mg total) by mouth every evening. Patient states she cannot tolerate atorvastatin  -     NIFEdipine (PROCARDIA-XL) 60 MG (OSM) 24 hr tablet; Take 1 tablet (60 mg total) by mouth once daily.     1. T2DM with hyperglycemia- Hgba1c goal is 7.5% or less without hypoglycemia - 9%---> 12.2%---> 9.9%-->9.2%--> 8.4%--> 9% current.   Continue metformin 1000 bid.   continue jardiance 10mg tablet daily.   Start Trulicity 0.75mg SC weekly. Give injection 1 time per week.   No history of pancreatitis or medullary thyroid cancer.   May plan to increase dose of trulicity if tolerated well.   Notify me if side effects such as severe abdominal pain, nausea, diarrhea.   discussed DM, progression of disease, long term complications, CV risk factors and tx options.   Discussed she is already high risk due to her history of smoking and stroke, so the importance of getting her diabetes under control is more important now more than ever.   Advise compliance with ADA diet and encourage exercise- gave list of diabetes friendly foods and discussed carbohydrates.    Instructed to monitor Blood glucose 2x/day - fasting in a.m. and bring meter/ log to every clinic visit.     2. HTN - uncontrolled, is prescribed meds, but admits not taking regularly -   Restart meds as previously prescribed and monitor.   To follow up with PCP  Mild clinical urine MAC   Continue jardiacne, start taking regularly.     3. HLD- LDL goal < 100. Not At goal.   Currently on statin therapy- crestor and zetia.     4.  Weight - BMI Body mass index is 33.13 kg/m².   Encourage Ada diet and exercise.   Referral to dietician.     5. Renal Function - stable at present.   Continue metformin - will continue to monitor.     6. Smoker - cessation advised - quitting smoking could be the single most important thing you can do for your health.   She is precontemplative.     7. S/p 2 CVA's - residual memory loss and dysphagia.   On statin, aspirin 325 and plavix.   Referral to neurology.        Follow up in 3 months with Ov and labs prior.

## 2022-05-15 ENCOUNTER — PATIENT MESSAGE (OUTPATIENT)
Dept: DIABETES | Facility: CLINIC | Age: 67
End: 2022-05-15
Payer: MEDICARE

## 2022-05-15 ENCOUNTER — TELEPHONE (OUTPATIENT)
Dept: DIABETES | Facility: CLINIC | Age: 67
End: 2022-05-15
Payer: MEDICARE

## 2022-05-15 NOTE — TELEPHONE ENCOUNTER
Attempted to call patient   Left voicemail for patient to call back.   Will also send portal message

## 2022-05-15 NOTE — TELEPHONE ENCOUNTER
----- Message from Lizzy Brown MA sent at 5/13/2022  4:00 PM CDT -----  Contact: pt @ [phone]  PLEASE ADVISE   ----- Message -----  From: Afua Sebastian  Sent: 5/13/2022   2:32 PM CDT  To: Alec Marcano Staff    Patient states that the dulaglutide (TRULICITY) 0.75 mg/0.5 mL pen injector is making her sick. Patient states she is also taking oral metFORMIN (GLUCOPHAGE) 1000 MG tablet.  Please call and advise.    Thank you and have a great day.

## 2022-05-16 ENCOUNTER — TELEPHONE (OUTPATIENT)
Dept: PRIMARY CARE CLINIC | Facility: CLINIC | Age: 67
End: 2022-05-16
Payer: MEDICARE

## 2022-05-16 NOTE — TELEPHONE ENCOUNTER
----- Message from Afua Sebastian sent at 5/16/2022  1:19 PM CDT -----  Contact: Pt [phone]  Patient states she would like a call back to discuss nausea after using Trulicity.   Please call and advise.    Thank you and have a great day.

## 2022-05-17 ENCOUNTER — TELEPHONE (OUTPATIENT)
Dept: DIABETES | Facility: CLINIC | Age: 67
End: 2022-05-17
Payer: MEDICARE

## 2022-05-17 DIAGNOSIS — E11.65 TYPE 2 DIABETES MELLITUS WITH HYPERGLYCEMIA, WITHOUT LONG-TERM CURRENT USE OF INSULIN: Primary | ICD-10-CM

## 2022-05-17 RX ORDER — ONDANSETRON 8 MG/1
8 TABLET, ORALLY DISINTEGRATING ORAL EVERY 12 HOURS PRN
Qty: 30 TABLET | Refills: 0 | Status: SHIPPED | OUTPATIENT
Start: 2022-05-17 | End: 2022-12-05 | Stop reason: ALTCHOICE

## 2022-05-17 NOTE — TELEPHONE ENCOUNTER
Was able to call and get in touch with patient.   She is having some nausea with the trulicity, no severe abdominal pain or vomiting.   Feels weak/tired for sure.   Is not taking any medications additionally.    Sending in rx for zofran odt prn for now.   Told her to hold off on the jardiance for now.   Try to continue the metformin 1000 bid.   Continue the trulicity for now, and going to see if the symptoms start to subside.   To keep in touch over next few weeks if not, and notify me if symptoms persist.     Current bg's much improved:   112, 130, 127, 157.     Thanks,  Jeannie

## 2022-05-17 NOTE — TELEPHONE ENCOUNTER
----- Message from Lizzy Brown MA sent at 5/16/2022  2:38 PM CDT -----  Contact: Pt @ [phone]  Patient is return ing your call from yesterday.  ----- Message -----  From: Afua Sebastian  Sent: 5/16/2022   1:20 PM CDT  To: Alec Marcano Staff    Patient states she would like a call back to discuss nausea after using Trulicity.   Please call and advise.    Thank you and have a great day.

## 2022-05-19 ENCOUNTER — OFFICE VISIT (OUTPATIENT)
Dept: PRIMARY CARE CLINIC | Facility: CLINIC | Age: 67
End: 2022-05-19
Payer: MEDICARE

## 2022-05-19 DIAGNOSIS — I69.311 MEMORY DEFICIT AFTER CEREBRAL INFARCTION: ICD-10-CM

## 2022-05-19 DIAGNOSIS — Z86.73 HISTORY OF STROKE: Primary | ICD-10-CM

## 2022-05-19 DIAGNOSIS — E78.5 HYPERLIPIDEMIA ASSOCIATED WITH TYPE 2 DIABETES MELLITUS: ICD-10-CM

## 2022-05-19 DIAGNOSIS — E66.09 CLASS 1 OBESITY DUE TO EXCESS CALORIES WITH SERIOUS COMORBIDITY AND BODY MASS INDEX (BMI) OF 32.0 TO 32.9 IN ADULT: ICD-10-CM

## 2022-05-19 DIAGNOSIS — F33.41 RECURRENT MAJOR DEPRESSIVE DISORDER, IN PARTIAL REMISSION: ICD-10-CM

## 2022-05-19 DIAGNOSIS — E11.69 HYPERLIPIDEMIA ASSOCIATED WITH TYPE 2 DIABETES MELLITUS: ICD-10-CM

## 2022-05-19 PROCEDURE — 3052F PR MOST RECENT HEMOGLOBIN A1C LEVEL 8.0 - < 9.0%: ICD-10-PCS | Mod: CPTII,S$GLB,, | Performed by: INTERNAL MEDICINE

## 2022-05-19 PROCEDURE — 1101F PT FALLS ASSESS-DOCD LE1/YR: CPT | Mod: CPTII,S$GLB,, | Performed by: INTERNAL MEDICINE

## 2022-05-19 PROCEDURE — 1159F PR MEDICATION LIST DOCUMENTED IN MEDICAL RECORD: ICD-10-PCS | Mod: CPTII,S$GLB,, | Performed by: INTERNAL MEDICINE

## 2022-05-19 PROCEDURE — 99999 PR PBB SHADOW E&M-EST. PATIENT-LVL IV: CPT | Mod: PBBFAC,,, | Performed by: INTERNAL MEDICINE

## 2022-05-19 PROCEDURE — 1125F AMNT PAIN NOTED PAIN PRSNT: CPT | Mod: CPTII,S$GLB,, | Performed by: INTERNAL MEDICINE

## 2022-05-19 PROCEDURE — 99213 OFFICE O/P EST LOW 20 MIN: CPT | Mod: S$GLB,,, | Performed by: INTERNAL MEDICINE

## 2022-05-19 PROCEDURE — 3008F BODY MASS INDEX DOCD: CPT | Mod: CPTII,S$GLB,, | Performed by: INTERNAL MEDICINE

## 2022-05-19 PROCEDURE — 1160F RVW MEDS BY RX/DR IN RCRD: CPT | Mod: CPTII,S$GLB,, | Performed by: INTERNAL MEDICINE

## 2022-05-19 PROCEDURE — 3078F PR MOST RECENT DIASTOLIC BLOOD PRESSURE < 80 MM HG: ICD-10-PCS | Mod: CPTII,S$GLB,, | Performed by: INTERNAL MEDICINE

## 2022-05-19 PROCEDURE — 3078F DIAST BP <80 MM HG: CPT | Mod: CPTII,S$GLB,, | Performed by: INTERNAL MEDICINE

## 2022-05-19 PROCEDURE — 1160F PR REVIEW ALL MEDS BY PRESCRIBER/CLIN PHARMACIST DOCUMENTED: ICD-10-PCS | Mod: CPTII,S$GLB,, | Performed by: INTERNAL MEDICINE

## 2022-05-19 PROCEDURE — 1125F PR PAIN SEVERITY QUANTIFIED, PAIN PRESENT: ICD-10-PCS | Mod: CPTII,S$GLB,, | Performed by: INTERNAL MEDICINE

## 2022-05-19 PROCEDURE — 3008F PR BODY MASS INDEX (BMI) DOCUMENTED: ICD-10-PCS | Mod: CPTII,S$GLB,, | Performed by: INTERNAL MEDICINE

## 2022-05-19 PROCEDURE — 3288F FALL RISK ASSESSMENT DOCD: CPT | Mod: CPTII,S$GLB,, | Performed by: INTERNAL MEDICINE

## 2022-05-19 PROCEDURE — 3288F PR FALLS RISK ASSESSMENT DOCUMENTED: ICD-10-PCS | Mod: CPTII,S$GLB,, | Performed by: INTERNAL MEDICINE

## 2022-05-19 PROCEDURE — 3074F SYST BP LT 130 MM HG: CPT | Mod: CPTII,S$GLB,, | Performed by: INTERNAL MEDICINE

## 2022-05-19 PROCEDURE — 1159F MED LIST DOCD IN RCRD: CPT | Mod: CPTII,S$GLB,, | Performed by: INTERNAL MEDICINE

## 2022-05-19 PROCEDURE — 3052F HG A1C>EQUAL 8.0%<EQUAL 9.0%: CPT | Mod: CPTII,S$GLB,, | Performed by: INTERNAL MEDICINE

## 2022-05-19 PROCEDURE — 1101F PR PT FALLS ASSESS DOC 0-1 FALLS W/OUT INJ PAST YR: ICD-10-PCS | Mod: CPTII,S$GLB,, | Performed by: INTERNAL MEDICINE

## 2022-05-19 PROCEDURE — 99213 PR OFFICE/OUTPT VISIT, EST, LEVL III, 20-29 MIN: ICD-10-PCS | Mod: S$GLB,,, | Performed by: INTERNAL MEDICINE

## 2022-05-19 PROCEDURE — 99999 PR PBB SHADOW E&M-EST. PATIENT-LVL IV: ICD-10-PCS | Mod: PBBFAC,,, | Performed by: INTERNAL MEDICINE

## 2022-05-19 PROCEDURE — 3074F PR MOST RECENT SYSTOLIC BLOOD PRESSURE < 130 MM HG: ICD-10-PCS | Mod: CPTII,S$GLB,, | Performed by: INTERNAL MEDICINE

## 2022-05-19 NOTE — PROGRESS NOTES
Subjective:      Patient ID: Shabnam Noble is a 66 y.o. female.    Chief Complaint: Follow-up (Trulicity side effects )    Here today for general exam.     65 yo female with a past medical history of of CVA (cerebral vascular accident) with memory deficit, DM2 (diabetes mellitus, type 2), Obesity, some day cigarette smoker, Essential hypertension, left knee OA, illicit drug use (cocaine) here for follow up.    Memory loss/Hx CVA: She has hx of remote CVA. She was also admitted to the hospital for stroke like symptoms (left arm weakness) in 2017. CT head at that time showed an old infarct. Her stroke work up at the time was negative and her exacerbation of sx was thought to be 2/2 to poorly controlled HTN. She was seen by neurology on 10/2017, neurology referral was made at last visit.      HTN: BP doing much better since changing her BP medications. BP in office 124/76. No CP/SOB/lightheadedness/dizziness/palpitations.      Poorly controlled DM2: A1c elevated to 9.0 at last physical. She was referred to diabetes advanced NP and has had her first visit with Jina on 5/9/2022. She continues to take metformin 1000mg BID and jardiance 10mg qday. Trulicity qweekly was added to her diabetes regimen. The patient is complaining of trulicity and wishes to be off of the medication citing injection pain and continued nausea with trulicity.     HLD: Continues on crestor 20mg qhs and zetia 10mg qday    Denies any chest pain, shortness of breath, nausea vomiting constipation diarrhea, blood in stool, heartburn    Review of Systems   Constitutional: Negative for chills, fever and weight loss.   HENT: Negative for congestion, ear pain and sore throat.    Eyes: Negative for double vision.   Respiratory: Negative for cough and shortness of breath.    Cardiovascular: Negative for chest pain, palpitations and leg swelling.   Gastrointestinal: Negative for abdominal pain, heartburn, nausea and vomiting.   Skin: Negative for rash.    Neurological: Negative for dizziness, tingling and headaches.   Psychiatric/Behavioral: Negative for depression.         Current Outpatient Medications:     amLODIPine (NORVASC) 10 MG tablet, Take 1 tablet (10 mg total) by mouth once daily., Disp: 90 tablet, Rfl: 3    aspirin (ECOTRIN) 81 MG EC tablet, Take 1 tablet (81 mg total) by mouth once daily., Disp: 90 tablet, Rfl: 3    clopidogreL (PLAVIX) 75 mg tablet, Take 1 tablet (75 mg total) by mouth once daily., Disp: 90 tablet, Rfl: 3    dulaglutide (TRULICITY) 0.75 mg/0.5 mL pen injector, Inject 0.75 mg into the skin once a week., Disp: 4 pen, Rfl: 4    empagliflozin (JARDIANCE) 10 mg tablet, Take 1 tablet (10 mg total) by mouth once daily., Disp: 90 tablet, Rfl: 1    ezetimibe (ZETIA) 10 mg tablet, Take 1 tablet (10 mg total) by mouth once daily., Disp: 90 tablet, Rfl: 3    furosemide (LASIX) 20 MG tablet, Take 1 tablet (20 mg total) by mouth daily as needed (leg swelling)., Disp: 90 tablet, Rfl: 1    ibuprofen (ADVIL,MOTRIN) 800 MG tablet, Take 1 tablet (800 mg total) by mouth 3 (three) times daily as needed for Pain., Disp: 90 tablet, Rfl: 1    metFORMIN (GLUCOPHAGE) 1000 MG tablet, Take 1 tablet (1,000 mg total) by mouth 2 (two) times daily with meals., Disp: 180 tablet, Rfl: 3    metoprolol succinate (TOPROL-XL) 100 MG 24 hr tablet, Take 1 tablet (100 mg total) by mouth once daily., Disp: 90 tablet, Rfl: 1    NIFEdipine (PROCARDIA-XL) 60 MG (OSM) 24 hr tablet, Take 1 tablet (60 mg total) by mouth once daily., Disp: 30 tablet, Rfl: 11    ondansetron (ZOFRAN-ODT) 8 MG TbDL, Take 1 tablet (8 mg total) by mouth every 12 (twelve) hours as needed (nausea or vomiting)., Disp: 30 tablet, Rfl: 0    rosuvastatin (CRESTOR) 20 MG tablet, Take 1 tablet (20 mg total) by mouth every evening. Patient states she cannot tolerate atorvastatin, Disp: 90 tablet, Rfl: 3    blood glucose control, high Soln, 1 drop by Misc.(Non-Drug; Combo Route) route once daily. ICD  10 code: E11.59, Disp: 3 each, Rfl: 3    blood-glucose meter kit, Use as instructed. ICD 10 code E11.59, Disp: 1 each, Rfl: 0    Lab Results   Component Value Date    HGBA1C 9.0 (H) 04/07/2022    HGBA1C 8.4 (H) 01/18/2022    HGBA1C 9.2 (H) 06/03/2021     Lab Results   Component Value Date    MICALBCREAT 49.0 (H) 06/03/2021     Lab Results   Component Value Date    LDLCALC 117.4 06/03/2021    LDLCALC 46.6 (L) 08/12/2020    CHOL 183 06/03/2021    HDL 40 06/03/2021    TRIG 128 06/03/2021       Lab Results   Component Value Date     01/18/2022    K 3.8 01/18/2022     01/18/2022    CO2 26 01/18/2022     (H) 01/18/2022    BUN 16 01/18/2022    CREATININE 1.0 01/18/2022    CALCIUM 9.3 01/18/2022    PROT 7.4 01/18/2022    ALBUMIN 3.6 01/18/2022    BILITOT 0.4 01/18/2022    ALKPHOS 74 01/18/2022    AST 11 01/18/2022    ALT 11 01/18/2022    ANIONGAP 10 01/18/2022    ESTGFRAFRICA >60.0 01/18/2022    EGFRNONAA 58.8 (A) 01/18/2022    WBC 8.66 07/10/2021    HGB 10.9 (L) 07/10/2021    HGB 10.3 (L) 07/09/2021    HCT 34.1 (L) 07/10/2021    MCV 84 07/10/2021     (L) 07/10/2021    TSH 0.560 06/03/2021    HEPCAB Negative 01/21/2020       No results found for: LH, FSH, TOTALTESTOST, PROGESTERONE, ESTRADIOL, [id number], [id number], FERRITIN, IRON, TRANSFERRIN, TIBC, FESATURATED, ZINC      Past Medical History:   Diagnosis Date    Colon polyp     CVA (cerebral vascular accident)     DM2 (diabetes mellitus, type 2)     Essential hypertension      Past Surgical History:   Procedure Laterality Date    ANKLE FRACTURE SURGERY Right     ANKLE FRACTURE SURGERY      COLONOSCOPY N/A 12/11/2020    Procedure: COLONOSCOPY;  Surgeon: Marshal Rogers MD;  Location: [facility] ([address] [place] [address]);  Service: Endoscopy;  Laterality: N/A;  11/18-plavix hold ok see te-covid pcw 12/8-tb    COLONOSCOPY W/ POLYPECTOMY      HYSTERECTOMY      no h/o  malignancy     Social History     Social History Narrative    Not on file  "    Family History   Problem Relation Age of Onset    Alzheimer's disease Mother     Prostate cancer Father     Diabetes Father     Colon cancer Brother     Breast cancer Paternal Aunt     Heart disease Neg Hx     Stroke Neg Hx      Vitals:    05/19/22 1428   BP: 124/76   Pulse: 77   Temp: 97.6 °F (36.4 °C)   SpO2: 97%   Weight: 89.3 kg (196 lb 13.9 oz)   Height: 5' 5" (1.651 m)   PainSc:   2     Objective:   Physical Exam  Eyes:      Pupils: Pupils are equal, round, and reactive to light.   Cardiovascular:      Rate and Rhythm: Normal rate and regular rhythm.      Pulses: Normal pulses.      Heart sounds: Normal heart sounds.   Pulmonary:      Effort: Pulmonary effort is normal.   Abdominal:      General: Abdomen is flat. Bowel sounds are normal.      Palpations: Abdomen is soft.   Skin:     General: Skin is warm.   Neurological:      Mental Status: She is alert.   Psychiatric:         Mood and Affect: Mood normal.       Assessment:     1. History of stroke    2. Memory deficit after cerebral infarction    3. Recurrent major depressive disorder, in partial remission    4. Hyperlipidemia associated with type 2 diabetes mellitus    5. Class 1 obesity due to excess calories with serious comorbidity and body mass index (BMI) of 32.0 to 32.9 in adult      Plan:   - cont with current dosage of antihypertensives  - cont to work with DM NP for diabetes management, continuing to keep glucose logs       Patient Instructions   3 months for well visit or sooner if needed.                               "

## 2022-05-20 VITALS
SYSTOLIC BLOOD PRESSURE: 124 MMHG | OXYGEN SATURATION: 97 % | WEIGHT: 196.88 LBS | TEMPERATURE: 98 F | HEART RATE: 77 BPM | BODY MASS INDEX: 32.8 KG/M2 | DIASTOLIC BLOOD PRESSURE: 76 MMHG | HEIGHT: 65 IN

## 2022-05-25 ENCOUNTER — TELEPHONE (OUTPATIENT)
Dept: PRIMARY CARE CLINIC | Facility: CLINIC | Age: 67
End: 2022-05-25
Payer: MEDICARE

## 2022-05-25 NOTE — TELEPHONE ENCOUNTER
Spoke with caregiver and scheduled 3 month f/u with pcp, on 8/15/2022 after appointment with NP Dianelys

## 2022-05-30 ENCOUNTER — PATIENT MESSAGE (OUTPATIENT)
Dept: ADMINISTRATIVE | Facility: HOSPITAL | Age: 67
End: 2022-05-30
Payer: MEDICARE

## 2022-06-30 ENCOUNTER — OFFICE VISIT (OUTPATIENT)
Dept: HOME HEALTH SERVICES | Facility: CLINIC | Age: 67
End: 2022-06-30
Payer: MEDICARE

## 2022-06-30 VITALS
HEIGHT: 65 IN | SYSTOLIC BLOOD PRESSURE: 166 MMHG | DIASTOLIC BLOOD PRESSURE: 95 MMHG | WEIGHT: 198 LBS | HEART RATE: 85 BPM | BODY MASS INDEX: 32.99 KG/M2

## 2022-06-30 DIAGNOSIS — D69.6 THROMBOCYTOPENIA, UNSPECIFIED: ICD-10-CM

## 2022-06-30 DIAGNOSIS — Z00.00 ENCOUNTER FOR PREVENTIVE HEALTH EXAMINATION: Primary | ICD-10-CM

## 2022-06-30 DIAGNOSIS — E11.59 HYPERTENSION ASSOCIATED WITH DIABETES: ICD-10-CM

## 2022-06-30 DIAGNOSIS — Z72.0 TOBACCO USE: ICD-10-CM

## 2022-06-30 DIAGNOSIS — F33.41 RECURRENT MAJOR DEPRESSIVE DISORDER, IN PARTIAL REMISSION: ICD-10-CM

## 2022-06-30 DIAGNOSIS — E11.69 HYPERLIPIDEMIA ASSOCIATED WITH TYPE 2 DIABETES MELLITUS: ICD-10-CM

## 2022-06-30 DIAGNOSIS — Z86.73 HISTORY OF STROKE: ICD-10-CM

## 2022-06-30 DIAGNOSIS — I15.2 HYPERTENSION ASSOCIATED WITH DIABETES: ICD-10-CM

## 2022-06-30 DIAGNOSIS — E11.65 TYPE 2 DIABETES MELLITUS WITH HYPERGLYCEMIA, WITHOUT LONG-TERM CURRENT USE OF INSULIN: ICD-10-CM

## 2022-06-30 DIAGNOSIS — E66.09 CLASS 1 OBESITY DUE TO EXCESS CALORIES WITH SERIOUS COMORBIDITY AND BODY MASS INDEX (BMI) OF 32.0 TO 32.9 IN ADULT: ICD-10-CM

## 2022-06-30 DIAGNOSIS — E78.5 HYPERLIPIDEMIA ASSOCIATED WITH TYPE 2 DIABETES MELLITUS: ICD-10-CM

## 2022-06-30 DIAGNOSIS — I69.311 MEMORY DEFICIT AFTER CEREBRAL INFARCTION: ICD-10-CM

## 2022-06-30 PROCEDURE — 1160F RVW MEDS BY RX/DR IN RCRD: CPT | Mod: CPTII,S$GLB,, | Performed by: NURSE PRACTITIONER

## 2022-06-30 PROCEDURE — 1160F PR REVIEW ALL MEDS BY PRESCRIBER/CLIN PHARMACIST DOCUMENTED: ICD-10-PCS | Mod: CPTII,S$GLB,, | Performed by: NURSE PRACTITIONER

## 2022-06-30 PROCEDURE — 3052F HG A1C>EQUAL 8.0%<EQUAL 9.0%: CPT | Mod: CPTII,S$GLB,, | Performed by: NURSE PRACTITIONER

## 2022-06-30 PROCEDURE — 99499 UNLISTED E&M SERVICE: CPT | Mod: S$GLB,,, | Performed by: NURSE PRACTITIONER

## 2022-06-30 PROCEDURE — 3077F SYST BP >= 140 MM HG: CPT | Mod: CPTII,S$GLB,, | Performed by: NURSE PRACTITIONER

## 2022-06-30 PROCEDURE — 1159F MED LIST DOCD IN RCRD: CPT | Mod: CPTII,S$GLB,, | Performed by: NURSE PRACTITIONER

## 2022-06-30 PROCEDURE — 1126F PR PAIN SEVERITY QUANTIFIED, NO PAIN PRESENT: ICD-10-PCS | Mod: CPTII,S$GLB,, | Performed by: NURSE PRACTITIONER

## 2022-06-30 PROCEDURE — 3008F BODY MASS INDEX DOCD: CPT | Mod: CPTII,S$GLB,, | Performed by: NURSE PRACTITIONER

## 2022-06-30 PROCEDURE — 3080F DIAST BP >= 90 MM HG: CPT | Mod: CPTII,S$GLB,, | Performed by: NURSE PRACTITIONER

## 2022-06-30 PROCEDURE — 3288F PR FALLS RISK ASSESSMENT DOCUMENTED: ICD-10-PCS | Mod: CPTII,S$GLB,, | Performed by: NURSE PRACTITIONER

## 2022-06-30 PROCEDURE — 3008F PR BODY MASS INDEX (BMI) DOCUMENTED: ICD-10-PCS | Mod: CPTII,S$GLB,, | Performed by: NURSE PRACTITIONER

## 2022-06-30 PROCEDURE — G0439 PPPS, SUBSEQ VISIT: HCPCS | Mod: S$GLB,,, | Performed by: NURSE PRACTITIONER

## 2022-06-30 PROCEDURE — 1126F AMNT PAIN NOTED NONE PRSNT: CPT | Mod: CPTII,S$GLB,, | Performed by: NURSE PRACTITIONER

## 2022-06-30 PROCEDURE — 99499 RISK ADDL DX/OHS AUDIT: ICD-10-PCS | Mod: S$GLB,,, | Performed by: NURSE PRACTITIONER

## 2022-06-30 PROCEDURE — 3288F FALL RISK ASSESSMENT DOCD: CPT | Mod: CPTII,S$GLB,, | Performed by: NURSE PRACTITIONER

## 2022-06-30 PROCEDURE — 1101F PR PT FALLS ASSESS DOC 0-1 FALLS W/OUT INJ PAST YR: ICD-10-PCS | Mod: CPTII,S$GLB,, | Performed by: NURSE PRACTITIONER

## 2022-06-30 PROCEDURE — G0439 PR MEDICARE ANNUAL WELLNESS SUBSEQUENT VISIT: ICD-10-PCS | Mod: S$GLB,,, | Performed by: NURSE PRACTITIONER

## 2022-06-30 PROCEDURE — 3080F PR MOST RECENT DIASTOLIC BLOOD PRESSURE >= 90 MM HG: ICD-10-PCS | Mod: CPTII,S$GLB,, | Performed by: NURSE PRACTITIONER

## 2022-06-30 PROCEDURE — 3077F PR MOST RECENT SYSTOLIC BLOOD PRESSURE >= 140 MM HG: ICD-10-PCS | Mod: CPTII,S$GLB,, | Performed by: NURSE PRACTITIONER

## 2022-06-30 PROCEDURE — 1159F PR MEDICATION LIST DOCUMENTED IN MEDICAL RECORD: ICD-10-PCS | Mod: CPTII,S$GLB,, | Performed by: NURSE PRACTITIONER

## 2022-06-30 PROCEDURE — 1101F PT FALLS ASSESS-DOCD LE1/YR: CPT | Mod: CPTII,S$GLB,, | Performed by: NURSE PRACTITIONER

## 2022-06-30 PROCEDURE — 3052F PR MOST RECENT HEMOGLOBIN A1C LEVEL 8.0 - < 9.0%: ICD-10-PCS | Mod: CPTII,S$GLB,, | Performed by: NURSE PRACTITIONER

## 2022-06-30 RX ORDER — EMPAGLIFLOZIN 10 MG/1
10 TABLET, FILM COATED ORAL DAILY
Qty: 90 TABLET | Refills: 1 | Status: SHIPPED | OUTPATIENT
Start: 2022-06-30 | End: 2023-08-14 | Stop reason: SDUPTHER

## 2022-06-30 RX ORDER — VITAMIN B COMPLEX
1 CAPSULE ORAL DAILY
COMMUNITY

## 2022-06-30 NOTE — PROGRESS NOTES
"  Shabnam Noble presented for a  Medicare AWV and comprehensive Health Risk Assessment today. The following components were reviewed and updated:    · Medical history  · Family History  · Social history  · Allergies and Current Medications  · Health Risk Assessment  · Health Maintenance  · Care Team         ** See Completed Assessments for Annual Wellness Visit within the encounter summary.**         The following assessments were completed:  · Living Situation  · CAGE  · Depression Screening  · Timed Get Up and Go  · Whisper Test  · Cognitive Function Screening  · Nutrition Screening  · ADL Screening  · PAQ Screening        Vitals:    06/30/22 1128   BP: (!) 166/95   Pulse: 85   Weight: 89.8 kg (198 lb)   Height: 5' 5" (1.651 m)     Body mass index is 32.95 kg/m².  Physical Exam  Constitutional:       Appearance: She is obese.   HENT:      Head: Normocephalic and atraumatic.      Nose: Nose normal.      Mouth/Throat:      Mouth: Mucous membranes are moist.   Eyes:      Extraocular Movements: Extraocular movements intact.   Cardiovascular:      Rate and Rhythm: Normal rate and regular rhythm.      Heart sounds: Normal heart sounds.   Pulmonary:      Effort: Pulmonary effort is normal. No respiratory distress.      Breath sounds: Normal breath sounds.   Abdominal:      General: Bowel sounds are normal. There is no distension.      Palpations: Abdomen is soft.   Musculoskeletal:         General: Swelling (right ankle) present.      Cervical back: Normal range of motion.   Skin:     General: Skin is warm and dry.   Neurological:      Mental Status: She is alert and oriented to person, place, and time.      Comments: forgetful   Psychiatric:         Mood and Affect: Mood normal.         Behavior: Behavior normal.               Diagnoses and health risks identified today and associated recommendations/orders:    1. Encounter for preventive health examination  Assessments completed. Preventive measures and health " maintenance reviewed with patient. Patient was started on several new medications on 5/9/2022. Patient does not currently have medications available, report have not picked up from pharmacy. Patient educated on medications and compliance. Patient reports that she will  new medications and start them.    2. Type 2 diabetes mellitus with hyperglycemia, without long-term current use of insulin  Stable, followed by PCP. Refilled Jardiance.  - empagliflozin (JARDIANCE) 10 mg tablet; Take 1 tablet (10 mg total) by mouth once daily.  Dispense: 90 tablet; Refill: 1    3. Hypertension associated with diabetes  Stable, followed by PCP.    4. Hyperlipidemia associated with type 2 diabetes mellitus  Stable, followed by PCP.    5. Recurrent major depressive disorder, in partial remission  Stable, followed by PCP.    6. Thrombocytopenia, unspecified  Stable, followed by PCP.    7. Class 1 obesity due to excess calories with serious comorbidity and body mass index (BMI) of 32.0 to 32.9 in adult  Stable, followed by PCP. Healthy diet and exercise encouraged.     8. History of stroke  Stable, followed by PCP.    9. Memory deficit after cerebral infarction  Stable, followed by PCP.    10. Tobacco use  Stable, followed by PCP. Smoking cessation encouraged, patient declined at this time.    Provided Shabnam with a 5-10 year written screening schedule and personal prevention plan. Recommendations were developed using the USPSTF age appropriate recommendations. Education, counseling, and referrals were provided as needed. After Visit Summary printed and given to patient which includes a list of additional screenings\tests needed.    Follow up in about 1 year (around 6/30/2023) for your next annual wellness visit.    January Britton NP    I offered to discuss advanced care planning, including how to pick a person who would make decisions for you if you were unable to make them for yourself, called a health care power of ,  and what kind of decisions you might make such as use of life sustaining treatments such as ventilators and tube feeding when faced with a life limiting illness recorded on a living will that they will need to know. (How you want to be cared for as you near the end of your natural life)     X Patient is interested in learning more about how to make advanced directives.  I provided them paperwork and offered to discuss this with them.

## 2022-07-01 PROBLEM — Z72.0 TOBACCO USE: Status: ACTIVE | Noted: 2022-07-01

## 2022-07-01 NOTE — PATIENT INSTRUCTIONS
Counseling and Referral of Other Preventative  (Italic type indicates deductible and co-insurance are waived)    Patient Name: Shabnam Noble  Today's Date: 6/30/2022    Health Maintenance       Date Due Completion Date    DEXA Scan Never done ---    Shingles Vaccine (1 of 2) Never done ---    Pneumococcal Vaccines (Age 65+) (2 - PCV) 10/12/2018 10/12/2017    Eye Exam 02/17/2021 2/17/2020    Foot Exam 12/07/2021 12/7/2020    COVID-19 Vaccine (4 - Booster for Pfizer series) 04/08/2022 12/8/2021    Lipid Panel 06/03/2022 6/3/2021    Diabetes Urine Screening 06/03/2022 6/3/2021    Hemoglobin A1c 07/07/2022 4/7/2022    Mammogram 01/18/2023 1/18/2022    High Dose Statin 05/20/2023 5/20/2022    TETANUS VACCINE 10/12/2027 10/12/2017    Colorectal Cancer Screening 12/11/2030 12/11/2020        No orders of the defined types were placed in this encounter.    The following information is provided to all patients.  This information is to help you find resources for any of the problems found today that may be affecting your health:                Living healthy guide: www.[facility].[place].gov      Understanding Diabetes: www.diabetes.org      Eating healthy: www.cdc.gov/healthyweight      [facility] home safety checklist: www.cdc.gov/steadi/patient.html      Agency on Aging: www.goea.[place].[facility]      Alcoholics anonymous (AA): www.aa.org      Physical Activity: www.robert.nih.gov/ej9wicc      Tobacco use: www.quitwithusla.org

## 2022-07-15 ENCOUNTER — PATIENT MESSAGE (OUTPATIENT)
Dept: NEUROLOGY | Facility: CLINIC | Age: 67
End: 2022-07-15
Payer: MEDICARE

## 2022-07-19 ENCOUNTER — TELEPHONE (OUTPATIENT)
Dept: NEUROLOGY | Facility: CLINIC | Age: 67
End: 2022-07-19
Payer: MEDICARE

## 2022-07-19 ENCOUNTER — OFFICE VISIT (OUTPATIENT)
Dept: PRIMARY CARE CLINIC | Facility: CLINIC | Age: 67
End: 2022-07-19
Payer: MEDICARE

## 2022-07-19 ENCOUNTER — PATIENT MESSAGE (OUTPATIENT)
Dept: NEUROLOGY | Facility: CLINIC | Age: 67
End: 2022-07-19
Payer: MEDICARE

## 2022-07-19 VITALS
OXYGEN SATURATION: 98 % | SYSTOLIC BLOOD PRESSURE: 170 MMHG | HEIGHT: 65 IN | DIASTOLIC BLOOD PRESSURE: 90 MMHG | BODY MASS INDEX: 33.24 KG/M2 | WEIGHT: 199.5 LBS | HEART RATE: 76 BPM

## 2022-07-19 DIAGNOSIS — R51.9 GENERALIZED HEADACHES: ICD-10-CM

## 2022-07-19 DIAGNOSIS — Z86.73 HISTORY OF STROKE: ICD-10-CM

## 2022-07-19 DIAGNOSIS — I10 BENIGN ESSENTIAL HTN: Primary | ICD-10-CM

## 2022-07-19 DIAGNOSIS — I69.311 MEMORY DEFICIT AFTER CEREBRAL INFARCTION: ICD-10-CM

## 2022-07-19 PROCEDURE — 3077F SYST BP >= 140 MM HG: CPT | Mod: CPTII,S$GLB,, | Performed by: INTERNAL MEDICINE

## 2022-07-19 PROCEDURE — 1101F PR PT FALLS ASSESS DOC 0-1 FALLS W/OUT INJ PAST YR: ICD-10-PCS | Mod: CPTII,S$GLB,, | Performed by: INTERNAL MEDICINE

## 2022-07-19 PROCEDURE — 1126F AMNT PAIN NOTED NONE PRSNT: CPT | Mod: CPTII,S$GLB,, | Performed by: INTERNAL MEDICINE

## 2022-07-19 PROCEDURE — 1160F RVW MEDS BY RX/DR IN RCRD: CPT | Mod: CPTII,S$GLB,, | Performed by: INTERNAL MEDICINE

## 2022-07-19 PROCEDURE — 3080F DIAST BP >= 90 MM HG: CPT | Mod: CPTII,S$GLB,, | Performed by: INTERNAL MEDICINE

## 2022-07-19 PROCEDURE — 1126F PR PAIN SEVERITY QUANTIFIED, NO PAIN PRESENT: ICD-10-PCS | Mod: CPTII,S$GLB,, | Performed by: INTERNAL MEDICINE

## 2022-07-19 PROCEDURE — 99999 PR PBB SHADOW E&M-EST. PATIENT-LVL V: ICD-10-PCS | Mod: PBBFAC,,, | Performed by: INTERNAL MEDICINE

## 2022-07-19 PROCEDURE — 1160F PR REVIEW ALL MEDS BY PRESCRIBER/CLIN PHARMACIST DOCUMENTED: ICD-10-PCS | Mod: CPTII,S$GLB,, | Performed by: INTERNAL MEDICINE

## 2022-07-19 PROCEDURE — 3052F PR MOST RECENT HEMOGLOBIN A1C LEVEL 8.0 - < 9.0%: ICD-10-PCS | Mod: CPTII,S$GLB,, | Performed by: INTERNAL MEDICINE

## 2022-07-19 PROCEDURE — 3288F PR FALLS RISK ASSESSMENT DOCUMENTED: ICD-10-PCS | Mod: CPTII,S$GLB,, | Performed by: INTERNAL MEDICINE

## 2022-07-19 PROCEDURE — 99213 OFFICE O/P EST LOW 20 MIN: CPT | Mod: S$GLB,,, | Performed by: INTERNAL MEDICINE

## 2022-07-19 PROCEDURE — 3008F PR BODY MASS INDEX (BMI) DOCUMENTED: ICD-10-PCS | Mod: CPTII,S$GLB,, | Performed by: INTERNAL MEDICINE

## 2022-07-19 PROCEDURE — 3077F PR MOST RECENT SYSTOLIC BLOOD PRESSURE >= 140 MM HG: ICD-10-PCS | Mod: CPTII,S$GLB,, | Performed by: INTERNAL MEDICINE

## 2022-07-19 PROCEDURE — 99999 PR PBB SHADOW E&M-EST. PATIENT-LVL V: CPT | Mod: PBBFAC,,, | Performed by: INTERNAL MEDICINE

## 2022-07-19 PROCEDURE — 3288F FALL RISK ASSESSMENT DOCD: CPT | Mod: CPTII,S$GLB,, | Performed by: INTERNAL MEDICINE

## 2022-07-19 PROCEDURE — 1159F PR MEDICATION LIST DOCUMENTED IN MEDICAL RECORD: ICD-10-PCS | Mod: CPTII,S$GLB,, | Performed by: INTERNAL MEDICINE

## 2022-07-19 PROCEDURE — 1101F PT FALLS ASSESS-DOCD LE1/YR: CPT | Mod: CPTII,S$GLB,, | Performed by: INTERNAL MEDICINE

## 2022-07-19 PROCEDURE — 3052F HG A1C>EQUAL 8.0%<EQUAL 9.0%: CPT | Mod: CPTII,S$GLB,, | Performed by: INTERNAL MEDICINE

## 2022-07-19 PROCEDURE — 3080F PR MOST RECENT DIASTOLIC BLOOD PRESSURE >= 90 MM HG: ICD-10-PCS | Mod: CPTII,S$GLB,, | Performed by: INTERNAL MEDICINE

## 2022-07-19 PROCEDURE — 99213 PR OFFICE/OUTPT VISIT, EST, LEVL III, 20-29 MIN: ICD-10-PCS | Mod: S$GLB,,, | Performed by: INTERNAL MEDICINE

## 2022-07-19 PROCEDURE — 1159F MED LIST DOCD IN RCRD: CPT | Mod: CPTII,S$GLB,, | Performed by: INTERNAL MEDICINE

## 2022-07-19 PROCEDURE — 3008F BODY MASS INDEX DOCD: CPT | Mod: CPTII,S$GLB,, | Performed by: INTERNAL MEDICINE

## 2022-07-19 RX ORDER — METOPROLOL TARTRATE 100 MG/1
100 TABLET ORAL 2 TIMES DAILY
Qty: 120 TABLET | Refills: 5 | Status: SHIPPED | OUTPATIENT
Start: 2022-07-19 | End: 2023-08-07 | Stop reason: SDUPTHER

## 2022-07-19 NOTE — PROGRESS NOTES
Subjective:      Patient ID: Shabnam Noble is a 66 y.o. female.    Chief Complaint: Headache, Dizziness, and Blurred Vision    Here today for general exam. She has been having more headache over the past weekend.     HTN: Her BP is noted to elevated in office to 170/100. Recheck continues to be elevated at 170/90. She does continue on amlodipine 10mg qday, metoprolol 100mg qday, nifedipine 60mg qday. Upon talking with the patient, there was a concern that metoprolol qday was too expensive, question compliance. Will change metoprolol qday to BID dosing to see if this increases compliance. Will consider adding HCTZ to regimen if no improvement in BP. She reports increased stress which was she knows is the cause of her headache this weekend. No CP/SOB/lightheadedness/dizziness/palpitations.     Denies any chest pain, shortness of breath, nausea vomiting constipation diarrhea, blood in stool, heartburn    Review of Systems   Constitutional: Negative for chills, fever and weight loss.   HENT: Negative for congestion, ear pain and sore throat.    Eyes: Positive for blurred vision. Negative for double vision.   Respiratory: Negative for cough and shortness of breath.    Cardiovascular: Negative for chest pain, palpitations and leg swelling.   Gastrointestinal: Positive for nausea. Negative for abdominal pain, heartburn and vomiting.   Skin: Negative for rash.   Neurological: Positive for headaches. Negative for dizziness and tingling.   Psychiatric/Behavioral: Negative for depression.         Current Outpatient Medications:     amLODIPine (NORVASC) 10 MG tablet, Take 1 tablet (10 mg total) by mouth once daily., Disp: 90 tablet, Rfl: 3    b complex vitamins capsule, Take 1 capsule by mouth once daily., Disp: , Rfl:     clopidogreL (PLAVIX) 75 mg tablet, Take 1 tablet (75 mg total) by mouth once daily., Disp: 90 tablet, Rfl: 3    empagliflozin (JARDIANCE) 10 mg tablet, Take 1 tablet (10 mg total) by mouth once daily.,  Disp: 90 tablet, Rfl: 1    ezetimibe (ZETIA) 10 mg tablet, Take 1 tablet (10 mg total) by mouth once daily., Disp: 90 tablet, Rfl: 3    furosemide (LASIX) 20 MG tablet, Take 1 tablet (20 mg total) by mouth daily as needed (leg swelling)., Disp: 90 tablet, Rfl: 1    ibuprofen (ADVIL,MOTRIN) 800 MG tablet, Take 1 tablet (800 mg total) by mouth 3 (three) times daily as needed for Pain., Disp: 90 tablet, Rfl: 1    metFORMIN (GLUCOPHAGE) 1000 MG tablet, Take 1 tablet (1,000 mg total) by mouth 2 (two) times daily with meals., Disp: 180 tablet, Rfl: 3    NIFEdipine (PROCARDIA-XL) 60 MG (OSM) 24 hr tablet, Take 1 tablet (60 mg total) by mouth once daily., Disp: 30 tablet, Rfl: 11    ondansetron (ZOFRAN-ODT) 8 MG TbDL, Take 1 tablet (8 mg total) by mouth every 12 (twelve) hours as needed (nausea or vomiting)., Disp: 30 tablet, Rfl: 0    rosuvastatin (CRESTOR) 20 MG tablet, Take 1 tablet (20 mg total) by mouth every evening. Patient states she cannot tolerate atorvastatin, Disp: 90 tablet, Rfl: 3    aspirin (ECOTRIN) 81 MG EC tablet, Take 1 tablet (81 mg total) by mouth once daily., Disp: 90 tablet, Rfl: 3    blood glucose control, high Soln, 1 drop by Misc.(Non-Drug; Combo Route) route once daily. ICD 10 code: E11.59, Disp: 3 each, Rfl: 3    blood-glucose meter kit, Use as instructed. ICD 10 code E11.59, Disp: 1 each, Rfl: 0    metoprolol tartrate (LOPRESSOR) 100 MG tablet, Take 1 tablet (100 mg total) by mouth 2 (two) times daily., Disp: 120 tablet, Rfl: 5    Lab Results   Component Value Date    HGBA1C 9.0 (H) 04/07/2022    HGBA1C 8.4 (H) 01/18/2022    HGBA1C 9.2 (H) 06/03/2021     Lab Results   Component Value Date    MICALBCREAT 49.0 (H) 06/03/2021     Lab Results   Component Value Date    LDLCALC 117.4 06/03/2021    LDLCALC 46.6 (L) 08/12/2020    CHOL 183 06/03/2021    HDL 40 06/03/2021    TRIG 128 06/03/2021       Lab Results   Component Value Date     01/18/2022    K 3.8 01/18/2022      "01/18/2022    CO2 26 01/18/2022     (H) 01/18/2022    BUN 16 01/18/2022    CREATININE 1.0 01/18/2022    CALCIUM 9.3 01/18/2022    PROT 7.4 01/18/2022    ALBUMIN 3.6 01/18/2022    BILITOT 0.4 01/18/2022    ALKPHOS 74 01/18/2022    AST 11 01/18/2022    ALT 11 01/18/2022    ANIONGAP 10 01/18/2022    ESTGFRAFRICA >60.0 01/18/2022    EGFRNONAA 58.8 (A) 01/18/2022    WBC 8.66 07/10/2021    HGB 10.9 (L) 07/10/2021    HGB 10.3 (L) 07/09/2021    HCT 34.1 (L) 07/10/2021    MCV 84 07/10/2021     (L) 07/10/2021    TSH 0.560 06/03/2021    HEPCAB Negative 01/21/2020       No results found for: LH, FSH, TOTALTESTOST, PROGESTERONE, ESTRADIOL, [id number], MIHDEESV18, FERRITIN, IRON, TRANSFERRIN, TIBC, FESATURATED, ZINC      Past Medical History:   Diagnosis Date    Colon polyp     CVA (cerebral vascular accident)     DM2 (diabetes mellitus, type 2)     Essential hypertension      Past Surgical History:   Procedure Laterality Date    ANKLE FRACTURE SURGERY Right     ANKLE FRACTURE SURGERY      COLONOSCOPY N/A 12/11/2020    Procedure: COLONOSCOPY;  Surgeon: Marshal Rogers MD;  Location: [address]);  Service: Endoscopy;  Laterality: N/A;  11/18-plavix hold ok see te-covid pcw 12/8-tb    COLONOSCOPY W/ POLYPECTOMY      HYSTERECTOMY      no h/o  malignancy     Social History     Social History Narrative    Not on file     Family History   Problem Relation Age of Onset    Alzheimer's disease Mother     Prostate cancer Father     Diabetes Father     Colon cancer Brother     Breast cancer Paternal Aunt     Heart disease Neg Hx     Stroke Neg Hx      Vitals:    07/19/22 0834 07/19/22 0905   BP: (!) 170/100 (!) 170/90   Pulse: 76    SpO2: 98%    Weight: 90.5 kg (199 lb 8.3 oz)    Height: 5' 5" (1.651 m)    PainSc: 0-No pain      Objective:   Physical Exam  Vitals reviewed.   Constitutional:       Appearance: Normal appearance.   HENT:      Head: Normocephalic.   Eyes:      Pupils: Pupils are " equal, round, and reactive to light.   Cardiovascular:      Rate and Rhythm: Normal rate.      Pulses: Normal pulses.   Pulmonary:      Effort: Pulmonary effort is normal.   Musculoskeletal:         General: Normal range of motion.   Skin:     General: Skin is warm.   Neurological:      General: No focal deficit present.      Mental Status: She is alert and oriented to person, place, and time. Mental status is at baseline.      Cranial Nerves: No cranial nerve deficit.      Motor: No weakness.   Psychiatric:         Mood and Affect: Mood normal.       Assessment:     1. Benign essential HTN    2. Generalized headaches    3. History of stroke    4. Memory deficit after cerebral infarction      Plan:     Orders Placed This Encounter    Ambulatory referral/consult to Neurology    metoprolol tartrate (LOPRESSOR) 100 MG tablet       Patient Instructions   1. Monitor BP at home.  Please call the office with BP log or return for nurse BP check in 2 wks.

## 2022-08-05 ENCOUNTER — TELEPHONE (OUTPATIENT)
Dept: NEUROLOGY | Facility: CLINIC | Age: 67
End: 2022-08-05
Payer: MEDICARE

## 2022-08-05 NOTE — TELEPHONE ENCOUNTER
----- Message from Yves Schofield sent at 8/4/2022 12:45 PM CDT -----  Contact: @ [phone]  Patient calling to schedule a NP appt from the referral, arnaldo call

## 2022-08-05 NOTE — TELEPHONE ENCOUNTER
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 Seen at [facility], inpatient for approx 1 week. No residual deficits per patient minus some memory concerns.     (2017 [facility] Neuro note copied here)  "Ms. Noble with pmhx of uncontrolled diabetes, HTN who presents for post discharged follow up of stroke like symptoms. She presented with exacerbation of a remote ischemic event in the left occipital pole with symptoms of L facial droop, L numbness and weakness. on 09/23. As a result she had left leg weakness. Stroke workup at the time was negative and exacerbation was thought to be due to HTN. CT-A on 09/23 showed severe stenosis in the M2 segment of the right MCA, moderate stenosis at the origin of the L CRISTY and stenosis in the middle 3rd of the basilar trunk and occlusion of the P1 segment of the L PCA. During her hospital stay her A1c was 10.5. Today she is doing well today and complains of left sided weakness in her leg and neck pain. The importance of taking her plavix, ASA, crestor, metformin, glipizide and HTN was stressed to the pt to prevent a future stroke"    Appt offered/accepted for 8/19/22 @ 9:30am with Dr. Mayer in Res clinic. Appt letter placed in the mail.       "

## 2022-08-11 ENCOUNTER — TELEPHONE (OUTPATIENT)
Dept: PRIMARY CARE CLINIC | Facility: CLINIC | Age: 67
End: 2022-08-11
Payer: MEDICARE

## 2022-08-11 NOTE — TELEPHONE ENCOUNTER
----- Message from Marianne Nichole sent at 8/11/2022  4:40 PM CDT -----  Contact: pt [phone]  [email or web address]     Patient is calling for Medical Advice regarding:urine is white    How long has patient had these symptoms: no pain- pt is concerned about the color of her urine        Would like response via mychart: phone    Comments:

## 2022-08-15 ENCOUNTER — PATIENT OUTREACH (OUTPATIENT)
Dept: ADMINISTRATIVE | Facility: HOSPITAL | Age: 67
End: 2022-08-15
Payer: MEDICARE

## 2022-08-19 ENCOUNTER — OFFICE VISIT (OUTPATIENT)
Dept: NEUROLOGY | Facility: CLINIC | Age: 67
End: 2022-08-19
Payer: MEDICARE

## 2022-08-19 VITALS
WEIGHT: 198.75 LBS | HEART RATE: 70 BPM | HEIGHT: 65 IN | BODY MASS INDEX: 33.11 KG/M2 | SYSTOLIC BLOOD PRESSURE: 193 MMHG | DIASTOLIC BLOOD PRESSURE: 81 MMHG

## 2022-08-19 DIAGNOSIS — R41.3 OTHER AMNESIA: ICD-10-CM

## 2022-08-19 DIAGNOSIS — Z72.0 TOBACCO USE: ICD-10-CM

## 2022-08-19 DIAGNOSIS — E11.69 HYPERLIPIDEMIA ASSOCIATED WITH TYPE 2 DIABETES MELLITUS: ICD-10-CM

## 2022-08-19 DIAGNOSIS — G31.84 MILD COGNITIVE IMPAIRMENT: ICD-10-CM

## 2022-08-19 DIAGNOSIS — I69.311 MEMORY DEFICIT AFTER CEREBRAL INFARCTION: Primary | ICD-10-CM

## 2022-08-19 DIAGNOSIS — I63.89 OTHER CEREBRAL INFARCTION: ICD-10-CM

## 2022-08-19 DIAGNOSIS — E11.65 TYPE 2 DIABETES MELLITUS WITH HYPERGLYCEMIA, WITHOUT LONG-TERM CURRENT USE OF INSULIN: ICD-10-CM

## 2022-08-19 DIAGNOSIS — E78.5 HYPERLIPIDEMIA ASSOCIATED WITH TYPE 2 DIABETES MELLITUS: ICD-10-CM

## 2022-08-19 DIAGNOSIS — E11.59 HYPERTENSION ASSOCIATED WITH DIABETES: ICD-10-CM

## 2022-08-19 DIAGNOSIS — I15.2 HYPERTENSION ASSOCIATED WITH DIABETES: ICD-10-CM

## 2022-08-19 PROCEDURE — 1160F RVW MEDS BY RX/DR IN RCRD: CPT | Mod: CPTII,GC,S$GLB, | Performed by: STUDENT IN AN ORGANIZED HEALTH CARE EDUCATION/TRAINING PROGRAM

## 2022-08-19 PROCEDURE — 1126F PR PAIN SEVERITY QUANTIFIED, NO PAIN PRESENT: ICD-10-PCS | Mod: CPTII,GC,S$GLB, | Performed by: STUDENT IN AN ORGANIZED HEALTH CARE EDUCATION/TRAINING PROGRAM

## 2022-08-19 PROCEDURE — 99999 PR PBB SHADOW E&M-EST. PATIENT-LVL V: ICD-10-PCS | Mod: PBBFAC,GC,, | Performed by: STUDENT IN AN ORGANIZED HEALTH CARE EDUCATION/TRAINING PROGRAM

## 2022-08-19 PROCEDURE — 3288F PR FALLS RISK ASSESSMENT DOCUMENTED: ICD-10-PCS | Mod: CPTII,GC,S$GLB, | Performed by: STUDENT IN AN ORGANIZED HEALTH CARE EDUCATION/TRAINING PROGRAM

## 2022-08-19 PROCEDURE — 99204 OFFICE O/P NEW MOD 45 MIN: CPT | Mod: GC,S$GLB,, | Performed by: STUDENT IN AN ORGANIZED HEALTH CARE EDUCATION/TRAINING PROGRAM

## 2022-08-19 PROCEDURE — 3288F FALL RISK ASSESSMENT DOCD: CPT | Mod: CPTII,GC,S$GLB, | Performed by: STUDENT IN AN ORGANIZED HEALTH CARE EDUCATION/TRAINING PROGRAM

## 2022-08-19 PROCEDURE — 1159F PR MEDICATION LIST DOCUMENTED IN MEDICAL RECORD: ICD-10-PCS | Mod: CPTII,GC,S$GLB, | Performed by: STUDENT IN AN ORGANIZED HEALTH CARE EDUCATION/TRAINING PROGRAM

## 2022-08-19 PROCEDURE — 3052F PR MOST RECENT HEMOGLOBIN A1C LEVEL 8.0 - < 9.0%: ICD-10-PCS | Mod: CPTII,GC,S$GLB, | Performed by: STUDENT IN AN ORGANIZED HEALTH CARE EDUCATION/TRAINING PROGRAM

## 2022-08-19 PROCEDURE — 1101F PT FALLS ASSESS-DOCD LE1/YR: CPT | Mod: CPTII,GC,S$GLB, | Performed by: STUDENT IN AN ORGANIZED HEALTH CARE EDUCATION/TRAINING PROGRAM

## 2022-08-19 PROCEDURE — 1126F AMNT PAIN NOTED NONE PRSNT: CPT | Mod: CPTII,GC,S$GLB, | Performed by: STUDENT IN AN ORGANIZED HEALTH CARE EDUCATION/TRAINING PROGRAM

## 2022-08-19 PROCEDURE — 1159F MED LIST DOCD IN RCRD: CPT | Mod: CPTII,GC,S$GLB, | Performed by: STUDENT IN AN ORGANIZED HEALTH CARE EDUCATION/TRAINING PROGRAM

## 2022-08-19 PROCEDURE — 3077F SYST BP >= 140 MM HG: CPT | Mod: CPTII,GC,S$GLB, | Performed by: STUDENT IN AN ORGANIZED HEALTH CARE EDUCATION/TRAINING PROGRAM

## 2022-08-19 PROCEDURE — 3008F BODY MASS INDEX DOCD: CPT | Mod: CPTII,GC,S$GLB, | Performed by: STUDENT IN AN ORGANIZED HEALTH CARE EDUCATION/TRAINING PROGRAM

## 2022-08-19 PROCEDURE — 99204 PR OFFICE/OUTPT VISIT, NEW, LEVL IV, 45-59 MIN: ICD-10-PCS | Mod: GC,S$GLB,, | Performed by: STUDENT IN AN ORGANIZED HEALTH CARE EDUCATION/TRAINING PROGRAM

## 2022-08-19 PROCEDURE — 3079F DIAST BP 80-89 MM HG: CPT | Mod: CPTII,GC,S$GLB, | Performed by: STUDENT IN AN ORGANIZED HEALTH CARE EDUCATION/TRAINING PROGRAM

## 2022-08-19 PROCEDURE — 99999 PR PBB SHADOW E&M-EST. PATIENT-LVL V: CPT | Mod: PBBFAC,GC,, | Performed by: STUDENT IN AN ORGANIZED HEALTH CARE EDUCATION/TRAINING PROGRAM

## 2022-08-19 PROCEDURE — 1160F PR REVIEW ALL MEDS BY PRESCRIBER/CLIN PHARMACIST DOCUMENTED: ICD-10-PCS | Mod: CPTII,GC,S$GLB, | Performed by: STUDENT IN AN ORGANIZED HEALTH CARE EDUCATION/TRAINING PROGRAM

## 2022-08-19 PROCEDURE — 3077F PR MOST RECENT SYSTOLIC BLOOD PRESSURE >= 140 MM HG: ICD-10-PCS | Mod: CPTII,GC,S$GLB, | Performed by: STUDENT IN AN ORGANIZED HEALTH CARE EDUCATION/TRAINING PROGRAM

## 2022-08-19 PROCEDURE — 3008F PR BODY MASS INDEX (BMI) DOCUMENTED: ICD-10-PCS | Mod: CPTII,GC,S$GLB, | Performed by: STUDENT IN AN ORGANIZED HEALTH CARE EDUCATION/TRAINING PROGRAM

## 2022-08-19 PROCEDURE — 1101F PR PT FALLS ASSESS DOC 0-1 FALLS W/OUT INJ PAST YR: ICD-10-PCS | Mod: CPTII,GC,S$GLB, | Performed by: STUDENT IN AN ORGANIZED HEALTH CARE EDUCATION/TRAINING PROGRAM

## 2022-08-19 PROCEDURE — 3052F HG A1C>EQUAL 8.0%<EQUAL 9.0%: CPT | Mod: CPTII,GC,S$GLB, | Performed by: STUDENT IN AN ORGANIZED HEALTH CARE EDUCATION/TRAINING PROGRAM

## 2022-08-19 PROCEDURE — 3079F PR MOST RECENT DIASTOLIC BLOOD PRESSURE 80-89 MM HG: ICD-10-PCS | Mod: CPTII,GC,S$GLB, | Performed by: STUDENT IN AN ORGANIZED HEALTH CARE EDUCATION/TRAINING PROGRAM

## 2022-08-19 NOTE — PROGRESS NOTES
"[facility] - NEUROLOGY [address]TH [address] [facility], [place]    Date: 8/19/22  Patient Name: Shabnam Noble   MRN: [id number]   PCP: Krys Michael  Referring Provider: Krys Michael MD    Assessment:   Shabnam Noble is a 66 y.o. female  w/ pmh of CVA (CTA at the time revealed severe stenosis of R M1, stenosis of basilar trunk, and occlusion of L PCA), DM, HTN, and current smoker presenting for evaluation at the request of her PCP, for memory issues. She received a score of 20/30 on MOCA at today's visit.  She is able to perform all her ADLs independently, but has began to struggle with driving (she works as an [employer]er ) and finances in regards to IADLs.    Plan:     -Labwork as ordered (re-ordered following visit after notification from lab that patient had signed in and left bc she "couldn't wait"  -Amb ref to neuropsychology  -Amb ref outpatient case management  -[facility]  -Continue ASA  -Stop taking plavix  -continue taking rosuvastatin and zetia  -Discussed benefits of PT (patient declines at this time)  -Goal systolic blood pressure to be between 140 and 160, so if you obtain measurements outside of this range, please contact your primary care physician to discuss, and follow Dr. 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 - Primary    Relevant Orders    Ambulatory referral/consult to Neuropsychology    Ambulatory referral/consult to Outpatient Case Management    Mild cognitive impairment       Cardiac/Vascular    Hypertension associated with diabetes    Hyperlipidemia associated with type 2 diabetes mellitus       Endocrine    Type 2 diabetes mellitus with hyperglycemia, without long-term current use of insulin       Other    Tobacco use      Other Visit Diagnoses     Other cerebral infarction        Relevant Orders    CT Head Without Contrast    Other amnesia         Body mass index (BMI) " "30.0-30.9, adult               G. Haris Mayer MD    Patient note was created using MModal Dictation.  Any errors in syntax or even information may not have been identified and edited on initial review prior to signing this note.  Subjective:   Patient seen in consultation at the request of Krys Michael MD for the evaluation of memory difficulties. A copy of this note will be sent to the referring physician.        HPI:   Ms. Shabnam Noble is a 66 y.o. female w/ pmh of CVA (CTA at the time revealed severe stenosis of R M1, stenosis of basilar trunk, and occlusion of L PCA), DM, HTN, and current smoker presenting for evaluation at the request of her PCP, for memory issues, although she states she is unsure why she was scheduled for the appointment. She states that everybody notices that she "doesn't remember from one moment to the next sometimes". She states this has been occurring since she had her stroke. She states it has been getting worse. She states she is able to perform all her ADLs independently, but has began to struggle with driving (she works as an [employer]er ) and finances in regards to IADLs. She states she experiences some dizziness with change in position, and also has some headaches occasionally. Her BP during her visit today was 193/81, and she states she didn't take any of her medications because it was "too early". We discussed the importance of taking all of her medications as prescribed. Her MOCA score during today's visit is 20/30.    PAST MEDICAL HISTORY:  Past Medical History:   Diagnosis Date    Colon polyp     CVA (cerebral vascular accident)     DM2 (diabetes mellitus, type 2)     Essential hypertension        PAST SURGICAL HISTORY:  Past Surgical History:   Procedure Laterality Date    ANKLE FRACTURE SURGERY Right     ANKLE FRACTURE SURGERY      COLONOSCOPY N/A 12/11/2020    Procedure: COLONOSCOPY;  Surgeon: Marshal Rogers MD;  Location: [facility] ([address] [place] [address]);  Service: " Endoscopy;  Laterality: N/A;  11/18-plavix hold ok see te-covid pcw 12/8-tb    COLONOSCOPY W/ POLYPECTOMY      HYSTERECTOMY      no h/o  malignancy       CURRENT MEDS:  Current Outpatient Medications   Medication Sig Dispense Refill    amLODIPine (NORVASC) 10 MG tablet Take 1 tablet (10 mg total) by mouth once daily. 90 tablet 3    aspirin (ECOTRIN) 81 MG EC tablet Take 1 tablet (81 mg total) by mouth once daily. 90 tablet 3    b complex vitamins capsule Take 1 capsule by mouth once daily.      blood glucose control, high Soln 1 drop by Misc.(Non-Drug; Combo Route) route once daily. ICD 10 code: E11.59 3 each 3    blood-glucose meter kit Use as instructed. ICD 10 code E11.59 1 each 0    empagliflozin (JARDIANCE) 10 mg tablet Take 1 tablet (10 mg total) by mouth once daily. 90 tablet 1    ezetimibe (ZETIA) 10 mg tablet Take 1 tablet (10 mg total) by mouth once daily. 90 tablet 3    furosemide (LASIX) 20 MG tablet Take 1 tablet (20 mg total) by mouth daily as needed (leg swelling). 90 tablet 1    ibuprofen (ADVIL,MOTRIN) 800 MG tablet Take 1 tablet (800 mg total) by mouth 3 (three) times daily as needed for Pain. 90 tablet 1    metFORMIN (GLUCOPHAGE) 1000 MG tablet Take 1 tablet (1,000 mg total) by mouth 2 (two) times daily with meals. 180 tablet 3    metoprolol tartrate (LOPRESSOR) 100 MG tablet Take 1 tablet (100 mg total) by mouth 2 (two) times daily. 120 tablet 5    NIFEdipine (PROCARDIA-XL) 60 MG (OSM) 24 hr tablet Take 1 tablet (60 mg total) by mouth once daily. 30 tablet 11    rosuvastatin (CRESTOR) 20 MG tablet Take 1 tablet (20 mg total) by mouth every evening. Patient states she cannot tolerate atorvastatin 90 tablet 3    ondansetron (ZOFRAN-ODT) 8 MG TbDL Take 1 tablet (8 mg total) by mouth every 12 (twelve) hours as needed (nausea or vomiting). (Patient not taking: Reported on 8/19/2022) 30 tablet 0     No current facility-administered medications for this visit.       ALLERGIES:  Review  "of patient's allergies indicates:   Allergen Reactions    Atorvastatin     Hydrodiuril [hydrochlorothiazide]     Lisinopril        FAMILY HISTORY:  Family History   Problem Relation Age of Onset    Alzheimer's disease Mother     Prostate cancer Father     Diabetes Father     Colon cancer Brother     Breast cancer Paternal Aunt     Heart disease Neg Hx     Stroke Neg Hx        SOCIAL HISTORY:  Social History     Tobacco Use    Smoking status: Current Every Day Smoker     Packs/day: 0.25     Years: 45.00     Pack years: 11.25     Types: Cigarettes     Last attempt to quit: 2021     Years since quittin.1    Smokeless tobacco: Never Used   Substance Use Topics    Alcohol use: Yes     Comment: 1-2x's    Drug use: Not Currently       Review of Systems:  12 system review of systems is negative except for the symptoms mentioned in HPI.      Objective:     Vitals:    22 0958   BP: (!) 193/81   BP Location: Right arm   Patient Position: Sitting   BP Method: Medium (Automatic)   Pulse: 70   Weight: 90.2 kg (198 lb 11.9 oz)   Height: 5' 5" (1.651 m)     General: NAD, well nourished   Eyes: no tearing, discharge, no erythema   ENT: moist mucous membranes of the oral cavity, nares patent    Neck: Supple, full range of motion  Cardiovascular: Warm and well perfused, pulses equal and symmetrical  Lungs: Normal work of breathing, normal chest wall excursions  Skin: No rash, lesions, or breakdown on exposed skin  Psychiatry: Mood and affect are appropriate   Abdomen: soft, non tender, non distended  Extremeties: No cyanosis, clubbing or edema.    Neurological   MENTAL STATUS: Alert and oriented to person, place, and time. Attention and concentration within normal limits. Speech without dysarthria, able to name without difficulty. Recent and remote memory diminished. MOCA 20/30.  CRANIAL NERVES: Visual fields intact. PERRL. EOMI. Facial sensation intact. Face symmetrical. Hearing grossly intact. Full " shoulder shrug bilaterally. Tongue protrudes midline   SENSORY: Sensation is intact to light touch throughout.  Joint position perception intact. Negative Romberg.   MOTOR: Normal bulk and tone. No pronator drift.  4/5 deltoid, biceps, triceps, interosseous, hand  bilaterally. 4/5 iliopsoas, knee extension/flexion, foot dorsi/plantarflexion bilaterally.    REFLEXES: Symmetric and 2+ throughout.   CEREBELLAR/COORDINATION/GAIT: Gait slow with reduced arm swing and stride length. Finger to nose intact.     Imaging    CTA  9/23/17  Showed severe stenosis in the M2 segment of the right MCA, moderate stenosis at the origin of the L CRISTY and stenosis in the middle 3rd of the basilar trunk and occlusion of the P1 segment of the L PCA.

## 2022-08-19 NOTE — PATIENT INSTRUCTIONS
Please schedule appointment for interdisciplinary dementia clinic with neuropsychology. Please also schedule to meet/get in touch with outpatient case management. Please have labs drawn today as discussed. Please schedule to have CT head performed. Please continue taking your aspirin, crestor, and zetia as prescribed. Please stop taking plavix. Please take your blood pressure measurements twice a day at home as discussed, and take all blood pressure medicines as currently prescribed. We would like your goal systolic blood pressure to be between 140 and 160, so if you obtain measurements outside of this range, please contact your primary care physician to discuss, and follow Dr. Michael's recommendations.

## 2022-08-25 ENCOUNTER — PATIENT MESSAGE (OUTPATIENT)
Dept: ADMINISTRATIVE | Facility: HOSPITAL | Age: 67
End: 2022-08-25
Payer: MEDICARE

## 2022-08-26 PROBLEM — G31.84 MILD COGNITIVE IMPAIRMENT: Status: ACTIVE | Noted: 2022-08-26

## 2022-09-15 ENCOUNTER — PATIENT OUTREACH (OUTPATIENT)
Dept: ADMINISTRATIVE | Facility: HOSPITAL | Age: 67
End: 2022-09-15
Payer: MEDICARE

## 2022-09-15 ENCOUNTER — PATIENT MESSAGE (OUTPATIENT)
Dept: ADMINISTRATIVE | Facility: HOSPITAL | Age: 67
End: 2022-09-15
Payer: MEDICARE

## 2022-09-15 NOTE — PROGRESS NOTES
Patient due for the following   Health Maintenance Due   Topic    DEXA Scan     Shingles Vaccine (1 of 2)    Pneumococcal Vaccines (Age 65+) (2 - PCV)    Eye Exam     Foot Exam     COVID-19 Vaccine (4 - Booster for Pfizer series)    Diabetes Urine Screening     Lipid Panel     Hemoglobin A1c     Influenza Vaccine (1)      Immunizations: reviewed and updated  Care Everywhere: triggered  Care Teams: up to date  Outreach: completed

## 2022-09-27 ENCOUNTER — TELEPHONE (OUTPATIENT)
Dept: PRIMARY CARE CLINIC | Facility: CLINIC | Age: 67
End: 2022-09-27
Payer: MEDICARE

## 2022-09-27 NOTE — TELEPHONE ENCOUNTER
----- Message from Deepika Giles sent at 9/26/2022  2:55 PM CDT -----  Contact: [employer]/Marianne/  Marianne called, want to remind the doctor that patient is due for osteoporosis screening, flu vaccine, and kidney evaluation lab. Please call and advise. Thank you.

## 2022-09-27 NOTE — TELEPHONE ENCOUNTER
Pt has a DEXA ordered, labs ordered, and can walk-in to pharmacy for the flu shot. No call back number left.

## 2022-10-05 ENCOUNTER — PATIENT MESSAGE (OUTPATIENT)
Dept: ADMINISTRATIVE | Facility: HOSPITAL | Age: 67
End: 2022-10-05
Payer: MEDICARE

## 2022-10-10 ENCOUNTER — PATIENT MESSAGE (OUTPATIENT)
Dept: ADMINISTRATIVE | Facility: HOSPITAL | Age: 67
End: 2022-10-10
Payer: MEDICARE

## 2022-11-15 ENCOUNTER — HOSPITAL ENCOUNTER (EMERGENCY)
Facility: HOSPITAL | Age: 67
Discharge: HOME | End: 2022-11-15
Attending: EMERGENCY MEDICINE
Payer: MEDICARE

## 2022-11-15 VITALS
OXYGEN SATURATION: 98 % | WEIGHT: 198.88 LBS | HEART RATE: 77 BPM | BODY MASS INDEX: 33.13 KG/M2 | SYSTOLIC BLOOD PRESSURE: 149 MMHG | TEMPERATURE: 98 F | DIASTOLIC BLOOD PRESSURE: 67 MMHG | HEIGHT: 65 IN | RESPIRATION RATE: 18 BRPM

## 2022-11-15 DIAGNOSIS — R29.818 TRANSIENT NEUROLOGICAL SYMPTOMS: Primary | ICD-10-CM

## 2022-11-15 DIAGNOSIS — I10 HYPERTENSION: ICD-10-CM

## 2022-11-15 LAB
ALBUMIN SERPL BCP-MCNC: 3.5 G/DL (ref 3.5–5.2)
ALP SERPL-CCNC: 92 U/L (ref 55–135)
ALT SERPL W/O P-5'-P-CCNC: 11 U/L (ref 10–44)
ANION GAP SERPL CALC-SCNC: 10 MMOL/L (ref 8–16)
APTT BLDCRRT: 27.6 SEC (ref 21–32)
AST SERPL-CCNC: 10 U/L (ref 10–40)
BACTERIA #/AREA URNS AUTO: NORMAL /HPF
BASOPHILS # BLD AUTO: 0.03 K/UL (ref 0–0.2)
BASOPHILS NFR BLD: 0.5 % (ref 0–1.9)
BILIRUB SERPL-MCNC: 0.4 MG/DL (ref 0.1–1)
BILIRUB UR QL STRIP: NEGATIVE
BUN SERPL-MCNC: 17 MG/DL (ref 8–23)
CALCIUM SERPL-MCNC: 9.2 MG/DL (ref 8.7–10.5)
CHLORIDE SERPL-SCNC: 101 MMOL/L (ref 95–110)
CLARITY UR REFRACT.AUTO: CLEAR
CO2 SERPL-SCNC: 26 MMOL/L (ref 23–29)
COLOR UR AUTO: YELLOW
CREAT SERPL-MCNC: 1 MG/DL (ref 0.5–1.4)
DIFFERENTIAL METHOD: NORMAL
EOSINOPHIL # BLD AUTO: 0.1 K/UL (ref 0–0.5)
EOSINOPHIL NFR BLD: 1.8 % (ref 0–8)
ERYTHROCYTE [DISTWIDTH] IN BLOOD BY AUTOMATED COUNT: 14 % (ref 11.5–14.5)
EST. GFR  (NO RACE VARIABLE): >60 ML/MIN/1.73 M^2
GLUCOSE SERPL-MCNC: 238 MG/DL (ref 70–110)
GLUCOSE UR QL STRIP: ABNORMAL
HCT VFR BLD AUTO: 39.3 % (ref 37–48.5)
HGB BLD-MCNC: 12.9 G/DL (ref 12–16)
HGB UR QL STRIP: NEGATIVE
HYALINE CASTS UR QL AUTO: 0 /LPF
IMM GRANULOCYTES # BLD AUTO: 0.01 K/UL (ref 0–0.04)
IMM GRANULOCYTES NFR BLD AUTO: 0.2 % (ref 0–0.5)
KETONES UR QL STRIP: NEGATIVE
LEUKOCYTE ESTERASE UR QL STRIP: NEGATIVE
LYMPHOCYTES # BLD AUTO: 1.7 K/UL (ref 1–4.8)
LYMPHOCYTES NFR BLD: 27.4 % (ref 18–48)
MCH RBC QN AUTO: 27.9 PG (ref 27–31)
MCHC RBC AUTO-ENTMCNC: 32.8 G/DL (ref 32–36)
MCV RBC AUTO: 85 FL (ref 82–98)
MICROSCOPIC COMMENT: NORMAL
MONOCYTES # BLD AUTO: 0.5 K/UL (ref 0.3–1)
MONOCYTES NFR BLD: 7.5 % (ref 4–15)
NEUTROPHILS # BLD AUTO: 3.9 K/UL (ref 1.8–7.7)
NEUTROPHILS NFR BLD: 62.6 % (ref 38–73)
NITRITE UR QL STRIP: NEGATIVE
NRBC BLD-RTO: 0 /100 WBC
PH UR STRIP: 5 [PH] (ref 5–8)
PLATELET # BLD AUTO: 198 K/UL (ref 150–450)
PMV BLD AUTO: 11.5 FL (ref 9.2–12.9)
POCT GLUCOSE: 219 MG/DL (ref 70–110)
POTASSIUM SERPL-SCNC: 3.6 MMOL/L (ref 3.5–5.1)
PROT SERPL-MCNC: 7.3 G/DL (ref 6–8.4)
PROT UR QL STRIP: ABNORMAL
RBC # BLD AUTO: 4.62 M/UL (ref 4–5.4)
RBC #/AREA URNS AUTO: 2 /HPF (ref 0–4)
SODIUM SERPL-SCNC: 137 MMOL/L (ref 136–145)
SP GR UR STRIP: 1.02 (ref 1–1.03)
SQUAMOUS #/AREA URNS AUTO: 3 /HPF
TROPONIN I SERPL DL<=0.01 NG/ML-MCNC: <0.006 NG/ML (ref 0–0.03)
URN SPEC COLLECT METH UR: ABNORMAL
WBC # BLD AUTO: 6.24 K/UL (ref 3.9–12.7)
WBC #/AREA URNS AUTO: 0 /HPF (ref 0–5)
YEAST UR QL AUTO: NORMAL

## 2022-11-15 PROCEDURE — 99285 EMERGENCY DEPT VISIT HI MDM: CPT | Mod: ,,, | Performed by: PHYSICIAN ASSISTANT

## 2022-11-15 PROCEDURE — 80053 COMPREHEN METABOLIC PANEL: CPT | Performed by: EMERGENCY MEDICINE

## 2022-11-15 PROCEDURE — 93010 EKG 12-LEAD: ICD-10-PCS | Mod: ,,, | Performed by: INTERNAL MEDICINE

## 2022-11-15 PROCEDURE — 81001 URINALYSIS AUTO W/SCOPE: CPT | Performed by: EMERGENCY MEDICINE

## 2022-11-15 PROCEDURE — 82962 GLUCOSE BLOOD TEST: CPT

## 2022-11-15 PROCEDURE — 93010 ELECTROCARDIOGRAM REPORT: CPT | Mod: ,,, | Performed by: INTERNAL MEDICINE

## 2022-11-15 PROCEDURE — 63600175 PHARM REV CODE 636 W HCPCS: Performed by: PHYSICIAN ASSISTANT

## 2022-11-15 PROCEDURE — 99285 EMERGENCY DEPT VISIT HI MDM: CPT | Mod: 25

## 2022-11-15 PROCEDURE — 85730 THROMBOPLASTIN TIME PARTIAL: CPT | Performed by: PHYSICIAN ASSISTANT

## 2022-11-15 PROCEDURE — 84484 ASSAY OF TROPONIN QUANT: CPT | Performed by: PHYSICIAN ASSISTANT

## 2022-11-15 PROCEDURE — 99285 PR EMERGENCY DEPT VISIT,LEVEL V: ICD-10-PCS | Mod: ,,, | Performed by: PHYSICIAN ASSISTANT

## 2022-11-15 PROCEDURE — 25000003 PHARM REV CODE 250: Performed by: PHYSICIAN ASSISTANT

## 2022-11-15 PROCEDURE — 96375 TX/PRO/DX INJ NEW DRUG ADDON: CPT

## 2022-11-15 PROCEDURE — 96361 HYDRATE IV INFUSION ADD-ON: CPT

## 2022-11-15 PROCEDURE — 93005 ELECTROCARDIOGRAM TRACING: CPT

## 2022-11-15 PROCEDURE — 85025 COMPLETE CBC W/AUTO DIFF WBC: CPT | Performed by: EMERGENCY MEDICINE

## 2022-11-15 PROCEDURE — 96374 THER/PROPH/DIAG INJ IV PUSH: CPT

## 2022-11-15 RX ORDER — METOPROLOL TARTRATE 100 MG/1
100 TABLET ORAL
Status: COMPLETED | OUTPATIENT
Start: 2022-11-15 | End: 2022-11-15

## 2022-11-15 RX ORDER — NIFEDIPINE 30 MG/1
60 TABLET, EXTENDED RELEASE ORAL
Status: COMPLETED | OUTPATIENT
Start: 2022-11-15 | End: 2022-11-15

## 2022-11-15 RX ORDER — AMLODIPINE BESYLATE 10 MG/1
10 TABLET ORAL
Status: COMPLETED | OUTPATIENT
Start: 2022-11-15 | End: 2022-11-15

## 2022-11-15 RX ORDER — HYDRALAZINE HYDROCHLORIDE 20 MG/ML
10 INJECTION INTRAMUSCULAR; INTRAVENOUS
Status: COMPLETED | OUTPATIENT
Start: 2022-11-15 | End: 2022-11-15

## 2022-11-15 RX ORDER — ACETAMINOPHEN 500 MG
1000 TABLET ORAL
Status: COMPLETED | OUTPATIENT
Start: 2022-11-15 | End: 2022-11-15

## 2022-11-15 RX ORDER — METOCLOPRAMIDE HYDROCHLORIDE 5 MG/ML
10 INJECTION INTRAMUSCULAR; INTRAVENOUS
Status: COMPLETED | OUTPATIENT
Start: 2022-11-15 | End: 2022-11-15

## 2022-11-15 RX ADMIN — NIFEDIPINE 60 MG: 30 TABLET, FILM COATED, EXTENDED RELEASE ORAL at 01:11

## 2022-11-15 RX ADMIN — AMLODIPINE BESYLATE 10 MG: 10 TABLET ORAL at 01:11

## 2022-11-15 RX ADMIN — METOPROLOL TARTRATE 100 MG: 100 TABLET, FILM COATED ORAL at 01:11

## 2022-11-15 RX ADMIN — METOCLOPRAMIDE 10 MG: 5 INJECTION, SOLUTION INTRAMUSCULAR; INTRAVENOUS at 12:11

## 2022-11-15 RX ADMIN — HYDRALAZINE HYDROCHLORIDE 10 MG: 20 INJECTION, SOLUTION INTRAMUSCULAR; INTRAVENOUS at 03:11

## 2022-11-15 RX ADMIN — SODIUM CHLORIDE, POTASSIUM CHLORIDE, SODIUM LACTATE AND CALCIUM CHLORIDE 500 ML: 600; 310; 30; 20 INJECTION, SOLUTION INTRAVENOUS at 12:11

## 2022-11-15 RX ADMIN — ACETAMINOPHEN 1000 MG: 500 TABLET ORAL at 12:11

## 2022-11-15 NOTE — ED PROVIDER NOTES
Encounter Date: 11/15/2022       History     Chief Complaint   Patient presents with    Headache     HA and dizziness. Hx of cva in 2017. No neuro deficits on arrival.      67-year-old female with history of CVA (residual memory deficits, CTA at the time revealed severe stenosis of R M1, stenosis of basilar trunk, and occlusion of L PCA), DM T2, hypertension presents to the emergency department for headache.  Patient states that she woke up around 8:30 a.m. this morning with a frontal headache that gradually worsened.  States it was initially throbbing but is now constant currently rates it a 6/10 pressure.  States that she felt disoriented with difficulty walking and transient episode of bilateral blurred vision that lasted a few minutes and has now resolved.  States that she called her friend at around 11:00 a.m. who noticed that she was slurred on the phone with trouble getting words out which prompted her to call 911.  Noted to be hypertensive on arrival states that she did not take her medications this morning.    The history is provided by the patient.   Review of patient's allergies indicates:   Allergen Reactions    Atorvastatin     Hydrodiuril [hydrochlorothiazide]     Lisinopril      Past Medical History:   Diagnosis Date    Colon polyp     CVA (cerebral vascular accident)     DM2 (diabetes mellitus, type 2)     Essential hypertension      Past Surgical History:   Procedure Laterality Date    ANKLE FRACTURE SURGERY Right     ANKLE FRACTURE SURGERY      COLONOSCOPY N/A 12/11/2020    Procedure: COLONOSCOPY;  Surgeon: Marshal Rogers MD;  Location: [address]);  Service: Endoscopy;  Laterality: N/A;  11/18-plavix hold ok see te-covid pcw 12/8-tb    COLONOSCOPY W/ POLYPECTOMY      HYSTERECTOMY      no h/o  malignancy     Family History   Problem Relation Age of Onset    Alzheimer's disease Mother     Prostate cancer Father     Diabetes Father     Colon cancer Brother     Breast cancer Paternal Aunt      Heart disease Neg Hx     Stroke Neg Hx      Social History     Tobacco Use    Smoking status: Every Day     Packs/day: 0.25     Years: 45.00     Pack years: 11.25     Types: Cigarettes     Last attempt to quit: 2021     Years since quittin.4    Smokeless tobacco: Never   Substance Use Topics    Alcohol use: Yes     Comment: 1-2x's    Drug use: Not Currently     Review of Systems   Constitutional:  Negative for chills and fever.   HENT:  Negative for sore throat.    Respiratory:  Negative for cough and shortness of breath.    Cardiovascular:  Negative for chest pain.   Gastrointestinal:  Negative for abdominal pain.   Genitourinary:  Negative for difficulty urinating and dysuria.   Musculoskeletal: Negative.    Skin: Negative.    Neurological:  Positive for light-headedness and headaches.   Psychiatric/Behavioral:  Negative for confusion.      Physical Exam     Initial Vitals [11/15/22 1158]   BP Pulse Resp Temp SpO2   (!) 145/94 60 16 98.4 °F (36.9 °C) 100 %      MAP       --         Physical Exam    Nursing note and vitals reviewed.  Constitutional: She appears well-developed and well-nourished. She is not diaphoretic. No distress.   HENT:   Head: Normocephalic and atraumatic.   Eyes: Conjunctivae and EOM are normal. Pupils are equal, round, and reactive to light.   Neck: Neck supple.   Normal range of motion.  Cardiovascular:  Normal rate, regular rhythm, normal heart sounds and intact distal pulses.           Pulmonary/Chest: Breath sounds normal.   Abdominal: Abdomen is soft. Bowel sounds are normal. She exhibits no distension and no mass. There is no abdominal tenderness. There is no rebound and no guarding.   Musculoskeletal:         General: Normal range of motion.      Cervical back: Normal range of motion and neck supple.     Neurological: She is alert and oriented to person, place, and time. She has normal strength. No cranial nerve deficit. GCS score is 15. GCS eye subscore is 4. GCS verbal  subscore is 5. GCS motor subscore is 6.   Facial features symmetric, no dysarthria, negative pronator drift,  strength equal, negative dysdiadochokinesia, normal finger-to-nose exam    5/5 strength in the bilateral lower extremities.   Skin: Skin is warm and dry. Capillary refill takes less than 2 seconds.   Psychiatric: She has a normal mood and affect.       ED Course   Procedures  Labs Reviewed   COMPREHENSIVE METABOLIC PANEL - Abnormal; Notable for the following components:       Result Value    Glucose 238 (*)     All other components within normal limits   URINALYSIS, REFLEX TO URINE CULTURE - Abnormal; Notable for the following components:    Protein, UA 1+ (*)     Glucose, UA 3+ (*)     All other components within normal limits    Narrative:     Specimen Source->Urine   POCT GLUCOSE - Abnormal; Notable for the following components:    POCT Glucose 219 (*)     All other components within normal limits   CBC W/ AUTO DIFFERENTIAL   TROPONIN I   APTT   URINALYSIS MICROSCOPIC    Narrative:     Specimen Source->Urine        ECG Results              EKG 12-lead (Final result)  Result time 11/15/22 14:12:39      Final result by Interface, Lab In [facility] (11/15/22 14:12:39)                   Narrative:    Test Reason : I10,    Vent. Rate : 064 BPM     Atrial Rate : 064 BPM     P-R Int : 154 ms          QRS Dur : 094 ms      QT Int : 440 ms       P-R-T Axes : 034 -01 -08 degrees     QTc Int : 453 ms    Normal sinus rhythm  Nonspecific ST abnormality  Abnormal ECG  When compared with ECG of 07-JUL-2021 02:55,  Vent. rate has decreased BY  55 BPM  T wave inversion now evident in Inferior leads  Confirmed by LILA POLK, BERNARDA ([id number]) on 11/15/2022 2:12:30 PM    Referred By: [email or web address]   SELF           Confirmed By:BERNARDA SHARP MD                                  Imaging Results              MRI Brain Without Contrast (Final result)  Result time 11/15/22 17:55:51      Final result by Jun Calvillo MD (11/15/22  17:55:51)                   Impression:      No acute intracranial abnormality.    Remote infarct within the left occipital lobe and right cerebellum.  Left thalamic tiny remote lacunar type infarct.    Involutional change and suspected sequela chronic microvascular ischemic change.    Mild right mastoid air cells effusion.    Electronically signed by resident: Froilan Trinidad  Date:    11/15/2022  Time:    17:32    Electronically signed by: Jun Calvillo MD  Date:    11/15/2022  Time:    17:55               Narrative:    EXAMINATION:  MRI BRAIN WITHOUT CONTRAST    CLINICAL HISTORY:  Transient ischemic attack (TIA);    TECHNIQUE:  Multiplanar multisequence MR imaging of the brain was performed without contrast.    COMPARISON:  CT 11/15/2020    FINDINGS:  Intracranial compartment: Brain appears normally formed.  Age-related mild generalized cerebral volume loss.    Volume loss within the left occipital lobe associated with dilatation of the temporal horn of the left lateral ventricle.  The ventricles are otherwise midline without distortion by mass effect or acute hydrocephalus.  Additional encephalomalacia within the right cerebellum.  Upper left thalamic suspected tiny remote lacunar type infarct.  No acute infarct, edema, or hemorrhage.  Few scattered punctate foci of nonspecific T2/FLAIR hyperintense signal of the subcortical and periventricular white matter likely sequela of chronic microvascular ischemic change.  No extra-axial blood or fluid collections    Normal vascular flow voids are preserved.    Skull/extracranial contents (limited evaluation): Bone marrow signal intensity is normal.  Mild right mastoid air cells effusion.                                       CT Head Without Contrast (Final result)  Result time 11/15/22 14:25:40      Final result by Tc Kelly MD (11/15/22 14:25:40)                   Impression:      No acute intracranial process.  Small chronic infarcts.      Electronically  signed by: Tc Kelly  Date:    11/15/2022  Time:    14:25               Narrative:    EXAMINATION:  CT HEAD WITHOUT CONTRAST    CLINICAL HISTORY:  Transient ischemic attack (TIA);    TECHNIQUE:  Low dose axial images were obtained through the head.  Coronal and sagittal reformations were also performed. Contrast was not administered.    COMPARISON:  None.    FINDINGS:  There is no evidence of hydrocephalus mass effect intracranial hemorrhage or acute territorial infarct.  A small chronic left occipital infarct is identified with a small chronic right cerebellar infarct also noted.    The visualized sinuses and mastoid air cells are clear.                                       Medications   metoclopramide HCl injection 10 mg (10 mg Intravenous Given 11/15/22 1250)   acetaminophen tablet 1,000 mg (1,000 mg Oral Given 11/15/22 1250)   lactated ringers bolus 500 mL (0 mLs Intravenous Stopped 11/15/22 1342)   amLODIPine tablet 10 mg (10 mg Oral Given 11/15/22 1310)   NIFEdipine 24 hr tablet 60 mg (60 mg Oral Given 11/15/22 1310)   metoprolol tartrate (LOPRESSOR) tablet 100 mg (100 mg Oral Given 11/15/22 1310)   hydrALAZINE injection 10 mg (10 mg Intravenous Given 11/15/22 1511)     Medical Decision Making:   History:   Old Medical Records: I decided to obtain old medical records.  Initial Assessment:   67-year-old female with history of previous CVA presents ED due to concern for headache and transient neurological symptoms.  Reports brief episode of blurred visions morning and her friend noted that her speech was slurred over the phone and called 911.  Differential Diagnosis:   CVA, TIA, complex migraine  Independently Interpreted Test(s):   I have ordered and independently interpreted EKG Reading(s) - see summary below       <> Summary of EKG Reading(s): Sinus rhythm with a rate of 64.  Does show some flattening in the T-waves in lead 3 and V6.  These are noncontiguous.  Does not meet STEMI criteria.  Clinical  Tests:   Lab Tests: Reviewed and Ordered  Radiological Study: Ordered and Reviewed  Medical Tests: Ordered and Reviewed  ED Management:  No focal neurological deficits on exam.  Given her potential transient neurological symptoms CT head was obtained which shows no acute intracranial abnormalities.  Subsequent MRI brain did not show any acute stroke.  Her headache improved with medications in the ED.  Noted to be hypertensive on arrival which improved after 1 dose of IV hydralazine.  On repeat examination patient ambulatory without difficulty requesting to go home.  Tolerating p.o..  Denies any chest pain or shortness of breath and she did have some T-wave depressions in 3 and V6 with a normal troponin and doubt ACS patient is currently taking aspirin and Crestor.  Will discharge with outpatient follow-up with Neurology.  Discussed strict return to ED precautions for any new or worsening symptoms.          Attending Attestation:     Physician Attestation Statement for NP/PA:       Other NP/PA Attestation Additions:      Medical Decision Making: Pt reports transient neuro symptoms, patient states that she is at her baseline at this time and no persistent neuro deficits found on exam.  Patient was significantly hypertensive, and this may have contributed to her symptoms with hypertensive emergency.  As MRI shows no CVA and symptoms have resolved at this time, patient is stable for discharge however will refer to Neurology for further evaluation.    I discussed the patient's care with Advanced Practice Clinician, and did see this patient with the APC.  I reviewed their note and agree with the history, physical, assessment, diagnosis, treatment, and disposition plan provided by the Advanced Practice Clinician.                ED Course as of 12/01/22 1536   Tue Nov 15, 2022   1309 Troponin I: <0.006 [HJ]   1309 WBC: 6.24 [HJ]   1310 POCT Glucose(!): 219 [HJ]   1536 BP(!): 172/76 [JR]      ED Course User Index  [HJ] Sonny  STEPH Canas  [JR] Leanna Gomez MD                   Clinical Impression:   Final diagnoses:  [I10] Hypertension  [R29.818] Transient neurological symptoms (Primary)        ED Disposition Condition    Discharge Stable          ED Prescriptions    None       Follow-up Information       Follow up With Specialties Details Why Contact Info Additional Information    [facility] - Neurology [facility] Neurology Schedule an appointment as soon as possible for a visit in 1 day  [address]  [facility] [address] [phone] [facility], 7th Floor Please park in [facility] and take Clinic elevator             Sonny Canas PA-C  11/15/22 1923       Sonny Canas PA-C  11/15/22 1924       Leanna Gomez MD  12/01/22 1536       Leanna Gomez MD  12/01/22 1538

## 2022-11-16 ENCOUNTER — TELEPHONE (OUTPATIENT)
Dept: PRIMARY CARE CLINIC | Facility: CLINIC | Age: 67
End: 2022-11-16
Payer: MEDICARE

## 2022-11-16 NOTE — TELEPHONE ENCOUNTER
----- Message from Deepika Giles sent at 11/16/2022 10:39 AM CST -----  Contact: [phone]  Patient called, want to inform that she was seen at the emergency for stroke symptoms. Was told to setup an appointment with PCP ASAP. Patient also want to talk with PCP about her care taker Chloe Du.  # [phone]. Thank you

## 2022-11-16 NOTE — DISCHARGE INSTRUCTIONS
You were seen in the emergency department for concerns for headache, slurred speech.  These are potentially signs and symptoms of a stroke however your CT scan and MRI today did not show any signs of acute stroke.  Please continue taking your aspirin and Crestor as scheduled.  We recommend close follow-up with your neurologist.  If you develop any new or worsening symptoms please return to the ED sooner

## 2022-11-29 ENCOUNTER — TELEPHONE (OUTPATIENT)
Dept: PRIMARY CARE CLINIC | Facility: CLINIC | Age: 67
End: 2022-11-29
Payer: MEDICARE

## 2022-11-29 NOTE — TELEPHONE ENCOUNTER
----- Message from Grace Lemons sent at 11/29/2022  7:09 AM CST -----  Contact: [phone]  Caller is requesting an earlier appointment then we can schedule.  Caller is requesting a message be sent to the provider.  When is the next available appointment with their provider:    Reason for the appointment:  book out/hosp discharge 11/29  Patient preference of timeframe to be scheduled:    Would the patient like a call back, or a response through their [facility] portal?:  call back to r/s  Comments:   Please call  Priscilla/caretaker if no answer

## 2022-12-01 ENCOUNTER — NURSE TRIAGE (OUTPATIENT)
Dept: ADMINISTRATIVE | Facility: CLINIC | Age: 67
End: 2022-12-01
Payer: MEDICARE

## 2022-12-01 ENCOUNTER — TELEPHONE (OUTPATIENT)
Dept: PRIMARY CARE CLINIC | Facility: CLINIC | Age: 67
End: 2022-12-01
Payer: MEDICARE

## 2022-12-01 NOTE — TELEPHONE ENCOUNTER
Spoke with pt she have been taking (nifedipine 60 mg) 24 hr  Pt said it make her shaking,blurry and nervous .   Pt stop taking it till she come to her appt.    Pt have appt 12/5/22 at 10;30 am

## 2022-12-01 NOTE — TELEPHONE ENCOUNTER
----- Message from Ann Nichole sent at 12/1/2022  2:15 PM CST -----  Contact: [phone]  Pt is calling she is needing to speak to you in regards to a medication she is taking and the reaction she is having to it please paco return call     She is shaking,blurry and nervous

## 2022-12-01 NOTE — TELEPHONE ENCOUNTER
"Shabnam calling states she feels weird and unusual since taking meds this morning Meds included Metformin, Aspirin and Nifedipine. States she feels nervous & had blurred vision that began suddenly while sitting down. Sounds anxious. Denies SOB & chest pain at this time. Has not checked Blood glucose yet but has eaten lunch. Shabnam states symptoms seem to be subsiding now. Reports recent hospitalization for "minor stroke." Advised per triage protocol to go to nearest ED now for physician cy. Instructed to call ems 911 now if no immediate . V/u.   Reason for Disposition   Patient sounds very sick or weak to the triager    Additional Information   Negative: SEVERE difficulty breathing (e.g., struggling for each breath, speaks in single words)   Negative: Bluish (or gray) lips or face   Negative: Difficult to awaken or acting confused (e.g., disoriented, slurred speech)   Negative: Hysterical or combative behavior   Negative: Sounds like a life-threatening emergency to the triager   Negative: Chest pain   Negative: Palpitations, skipped heart beat, or rapid heart beat   Negative: Difficulty breathing and persists > 10 minutes and not relieved by reassurance provided by triager   Negative: Lightheadedness or dizziness and persists > 10 minutes and not relieved by reassurance provided by triager   Negative: Substance use (drug use) or unhealthy alcohol use, known or suspected, and feeling very shaky (i.e., visible tremors of hands)    Protocols used: Anxiety and Panic Attack-A-OH    "

## 2022-12-05 ENCOUNTER — OFFICE VISIT (OUTPATIENT)
Dept: PRIMARY CARE CLINIC | Facility: CLINIC | Age: 67
End: 2022-12-05
Payer: MEDICARE

## 2022-12-05 VITALS
OXYGEN SATURATION: 99 % | DIASTOLIC BLOOD PRESSURE: 82 MMHG | SYSTOLIC BLOOD PRESSURE: 143 MMHG | TEMPERATURE: 98 F | HEART RATE: 69 BPM | RESPIRATION RATE: 18 BRPM | BODY MASS INDEX: 34.2 KG/M2 | HEIGHT: 65 IN | WEIGHT: 205.25 LBS

## 2022-12-05 DIAGNOSIS — G31.84 MILD COGNITIVE IMPAIRMENT: ICD-10-CM

## 2022-12-05 DIAGNOSIS — E78.5 HYPERLIPIDEMIA ASSOCIATED WITH TYPE 2 DIABETES MELLITUS: ICD-10-CM

## 2022-12-05 DIAGNOSIS — Z09 HOSPITAL DISCHARGE FOLLOW-UP: ICD-10-CM

## 2022-12-05 DIAGNOSIS — Z86.73 HISTORY OF STROKE: ICD-10-CM

## 2022-12-05 DIAGNOSIS — E11.69 HYPERLIPIDEMIA ASSOCIATED WITH TYPE 2 DIABETES MELLITUS: ICD-10-CM

## 2022-12-05 DIAGNOSIS — I10 HYPERTENSION, UNSPECIFIED TYPE: Primary | ICD-10-CM

## 2022-12-05 DIAGNOSIS — E11.69 TYPE 2 DIABETES MELLITUS WITH OTHER SPECIFIED COMPLICATION, UNSPECIFIED WHETHER LONG TERM INSULIN USE: ICD-10-CM

## 2022-12-05 DIAGNOSIS — F33.41 RECURRENT MAJOR DEPRESSIVE DISORDER, IN PARTIAL REMISSION: ICD-10-CM

## 2022-12-05 DIAGNOSIS — I69.311 MEMORY DEFICIT AFTER CEREBRAL INFARCTION: ICD-10-CM

## 2022-12-05 LAB
ALBUMIN/CREAT UR: 139.5 UG/MG (ref 0–30)
CREAT UR-MCNC: 190 MG/DL (ref 15–325)
MICROALBUMIN UR DL<=1MG/L-MCNC: 265 UG/ML

## 2022-12-05 PROCEDURE — 1101F PT FALLS ASSESS-DOCD LE1/YR: CPT | Mod: CPTII,S$GLB,, | Performed by: INTERNAL MEDICINE

## 2022-12-05 PROCEDURE — 3288F PR FALLS RISK ASSESSMENT DOCUMENTED: ICD-10-PCS | Mod: CPTII,S$GLB,, | Performed by: INTERNAL MEDICINE

## 2022-12-05 PROCEDURE — 99214 PR OFFICE/OUTPT VISIT, EST, LEVL IV, 30-39 MIN: ICD-10-PCS | Mod: S$GLB,,, | Performed by: INTERNAL MEDICINE

## 2022-12-05 PROCEDURE — 3079F DIAST BP 80-89 MM HG: CPT | Mod: CPTII,S$GLB,, | Performed by: INTERNAL MEDICINE

## 2022-12-05 PROCEDURE — 1159F MED LIST DOCD IN RCRD: CPT | Mod: CPTII,S$GLB,, | Performed by: INTERNAL MEDICINE

## 2022-12-05 PROCEDURE — 82570 ASSAY OF URINE CREATININE: CPT | Performed by: INTERNAL MEDICINE

## 2022-12-05 PROCEDURE — 3052F HG A1C>EQUAL 8.0%<EQUAL 9.0%: CPT | Mod: CPTII,S$GLB,, | Performed by: INTERNAL MEDICINE

## 2022-12-05 PROCEDURE — 1101F PR PT FALLS ASSESS DOC 0-1 FALLS W/OUT INJ PAST YR: ICD-10-PCS | Mod: CPTII,S$GLB,, | Performed by: INTERNAL MEDICINE

## 2022-12-05 PROCEDURE — 82043 UR ALBUMIN QUANTITATIVE: CPT | Performed by: INTERNAL MEDICINE

## 2022-12-05 PROCEDURE — 99214 OFFICE O/P EST MOD 30 MIN: CPT | Mod: S$GLB,,, | Performed by: INTERNAL MEDICINE

## 2022-12-05 PROCEDURE — 1160F RVW MEDS BY RX/DR IN RCRD: CPT | Mod: CPTII,S$GLB,, | Performed by: INTERNAL MEDICINE

## 2022-12-05 PROCEDURE — 1160F PR REVIEW ALL MEDS BY PRESCRIBER/CLIN PHARMACIST DOCUMENTED: ICD-10-PCS | Mod: CPTII,S$GLB,, | Performed by: INTERNAL MEDICINE

## 2022-12-05 PROCEDURE — 3079F PR MOST RECENT DIASTOLIC BLOOD PRESSURE 80-89 MM HG: ICD-10-PCS | Mod: CPTII,S$GLB,, | Performed by: INTERNAL MEDICINE

## 2022-12-05 PROCEDURE — 99499 RISK ADDL DX/OHS AUDIT: ICD-10-PCS | Mod: S$GLB,,, | Performed by: INTERNAL MEDICINE

## 2022-12-05 PROCEDURE — 1125F AMNT PAIN NOTED PAIN PRSNT: CPT | Mod: CPTII,S$GLB,, | Performed by: INTERNAL MEDICINE

## 2022-12-05 PROCEDURE — 3008F BODY MASS INDEX DOCD: CPT | Mod: CPTII,S$GLB,, | Performed by: INTERNAL MEDICINE

## 2022-12-05 PROCEDURE — 3077F SYST BP >= 140 MM HG: CPT | Mod: CPTII,S$GLB,, | Performed by: INTERNAL MEDICINE

## 2022-12-05 PROCEDURE — 1125F PR PAIN SEVERITY QUANTIFIED, PAIN PRESENT: ICD-10-PCS | Mod: CPTII,S$GLB,, | Performed by: INTERNAL MEDICINE

## 2022-12-05 PROCEDURE — 99999 PR PBB SHADOW E&M-EST. PATIENT-LVL IV: ICD-10-PCS | Mod: PBBFAC,,, | Performed by: INTERNAL MEDICINE

## 2022-12-05 PROCEDURE — 3288F FALL RISK ASSESSMENT DOCD: CPT | Mod: CPTII,S$GLB,, | Performed by: INTERNAL MEDICINE

## 2022-12-05 PROCEDURE — 3052F PR MOST RECENT HEMOGLOBIN A1C LEVEL 8.0 - < 9.0%: ICD-10-PCS | Mod: CPTII,S$GLB,, | Performed by: INTERNAL MEDICINE

## 2022-12-05 PROCEDURE — 3008F PR BODY MASS INDEX (BMI) DOCUMENTED: ICD-10-PCS | Mod: CPTII,S$GLB,, | Performed by: INTERNAL MEDICINE

## 2022-12-05 PROCEDURE — 3077F PR MOST RECENT SYSTOLIC BLOOD PRESSURE >= 140 MM HG: ICD-10-PCS | Mod: CPTII,S$GLB,, | Performed by: INTERNAL MEDICINE

## 2022-12-05 PROCEDURE — 1159F PR MEDICATION LIST DOCUMENTED IN MEDICAL RECORD: ICD-10-PCS | Mod: CPTII,S$GLB,, | Performed by: INTERNAL MEDICINE

## 2022-12-05 PROCEDURE — 99999 PR PBB SHADOW E&M-EST. PATIENT-LVL IV: CPT | Mod: PBBFAC,,, | Performed by: INTERNAL MEDICINE

## 2022-12-05 PROCEDURE — 99499 UNLISTED E&M SERVICE: CPT | Mod: S$GLB,,, | Performed by: INTERNAL MEDICINE

## 2022-12-05 RX ORDER — LOSARTAN POTASSIUM 50 MG/1
50 TABLET ORAL DAILY
Qty: 90 TABLET | Refills: 3 | Status: SHIPPED | OUTPATIENT
Start: 2022-12-05 | End: 2023-08-07 | Stop reason: SDUPTHER

## 2022-12-05 RX ORDER — ASPIRIN 81 MG/1
81 TABLET ORAL DAILY
Qty: 90 TABLET | Refills: 3 | Status: SHIPPED | OUTPATIENT
Start: 2022-12-05 | End: 2023-12-05

## 2022-12-05 RX ORDER — PRAVASTATIN SODIUM 40 MG/1
40 TABLET ORAL DAILY
Qty: 90 TABLET | Refills: 3 | Status: SHIPPED | OUTPATIENT
Start: 2022-12-05 | End: 2023-12-05

## 2022-12-05 NOTE — PROGRESS NOTES
Subjective:      Patient ID: Shabnam Noble is a 67 y.o. female.    Chief Complaint: Follow-up      Here today to follow up on recent hospital visit. The patient was seen in the hospital on 11/15/2022 and was discharged on 11/15/2022.    Past Medical History:   Diagnosis Date    Colon polyp     CVA (cerebral vascular accident)     DM2 (diabetes mellitus, type 2)     Essential hypertension      Past Surgical History:   Procedure Laterality Date    ANKLE FRACTURE SURGERY Right     ANKLE FRACTURE SURGERY      COLONOSCOPY N/A 12/11/2020    Procedure: COLONOSCOPY;  Surgeon: Marshal Rogers MD;  Location: [facility] ([address] [place] [address]);  Service: Endoscopy;  Laterality: N/A;  11/18-plavix hold ok see te-covid pcw 12/8-tb    COLONOSCOPY W/ POLYPECTOMY      HYSTERECTOMY      no h/o  malignancy       ED/hospital course:  67-year-old female with history of CVA (residual memory deficits, CTA at the time revealed severe stenosis of R M1, stenosis of basilar trunk, and occlusion of L PCA), DM T2, hypertension presents to the emergency department for headache.  Patient states that she woke up around 8:30 a.m. this morning with a frontal headache that gradually worsened.  States it was initially throbbing but is now constant currently rates it a 6/10 pressure.  States that she felt disoriented with difficulty walking and transient episode of bilateral blurred vision that lasted a few minutes and has now resolved.  States that she called her friend at around 11:00 a.m. who noticed that she was slurred on the phone with trouble getting words out which prompted her to call 911.  Noted to be hypertensive on arrival states that she did not take her medications this morning.    MRI unremarkable other than remote infarct    Follow up labs/tests/specialists needed - Neurology    BP noted to be elevated today. Patient request to be off of nifedipine as she feels nifedipine is giving her side effects. Note that she is already taking amlodipine for  CCB. Will start losartan at this time, d/c nifedipine.     Will trial pravastatin for cholesterol. If side effect with pravastatin, will change to zetia.     Denies any chest pain, shortness of breath, nausea vomiting constipation diarrhea, blood in stool, heartburn    Review of Systems   Constitutional:  Negative for chills, fever and weight loss.   HENT:  Negative for congestion, ear pain and sore throat.    Eyes:  Negative for double vision.   Respiratory:  Negative for cough and shortness of breath.    Cardiovascular:  Negative for chest pain, palpitations and leg swelling.   Gastrointestinal:  Negative for abdominal pain, heartburn, nausea and vomiting.   Skin:  Negative for rash.   Neurological:  Negative for dizziness, tingling and headaches.   Psychiatric/Behavioral:  Negative for depression.         Current Outpatient Medications:     amLODIPine (NORVASC) 10 MG tablet, Take 1 tablet (10 mg total) by mouth once daily., Disp: 90 tablet, Rfl: 3    b complex vitamins capsule, Take 1 capsule by mouth once daily., Disp: , Rfl:     blood glucose control, high Soln, 1 drop by Misc.(Non-Drug; Combo Route) route once daily. ICD 10 code: E11.59, Disp: 3 each, Rfl: 3    blood-glucose meter kit, Use as instructed. ICD 10 code E11.59, Disp: 1 each, Rfl: 0    empagliflozin (JARDIANCE) 10 mg tablet, Take 1 tablet (10 mg total) by mouth once daily., Disp: 90 tablet, Rfl: 1    ezetimibe (ZETIA) 10 mg tablet, Take 1 tablet (10 mg total) by mouth once daily., Disp: 90 tablet, Rfl: 3    furosemide (LASIX) 20 MG tablet, Take 1 tablet (20 mg total) by mouth daily as needed (leg swelling)., Disp: 90 tablet, Rfl: 1    ibuprofen (ADVIL,MOTRIN) 800 MG tablet, Take 1 tablet (800 mg total) by mouth 3 (three) times daily as needed for Pain., Disp: 90 tablet, Rfl: 1    metFORMIN (GLUCOPHAGE) 1000 MG tablet, Take 1 tablet (1,000 mg total) by mouth 2 (two) times daily with meals., Disp: 180 tablet, Rfl: 3    metoprolol tartrate (LOPRESSOR)  100 MG tablet, Take 1 tablet (100 mg total) by mouth 2 (two) times daily., Disp: 120 tablet, Rfl: 5    aspirin (ECOTRIN) 81 MG EC tablet, Take 1 tablet (81 mg total) by mouth once daily., Disp: 90 tablet, Rfl: 3    losartan (COZAAR) 50 MG tablet, Take 1 tablet (50 mg total) by mouth once daily., Disp: 90 tablet, Rfl: 3    pravastatin (PRAVACHOL) 40 MG tablet, Take 1 tablet (40 mg total) by mouth once daily., Disp: 90 tablet, Rfl: 3    Lab Results   Component Value Date    HGBA1C 9.0 (H) 04/07/2022    HGBA1C 8.4 (H) 01/18/2022    HGBA1C 9.2 (H) 06/03/2021     Lab Results   Component Value Date    MICALBCREAT 49.0 (H) 06/03/2021     Lab Results   Component Value Date    LDLCALC 117.4 06/03/2021    LDLCALC 46.6 (L) 08/12/2020    CHOL 183 06/03/2021    HDL 40 06/03/2021    TRIG 128 06/03/2021       Lab Results   Component Value Date     11/15/2022    K 3.6 11/15/2022     11/15/2022    CO2 26 11/15/2022     (H) 11/15/2022    BUN 17 11/15/2022    CREATININE 1.0 11/15/2022    CALCIUM 9.2 11/15/2022    PROT 7.3 11/15/2022    ALBUMIN 3.5 11/15/2022    BILITOT 0.4 11/15/2022    ALKPHOS 92 11/15/2022    AST 10 11/15/2022    ALT 11 11/15/2022    ANIONGAP 10 11/15/2022    ESTGFRAFRICA >60.0 01/18/2022    EGFRNONAA 58.8 (A) 01/18/2022    WBC 6.24 11/15/2022    HGB 12.9 11/15/2022    HGB 10.9 (L) 07/10/2021    HCT 39.3 11/15/2022    MCV 85 11/15/2022     11/15/2022    TSH 0.560 06/03/2021    HEPCAB Negative 01/21/2020       No results found for: LH, FSH, TOTALTESTOST, PROGESTERONE, ESTRADIOL, BLMHDAIH92VJ, YBCKPDDY86, FERRITIN, IRON, TRANSFERRIN, TIBC, FESATURATED, ZINC    Social History     Social History Narrative    Not on file     Family History   Problem Relation Age of Onset    Alzheimer's disease Mother     Prostate cancer Father     Diabetes Father     Colon cancer Brother     Breast cancer Paternal Aunt     Heart disease Neg Hx     Stroke Neg Hx      Vitals:    12/05/22 1016   BP: (!) 143/82  "  Pulse: 69   Resp: 18   Temp: 98 °F (36.7 °C)   SpO2: 99%   Weight: 93.1 kg (205 lb 4 oz)   Height: 5' 5" (1.651 m)   PainSc:   4     Objective:   Physical Exam  Vitals reviewed.   Constitutional:       Appearance: Normal appearance.   HENT:      Head: Normocephalic.   Eyes:      Pupils: Pupils are equal, round, and reactive to light.   Cardiovascular:      Rate and Rhythm: Normal rate.      Pulses: Normal pulses.      Heart sounds: Normal heart sounds.   Pulmonary:      Effort: Pulmonary effort is normal.      Breath sounds: Normal breath sounds.   Abdominal:      General: Bowel sounds are normal.      Palpations: Abdomen is soft.   Musculoskeletal:         General: Normal range of motion.   Skin:     General: Skin is warm.   Neurological:      General: No focal deficit present.      Mental Status: She is alert and oriented to person, place, and time. Mental status is at baseline.      Cranial Nerves: No cranial nerve deficit.      Sensory: No sensory deficit.      Motor: No weakness.      Coordination: Coordination normal.      Gait: Gait normal.      Deep Tendon Reflexes: Reflexes normal.   Psychiatric:         Mood and Affect: Mood normal.     Assessment/Plan     Shabnam Noble is a 67 y.o.female with:    Hypertension, unspecified type  -     losartan (COZAAR) 50 MG tablet; Take 1 tablet (50 mg total) by mouth once daily.  Dispense: 90 tablet; Refill: 3  -     pravastatin (PRAVACHOL) 40 MG tablet; Take 1 tablet (40 mg total) by mouth once daily.  Dispense: 90 tablet; Refill: 3    History of stroke  -     aspirin (ECOTRIN) 81 MG EC tablet; Take 1 tablet (81 mg total) by mouth once daily.  Dispense: 90 tablet; Refill: 3  -     pravastatin (PRAVACHOL) 40 MG tablet; Take 1 tablet (40 mg total) by mouth once daily.  Dispense: 90 tablet; Refill: 3    Hospital discharge follow-up  -     aspirin (ECOTRIN) 81 MG EC tablet; Take 1 tablet (81 mg total) by mouth once daily.  Dispense: 90 tablet; Refill: 3  -     losartan " (COZAAR) 50 MG tablet; Take 1 tablet (50 mg total) by mouth once daily.  Dispense: 90 tablet; Refill: 3  -     pravastatin (PRAVACHOL) 40 MG tablet; Take 1 tablet (40 mg total) by mouth once daily.  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  Yes.  Diagnostic tests reviewed/disposition: No diagnosic tests pending after this hospitalization.  Disease/illness education: HTN  Home health/community services discussion/referrals: Patient does not have home health established from hospital visit.  They do not need home health.  If needed, we will set up home health for the patient.   Establishment or re-establishment of referral orders for community resources: No other necessary community resources.   Discussion with other health care providers: No discussion with other health care providers necessary.          Chronic conditions status updated as per HPI.  Other than changes above, cont current medications and maintain follow up with specialists.  Return to clinic in Follow up in about 6 months (around 6/5/2023).      Krys Michael MD  [facility]    Patient Instructions   Labs are fasting. Please do not eat or drink anything other than water for 6-8 hrs prior to your lab work.    6 months for well visit or sooner if needed.

## 2022-12-06 ENCOUNTER — TELEPHONE (OUTPATIENT)
Dept: PRIMARY CARE CLINIC | Facility: CLINIC | Age: 67
End: 2022-12-06
Payer: MEDICARE

## 2022-12-06 DIAGNOSIS — G31.84 MILD COGNITIVE IMPAIRMENT: Primary | ICD-10-CM

## 2022-12-06 DIAGNOSIS — Z79.899 MEDICATION MANAGEMENT: ICD-10-CM

## 2022-12-06 NOTE — TELEPHONE ENCOUNTER
----- Message from Kelly Sanchez sent at 12/6/2022  3:21 PM CST -----  Contact: [phone] Viry/nurse with [facility] states the pt is non compliant with her medications and is not filling medications. Viry states calling her in the morning is the best time.    Please call and advise.

## 2022-12-06 NOTE — TELEPHONE ENCOUNTER
Spoke With  Viry/nurse with [facility] states the pt is non compliant with her medications and is not filling medications. Viry states calling her in the morning is the best time.        Please call and advise.     Skill Nurse Medication Mgt     Order Is pending

## 2023-01-03 ENCOUNTER — TELEPHONE (OUTPATIENT)
Dept: PRIMARY CARE CLINIC | Facility: CLINIC | Age: 68
End: 2023-01-03
Payer: MEDICARE

## 2023-01-03 NOTE — TELEPHONE ENCOUNTER
----- Message from Shaji Sorto sent at 1/3/2023 10:18 AM CST -----  Contact: Pt [phone]  Pt called in regards to getting home health care orders in for the new year she states it has  please advise

## 2023-01-03 NOTE — TELEPHONE ENCOUNTER
----- Message from Vidhi Casper sent at 1/3/2023  2:05 PM CST -----  Contact: [phone] patient  Pt is calling in regards to her previous message. Pt would like Dr Michael to call her first before she puts the orders in for HH please. Pt is asking for a call today ASA. Thank you.

## 2023-01-04 ENCOUNTER — PATIENT MESSAGE (OUTPATIENT)
Dept: ADMINISTRATIVE | Facility: HOSPITAL | Age: 68
End: 2023-01-04
Payer: MEDICARE

## 2023-02-20 ENCOUNTER — PES CALL (OUTPATIENT)
Dept: ADMINISTRATIVE | Facility: CLINIC | Age: 68
End: 2023-02-20
Payer: MEDICARE

## 2023-03-22 ENCOUNTER — PATIENT OUTREACH (OUTPATIENT)
Dept: ADMINISTRATIVE | Facility: HOSPITAL | Age: 68
End: 2023-03-22
Payer: MEDICARE

## 2023-03-22 NOTE — PROGRESS NOTES
Patient due for the following    DEXA Scan     Shingles Vaccine (1 of 2)    Pneumococcal Vaccines (Age 65+) (2 - PCV)    Eye Exam     Foot Exam     COVID-19 Vaccine (4 - Booster for Pfizer series)    Lipid Panel     Hemoglobin A1c     Influenza Vaccine (1)    Mammogram       Immunizations: reviewed and updated  Care Everywhere: triggered  Care Teams: up to date  Outreach: CHEMO

## 2023-04-03 ENCOUNTER — PES CALL (OUTPATIENT)
Dept: ADMINISTRATIVE | Facility: CLINIC | Age: 68
End: 2023-04-03
Payer: MEDICARE

## 2023-04-03 ENCOUNTER — PATIENT MESSAGE (OUTPATIENT)
Dept: ADMINISTRATIVE | Facility: HOSPITAL | Age: 68
End: 2023-04-03
Payer: MEDICARE

## 2023-04-14 ENCOUNTER — PES CALL (OUTPATIENT)
Dept: ADMINISTRATIVE | Facility: CLINIC | Age: 68
End: 2023-04-14
Payer: MEDICARE

## 2023-05-24 ENCOUNTER — PES CALL (OUTPATIENT)
Dept: ADMINISTRATIVE | Facility: CLINIC | Age: 68
End: 2023-05-24
Payer: MEDICARE

## 2023-06-06 ENCOUNTER — PATIENT MESSAGE (OUTPATIENT)
Dept: ADMINISTRATIVE | Facility: HOSPITAL | Age: 68
End: 2023-06-06
Payer: MEDICARE

## 2023-06-28 ENCOUNTER — PES CALL (OUTPATIENT)
Dept: ADMINISTRATIVE | Facility: CLINIC | Age: 68
End: 2023-06-28
Payer: MEDICARE

## 2023-07-10 ENCOUNTER — TELEPHONE (OUTPATIENT)
Dept: PRIMARY CARE CLINIC | Facility: CLINIC | Age: 68
End: 2023-07-10
Payer: MEDICARE

## 2023-07-10 NOTE — TELEPHONE ENCOUNTER
----- Message from Lauren Cameron sent at 7/10/2023 11:51 AM CDT -----  Contact: [phone]  [email or web address]     Patient is calling for Medical Advice regarding: pt has been experiencing headaches since falling    How long has patient had these symptoms: almost 2 weeks     Pharmacy name and phone#:   [id number] - [address] [address]  Phone: [phone] Fax: [phone]        Would like response via [employer]:  no     Comments: scheduled pt for 7/31 but pt would like to be seen sooner

## 2023-08-07 ENCOUNTER — TELEPHONE (OUTPATIENT)
Dept: PRIMARY CARE CLINIC | Facility: CLINIC | Age: 68
End: 2023-08-07
Payer: MEDICARE

## 2023-08-07 ENCOUNTER — HOSPITAL ENCOUNTER (EMERGENCY)
Facility: OTHER | Age: 68
Discharge: HOME | End: 2023-08-07
Attending: EMERGENCY MEDICINE
Payer: MEDICARE

## 2023-08-07 ENCOUNTER — NURSE TRIAGE (OUTPATIENT)
Dept: ADMINISTRATIVE | Facility: CLINIC | Age: 68
End: 2023-08-07
Payer: MEDICARE

## 2023-08-07 VITALS
HEIGHT: 65 IN | WEIGHT: 180 LBS | OXYGEN SATURATION: 98 % | TEMPERATURE: 98 F | DIASTOLIC BLOOD PRESSURE: 86 MMHG | SYSTOLIC BLOOD PRESSURE: 170 MMHG | BODY MASS INDEX: 29.99 KG/M2 | RESPIRATION RATE: 16 BRPM | HEART RATE: 68 BPM

## 2023-08-07 DIAGNOSIS — I10 UNCONTROLLED HYPERTENSION: ICD-10-CM

## 2023-08-07 DIAGNOSIS — I10 HTN (HYPERTENSION): ICD-10-CM

## 2023-08-07 DIAGNOSIS — I10 HYPERTENSION, UNSPECIFIED TYPE: ICD-10-CM

## 2023-08-07 DIAGNOSIS — M79.89 LEG SWELLING: ICD-10-CM

## 2023-08-07 DIAGNOSIS — I16.0 HYPERTENSIVE URGENCY: Primary | ICD-10-CM

## 2023-08-07 DIAGNOSIS — I10 BENIGN ESSENTIAL HTN: ICD-10-CM

## 2023-08-07 DIAGNOSIS — R79.89 ELEVATED SERUM CREATININE: ICD-10-CM

## 2023-08-07 DIAGNOSIS — Z09 HOSPITAL DISCHARGE FOLLOW-UP: ICD-10-CM

## 2023-08-07 LAB
ALBUMIN SERPL BCP-MCNC: 3.8 G/DL (ref 3.5–5.2)
ALP SERPL-CCNC: 96 U/L (ref 55–135)
ALT SERPL W/O P-5'-P-CCNC: 10 U/L (ref 10–44)
ANION GAP SERPL CALC-SCNC: 12 MMOL/L (ref 8–16)
AST SERPL-CCNC: 11 U/L (ref 10–40)
BASOPHILS # BLD AUTO: 0.02 K/UL (ref 0–0.2)
BASOPHILS NFR BLD: 0.3 % (ref 0–1.9)
BILIRUB SERPL-MCNC: 0.3 MG/DL (ref 0.1–1)
BNP SERPL-MCNC: 249 PG/ML (ref 0–99)
BUN SERPL-MCNC: 19 MG/DL (ref 8–23)
CALCIUM SERPL-MCNC: 9.8 MG/DL (ref 8.7–10.5)
CHLORIDE SERPL-SCNC: 104 MMOL/L (ref 95–110)
CO2 SERPL-SCNC: 24 MMOL/L (ref 23–29)
CREAT SERPL-MCNC: 1.5 MG/DL (ref 0.5–1.4)
DIFFERENTIAL METHOD: NORMAL
EOSINOPHIL # BLD AUTO: 0.1 K/UL (ref 0–0.5)
EOSINOPHIL NFR BLD: 2 % (ref 0–8)
ERYTHROCYTE [DISTWIDTH] IN BLOOD BY AUTOMATED COUNT: 13.8 % (ref 11.5–14.5)
EST. GFR  (NO RACE VARIABLE): 38 ML/MIN/1.73 M^2
GLUCOSE SERPL-MCNC: 243 MG/DL (ref 70–110)
HCT VFR BLD AUTO: 39.7 % (ref 37–48.5)
HGB BLD-MCNC: 13 G/DL (ref 12–16)
IMM GRANULOCYTES # BLD AUTO: 0.01 K/UL (ref 0–0.04)
IMM GRANULOCYTES NFR BLD AUTO: 0.2 % (ref 0–0.5)
LYMPHOCYTES # BLD AUTO: 1.6 K/UL (ref 1–4.8)
LYMPHOCYTES NFR BLD: 24.2 % (ref 18–48)
MCH RBC QN AUTO: 27.5 PG (ref 27–31)
MCHC RBC AUTO-ENTMCNC: 32.7 G/DL (ref 32–36)
MCV RBC AUTO: 84 FL (ref 82–98)
MONOCYTES # BLD AUTO: 0.5 K/UL (ref 0.3–1)
MONOCYTES NFR BLD: 7.5 % (ref 4–15)
NEUTROPHILS # BLD AUTO: 4.2 K/UL (ref 1.8–7.7)
NEUTROPHILS NFR BLD: 65.8 % (ref 38–73)
NRBC BLD-RTO: 0 /100 WBC
PLATELET # BLD AUTO: 212 K/UL (ref 150–450)
PMV BLD AUTO: 10.7 FL (ref 9.2–12.9)
POTASSIUM SERPL-SCNC: 3.7 MMOL/L (ref 3.5–5.1)
PROT SERPL-MCNC: 8.2 G/DL (ref 6–8.4)
RBC # BLD AUTO: 4.73 M/UL (ref 4–5.4)
SODIUM SERPL-SCNC: 140 MMOL/L (ref 136–145)
TROPONIN I SERPL DL<=0.01 NG/ML-MCNC: 0.02 NG/ML (ref 0–0.03)
TSH SERPL DL<=0.005 MIU/L-ACNC: 0.72 UIU/ML (ref 0.4–4)
WBC # BLD AUTO: 6.4 K/UL (ref 3.9–12.7)

## 2023-08-07 PROCEDURE — 80053 COMPREHEN METABOLIC PANEL: CPT | Performed by: NURSE PRACTITIONER

## 2023-08-07 PROCEDURE — 99285 EMERGENCY DEPT VISIT HI MDM: CPT | Mod: 25

## 2023-08-07 PROCEDURE — 93010 EKG 12-LEAD: ICD-10-PCS | Mod: ,,, | Performed by: INTERNAL MEDICINE

## 2023-08-07 PROCEDURE — 93010 ELECTROCARDIOGRAM REPORT: CPT | Mod: ,,, | Performed by: INTERNAL MEDICINE

## 2023-08-07 PROCEDURE — 85025 COMPLETE CBC W/AUTO DIFF WBC: CPT | Performed by: NURSE PRACTITIONER

## 2023-08-07 PROCEDURE — 83880 ASSAY OF NATRIURETIC PEPTIDE: CPT | Performed by: NURSE PRACTITIONER

## 2023-08-07 PROCEDURE — 25000003 PHARM REV CODE 250: Performed by: PHYSICIAN ASSISTANT

## 2023-08-07 PROCEDURE — 84484 ASSAY OF TROPONIN QUANT: CPT | Performed by: NURSE PRACTITIONER

## 2023-08-07 PROCEDURE — 25000003 PHARM REV CODE 250: Performed by: NURSE PRACTITIONER

## 2023-08-07 PROCEDURE — 84443 ASSAY THYROID STIM HORMONE: CPT | Performed by: NURSE PRACTITIONER

## 2023-08-07 PROCEDURE — 93005 ELECTROCARDIOGRAM TRACING: CPT

## 2023-08-07 RX ORDER — AMLODIPINE BESYLATE 5 MG/1
10 TABLET ORAL
Status: COMPLETED | OUTPATIENT
Start: 2023-08-07 | End: 2023-08-07

## 2023-08-07 RX ORDER — LOSARTAN POTASSIUM 50 MG/1
50 TABLET ORAL DAILY
Status: DISCONTINUED | OUTPATIENT
Start: 2023-08-07 | End: 2023-08-07 | Stop reason: HOSPADM

## 2023-08-07 RX ORDER — CLONIDINE HYDROCHLORIDE 0.1 MG/1
0.2 TABLET ORAL
Status: COMPLETED | OUTPATIENT
Start: 2023-08-07 | End: 2023-08-07

## 2023-08-07 RX ORDER — FUROSEMIDE 20 MG/1
20 TABLET ORAL
Status: COMPLETED | OUTPATIENT
Start: 2023-08-07 | End: 2023-08-07

## 2023-08-07 RX ORDER — AMLODIPINE BESYLATE 10 MG/1
10 TABLET ORAL DAILY
Qty: 90 TABLET | Refills: 3 | Status: SHIPPED | OUTPATIENT
Start: 2023-08-07

## 2023-08-07 RX ORDER — LOSARTAN POTASSIUM 50 MG/1
50 TABLET ORAL DAILY
Qty: 90 TABLET | Refills: 3 | Status: SHIPPED | OUTPATIENT
Start: 2023-08-07 | End: 2024-08-06

## 2023-08-07 RX ORDER — SODIUM CHLORIDE 9 MG/ML
500 INJECTION, SOLUTION INTRAVENOUS
Status: COMPLETED | OUTPATIENT
Start: 2023-08-07 | End: 2023-08-07

## 2023-08-07 RX ORDER — FUROSEMIDE 20 MG/1
20 TABLET ORAL DAILY PRN
Qty: 90 TABLET | Refills: 1 | Status: SHIPPED | OUTPATIENT
Start: 2023-08-07 | End: 2024-08-06

## 2023-08-07 RX ORDER — ACETAMINOPHEN 325 MG/1
650 TABLET ORAL
Status: COMPLETED | OUTPATIENT
Start: 2023-08-07 | End: 2023-08-07

## 2023-08-07 RX ORDER — METOPROLOL TARTRATE 100 MG/1
100 TABLET ORAL 2 TIMES DAILY
Qty: 120 TABLET | Refills: 5 | Status: SHIPPED | OUTPATIENT
Start: 2023-08-07 | End: 2024-08-06

## 2023-08-07 RX ADMIN — LOSARTAN POTASSIUM 50 MG: 50 TABLET, FILM COATED ORAL at 06:08

## 2023-08-07 RX ADMIN — SODIUM CHLORIDE 500 ML: 9 INJECTION, SOLUTION INTRAVENOUS at 06:08

## 2023-08-07 RX ADMIN — ACETAMINOPHEN 325 MG: 325 TABLET, FILM COATED ORAL at 05:08

## 2023-08-07 RX ADMIN — AMLODIPINE BESYLATE 10 MG: 5 TABLET ORAL at 06:08

## 2023-08-07 RX ADMIN — FUROSEMIDE 20 MG: 20 TABLET ORAL at 07:08

## 2023-08-07 RX ADMIN — CLONIDINE HYDROCHLORIDE 0.2 MG: 0.1 TABLET ORAL at 05:08

## 2023-08-07 NOTE — ED TRIAGE NOTES
Patient presents to ED C/O hypertensive headache described as tingling/pins and needles sensation rated 9/10. Patient states she has not taken her BP medication. Patient reports recent cough. Denies chest pain, SOB, visual disturbances, and weakness in extremities.

## 2023-08-07 NOTE — TELEPHONE ENCOUNTER
----- Message from Yocasta Ramirez sent at 8/7/2023 11:16 AM CDT -----  Contact: [facility] [phone]  Armando Hagen called and stated that the pt called and stated that she needs refills on all her medications.       [id number] [address] [address]  Phone: [phone] Fax: [phone]

## 2023-08-07 NOTE — TELEPHONE ENCOUNTER
Spoke with patient states she needs B/P medication refilled.  Patient states Armando [facility]agen sent request but no response form PCP office.  Patient states her systolic B/P this morning was 210.  Patient also reports having a severe headache.  Current B/P 211/90.   Advised ED patient declined advice.  States she wants amlodipine 10 mg sent to [facility].  Message sent to PCP and staff.   Spoke with Vianey at Dr. Michael's office who states she would speak with the patient directly to reiterate that Dr. Michael wants the patient to go to ED as well.  Patient connected to office. No further assistance needed from the nurse on call line.   Reason for Disposition   SEVERE headache, sudden-onset (i.e., reaching maximum intensity within seconds to 1 hour)     Woke up with severe headache   Systolic BP >= 160 OR Diastolic >= 100, and any cardiac or neurologic symptoms (e.g., chest pain, difficulty breathing, unsteady gait, blurred vision)    Additional Information   Negative: Sounds like a life-threatening emergency to the triager   Negative: Difficult to awaken or acting confused (e.g., disoriented, slurred speech)   Negative: Weakness of the face, arm or leg on one side of the body and new-onset   Negative: Numbness of the face, arm or leg on one side of the body and new-onset   Negative: Loss of speech or garbled speech and new-onset   Negative: Passed out (i.e., fainted, collapsed and was not responding)   Negative: Sounds like a life-threatening emergency to the triager   Negative: Unable to walk without falling   Negative: Stiff neck (can't touch chin to chest)   Negative: Possibility of carbon monoxide exposure   Negative: SEVERE headache, states 'worst headache' of life     Patient states it is not the worst headache of her life.    Protocols used: Blood Pressure - High-A-OH, Headache-A-OH

## 2023-08-07 NOTE — ED PROVIDER NOTES
Encounter Date: 8/7/2023       History     Chief Complaint   Patient presents with    Hypertension     Reports elevated BP at home last night. +HA, -blurred vision, - weakness.      Chief complaint:  High blood pressure    67-year-old female with a past medical history significant for hypertension presents for evaluation of high blood pressure and headache.  Patient reports that she takes amlodipine for her blood pressure.  Patient noted her blood pressure to be elevated yesterday when she took her last dose of amlodipine.  Reports that she contacted her primary care doctor for refill and was instructed to report to the emergency room given hypertension with a headache.  Patient reports that she is had a headache like this in the past.  No weakness.  No blurry vision or double vision.  No medications taken today.  Denies chest pain or shortness of breath.  Denies abdominal pain.  Denies acute URI symptoms.    This is the extent of patient's complaints for this ER encounter.       The history is provided by the patient.     Review of patient's allergies indicates:   Allergen Reactions    Atorvastatin     Hydrodiuril [hydrochlorothiazide]     Lisinopril      Past Medical History:   Diagnosis Date    Colon polyp     CVA (cerebral vascular accident)     DM2 (diabetes mellitus, type 2)     Essential hypertension      Past Surgical History:   Procedure Laterality Date    ANKLE FRACTURE SURGERY Right     ANKLE FRACTURE SURGERY      COLONOSCOPY N/A 12/11/2020    Procedure: COLONOSCOPY;  Surgeon: Marshal Rogers MD;  Location: [facility] ([address] [place] [address]);  Service: Endoscopy;  Laterality: N/A;  11/18-plavix hold ok see te-covid pcw 12/8-tb    COLONOSCOPY W/ POLYPECTOMY      HYSTERECTOMY      no h/o  malignancy     Family History   Problem Relation Age of Onset    Alzheimer's disease Mother     Prostate cancer Father     Diabetes Father     Colon cancer Brother     Breast cancer Paternal Aunt     Heart disease Neg Hx     Stroke  Neg Hx      Social History     Tobacco Use    Smoking status: Every Day     Current packs/day: 0.00     Average packs/day: 0.3 packs/day for 45.0 years (11.3 ttl pk-yrs)     Types: Cigarettes     Start date: 1976     Last attempt to quit: 2021     Years since quittin.1    Smokeless tobacco: Never   Substance Use Topics    Alcohol use: Yes     Comment: 1-2x's    Drug use: Not Currently     Review of Systems  ROS per HPI.    Physical Exam     Initial Vitals [23 1618]   BP Pulse Resp Temp SpO2   (!) 253/114 77 20 97.8 °F (36.6 °C) 99 %      MAP       --         Physical Exam    Vitals reviewed.  Constitutional: No distress.   HENT:   Head: Normocephalic and atraumatic.   Nose: Nose normal.   Mouth/Throat: Oropharynx is clear and moist and mucous membranes are normal.   Eyes: Conjunctivae, EOM and lids are normal. Right pupil is round. Left pupil is round. Pupils are equal.   Cardiovascular:  Normal rate, regular rhythm and normal heart sounds.           Pulmonary/Chest: Effort normal and breath sounds normal. No respiratory distress.   Musculoskeletal:         General: Normal range of motion.     Neurological: She is alert and oriented to person, place, and time. She has normal strength. No cranial nerve deficit or sensory deficit.   Skin: Skin is warm and dry. No rash noted.   Psychiatric: She has a normal mood and affect. Her speech is normal and behavior is normal.         ED Course   Procedures  Labs Reviewed   COMPREHENSIVE METABOLIC PANEL - Abnormal; Notable for the following components:       Result Value    Glucose 243 (*)     Creatinine 1.5 (*)     eGFR 38 (*)     All other components within normal limits   B-TYPE NATRIURETIC PEPTIDE - Abnormal; Notable for the following components:     (*)     All other components within normal limits   CBC W/ AUTO DIFFERENTIAL   TROPONIN I   TSH        ECG Results              EKG 12-lead (In process)  Result time 23 06:53:56      In process by  Interface, Lab In [facility] (08/08/23 06:53:56)                   Narrative:    Test Reason : I10,    Vent. Rate : 081 BPM     Atrial Rate : 081 BPM     P-R Int : 156 ms          QRS Dur : 098 ms      QT Int : 392 ms       P-R-T Axes : 016 -35 059 degrees     QTc Int : 455 ms    Normal sinus rhythm  Left axis deviation  Moderate voltage criteria for LVH, may be normal variant ( R in aVL ,   Edward product )  Abnormal ECG      Referred By: [email or web address]   SELF           Confirmed By:                                   Imaging Results              CT Head Without Contrast (Final result)  Result time 08/07/23 18:09:46      Final result by Efren Helm MD (08/07/23 18:09:46)                   Impression:      Stable CT of the brain no evidence of acute hemorrhage, mass or infarction.      Electronically signed by: Efren Helm  Date:    08/07/2023  Time:    18:09               Narrative:    EXAMINATION:  CT HEAD WITHOUT CONTRAST    CLINICAL HISTORY:  HA with /114;    TECHNIQUE:  Low dose axial images were obtained through the head.  Coronal and sagittal reformations were also performed. Contrast was not administered.    COMPARISON:  MRI of the brain, 11/15/2022, CT brain, 11/15/2022    FINDINGS:  The areas of low density compatible with remote infarcts in the right cerebellum and left occipital lobe appear stable.  No new loss of gray-white junction differentiation mass or pathologic fluid collection.  Hydrocephalus. The calvarium is intact. Air cells are clear. Orbits and contents stable. No soft tissue swelling.                                       X-Ray Chest 1 View (Final result)  Result time 08/07/23 18:30:50      Final result by Bolivar Dozier MD (08/07/23 18:30:50)                   Impression:      1. Pulmonary findings suggest edema, correlation and follow-up is advised.      Electronically signed by: Bolivar Dozier MD  Date:    08/07/2023  Time:    18:30               Narrative:     EXAMINATION:  XR CHEST 1 VIEW    CLINICAL HISTORY:  Essential (primary) hypertension    TECHNIQUE:  Single frontal view of the chest was performed.    COMPARISON:  07/09/2021    FINDINGS:  The cardiomediastinal silhouette is prominent, similar to the previous exam noting calcification of the aorta..  There is no pleural effusion.  The trachea is midline.  The lungs are symmetrically expanded bilaterally with coarse interstitial attenuation primarily projected over the bilateral lower lung zones..  No large focal consolidation seen.  There is no pneumothorax.  The osseous structures are remarkable for degenerative change..                                       Medications   acetaminophen tablet 650 mg (325 mg Oral Given 8/7/23 1736)   cloNIDine tablet 0.2 mg (0.2 mg Oral Given 8/7/23 1736)   amLODIPine tablet 10 mg (10 mg Oral Given 8/7/23 1823)   0.9%  NaCl infusion (500 mLs Intravenous New Bag 8/7/23 1823)   furosemide tablet 20 mg (20 mg Oral Given 8/7/23 1924)     Medical Decision Making:   History:   Old Medical Records: I decided to obtain old medical records.  Initial Assessment:   67-year-old female presents for evaluation of hypertension and headache.  Clinical Tests:   Lab Tests: Ordered  Radiological Study: Ordered  Medical Tests: Ordered  ED Management:  Labs, EKG, and chest x-ray ordered for further evaluation of hypertension.  Given headache and high blood pressure, head CT ordered.    Based on review of electronic medical records, noncompliance with medication.  Most recently prescribed blood pressure medication as losartan 50 mg.  Patient reports that she is only taking amlodipine 10 mg.  She is also been prescribed metoprolol 100 mg in the past.    Refer to patient course below for additional management.     Care assumed by JACQUELINE Buchanan as my shift is ending at 1800. She will dispo the patient; work up is pending.            ED Course as of 08/08/23 0805   Mon Aug 07, 2023   1814 Assumed care from  fellow NP.  Reviewed patient's home meds and 2 of which were ordered Norvasc and losartan.  Initial labs returning with CBC unremarkable.  BNP with mild elevation.  Troponin unremarkable.  Creatinine noted to be 1.5 here.  No recent to compare to although last year within normal limits.  Will give soft hydration and continue to monitor [LC]      ED Course User Index  [LC] Joy Buchanan PA                 Clinical Impression:   Final diagnoses:  [I10] HTN (hypertension)  [I16.0] Hypertensive urgency (Primary)  [I10] Uncontrolled hypertension  [R79.89] Elevated serum creatinine        ED Disposition Condition    Discharge Stable          ED Prescriptions       Medication Sig Dispense Start Date End Date Auth. Provider    amLODIPine (NORVASC) 10 MG tablet Take 1 tablet (10 mg total) by mouth once daily. 90 tablet 8/7/2023 -- Joy Buchanan PA    furosemide (LASIX) 20 MG tablet Take 1 tablet (20 mg total) by mouth daily as needed (leg swelling). 90 tablet 8/7/2023 8/6/2024 Joy Buchanan PA    losartan (COZAAR) 50 MG tablet Take 1 tablet (50 mg total) by mouth once daily. 90 tablet 8/7/2023 8/6/2024 Joy Buchanan PA    metoprolol tartrate (LOPRESSOR) 100 MG tablet Take 1 tablet (100 mg total) by mouth 2 (two) times daily. 120 tablet 8/7/2023 8/6/2024 Joy Buchanan PA          Follow-up Information       Follow up With Specialties Details Why Contact Info    Krys Michael MD Internal Medicine Schedule an appointment as soon as possible for a visit   [address] [facility] [address] [phone]      [facility] - Emergency Dept Emergency Medicine  If symptoms worsen [address]  [place] [address] [phone]             Irina Wyman NP  08/08/23 0807

## 2023-08-09 ENCOUNTER — PATIENT OUTREACH (OUTPATIENT)
Dept: EMERGENCY MEDICINE | Facility: OTHER | Age: 68
End: 2023-08-09
Payer: MEDICARE

## 2023-08-09 ENCOUNTER — TELEPHONE (OUTPATIENT)
Dept: PRIMARY CARE CLINIC | Facility: CLINIC | Age: 68
End: 2023-08-09
Payer: MEDICARE

## 2023-08-09 NOTE — PROGRESS NOTES
Patient seen in ED 8/7/23 for hyperglycemia. She is currently scheduled for a PCP F/U 8/17/23. Will remind pt of appt.

## 2023-08-09 NOTE — TELEPHONE ENCOUNTER
----- Message from Lauren Amato Rakesh sent at 8/8/2023  4:51 PM CDT -----  Contact: [phone]  1MEDICALADVICE     Patient is calling for Medical Advice regarding: pt went to er yesterday for high bp. Pt states they gave her a bunch of medications and not sure why they gave her 4 new medications and wants to consult with doctor before taking them. Pt also stated one of the medications Furosemide they gave her Dr. Michael took her off before.   Was also given losartan, amlodipine, and metoprolol. Pt states that she is confused and when she left it was 166/80.     How long has patient had these symptoms:     Pharmacy name and phone#:    Would like response via [employer]:     Comments:

## 2023-08-14 DIAGNOSIS — E11.65 TYPE 2 DIABETES MELLITUS WITH HYPERGLYCEMIA, WITHOUT LONG-TERM CURRENT USE OF INSULIN: ICD-10-CM

## 2023-08-14 NOTE — TELEPHONE ENCOUNTER
----- Message from Afua Sebastian sent at 8/14/2023  2:00 PM CDT -----  Contact: [phone]  Requesting an RX refill or new RX.  Is this a refill or new RX: new  RX name and strength (copy/paste from chart):  empagliflozin (JARDIANCE) 10 mg tablet  Is this a 30 day or 90 day RX: 90  Pharmacy name and phone # (copy/paste from chart):    [id number] [address] [address]  Phone: [phone] Fax: [phone]      The doctors have asked that we provide their patients with the following 2 reminders -- prescription refills can take up to 72 hours, and a friendly reminder that in the future you can use your [facility] account to request refills: edward

## 2023-08-16 ENCOUNTER — PATIENT OUTREACH (OUTPATIENT)
Dept: EMERGENCY MEDICINE | Facility: OTHER | Age: 68
End: 2023-08-16
Payer: MEDICARE

## 2023-08-16 NOTE — PROGRESS NOTES
ED Navigator reminded the patient of scheduled appointment with Dr. Krys Michael on 8/17/23 at 11:20. Patient instructed to bring a photo I.D., health insurance card, and a list of current medications. Patient agreed to appointment date and time. Patient declined additional services. Follow up encounter closed.

## 2023-09-13 ENCOUNTER — PATIENT MESSAGE (OUTPATIENT)
Dept: ADMINISTRATIVE | Facility: HOSPITAL | Age: 68
End: 2023-09-13
Payer: MEDICARE

## 2023-09-20 ENCOUNTER — PATIENT OUTREACH (OUTPATIENT)
Dept: ADMINISTRATIVE | Facility: HOSPITAL | Age: 68
End: 2023-09-20
Payer: MEDICARE

## 2023-09-20 ENCOUNTER — PATIENT MESSAGE (OUTPATIENT)
Dept: ADMINISTRATIVE | Facility: HOSPITAL | Age: 68
End: 2023-09-20
Payer: MEDICARE

## 2023-10-31 ENCOUNTER — PATIENT OUTREACH (OUTPATIENT)
Dept: ADMINISTRATIVE | Facility: HOSPITAL | Age: 68
End: 2023-10-31
Payer: MEDICARE

## 2023-10-31 NOTE — PROGRESS NOTES
Patient due for the following    DEXA Scan     Shingles Vaccine (1 of 2)    RSV Vaccine (Age 60+) (1 - 1-dose 60+ series)    Pneumococcal Vaccines (Age 65+) (2 - PCV)    Eye Exam     Foot Exam     Lipid Panel     Hemoglobin A1c     Mammogram     Influenza Vaccine (1)    COVID-19 Vaccine (4 - 2023-24 season)      Immunizations: reviewed and updated  Care Everywhere: triggered  Care Teams: up to date  Outreach: none needed

## 2023-11-27 ENCOUNTER — PATIENT OUTREACH (OUTPATIENT)
Dept: ADMINISTRATIVE | Facility: HOSPITAL | Age: 68
End: 2023-11-27
Payer: MEDICARE

## 2023-11-27 DIAGNOSIS — E11.9 DIABETES MELLITUS WITHOUT COMPLICATION: Primary | ICD-10-CM

## 2024-02-16 ENCOUNTER — PATIENT MESSAGE (OUTPATIENT)
Dept: INTERNAL MEDICINE | Facility: CLINIC | Age: 69
End: 2024-02-16
Payer: MEDICARE

## 2024-03-14 ENCOUNTER — TELEPHONE (OUTPATIENT)
Dept: INTERNAL MEDICINE | Facility: CLINIC | Age: 69
End: 2024-03-14
Payer: MEDICARE

## 2024-03-14 NOTE — TELEPHONE ENCOUNTER
----- Message from Isacc Bettencourt sent at 3/14/2024 10:44 AM CDT -----  Contact: [phone] @patient  Good morning patient would like a call back to discuss getting an earlier apt than the one she has on 7/9. Patient says she is starting to have bad headaches again just like when she had her stroke. Please give patient a call back [phone]

## 2024-03-14 NOTE — TELEPHONE ENCOUNTER
Spoke to MsMaria Elena Aide and patient states that she has been having bad headaches, but at this time it is not as bad as it usually is.  Scheduled patient for an appt with SIMÓN today.  Patient states understanding.

## 2024-03-27 ENCOUNTER — PATIENT OUTREACH (OUTPATIENT)
Dept: ADMINISTRATIVE | Facility: HOSPITAL | Age: 69
End: 2024-03-27
Payer: MEDICARE

## 2024-03-27 DIAGNOSIS — E11.9 TYPE 2 DIABETES MELLITUS WITHOUT COMPLICATION, UNSPECIFIED WHETHER LONG TERM INSULIN USE: ICD-10-CM

## 2024-03-27 DIAGNOSIS — E11.9 TYPE 2 DIABETES MELLITUS WITHOUT COMPLICATION: ICD-10-CM

## 2024-04-03 DIAGNOSIS — E11.9 TYPE 2 DIABETES MELLITUS WITHOUT COMPLICATION: ICD-10-CM

## 2024-04-12 ENCOUNTER — PATIENT OUTREACH (OUTPATIENT)
Dept: ADMINISTRATIVE | Facility: HOSPITAL | Age: 69
End: 2024-04-12
Payer: MEDICARE

## 2024-04-25 ENCOUNTER — OFFICE VISIT (OUTPATIENT)
Dept: INTERNAL MEDICINE | Facility: CLINIC | Age: 69
End: 2024-04-25
Attending: FAMILY MEDICINE
Payer: MEDICARE

## 2024-04-25 ENCOUNTER — LAB VISIT (OUTPATIENT)
Dept: LAB | Facility: OTHER | Age: 69
End: 2024-04-25
Attending: FAMILY MEDICINE
Payer: MEDICARE

## 2024-04-25 VITALS
BODY MASS INDEX: 30.93 KG/M2 | HEIGHT: 65 IN | SYSTOLIC BLOOD PRESSURE: 160 MMHG | WEIGHT: 185.63 LBS | DIASTOLIC BLOOD PRESSURE: 90 MMHG | OXYGEN SATURATION: 98 %

## 2024-04-25 DIAGNOSIS — E11.65 TYPE 2 DIABETES MELLITUS WITH HYPERGLYCEMIA, WITHOUT LONG-TERM CURRENT USE OF INSULIN: ICD-10-CM

## 2024-04-25 DIAGNOSIS — N18.31 CKD STAGE 3A, GFR 45-59 ML/MIN: ICD-10-CM

## 2024-04-25 DIAGNOSIS — E11.65 TYPE 2 DIABETES MELLITUS WITH HYPERGLYCEMIA, WITHOUT LONG-TERM CURRENT USE OF INSULIN: Primary | ICD-10-CM

## 2024-04-25 DIAGNOSIS — E11.59 TYPE 2 DIABETES MELLITUS WITH OTHER CIRCULATORY COMPLICATION, WITHOUT LONG-TERM CURRENT USE OF INSULIN: ICD-10-CM

## 2024-04-25 DIAGNOSIS — R51.9 GENERALIZED HEADACHES: ICD-10-CM

## 2024-04-25 DIAGNOSIS — I69.311 MEMORY DEFICIT AFTER CEREBRAL INFARCTION: ICD-10-CM

## 2024-04-25 PROBLEM — N18.32 STAGE 3B CHRONIC KIDNEY DISEASE: Status: ACTIVE | Noted: 2024-04-25

## 2024-04-25 LAB
ALBUMIN SERPL BCP-MCNC: 3.7 G/DL (ref 3.5–5.2)
ALP SERPL-CCNC: 93 U/L (ref 55–135)
ALT SERPL W/O P-5'-P-CCNC: 12 U/L (ref 10–44)
ANION GAP SERPL CALC-SCNC: 7 MMOL/L (ref 8–16)
AST SERPL-CCNC: 12 U/L (ref 10–40)
BILIRUB SERPL-MCNC: 0.2 MG/DL (ref 0.1–1)
BUN SERPL-MCNC: 23 MG/DL (ref 8–23)
CALCIUM SERPL-MCNC: 9.8 MG/DL (ref 8.7–10.5)
CHLORIDE SERPL-SCNC: 106 MMOL/L (ref 95–110)
CHOLEST SERPL-MCNC: 195 MG/DL (ref 120–199)
CHOLEST/HDLC SERPL: 4.5 {RATIO} (ref 2–5)
CO2 SERPL-SCNC: 27 MMOL/L (ref 23–29)
CREAT SERPL-MCNC: 1.4 MG/DL (ref 0.5–1.4)
EST. GFR  (NO RACE VARIABLE): 41 ML/MIN/1.73 M^2
ESTIMATED AVG GLUCOSE: 217 MG/DL (ref 68–131)
GLUCOSE SERPL-MCNC: 283 MG/DL (ref 70–110)
HBA1C MFR BLD: 9.2 % (ref 4–5.6)
HDLC SERPL-MCNC: 43 MG/DL (ref 40–75)
HDLC SERPL: 22.1 % (ref 20–50)
LDLC SERPL CALC-MCNC: 121 MG/DL (ref 63–159)
NONHDLC SERPL-MCNC: 152 MG/DL
POTASSIUM SERPL-SCNC: 3.8 MMOL/L (ref 3.5–5.1)
PROT SERPL-MCNC: 7.9 G/DL (ref 6–8.4)
SODIUM SERPL-SCNC: 140 MMOL/L (ref 136–145)
TRIGL SERPL-MCNC: 155 MG/DL (ref 30–150)
TSH SERPL DL<=0.005 MIU/L-ACNC: 1 UIU/ML (ref 0.4–4)

## 2024-04-25 PROCEDURE — 36415 COLL VENOUS BLD VENIPUNCTURE: CPT | Performed by: FAMILY MEDICINE

## 2024-04-25 PROCEDURE — 1159F MED LIST DOCD IN RCRD: CPT | Mod: CPTII,S$GLB,, | Performed by: FAMILY MEDICINE

## 2024-04-25 PROCEDURE — 3288F FALL RISK ASSESSMENT DOCD: CPT | Mod: CPTII,S$GLB,, | Performed by: FAMILY MEDICINE

## 2024-04-25 PROCEDURE — 80061 LIPID PANEL: CPT | Performed by: FAMILY MEDICINE

## 2024-04-25 PROCEDURE — 3080F DIAST BP >= 90 MM HG: CPT | Mod: CPTII,S$GLB,, | Performed by: FAMILY MEDICINE

## 2024-04-25 PROCEDURE — 99215 OFFICE O/P EST HI 40 MIN: CPT | Mod: S$GLB,,, | Performed by: FAMILY MEDICINE

## 2024-04-25 PROCEDURE — G2211 COMPLEX E/M VISIT ADD ON: HCPCS | Mod: S$GLB,,, | Performed by: FAMILY MEDICINE

## 2024-04-25 PROCEDURE — 84443 ASSAY THYROID STIM HORMONE: CPT | Performed by: FAMILY MEDICINE

## 2024-04-25 PROCEDURE — 80053 COMPREHEN METABOLIC PANEL: CPT | Performed by: FAMILY MEDICINE

## 2024-04-25 PROCEDURE — 1160F RVW MEDS BY RX/DR IN RCRD: CPT | Mod: CPTII,S$GLB,, | Performed by: FAMILY MEDICINE

## 2024-04-25 PROCEDURE — 1126F AMNT PAIN NOTED NONE PRSNT: CPT | Mod: CPTII,S$GLB,, | Performed by: FAMILY MEDICINE

## 2024-04-25 PROCEDURE — 83036 HEMOGLOBIN GLYCOSYLATED A1C: CPT | Performed by: FAMILY MEDICINE

## 2024-04-25 PROCEDURE — 99999 PR PBB SHADOW E&M-EST. PATIENT-LVL V: CPT | Mod: PBBFAC,,, | Performed by: FAMILY MEDICINE

## 2024-04-25 PROCEDURE — 3077F SYST BP >= 140 MM HG: CPT | Mod: CPTII,S$GLB,, | Performed by: FAMILY MEDICINE

## 2024-04-25 PROCEDURE — 3008F BODY MASS INDEX DOCD: CPT | Mod: CPTII,S$GLB,, | Performed by: FAMILY MEDICINE

## 2024-04-25 PROCEDURE — 1101F PT FALLS ASSESS-DOCD LE1/YR: CPT | Mod: CPTII,S$GLB,, | Performed by: FAMILY MEDICINE

## 2024-04-25 RX ORDER — METFORMIN HYDROCHLORIDE 1000 MG/1
1000 TABLET ORAL 2 TIMES DAILY WITH MEALS
Qty: 180 TABLET | Refills: 0 | Status: SHIPPED | OUTPATIENT
Start: 2024-04-25 | End: 2025-04-25

## 2024-04-25 RX ORDER — AMLODIPINE BESYLATE 10 MG/1
10 TABLET ORAL DAILY
Qty: 90 TABLET | Refills: 0 | Status: SHIPPED | OUTPATIENT
Start: 2024-04-25

## 2024-04-25 NOTE — PROGRESS NOTES
"CHIEF COMPLAINT:  Diabetes and hypertension    HISTORY OF PRESENT ILLNESS: The patient is a chronically ill 68-year-old black female.  She has had minimal care in the past year.  She has not seen her primary care in greater than 1 year.  No blood work in greater than 1 year.  Not taking medication.  Years ago she had a significant dominant hemisphere CVA and has had progressive memory loss since then.  This is documented for years.  She has also had headaches for years.  Today she presents with uncontrolled hypertension.  Her last A1c was greater than 10.  She stopped taking medication approximately 1 year ago.  Perhaps longer    REVIEW OF SYSTEMS:  GENERAL: No fever, chills, fatigability or weight loss.  SKIN: No rashes, itching or changes in color or texture of skin.  HEAD: No headaches or recent head trauma.  EYES: Visual acuity fine. No photophobia, ocular pain or diplopia.  EARS: Denies ear pain, discharge or vertigo.  NOSE: No loss of smell, no epistaxis or postnasal drip.  MOUTH & THROAT: No hoarseness or change in voice. No excessive gum bleeding.  NODES: Denies swollen glands.  CHEST: Denies HAQ, cyanosis, wheezing, cough and sputum production.  CARDIOVASCULAR: Denies chest pain, PND, orthopnea or reduced exercise tolerance.  ABDOMEN: Appetite fine. No weight loss. Denies diarrhea, abdominal pain, hematemesis or blood in stool.  URINARY: No flank pain, dysuria or hematuria.  PERIPHERAL VASCULAR: No claudication or cyanosis.  MUSCULOSKELETAL: No joint stiffness or swelling. Denies back pain.  NEUROLOGIC: No history of seizures, paralysis, alteration of gait or coordination.    SOCIAL HISTORY: The patient does not smoke.  The patient consumes alcohol socially.  The patient is single.    PHYSICAL EXAMINATION:   Blood pressure (!) 160/90, height 5' 5" (1.651 m), weight 84.2 kg (185 lb 10 oz), SpO2 98%.  APPEARANCE: Well nourished, well developed, in no acute distress.    HEAD: Normocephalic, atraumatic.  EYES: " PERRL. EOMI.  Conjunctivae without injection and  anicteric  NOSE: Mucosa pink. Airway clear.  MOUTH & THROAT: No tonsillar enlargement. No pharyngeal erythema or exudate. No stridor.  NECK: Supple.   NODES: No cervical, axillary or inguinal lymph node enlargement.  CHEST: Lungs clear to auscultation.  No retractions are noted.  No rales or rhonchi are present.  CARDIOVASCULAR: Normal S1, S2. No rubs, murmurs or gallops.  ABDOMEN: Bowel sounds normal. Not distended. Soft. No tenderness or masses.  No ascites is noted.  MUSCULOSKELETAL:  There is no clubbing, cyanosis, or edema of the extremities x4.  There is full range of motion of the lumbar spine.  There is full range of motion of the extremities x4.  There is no deformity noted.    NEUROLOGIC:       Normal speech development.      Hearing normal.      Normal gait.      Motor and sensory exams consistent with remote CVA  PSYCHIATRIC: Patient is alert and oriented x3.  Thought processes are all normal but evasive when asked specific medical questions.  There is no homicidality.  There is no suicidality.  There is no evidence of psychosis.    LABORATORY/RADIOLOGY:   Chart reviewed.  We will update blood work today.    ASSESSMENT:   Uncontrolled type 2 diabetes  Uncontrolled hypertension  Chronic effects of CVA  Chronic memory loss  Chronic headaches  CKD 3a    PLAN:  I suspect her A1c will be elevated.  She also has CKD 3A with last creatinine of 1.5 about a year ago.  I will arrange for Neurology and Nephrology to catch up with her.  She also will need to see her PCP when these workups are complete to address her uncontrolled conditions.  Medications refilled today  Return to clinic in one year.

## 2024-04-26 ENCOUNTER — OFFICE VISIT (OUTPATIENT)
Dept: NEPHROLOGY | Facility: CLINIC | Age: 69
End: 2024-04-26
Payer: MEDICARE

## 2024-04-26 ENCOUNTER — LAB VISIT (OUTPATIENT)
Dept: LAB | Facility: HOSPITAL | Age: 69
End: 2024-04-26
Payer: MEDICARE

## 2024-04-26 VITALS
DIASTOLIC BLOOD PRESSURE: 77 MMHG | WEIGHT: 180.75 LBS | HEART RATE: 99 BPM | SYSTOLIC BLOOD PRESSURE: 159 MMHG | OXYGEN SATURATION: 97 % | BODY MASS INDEX: 30.08 KG/M2

## 2024-04-26 DIAGNOSIS — Z86.73 HISTORY OF STROKE: ICD-10-CM

## 2024-04-26 DIAGNOSIS — N18.32 STAGE 3B CHRONIC KIDNEY DISEASE: ICD-10-CM

## 2024-04-26 DIAGNOSIS — N25.81 SECONDARY HYPERPARATHYROIDISM OF RENAL ORIGIN: ICD-10-CM

## 2024-04-26 DIAGNOSIS — I15.2 HYPERTENSION ASSOCIATED WITH DIABETES: ICD-10-CM

## 2024-04-26 DIAGNOSIS — E11.9 TYPE 2 DIABETES MELLITUS WITHOUT COMPLICATION: ICD-10-CM

## 2024-04-26 DIAGNOSIS — E11.59 HYPERTENSION ASSOCIATED WITH DIABETES: ICD-10-CM

## 2024-04-26 DIAGNOSIS — N18.32 STAGE 3B CHRONIC KIDNEY DISEASE: Primary | ICD-10-CM

## 2024-04-26 DIAGNOSIS — N18.31 CKD STAGE 3A, GFR 45-59 ML/MIN: ICD-10-CM

## 2024-04-26 DIAGNOSIS — E11.65 TYPE 2 DIABETES MELLITUS WITH HYPERGLYCEMIA, WITHOUT LONG-TERM CURRENT USE OF INSULIN: ICD-10-CM

## 2024-04-26 LAB
ALBUMIN/CREAT UR: 404 UG/MG (ref 0–30)
ALBUMIN/CREAT UR: 404 UG/MG (ref 0–30)
CREAT UR-MCNC: 200 MG/DL (ref 15–325)
CREAT UR-MCNC: 200 MG/DL (ref 15–325)
MICROALBUMIN UR DL<=1MG/L-MCNC: 808 UG/ML
MICROALBUMIN UR DL<=1MG/L-MCNC: 808 UG/ML
SODIUM UR-SCNC: 95 MMOL/L (ref 20–250)

## 2024-04-26 PROCEDURE — 3062F POS MACROALBUMINURIA REV: CPT | Mod: CPTII,S$GLB,, | Performed by: STUDENT IN AN ORGANIZED HEALTH CARE EDUCATION/TRAINING PROGRAM

## 2024-04-26 PROCEDURE — 1126F AMNT PAIN NOTED NONE PRSNT: CPT | Mod: CPTII,S$GLB,, | Performed by: STUDENT IN AN ORGANIZED HEALTH CARE EDUCATION/TRAINING PROGRAM

## 2024-04-26 PROCEDURE — 3066F NEPHROPATHY DOC TX: CPT | Mod: CPTII,S$GLB,, | Performed by: STUDENT IN AN ORGANIZED HEALTH CARE EDUCATION/TRAINING PROGRAM

## 2024-04-26 PROCEDURE — 99999 PR PBB SHADOW E&M-EST. PATIENT-LVL IV: CPT | Mod: PBBFAC,,, | Performed by: STUDENT IN AN ORGANIZED HEALTH CARE EDUCATION/TRAINING PROGRAM

## 2024-04-26 PROCEDURE — 1101F PT FALLS ASSESS-DOCD LE1/YR: CPT | Mod: CPTII,S$GLB,, | Performed by: STUDENT IN AN ORGANIZED HEALTH CARE EDUCATION/TRAINING PROGRAM

## 2024-04-26 PROCEDURE — 3078F DIAST BP <80 MM HG: CPT | Mod: CPTII,S$GLB,, | Performed by: STUDENT IN AN ORGANIZED HEALTH CARE EDUCATION/TRAINING PROGRAM

## 2024-04-26 PROCEDURE — 84300 ASSAY OF URINE SODIUM: CPT | Performed by: STUDENT IN AN ORGANIZED HEALTH CARE EDUCATION/TRAINING PROGRAM

## 2024-04-26 PROCEDURE — 4010F ACE/ARB THERAPY RXD/TAKEN: CPT | Mod: CPTII,S$GLB,, | Performed by: STUDENT IN AN ORGANIZED HEALTH CARE EDUCATION/TRAINING PROGRAM

## 2024-04-26 PROCEDURE — 99204 OFFICE O/P NEW MOD 45 MIN: CPT | Mod: S$GLB,,, | Performed by: STUDENT IN AN ORGANIZED HEALTH CARE EDUCATION/TRAINING PROGRAM

## 2024-04-26 PROCEDURE — 1160F RVW MEDS BY RX/DR IN RCRD: CPT | Mod: CPTII,S$GLB,, | Performed by: STUDENT IN AN ORGANIZED HEALTH CARE EDUCATION/TRAINING PROGRAM

## 2024-04-26 PROCEDURE — 3288F FALL RISK ASSESSMENT DOCD: CPT | Mod: CPTII,S$GLB,, | Performed by: STUDENT IN AN ORGANIZED HEALTH CARE EDUCATION/TRAINING PROGRAM

## 2024-04-26 PROCEDURE — 3008F BODY MASS INDEX DOCD: CPT | Mod: CPTII,S$GLB,, | Performed by: STUDENT IN AN ORGANIZED HEALTH CARE EDUCATION/TRAINING PROGRAM

## 2024-04-26 PROCEDURE — 3077F SYST BP >= 140 MM HG: CPT | Mod: CPTII,S$GLB,, | Performed by: STUDENT IN AN ORGANIZED HEALTH CARE EDUCATION/TRAINING PROGRAM

## 2024-04-26 PROCEDURE — 3046F HEMOGLOBIN A1C LEVEL >9.0%: CPT | Mod: CPTII,S$GLB,, | Performed by: STUDENT IN AN ORGANIZED HEALTH CARE EDUCATION/TRAINING PROGRAM

## 2024-04-26 PROCEDURE — 82043 UR ALBUMIN QUANTITATIVE: CPT | Performed by: FAMILY MEDICINE

## 2024-04-26 PROCEDURE — 1159F MED LIST DOCD IN RCRD: CPT | Mod: CPTII,S$GLB,, | Performed by: STUDENT IN AN ORGANIZED HEALTH CARE EDUCATION/TRAINING PROGRAM

## 2024-04-26 RX ORDER — ACETAMINOPHEN 500 MG
5000 TABLET ORAL DAILY
Qty: 30 TABLET | Refills: 11 | Status: SHIPPED | OUTPATIENT
Start: 2024-04-26 | End: 2025-04-26

## 2024-04-26 RX ORDER — VALSARTAN 40 MG/1
40 TABLET ORAL DAILY
Qty: 90 TABLET | Refills: 3 | Status: SHIPPED | OUTPATIENT
Start: 2024-04-26 | End: 2025-04-26

## 2024-04-26 NOTE — PROGRESS NOTES
Nephrology Clinic Note   4/26/2024    Chief Complaint   Patient presents with    Chronic Kidney Disease      History of Present Illness    Patient has been referred to the kidney clinic    She was advised by a previous physician that her kidneys may not be functioning well and requires further investigation. Her kidney function was at 50% in 2020, and further declined to 40% in 2023. She reports no problems with fluid or swelling, and has no trouble urinating.    She expressed difficulty with memory, experiencing issues with remembering a range of things.    Histories of two instances of stroke and high blood pressure were reported. She was previously on losartan for her blood pressure but stopped due to side effects such as dizziness and nausea. She is currently on amlodipine and reports no adverse effects.    Diabetes has been inadequately managed with A1C level consistently being at least 9. She had been prescribed Metformin and Jardiance, but did not take Jardiance due to a misunderstanding of its use for diabetes.    A history of a fall was reported that resulted in an ankle fracture, followed by surgery.    She uses Ibuprofen regularly, at least once at night, to manage the headaches. She did not realize Jardiance, which was prescribed for diabetes, was thought to be for a brain issue. She confirmed continued Metformin use for diabetes.    She lives alone and is capable of driving herself to medical appointments. She prefers to lie in a face-down position when sleeping and denies shortness of breath.    Initiation of Vitamin D supplementation has been recommended for her overall health.  ROS:  Constitutional: +dizziness  Head: +headaches  Respiratory: -shortness of breath  Gastrointestinal: +nausea  Genitourinary: -frequency  Psychiatric: +memory problems       Presents to the clinic today for medical conditions listed below.  Problem Noted   Secondary Hyperparathyroidism of Renal Origin 4/26/2024   Stage 3b  Chronic Kidney Disease 4/25/2024    worsening  Last eGFR 4/25/2024: 41 mL/min/1.73 m^2; stage G3bA3 likely secondary to diabetes  Last sCr 4/25/2024: 1.4 mg/dL; baseline creatinine 1.3-1.6  Last UPCR  : No results found for requested labs within last 416 days 16 hours.; baseline UPCR unk  historic complications  none  BP uncontrolled on ACE/ARB, CCB, and BB  glucose uncontrolled on Biguanide and SGLT2 inhibitor  UPCR controlled on ACE/ARB and SGLT2 inhibitor  Native kidney US No results found for this or any previous visit.    current diuretic therapy: furo 20  current bicarbonate therapy: none   current CKD-MBD therapy: start vit d  current anemia therapy: none    Recently referred here for aid in management of CKD likely due to chronic kidney disease. Discussed high risk for dialysis if glucose not well controlled. Stop ibuprofen. Start vit d, check kidney ultrasound. Was not taking ARB or SGLT2. Discussed importance and started today.       Type 2 Diabetes Mellitus With Hyperglycemia, Without Long-Term Current Use of Insulin     Started sglt2     Ckd Stage 3a, Gfr 45-59 Ml/Min (Resolved) 4/26/2024       Physical Exam    General: Well-appearing.  Lungs: Normal respiratory effort. No respiratory distress.  Extremities: No edema lower extremities.         Assessment:    1. Stage 3b chronic kidney disease    2. Type 2 diabetes mellitus with hyperglycemia, without long-term current use of insulin    3. CKD stage 3a, GFR 45-59 ml/min    4. Secondary hyperparathyroidism of renal origin    5. History of stroke    6. Hypertension associated with diabetes        Plan:    Assessment & Plan    E11.9 Type 2 diabetes mellitus without complications  I12.9 Hypertensive chronic kidney disease with stage 1 through stage 4 chronic kidney disease, or unspecified chronic kidney disease  N18.32 Chronic kidney disease, stage 3b  DIABETES MANAGEMENT:  - Discussed the significance of managing diabetes to impede the progression of renal  disease.  - Prescribed Jardiance for diabetes management and renal protection.  - Reinforced the necessity of Jardiance adherence, a medication previously prescribed but not collected by the patient.  - Highlighted the benefits of Jardiance for renal and cardiac protection.  HYPERTENSION CONTROL:  - Emphasized the detrimental effects of hypertension on renal function.  - Prescribed Valsartan for hypertension control and renal safeguarding.  - Transitioned the patient from Losartan to Valsartan due to reported adverse effects.  RENAL DISEASE PREVENTION:  - Advised the patient to opt for Tylenol over ibuprofen to mitigate potential renal damage.  - This advice was reiterated to ensure understanding and compliance.  - Ordered a renal ultrasound to evaluate the structure and function of the kidneys.  - Requested a urine sample for comprehensive analysis.  PATIENT EDUCATION AND COMPLIANCE:  - Promoted the patient's understanding and monitoring of personal health and lab results.  OVERALL HEALTH ENHANCEMENT:  - Prescribed Vitamin D at a dosage of 5,000 units daily for overall health enhancement.         Shabnam was seen today for chronic kidney disease.    Diagnoses and all orders for this visit:    Stage 3b chronic kidney disease  -     US Retroperitoneal Complete; Future  -     empagliflozin (JARDIANCE) 25 mg tablet; Take 1 tablet (25 mg total) by mouth once daily.  -     valsartan (DIOVAN) 40 MG tablet; Take 1 tablet (40 mg total) by mouth once daily.  -     Sodium, Random Urine; Future  -     Microalbumin/Creatinine Ratio, Urine; Future    Type 2 diabetes mellitus with hyperglycemia, without long-term current use of insulin  -     Ambulatory referral/consult to Nephrology  -     empagliflozin (JARDIANCE) 25 mg tablet; Take 1 tablet (25 mg total) by mouth once daily.    CKD stage 3a, GFR 45-59 ml/min  -     Ambulatory referral/consult to Nephrology    Secondary hyperparathyroidism of renal origin  -     cholecalciferol,  vitamin D3, (VITAMIN D3) 125 mcg (5,000 unit) Tab; Take 1 tablet (5,000 Units total) by mouth once daily.    History of stroke    Hypertension associated with diabetes        No follow-ups on file.     I spent a total of 45 minutes on the day of the visit.    Visit today included increased complexity associated with the care of the episodic problem CKD addressed and managing the longitudinal care of the patient due to the serious and/or complex managed problem(s) CKD, HTN, secondary hyperparathyroidism, and anemia of chronic disease.  Orders Placed This Encounter   Procedures    US Retroperitoneal Complete    Sodium, Random Urine    Microalbumin/Creatinine Ratio, Urine     Medications Discontinued During This Encounter   Medication Reason    empagliflozin (JARDIANCE) 10 mg tablet       Future Appointments   Date Time Provider Department Center   4/30/2024 11:00 AM Angelica Cuevas MD [facility] [id number] [facility]   5/23/2024  2:00 PM [facility]   5/23/2024  3:45 PM [facility] [place] USOP2 [facility] [place]   7/18/2024  1:00 PM Krys Michael MD [facility]       This note was generated with the assistance of ambient listening technology. Verbal consent was obtained by the patient and accompanying visitor(s) for the recording of patient appointment to facilitate this note. I attest to having reviewed and edited the generated note for accuracy, though some syntax or spelling errors may persist. Please contact the author of this note for any clarification.      Sg Abad

## 2024-04-30 ENCOUNTER — LAB VISIT (OUTPATIENT)
Dept: LAB | Facility: HOSPITAL | Age: 69
End: 2024-04-30
Attending: STUDENT IN AN ORGANIZED HEALTH CARE EDUCATION/TRAINING PROGRAM
Payer: MEDICARE

## 2024-04-30 ENCOUNTER — TELEPHONE (OUTPATIENT)
Dept: NEPHROLOGY | Facility: CLINIC | Age: 69
End: 2024-04-30
Payer: MEDICARE

## 2024-04-30 ENCOUNTER — OFFICE VISIT (OUTPATIENT)
Dept: NEUROLOGY | Facility: CLINIC | Age: 69
End: 2024-04-30
Payer: MEDICARE

## 2024-04-30 VITALS
HEIGHT: 65 IN | HEART RATE: 84 BPM | SYSTOLIC BLOOD PRESSURE: 157 MMHG | DIASTOLIC BLOOD PRESSURE: 78 MMHG | WEIGHT: 180.75 LBS | BODY MASS INDEX: 30.12 KG/M2

## 2024-04-30 DIAGNOSIS — F03.A0 MILD DEMENTIA WITHOUT BEHAVIORAL DISTURBANCE, PSYCHOTIC DISTURBANCE, MOOD DISTURBANCE, OR ANXIETY, UNSPECIFIED DEMENTIA TYPE: ICD-10-CM

## 2024-04-30 DIAGNOSIS — I69.311 MEMORY DEFICIT AFTER CEREBRAL INFARCTION: ICD-10-CM

## 2024-04-30 LAB — VIT B12 SERPL-MCNC: 877 PG/ML (ref 210–950)

## 2024-04-30 PROCEDURE — 36415 COLL VENOUS BLD VENIPUNCTURE: CPT | Performed by: STUDENT IN AN ORGANIZED HEALTH CARE EDUCATION/TRAINING PROGRAM

## 2024-04-30 PROCEDURE — 82525 ASSAY OF COPPER: CPT | Performed by: STUDENT IN AN ORGANIZED HEALTH CARE EDUCATION/TRAINING PROGRAM

## 2024-04-30 PROCEDURE — 3077F SYST BP >= 140 MM HG: CPT | Mod: CPTII,S$GLB,, | Performed by: STUDENT IN AN ORGANIZED HEALTH CARE EDUCATION/TRAINING PROGRAM

## 2024-04-30 PROCEDURE — 3066F NEPHROPATHY DOC TX: CPT | Mod: CPTII,S$GLB,, | Performed by: STUDENT IN AN ORGANIZED HEALTH CARE EDUCATION/TRAINING PROGRAM

## 2024-04-30 PROCEDURE — 4010F ACE/ARB THERAPY RXD/TAKEN: CPT | Mod: CPTII,S$GLB,, | Performed by: STUDENT IN AN ORGANIZED HEALTH CARE EDUCATION/TRAINING PROGRAM

## 2024-04-30 PROCEDURE — 99215 OFFICE O/P EST HI 40 MIN: CPT | Mod: S$GLB,,, | Performed by: STUDENT IN AN ORGANIZED HEALTH CARE EDUCATION/TRAINING PROGRAM

## 2024-04-30 PROCEDURE — 82300 ASSAY OF CADMIUM: CPT | Performed by: STUDENT IN AN ORGANIZED HEALTH CARE EDUCATION/TRAINING PROGRAM

## 2024-04-30 PROCEDURE — 3046F HEMOGLOBIN A1C LEVEL >9.0%: CPT | Mod: CPTII,S$GLB,, | Performed by: STUDENT IN AN ORGANIZED HEALTH CARE EDUCATION/TRAINING PROGRAM

## 2024-04-30 PROCEDURE — 3078F DIAST BP <80 MM HG: CPT | Mod: CPTII,S$GLB,, | Performed by: STUDENT IN AN ORGANIZED HEALTH CARE EDUCATION/TRAINING PROGRAM

## 2024-04-30 PROCEDURE — 3288F FALL RISK ASSESSMENT DOCD: CPT | Mod: CPTII,S$GLB,, | Performed by: STUDENT IN AN ORGANIZED HEALTH CARE EDUCATION/TRAINING PROGRAM

## 2024-04-30 PROCEDURE — 3008F BODY MASS INDEX DOCD: CPT | Mod: CPTII,S$GLB,, | Performed by: STUDENT IN AN ORGANIZED HEALTH CARE EDUCATION/TRAINING PROGRAM

## 2024-04-30 PROCEDURE — 1159F MED LIST DOCD IN RCRD: CPT | Mod: CPTII,S$GLB,, | Performed by: STUDENT IN AN ORGANIZED HEALTH CARE EDUCATION/TRAINING PROGRAM

## 2024-04-30 PROCEDURE — 1125F AMNT PAIN NOTED PAIN PRSNT: CPT | Mod: CPTII,S$GLB,, | Performed by: STUDENT IN AN ORGANIZED HEALTH CARE EDUCATION/TRAINING PROGRAM

## 2024-04-30 PROCEDURE — 3062F POS MACROALBUMINURIA REV: CPT | Mod: CPTII,S$GLB,, | Performed by: STUDENT IN AN ORGANIZED HEALTH CARE EDUCATION/TRAINING PROGRAM

## 2024-04-30 PROCEDURE — 99999 PR PBB SHADOW E&M-EST. PATIENT-LVL V: CPT | Mod: PBBFAC,,, | Performed by: STUDENT IN AN ORGANIZED HEALTH CARE EDUCATION/TRAINING PROGRAM

## 2024-04-30 PROCEDURE — 1101F PT FALLS ASSESS-DOCD LE1/YR: CPT | Mod: CPTII,S$GLB,, | Performed by: STUDENT IN AN ORGANIZED HEALTH CARE EDUCATION/TRAINING PROGRAM

## 2024-04-30 PROCEDURE — 82607 VITAMIN B-12: CPT | Performed by: STUDENT IN AN ORGANIZED HEALTH CARE EDUCATION/TRAINING PROGRAM

## 2024-04-30 NOTE — PROGRESS NOTES
"[facility] Neurology Clinic  Return Clinic Visit    Patient Name: Shabnam Noble  MRN: [id number]  Date: 4/30/24  Referring Provider: Dr. Saúl Medina*    CC: Headaches, memory complaints    SUBJECTIVE:      History of Present Illness: Shabnam Noble is a 68 y.o. female with a past medical history significant for prior stroke, DM, HTN, tobacco use who presents for follow-up for headaches and cognitive impairment.     She was previously evaluated by Dr. Mayer, last seen 08/2022. HPI at the time: "She states that everybody notices that she "doesn't remember from one moment to the next sometimes". She states this has been occurring since she had her stroke. She states it has been getting worse. She states she is able to perform all her ADLs independently, but has began to struggle with driving (she works as an [employer] ) and finances in regards to IADLs. She states she experiences some dizziness with change in position, and also has some headaches occasionally. Her BP during her visit today was 193/81, and she states she didn't take any of her medications because it was "too early". We discussed the importance of taking all of her medications as prescribed. Her MOCA score during today's visit is 20/30." Lab work as well as referral for neuropsychology evaluation was ordered; however, the patient had not yet completed these. She recently had follow-up with her PCP who noted that she had not had regular follow-up in over a year. She had also stopped taking her medications.     Today, she reports that her memory issues had been getting worse. Denies difficulties with ADLs. She occasionally forgets to pay her bills and does not currently work. Her sister keeps up with her medical appointments. She tries to take her medications every day, but reports some non-compliance. She continues to drive - does not get lost, no major traffic incidents or MVCs. Her sister rides with her and reports no issues. She continues to forget " conversations. Denies depressed mood. Denies changes in appetite or recent weight loss. No SI/HI/AVH. She denies difficulties falling asleep or significant daytime somnolence. Does not know if she snores. She reports night sweats and waking up in the middle of the night. She then has difficulty getting back to sleep. She reports frequent headaches, frontal, dull/throbbing. No nausea, vomiting, photophobia/phonophobia. No vision changes or associated neurologic symptoms. Occurs every other day. She will occasionally take ibuprofen for this with relief. Denies new or worsening focal neurologic symptoms concerning for stroke or TIA.     Work-up:    Imaging:  - [facility] (08/07/2023): Stable CT of the brain no evidence of acute hemorrhage, mass or infarction.   - MRI brain without contrast (11/15/2022): No acute intracranial abnormality. Remote infarct within the left occipital lobe and right cerebellum. Left thalamic tiny remote lacunar type infarct. Involutional change and suspected sequela chronic microvascular ischemic change. Mild right mastoid air cells effusion.  - CTA head/neck (09/23/2017): Generalized atherosclerotic changes. Moderate stenosis at the origin of right vertebral artery. Severe stenosis in the M2 segment of the right middle cerebral artery, moderate stenosis in the left anterior cerebral artery.   Severe stenosis in the middle 3rd of the basilar trunk, and occlusion of the left posterior cerebral artery.     Labs:  Recent Labs   Lab 04/25/24  1415   Hemoglobin A1C 9.2 H   LDL Cholesterol 121.0   HDL 43   Triglycerides 155 H   Cholesterol 195       Review of Systems: The following systems were reviewed with pertinent positives and negatives documented in the HPI: constitutional, eyes, CV, respiratory, GI, , musculoskeletal, skin, neurological, psychiatric    Past Medical History:  Past Medical History:   Diagnosis Date    Colon polyp     CVA (cerebral vascular accident)     DM2 (diabetes mellitus,  type 2)     Essential hypertension        Medications:    Current Outpatient Medications:     amLODIPine (NORVASC) 10 MG tablet, Take 1 tablet (10 mg total) by mouth once daily., Disp: 90 tablet, Rfl: 0    aspirin (ECOTRIN) 81 MG EC tablet, Take 1 tablet (81 mg total) by mouth once daily., Disp: 90 tablet, Rfl: 3    b complex vitamins capsule, Take 1 capsule by mouth once daily., Disp: , Rfl:     blood glucose control, high Soln, 1 drop by Misc.(Non-Drug; Combo Route) route once daily. ICD 10 code: E11.59, Disp: 3 each, Rfl: 3    blood-glucose meter kit, Use as instructed. ICD 10 code E11.59, Disp: 1 each, Rfl: 0    cholecalciferol, vitamin D3, (VITAMIN D3) 125 mcg (5,000 unit) Tab, Take 1 tablet (5,000 Units total) by mouth once daily., Disp: 30 tablet, Rfl: 11    empagliflozin (JARDIANCE) 25 mg tablet, Take 1 tablet (25 mg total) by mouth once daily., Disp: 90 tablet, Rfl: 3    ezetimibe (ZETIA) 10 mg tablet, Take 1 tablet (10 mg total) by mouth once daily., Disp: 90 tablet, Rfl: 3    furosemide (LASIX) 20 MG tablet, Take 1 tablet (20 mg total) by mouth daily as needed (leg swelling)., Disp: 90 tablet, Rfl: 1    ibuprofen (ADVIL,MOTRIN) 800 MG tablet, Take 1 tablet (800 mg total) by mouth 3 (three) times daily as needed for Pain., Disp: 90 tablet, Rfl: 1    losartan (COZAAR) 50 MG tablet, Take 1 tablet (50 mg total) by mouth once daily., Disp: 90 tablet, Rfl: 3    metFORMIN (GLUCOPHAGE) 1000 MG tablet, Take 1 tablet (1,000 mg total) by mouth 2 (two) times daily with meals., Disp: 180 tablet, Rfl: 0    metoprolol tartrate (LOPRESSOR) 100 MG tablet, Take 1 tablet (100 mg total) by mouth 2 (two) times daily., Disp: 120 tablet, Rfl: 5    pravastatin (PRAVACHOL) 40 MG tablet, Take 1 tablet (40 mg total) by mouth once daily., Disp: 90 tablet, Rfl: 3    valsartan (DIOVAN) 40 MG tablet, Take 1 tablet (40 mg total) by mouth once daily., Disp: 90 tablet, Rfl: 3  Any other notable medications as documented in  HPI.    Allergies:  Review of patient's allergies indicates:   Allergen Reactions    Atorvastatin     Hydrodiuril [hydrochlorothiazide]     Lisinopril        Social History:  Social History     Socioeconomic History    Marital status: Single   Tobacco Use    Smoking status: Every Day     Current packs/day: 0.00     Average packs/day: 0.3 packs/day for 45.0 years (11.3 ttl pk-yrs)     Types: Cigarettes     Start date: 1976     Last attempt to quit: 2021     Years since quittin.8    Smokeless tobacco: Never   Substance and Sexual Activity    Alcohol use: Yes     Comment: 1-2x's    Drug use: Not Currently    Sexual activity: Not Currently     Social Determinants of Health     Financial Resource Strain: Low Risk  (2022)    Overall Financial Resource Strain (CARDIA)     Difficulty of Paying Living Expenses: Not hard at all   Food Insecurity: No Food Insecurity (2022)    Hunger Vital Sign     Worried About Running Out of Food in the Last Year: Never true     Ran Out of Food in the Last Year: Never true   Transportation Needs: No Transportation Needs (2022)    PRAPARE - Transportation     Lack of Transportation (Medical): No     Lack of Transportation (Non-Medical): No   Physical Activity: Insufficiently Active (2022)    Exercise Vital Sign     Days of Exercise per Week: 2 days     Minutes of Exercise per Session: 10 min   Stress: No Stress Concern Present (2022)    [place] [employer] - Occupational Stress Questionnaire     Feeling of Stress : Only a little   Social Connections: Moderately Isolated (2022)    Social Connection and Isolation Panel [NHANES]     Frequency of Communication with Friends and Family: More than three times a week     Frequency of Social Gatherings with Friends and Family: Twice a week     Attends Protestant Services: 1 to 4 times per year     Active Member of Clubs or Organizations: No     Attends Club or Organization Meetings: Never      Marital Status:    Housing Stability: Low Risk  (6/30/2022)    Housing Stability Vital Sign     Unable to Pay for Housing in the Last Year: No     Number of Places Lived in the Last Year: 1     Unstable Housing in the Last Year: No     Any other notable Social History as documented in HPI.    Family History:  Family History   Problem Relation Name Age of Onset    Alzheimer's disease Mother      Prostate cancer Father      Diabetes Father      Colon cancer Brother      Breast cancer Paternal Aunt      Heart disease Neg Hx      Stroke Neg Hx       Any other notable FMH as documented in HPI.      OBJECTIVE:     Physical Exam:   Vitals:    04/30/24 1103   BP: (!) 157/78   Pulse: 84     GEN: NAD, pleasant, cooperative  CV: RRR  PULM: No signs of resp distress, on room air  EXT: No cyanosis, clubbing, or edema.    NEURO:  Mental status: The patient is alert, attentive, and oriented to self, age, month, year. Spells WORLD forwards and backwards. Delayed recall 3/3 at 5 minutes.   Speech: Fluent speech with intact repetition, comprehension, and naming. Phonation is normal.    Cranial nerves:  CN II: Visual fields are full to confrontation. Pupils are 3mm and reactive to light OU.  CN III, IV, VI: EOMI, no nystagmus, no ptosis  CN V: Facial sensation is intact in all 3 divisions bilaterally.  CN VII: Face is symmetric with normal eye closure and smile.  CN VIII: Hearing is normal bilaterally.  CN IX, X: Palate elevates symmetrically.   CN XI: Head turning and shoulder shrug are intact.  CN XII: Tongue is midline with normal movements and no atrophy.    Motor: Muscle bulk and tone are normal. No pronator drift. 5/5 strength throughout.     Reflexes: Reflexes are 2+ and symmetric at the biceps, triceps, knees, and ankles.     Sensory: Light touch is intact in bilateral upper and lower extremities.    Coordination: Rapid alternating movements and fine finger movements are intact. There is no dysmetria on finger-to-nose  and heel-knee-shin. There are no abnormal or extraneous movements.    Gait/Stance: Posture is normal. Gait is steady with normal steps, base, arm swing, and turning.       ASSESSMENT/PLAN:     Diagnosis/Etiology: History of left occipital, right cerebellar infarctions. Suspect large artery atherosclerosis vs possible ESUS given evidence of significant intracranial stenosis on CT angiogram. No reported new neurologic symptoms since she was last seen in clinic. Plan for management of vascular risk factors as detailed below.   Stroke Risk Factors: DM, HTN, HLD  Effects of Stroke: Cognitive impairment    Recommendations:   - Antiplatelet/Anticoagulation: Continue aspirin 81mg daily.   - Lipid Management: Target is LDL < 70mg/dL. Continue pravastatin daily.   - Diabetes: Target hemoglobin A1c <7%, measured 2X/year or quarterly if not meeting goals  - Hypertension: Target systolic BP <130mmHg. BP elevated today. Recommend medication compliance and close follow-up with her PCP.   - Smoking: Discussed smoking cessation. There is also concern for secondary smoke exposure from family members and friends which is also a risk and family and friends should be encouraged to stop smoking.  - Weight: Target BMI is <25kg/m2 if BMI is > 25-27 kg/m2; if BMI >27kg/m2 10% weight loss is suggested over next 6 months. Dietary consult is suggested to assist in weight control.  - Therapy: No therapy needs at this time.   - Follow-up: RTC in 3 months. Recommend close follow-up with their PCP for monitoring and management of the above vascular risk factors as needed to meet goals.     # Cognitive impairment  - Likely multifactorial 2/2 prior stroke (vascular dementia), multiple vascular risk factors that were previously not medically managed. She has a history of dementia in her family.   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    Relevant Orders    VITAMIN B12 (Completed)    HEAVY METALS SCREEN, BLOOD (QUANTITATIVE) (Completed)    COPPER, SERUM (Completed)    Ambulatory referral/consult to Adult Neuropsychology     Other Visit Diagnoses       Mild dementia without behavioral disturbance, psychotic disturbance, mood disturbance, or anxiety, unspecified dementia type        Relevant Orders    VITAMIN B12 (Completed)    Ambulatory referral/consult to Adult Neuropsychology          60 minutes of total time spent on the encounter, which includes face to face time and non-face to face time preparing to see the patient (eg, review of tests), obtaining and/or reviewing separately obtained history, documenting clinical information in the electronic or other health record, independently interpreting results (not separately reported), and communicating results to the patient/family/caregiver, patient/family education, and care coordination (not separately reported).      Angelica Cuevas MD, PhD  [facility]  Department of Vascular Neurology

## 2024-04-30 NOTE — TELEPHONE ENCOUNTER
Donna Nieto, RN  Donna Nieto, RN  Caller: [phone] (Today,  3:32 PM)         Previous Messages       ----- Message -----  From: Millie Prince  Sent: 4/30/2024   3:34 PM CDT  To: [facility] Staff  Subject: Pt Advice                                        CONSULT/ADVISORY    Name of Caller:  Loreto Vanessa ( Sister)    Contact Preference:  [phone]    Nature of Call:  Pt was seen 04/26/2024 and pts sister was under the impression pt would be prescribed medication.  States she's unsure of the name of the rx's but pt hasn't received any as of today.  Please call.

## 2024-04-30 NOTE — PATIENT INSTRUCTIONS
To prevent further memory loss, some of the best preventative measures are following a healthy diet, getting regular exercise, and ensuring good sleep habits.  Approximately 30 to 45 minutes of brisk physical activity (brisk walking, swimming, stationary bicycle, etc.) 5 days a week has been shown to improve function in vascular dementias, and can lower the risk of stroke and slow progression of memory loss.     A Mediterranean style diet, or the DASH diet with lots of fresh fruits and vegetables, whole grains, more fish and chicken, less red meat, less dairy, less processed foods is also beneficial. For more information see:     [email or web address]      Minimize or eliminate the use of alcohol, and discuss any prescription medications you might be taking with your doctor to avoid medications which can cause sedation or worsen cognitive function.     Establish a regular, consistent sleep pattern and practice good sleep hygiene.  Avoid screen time (computer, TV, smartphones or tablets) or heavy meals for at least an hour before bedtime, and avoid caffeine or stimulants after 2 PM. Exercise earlier in the day or mornings and keep your sleeping environment comfortable.      Socializing with friends and family and staying both mentally and physically active is also very important. Continue with hobbies or activities that are engaging and practice activities that are mentally stimulating (e.g. word puzzles, Sudoku, etc) and stay active in the community.

## 2024-05-01 LAB
ARSENIC BLD-MCNC: 2 NG/ML
CADMIUM BLD-MCNC: 0.6 NG/ML
CITY: NORMAL
COUNTY: NORMAL
GUARDIAN FIRST NAME: NORMAL
GUARDIAN LAST NAME: NORMAL
HOME PHONE: NORMAL
LEAD BLD-MCNC: 1.7 MCG/DL
MERCURY BLD-MCNC: <1 NG/ML
RACE: NORMAL
STATE: NORMAL
STREET ADDRESS: NORMAL
VENOUS/CAPILLARY: NORMAL
ZIP: NORMAL

## 2024-05-06 LAB — COPPER SERPL-MCNC: 1450 UG/L (ref 810–1990)

## 2024-05-22 ENCOUNTER — TELEPHONE (OUTPATIENT)
Dept: ADMINISTRATIVE | Facility: CLINIC | Age: 69
End: 2024-05-22
Payer: MEDICARE

## 2024-05-30 ENCOUNTER — PATIENT MESSAGE (OUTPATIENT)
Dept: ADMINISTRATIVE | Facility: HOSPITAL | Age: 69
End: 2024-05-30
Payer: MEDICARE

## 2024-07-10 ENCOUNTER — TELEPHONE (OUTPATIENT)
Dept: INTERNAL MEDICINE | Facility: CLINIC | Age: 69
End: 2024-07-10
Payer: MEDICARE

## 2024-07-10 NOTE — TELEPHONE ENCOUNTER
----- Message from Rylee Alberto sent at 7/10/2024  1:51 PM CDT -----  Regarding: call back  Name of caller: nadine       What is the requesting detail: pt is on the wait list but does not know how accept the appt. Please give her a call back to further discuss.       Can the clinic reply by [facility]:       What number to call back: [phone]

## 2024-07-11 ENCOUNTER — OFFICE VISIT (OUTPATIENT)
Dept: INTERNAL MEDICINE | Facility: CLINIC | Age: 69
End: 2024-07-11
Payer: MEDICARE

## 2024-07-11 ENCOUNTER — LAB VISIT (OUTPATIENT)
Dept: LAB | Facility: OTHER | Age: 69
End: 2024-07-11
Attending: INTERNAL MEDICINE
Payer: MEDICARE

## 2024-07-11 VITALS
HEART RATE: 80 BPM | BODY MASS INDEX: 30.76 KG/M2 | WEIGHT: 184.63 LBS | SYSTOLIC BLOOD PRESSURE: 152 MMHG | OXYGEN SATURATION: 99 % | HEIGHT: 65 IN | DIASTOLIC BLOOD PRESSURE: 92 MMHG

## 2024-07-11 DIAGNOSIS — I70.0 AORTIC ATHEROSCLEROSIS: ICD-10-CM

## 2024-07-11 DIAGNOSIS — E78.5 HYPERLIPIDEMIA LDL GOAL <70: ICD-10-CM

## 2024-07-11 DIAGNOSIS — Z00.00 ROUTINE GENERAL MEDICAL EXAMINATION AT A HEALTH CARE FACILITY: Primary | ICD-10-CM

## 2024-07-11 DIAGNOSIS — E11.59 TYPE 2 DIABETES MELLITUS WITH OTHER CIRCULATORY COMPLICATION, WITHOUT LONG-TERM CURRENT USE OF INSULIN: ICD-10-CM

## 2024-07-11 DIAGNOSIS — Z86.73 HISTORY OF STROKE: ICD-10-CM

## 2024-07-11 DIAGNOSIS — I10 BENIGN ESSENTIAL HTN: ICD-10-CM

## 2024-07-11 DIAGNOSIS — Z87.19 HISTORY OF DIVERTICULITIS: ICD-10-CM

## 2024-07-11 DIAGNOSIS — G31.84 MILD COGNITIVE IMPAIRMENT: ICD-10-CM

## 2024-07-11 DIAGNOSIS — I69.311 MEMORY DEFICIT AFTER CEREBRAL INFARCTION: ICD-10-CM

## 2024-07-11 DIAGNOSIS — N18.32 STAGE 3B CHRONIC KIDNEY DISEASE: ICD-10-CM

## 2024-07-11 DIAGNOSIS — E11.65 TYPE 2 DIABETES MELLITUS WITH HYPERGLYCEMIA, WITHOUT LONG-TERM CURRENT USE OF INSULIN: ICD-10-CM

## 2024-07-11 DIAGNOSIS — F19.91 HISTORY OF DRUG USE: ICD-10-CM

## 2024-07-11 DIAGNOSIS — F33.41 RECURRENT MAJOR DEPRESSIVE DISORDER, IN PARTIAL REMISSION: ICD-10-CM

## 2024-07-11 PROBLEM — D69.6 THROMBOCYTOPENIA, UNSPECIFIED: Status: RESOLVED | Noted: 2022-01-18 | Resolved: 2024-07-11

## 2024-07-11 LAB
BASOPHILS # BLD AUTO: 0.03 K/UL (ref 0–0.2)
BASOPHILS NFR BLD: 0.4 % (ref 0–1.9)
CHOLEST SERPL-MCNC: 212 MG/DL (ref 120–199)
CHOLEST/HDLC SERPL: 4.7 {RATIO} (ref 2–5)
DIFFERENTIAL METHOD BLD: ABNORMAL
EOSINOPHIL # BLD AUTO: 0.1 K/UL (ref 0–0.5)
EOSINOPHIL NFR BLD: 1.6 % (ref 0–8)
ERYTHROCYTE [DISTWIDTH] IN BLOOD BY AUTOMATED COUNT: 14.4 % (ref 11.5–14.5)
ESTIMATED AVG GLUCOSE: 203 MG/DL (ref 68–131)
HBA1C MFR BLD: 8.7 % (ref 4–5.6)
HCT VFR BLD AUTO: 39.1 % (ref 37–48.5)
HDLC SERPL-MCNC: 45 MG/DL (ref 40–75)
HDLC SERPL: 21.2 % (ref 20–50)
HGB BLD-MCNC: 12.3 G/DL (ref 12–16)
IMM GRANULOCYTES # BLD AUTO: 0.02 K/UL (ref 0–0.04)
IMM GRANULOCYTES NFR BLD AUTO: 0.2 % (ref 0–0.5)
LDLC SERPL CALC-MCNC: 142 MG/DL (ref 63–159)
LYMPHOCYTES # BLD AUTO: 1.7 K/UL (ref 1–4.8)
LYMPHOCYTES NFR BLD: 21 % (ref 18–48)
MCH RBC QN AUTO: 26.9 PG (ref 27–31)
MCHC RBC AUTO-ENTMCNC: 31.5 G/DL (ref 32–36)
MCV RBC AUTO: 86 FL (ref 82–98)
MONOCYTES # BLD AUTO: 0.5 K/UL (ref 0.3–1)
MONOCYTES NFR BLD: 6.6 % (ref 4–15)
NEUTROPHILS # BLD AUTO: 5.6 K/UL (ref 1.8–7.7)
NEUTROPHILS NFR BLD: 70.2 % (ref 38–73)
NONHDLC SERPL-MCNC: 167 MG/DL
NRBC BLD-RTO: 0 /100 WBC
PLATELET # BLD AUTO: 216 K/UL (ref 150–450)
PMV BLD AUTO: 11.4 FL (ref 9.2–12.9)
RBC # BLD AUTO: 4.57 M/UL (ref 4–5.4)
TRIGL SERPL-MCNC: 125 MG/DL (ref 30–150)
WBC # BLD AUTO: 8.04 K/UL (ref 3.9–12.7)

## 2024-07-11 PROCEDURE — 99999 PR PBB SHADOW E&M-EST. PATIENT-LVL IV: CPT | Mod: PBBFAC,,, | Performed by: INTERNAL MEDICINE

## 2024-07-11 PROCEDURE — 83036 HEMOGLOBIN GLYCOSYLATED A1C: CPT | Performed by: INTERNAL MEDICINE

## 2024-07-11 PROCEDURE — 85025 COMPLETE CBC W/AUTO DIFF WBC: CPT | Performed by: INTERNAL MEDICINE

## 2024-07-11 PROCEDURE — 80061 LIPID PANEL: CPT | Performed by: INTERNAL MEDICINE

## 2024-07-11 PROCEDURE — 36415 COLL VENOUS BLD VENIPUNCTURE: CPT | Performed by: INTERNAL MEDICINE

## 2024-07-11 RX ORDER — LOSARTAN POTASSIUM 50 MG/1
50 TABLET ORAL DAILY
Qty: 90 TABLET | Refills: 3 | Status: SHIPPED | OUTPATIENT
Start: 2024-07-11 | End: 2024-07-11

## 2024-07-11 RX ORDER — METFORMIN HYDROCHLORIDE 1000 MG/1
1000 TABLET ORAL 2 TIMES DAILY WITH MEALS
Qty: 180 TABLET | Refills: 3 | Status: SHIPPED | OUTPATIENT
Start: 2024-07-11 | End: 2025-07-11

## 2024-07-11 RX ORDER — PRAVASTATIN SODIUM 80 MG/1
80 TABLET ORAL DAILY
Qty: 90 TABLET | Refills: 3 | Status: SHIPPED | OUTPATIENT
Start: 2024-07-11 | End: 2025-07-11

## 2024-07-11 RX ORDER — AMLODIPINE BESYLATE 10 MG/1
10 TABLET ORAL DAILY
Qty: 90 TABLET | Refills: 3 | Status: SHIPPED | OUTPATIENT
Start: 2024-07-11

## 2024-07-11 RX ORDER — METOPROLOL TARTRATE 100 MG/1
100 TABLET ORAL 2 TIMES DAILY
Qty: 120 TABLET | Refills: 5 | Status: SHIPPED | OUTPATIENT
Start: 2024-07-11 | End: 2025-07-11

## 2024-07-11 RX ORDER — DAPAGLIFLOZIN 5 MG/1
5 TABLET, FILM COATED ORAL DAILY
Qty: 90 TABLET | Refills: 3 | Status: SHIPPED | OUTPATIENT
Start: 2024-07-11 | End: 2024-07-11

## 2024-07-11 RX ORDER — EZETIMIBE 10 MG/1
10 TABLET ORAL DAILY
Qty: 90 TABLET | Refills: 3 | Status: SHIPPED | OUTPATIENT
Start: 2024-07-11 | End: 2025-07-11

## 2024-07-11 RX ORDER — VALSARTAN 40 MG/1
40 TABLET ORAL DAILY
Qty: 90 TABLET | Refills: 3 | Status: SHIPPED | OUTPATIENT
Start: 2024-07-11 | End: 2025-07-11

## 2024-07-11 NOTE — PROGRESS NOTES
Subjective:      Patient ID: Shabnam Noble is a 68 y.o. female.    Chief Complaint: Follow-up, Dizziness (today), and Memory Loss      Shabnam Noble is a 68 y.o. 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 She has been lost to follow up and is coming back to establish herself with me again.     Memory loss/Hx CVA: She has hx of remote CVA. She was also admitted to the hospital for stroke like symptoms (left arm weakness) in 2017. CT head at that time showed an old infarct. Her stroke work up at the time was negative and her exacerbation of sx was thought to be 2/2 to poorly controlled HTN. She was seen by neurology and neuropsych referral was placed by her neurologist. Continue to follow up with neuro and neuropsych.      HTN: BP now elevated again. She has not been consistent with her BP medications and has only been taking amlodipine instead of other regimen. Stressed the importance of compliance. Will restart her regimen at this time. RTC 1 month.      Poorly controlled DM2: A1c elevated to 9.0 at last physical. She was referred to diabetes advanced NP and has had her first visit with Jina on 5/9/2022. She continues to take metformin 1000mg BID and jardiance 10mg qday. Trulicity qweekly was added to her diabetes regimen which she has discontinued herself off of. She reports that she has only been taking half the metformin that was prescribed and has not been taking the jardiance. Will restart her medication at this time, repeat A1C. She will need endocrinology for further help in management.      HLD/aortic atherosclerosis: Was on crestor 20mg qhs which she stopped due to questionable side effects. Was previously on high dose pravastatin instead and zetia 10mg, but has not taken  these medications in more than a year. Resume regimen at this time.     CKD3B: Was see by nephrology and jardiance was increased to 25mg.She has also stopped jardiance for unknown reason. Restart jardiance, discussed that this medication helps with her kidneys and also with her diabetes.     Hx CVA: She has been taking ASA, BB. Pravastatin was restarted    Visit today is associated with current or anticipated ongoing medical care related to this patient's single serious condition/complex condition of uncontrolled T2DM, hx CVA, mild cognitive deficit, memory loss, HLD, CKD, HTN, MDD. The patient will return to see me as these issues will be followed longitudinally.    Denies any chest pain, shortness of breath, nausea vomiting constipation diarrhea, blood in stool, heartburn    Review of Systems   Constitutional:  Negative for chills, fever and weight loss.   HENT:  Negative for congestion, ear pain and sore throat.    Eyes:  Negative for double vision.   Respiratory:  Negative for cough and shortness of breath.    Cardiovascular:  Negative for chest pain, palpitations and leg swelling.   Gastrointestinal:  Negative for abdominal pain, heartburn, nausea and vomiting.   Skin:  Negative for rash.   Neurological:  Negative for dizziness, tingling and headaches.   Psychiatric/Behavioral:  Negative for depression.           Current Outpatient Medications:     aspirin (ECOTRIN) 81 MG EC tablet, Take 1 tablet (81 mg total) by mouth once daily., Disp: 90 tablet, Rfl: 3    blood glucose control, high Soln, 1 drop by Misc.(Non-Drug; Combo Route) route once daily. ICD 10 code: E11.59, Disp: 3 each, Rfl: 3    blood-glucose meter kit, Use as instructed. ICD 10 code E11.59, Disp: 1 each, Rfl: 0    ibuprofen (ADVIL,MOTRIN) 800 MG tablet, Take 1 tablet (800 mg total) by mouth 3 (three) times daily as needed for Pain., Disp: 90 tablet, Rfl: 1    amLODIPine (NORVASC) 10 MG tablet, Take 1 tablet (10 mg total) by mouth once daily.,  Disp: 90 tablet, Rfl: 3    b complex vitamins capsule, Take 1 capsule by mouth once daily. (Patient not taking: Reported on 4/30/2024), Disp: , Rfl:     cholecalciferol, vitamin D3, (VITAMIN D3) 125 mcg (5,000 unit) Tab, Take 1 tablet (5,000 Units total) by mouth once daily. (Patient not taking: Reported on 4/30/2024), Disp: 30 tablet, Rfl: 11    empagliflozin (JARDIANCE) 25 mg tablet, Take 1 tablet (25 mg total) by mouth once daily., Disp: 90 tablet, Rfl: 3    ezetimibe (ZETIA) 10 mg tablet, Take 1 tablet (10 mg total) by mouth once daily., Disp: 90 tablet, Rfl: 3    metFORMIN (GLUCOPHAGE) 1000 MG tablet, Take 1 tablet (1,000 mg total) by mouth 2 (two) times daily with meals., Disp: 180 tablet, Rfl: 3    metoprolol tartrate (LOPRESSOR) 100 MG tablet, Take 1 tablet (100 mg total) by mouth 2 (two) times daily., Disp: 120 tablet, Rfl: 5    pravastatin (PRAVACHOL) 80 MG tablet, Take 1 tablet (80 mg total) by mouth once daily., Disp: 90 tablet, Rfl: 3    valsartan (DIOVAN) 40 MG tablet, Take 1 tablet (40 mg total) by mouth once daily., Disp: 90 tablet, Rfl: 3    Lab Results   Component Value Date    HGBA1C 9.2 (H) 04/25/2024    HGBA1C 9.0 (H) 04/07/2022    HGBA1C 8.4 (H) 01/18/2022     Lab Results   Component Value Date    MICALBCREAT 404.0 (H) 04/26/2024    MICALBCREAT 404.0 (H) 04/26/2024     Lab Results   Component Value Date    LDLCALC 121.0 04/25/2024    LDLCALC 117.4 06/03/2021    CHOL 195 04/25/2024    HDL 43 04/25/2024    TRIG 155 (H) 04/25/2024       Lab Results   Component Value Date     04/25/2024    K 3.8 04/25/2024     04/25/2024    CO2 27 04/25/2024     (H) 04/25/2024    BUN 23 04/25/2024    CREATININE 1.4 04/25/2024    CALCIUM 9.8 04/25/2024    PROT 7.9 04/25/2024    ALBUMIN 3.7 04/25/2024    BILITOT 0.2 04/25/2024    ALKPHOS 93 04/25/2024    AST 12 04/25/2024    ALT 12 04/25/2024    ANIONGAP 7 (L) 04/25/2024    ESTGFRAFRICA >60.0 01/18/2022    EGFRNONAA 58.8 (A) 01/18/2022    WBC 6.40  "08/07/2023    HGB 13.0 08/07/2023    HGB 12.9 11/15/2022    HCT 39.7 08/07/2023    MCV 84 08/07/2023     08/07/2023    TSH 0.998 04/25/2024    HEPCAB Negative 01/21/2020       Lab Results   Component Value Date    [id number] 877 04/30/2024         Past Medical History:   Diagnosis Date    Colon polyp     CVA (cerebral vascular accident)     DM2 (diabetes mellitus, type 2)     Essential hypertension      Past Surgical History:   Procedure Laterality Date    ANKLE FRACTURE SURGERY Right     ANKLE FRACTURE SURGERY      COLONOSCOPY N/A 12/11/2020    Procedure: COLONOSCOPY;  Surgeon: Marshal Rogers MD;  Location: [facility] ([address] [place] [address]);  Service: Endoscopy;  Laterality: N/A;  11/18-plavix hold ok see te-covid pcw 12/8-tb    COLONOSCOPY W/ POLYPECTOMY      HYSTERECTOMY      no h/o  malignancy     Social History     Social History Narrative    Not on file     Family History   Problem Relation Name Age of Onset    Alzheimer's disease Mother      Prostate cancer Father      Diabetes Father      Colon cancer Brother      Breast cancer Paternal Aunt      Heart disease Neg Hx      Stroke Neg Hx       Vitals:    07/11/24 1331 07/11/24 1419   BP: (!) 174/92 (!) 152/92   Pulse: 80    SpO2: 99%    Weight: 83.7 kg (184 lb 10.2 oz)    Height: 5' 5" (1.651 m)    PainSc:   2    PainLoc: Head      Objective:   Physical Exam  Vitals reviewed.   Constitutional:       Appearance: Normal appearance.   HENT:      Head: Normocephalic.      Right Ear: Tympanic membrane, ear canal and external ear normal.      Left Ear: Tympanic membrane, ear canal and external ear normal.      Nose: Nose normal.      Mouth/Throat:      Mouth: Mucous membranes are moist.      Pharynx: Oropharynx is clear.   Eyes:      Conjunctiva/sclera: Conjunctivae normal.      Pupils: Pupils are equal, round, and reactive to light.   Cardiovascular:      Rate and Rhythm: Normal rate and regular rhythm.      Pulses: Normal pulses.   Pulmonary:      Effort: Pulmonary " effort is normal.      Breath sounds: Normal breath sounds.   Abdominal:      General: Abdomen is flat. Bowel sounds are normal.      Palpations: Abdomen is soft.   Musculoskeletal:      Cervical back: Neck supple.   Skin:     General: Skin is warm.   Neurological:      General: No focal deficit present.      Mental Status: She is alert.   Psychiatric:         Mood and Affect: Mood normal.       Assessment/Plan     Shabnam Noble is a 68 y.o.female with:    Routine general medical examination at a health care facility  -     Hemoglobin A1C; Future; Expected date: 07/11/2024  -     CBC Auto Differential; Future; Expected date: 07/11/2024  -     Lipid Panel; Future; Expected date: 07/11/2024  - Bloodwork that was obtained prior to this appointment was reviewed in detail with the patient during the office visit.     Benign essential HTN  -     amLODIPine (NORVASC) 10 MG tablet; Take 1 tablet (10 mg total) by mouth once daily.  Dispense: 90 tablet; Refill: 3  -     Discontinue: losartan (COZAAR) 50 MG tablet; Take 1 tablet (50 mg total) by mouth once daily.  Dispense: 90 tablet; Refill: 3  -     metoprolol tartrate (LOPRESSOR) 100 MG tablet; Take 1 tablet (100 mg total) by mouth 2 (two) times daily.  Dispense: 120 tablet; Refill: 5  -     CBC Auto Differential; Future; Expected date: 07/11/2024    History of stroke  -     pravastatin (PRAVACHOL) 80 MG tablet; Take 1 tablet (80 mg total) by mouth once daily.  Dispense: 90 tablet; Refill: 3  -     ezetimibe (ZETIA) 10 mg tablet; Take 1 tablet (10 mg total) by mouth once daily.  Dispense: 90 tablet; Refill: 3  -     Ambulatory referral/consult to Endocrinology; Future; Expected date: 07/18/2024    Hyperlipidemia LDL goal <70  -     ezetimibe (ZETIA) 10 mg tablet; Take 1 tablet (10 mg total) by mouth once daily.  Dispense: 90 tablet; Refill: 3  -     Lipid Panel; Future; Expected date: 07/11/2024    Type 2 diabetes mellitus with other circulatory complication, without  long-term current use of insulin  -     metFORMIN (GLUCOPHAGE) 1000 MG tablet; Take 1 tablet (1,000 mg total) by mouth 2 (two) times daily with meals.  Dispense: 180 tablet; Refill: 3  -     Discontinue: dapagliflozin propanediol (FARXIGA) 5 mg Tab tablet; Take 1 tablet (5 mg total) by mouth once daily.  Dispense: 90 tablet; Refill: 3  -     Hemoglobin A1C; Future; Expected date: 07/11/2024  -     valsartan (DIOVAN) 40 MG tablet; Take 1 tablet (40 mg total) by mouth once daily.  Dispense: 90 tablet; Refill: 3  -     empagliflozin (JARDIANCE) 25 mg tablet; Take 1 tablet (25 mg total) by mouth once daily.  Dispense: 90 tablet; Refill: 3    Stage 3b chronic kidney disease  -     valsartan (DIOVAN) 40 MG tablet; Take 1 tablet (40 mg total) by mouth once daily.  Dispense: 90 tablet; Refill: 3  -     empagliflozin (JARDIANCE) 25 mg tablet; Take 1 tablet (25 mg total) by mouth once daily.  Dispense: 90 tablet; Refill: 3    Aortic atherosclerosis  -     pravastatin (PRAVACHOL) 80 MG tablet; Take 1 tablet (80 mg total) by mouth once daily.  Dispense: 90 tablet; Refill: 3  -     ezetimibe (ZETIA) 10 mg tablet; Take 1 tablet (10 mg total) by mouth once daily.  Dispense: 90 tablet; Refill: 3  -     Lipid Panel; Future; Expected date: 07/11/2024    Recurrent major depressive disorder, in partial remission    Type 2 diabetes mellitus with hyperglycemia, without long-term current use of insulin  -     Ambulatory referral/consult to Endocrinology; Future; Expected date: 07/18/2024    History of diverticulitis    History of drug use    Mild cognitive impairment    Memory deficit after cerebral infarction  -     Lipid Panel; Future; Expected date: 07/11/2024         Chronic conditions status updated as per HPI.  Other than changes above, cont current medications and maintain follow up with specialists.  Return to clinic in Follow up in about 4 weeks (around 8/8/2024).      Krys Michael MD  [facility]    There are no  Patient Instructions on file for this visit.  Tests to Keep You Healthy    Mammogram: DUE  Eye Exam: SCHEDULED  Colon Cancer Screening: Met on 12/11/2020  Last Blood Pressure <= 139/89 (7/11/2024): NO  Last HbA1c < 8 (04/25/2024): NO  Tobacco Cessation: NO

## 2024-07-16 ENCOUNTER — TELEPHONE (OUTPATIENT)
Dept: INTERNAL MEDICINE | Facility: CLINIC | Age: 69
End: 2024-07-16
Payer: MEDICARE

## 2024-07-16 NOTE — TELEPHONE ENCOUNTER
Spoke to patient's sister.     She needs something in writing that states she is not responsible to live by herself.     Would like medical conditions listed in letter.     Are you ok writing this letter? She will gibe me the phone number to fax it to once approved.

## 2024-07-18 NOTE — TELEPHONE ENCOUNTER
Spoke to Physical Medicine and Rehab.    They suggested talking to her Neurologist on 7/31 at her appointment.     She requested all her appointments printed and mailed to:    [id number] [facility] [place] [address]

## 2024-07-31 ENCOUNTER — OFFICE VISIT (OUTPATIENT)
Dept: NEUROLOGY | Facility: CLINIC | Age: 69
End: 2024-07-31
Payer: MEDICARE

## 2024-07-31 VITALS — DIASTOLIC BLOOD PRESSURE: 84 MMHG | SYSTOLIC BLOOD PRESSURE: 215 MMHG | HEART RATE: 76 BPM

## 2024-07-31 DIAGNOSIS — I15.2 HYPERTENSION ASSOCIATED WITH DIABETES: ICD-10-CM

## 2024-07-31 DIAGNOSIS — E11.59 HYPERTENSION ASSOCIATED WITH DIABETES: ICD-10-CM

## 2024-07-31 DIAGNOSIS — G31.84 MILD COGNITIVE IMPAIRMENT: ICD-10-CM

## 2024-07-31 DIAGNOSIS — Z86.73 HISTORY OF STROKE: Primary | ICD-10-CM

## 2024-07-31 PROCEDURE — 99999 PR PBB SHADOW E&M-EST. PATIENT-LVL III: CPT | Mod: PBBFAC,,, | Performed by: STUDENT IN AN ORGANIZED HEALTH CARE EDUCATION/TRAINING PROGRAM

## 2024-07-31 NOTE — PROGRESS NOTES
"[facility]  Return Clinic Visit    Patient Name: Shabnam Noble  MRN: [id number]  Date: 7/31/24  Referring Provider: No ref. provider found    CC: Headaches, memory complaints    SUBJECTIVE:      History of Present Illness: Shabnam Noble is a 68 y.o. female with a past medical history significant for prior stroke, DM, HTN, tobacco use who presents for follow-up for headaches and cognitive impairment.     She was previously evaluated by Dr. Mayer, last seen 08/2022. HPI at the time: "She states that everybody notices that she "doesn't remember from one moment to the next sometimes". She states this has been occurring since she had her stroke. She states it has been getting worse. She states she is able to perform all her ADLs independently, but has began to struggle with driving (she works as an [employer] ) and finances in regards to IADLs. She states she experiences some dizziness with change in position, and also has some headaches occasionally. Her BP during her visit today was 193/81, and she states she didn't take any of her medications because it was "too early". We discussed the importance of taking all of her medications as prescribed. Her MOCA score during today's visit is 20/30." Lab work as well as referral for neuropsychology evaluation was ordered; however, the patient had not yet completed these. She recently had follow-up with her PCP who noted that she had not had regular follow-up in over a year. She had also stopped taking her medications.     Today, she reports that her memory issues had been getting worse. Denies difficulties with ADLs. She occasionally forgets to pay her bills and does not currently work. Her sister keeps up with her medical appointments. She tries to take her medications every day, but reports some non-compliance. She continues to drive - does not get lost, no major traffic incidents or MVCs. Her sister rides with her and reports no issues. She continues to forget " conversations. Denies depressed mood. Denies changes in appetite or recent weight loss. No SI/HI/AVH. She denies difficulties falling asleep or significant daytime somnolence. Does not know if she snores. She reports night sweats and waking up in the middle of the night. She then has difficulty getting back to sleep. She reports frequent headaches, frontal, dull/throbbing. No nausea, vomiting, photophobia/phonophobia. No vision changes or associated neurologic symptoms. Occurs every other day. She will occasionally take ibuprofen for this with relief. Denies new or worsening focal neurologic symptoms concerning for stroke or TIA.     Interval History:   Since she was last seen in clinic, she denies new or worsening symptoms concerning for stroke or TIA. Memory symptoms also appear stable. Labs obtained at her last clinic visit were unremarkable. She still has yet to meet with neuropsychology.     SBP 210s this morning. She did not take her AM meds and reports missing frequent doses of her medications. She does not have her medications with her. Does not measure her blood pressure at home. Lives alone. Reports a mild headache. Denies blurry vision, lightheadedness, dizziness, numbness, or weakness.     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     Work-up:    Imaging:  - [facility] (08/07/2023): Stable CT of the brain no evidence of acute hemorrhage, mass or infarction.   - MRI brain without contrast (11/15/2022): No acute intracranial abnormality. Remote infarct within the left occipital lobe and right cerebellum. Left thalamic tiny remote lacunar type infarct. Involutional change and suspected sequela chronic microvascular ischemic change. Mild right mastoid air cells effusion.  - CTA head/neck (09/23/2017): Generalized atherosclerotic changes. Moderate stenosis at the origin of right vertebral artery. Severe stenosis in the M2 segment of the right middle cerebral artery, moderate stenosis  in the left anterior cerebral artery.   Severe stenosis in the middle 3rd of the basilar trunk, and occlusion of the left posterior cerebral artery.     Labs:  Recent Labs   Lab 07/11/24  1430   Hemoglobin A1C 8.7 H   LDL Cholesterol 142.0   HDL 45   Triglycerides 125   Cholesterol 212 H       Review of Systems: The following systems were reviewed with pertinent positives and negatives documented in the HPI: constitutional, eyes, CV, respiratory, GI, , musculoskeletal, skin, neurological, psychiatric    Past Medical History:  Past Medical History:   Diagnosis Date    Colon polyp     CVA (cerebral vascular accident)     DM2 (diabetes mellitus, type 2)     Essential hypertension        Medications:    Current Outpatient Medications:     amLODIPine (NORVASC) 10 MG tablet, Take 1 tablet (10 mg total) by mouth once daily., Disp: 90 tablet, Rfl: 3    aspirin (ECOTRIN) 81 MG EC tablet, Take 1 tablet (81 mg total) by mouth once daily., Disp: 90 tablet, Rfl: 3    b complex vitamins capsule, Take 1 capsule by mouth once daily. (Patient not taking: Reported on 4/30/2024), Disp: , Rfl:     blood glucose control, high Soln, 1 drop by Misc.(Non-Drug; Combo Route) route once daily. ICD 10 code: E11.59, Disp: 3 each, Rfl: 3    blood-glucose meter kit, Use as instructed. ICD 10 code E11.59, Disp: 1 each, Rfl: 0    cholecalciferol, vitamin D3, (VITAMIN D3) 125 mcg (5,000 unit) Tab, Take 1 tablet (5,000 Units total) by mouth once daily. (Patient not taking: Reported on 4/30/2024), Disp: 30 tablet, Rfl: 11    empagliflozin (JARDIANCE) 25 mg tablet, Take 1 tablet (25 mg total) by mouth once daily., Disp: 90 tablet, Rfl: 3    ezetimibe (ZETIA) 10 mg tablet, Take 1 tablet (10 mg total) by mouth once daily., Disp: 90 tablet, Rfl: 3    ibuprofen (ADVIL,MOTRIN) 800 MG tablet, Take 1 tablet (800 mg total) by mouth 3 (three) times daily as needed for Pain., Disp: 90 tablet, Rfl: 1    metFORMIN (GLUCOPHAGE) 1000 MG tablet, Take 1 tablet  (1,000 mg total) by mouth 2 (two) times daily with meals., Disp: 180 tablet, Rfl: 3    metoprolol tartrate (LOPRESSOR) 100 MG tablet, Take 1 tablet (100 mg total) by mouth 2 (two) times daily., Disp: 120 tablet, Rfl: 5    pravastatin (PRAVACHOL) 80 MG tablet, Take 1 tablet (80 mg total) by mouth once daily., Disp: 90 tablet, Rfl: 3    valsartan (DIOVAN) 40 MG tablet, Take 1 tablet (40 mg total) by mouth once daily., Disp: 90 tablet, Rfl: 3  Any other notable medications as documented in HPI.    Allergies:  Review of patient's allergies indicates:   Allergen Reactions    Atorvastatin     Hydrodiuril [hydrochlorothiazide]     Lisinopril        Social History:  Social History     Socioeconomic History    Marital status: Single   Tobacco Use    Smoking status: Every Day     Current packs/day: 0.00     Average packs/day: 0.3 packs/day for 45.0 years (11.3 ttl pk-yrs)     Types: Cigarettes     Start date: 7/1/1976     Last attempt to quit: 7/1/2021     Years since quitting: 3.0    Smokeless tobacco: Never   Substance and Sexual Activity    Alcohol use: Yes     Comment: 1-2x's    Drug use: Not Currently    Sexual activity: Not Currently     Social Determinants of Health     Financial Resource Strain: Low Risk  (6/30/2022)    Overall Financial Resource Strain (CARDIA)     Difficulty of Paying Living Expenses: Not hard at all   Food Insecurity: No Food Insecurity (6/30/2022)    Hunger Vital Sign     Worried About Running Out of Food in the Last Year: Never true     Ran Out of Food in the Last Year: Never true   Transportation Needs: No Transportation Needs (6/30/2022)    PRAPARE - Transportation     Lack of Transportation (Medical): No     Lack of Transportation (Non-Medical): No   Physical Activity: Insufficiently Active (6/30/2022)    Exercise Vital Sign     Days of Exercise per Week: 2 days     Minutes of Exercise per Session: 10 min   Stress: No Stress Concern Present (6/30/2022)    [employer] [place] [employer] - Occupational Stress Questionnaire     Feeling of Stress : Only a little   Housing Stability: Low Risk  (6/30/2022)    Housing Stability Vital Sign     Unable to Pay for Housing in the Last Year: No     Number of Places Lived in the Last Year: 1     Unstable Housing in the Last Year: No     Any other notable Social History as documented in HPI.    Family History:  Family History   Problem Relation Name Age of Onset    Alzheimer's disease Mother      Prostate cancer Father      Diabetes Father      Colon cancer Brother      Breast cancer Paternal Aunt      Heart disease Neg Hx      Stroke Neg Hx       Any other notable FMH as documented in HPI.      OBJECTIVE:     Physical Exam:   Vitals:    07/31/24 0954   BP: (!) 214/81   Pulse: 72     GEN: NAD, pleasant, cooperative  CV: RRR  PULM: No signs of resp distress, on room air  EXT: No cyanosis, clubbing, or edema.    NEURO:  Mental status: The patient is alert, attentive, and oriented to self, age, month, year. Spells WORLD forwards and backwards. Delayed recall 3/3 at 5 minutes.   Speech: Fluent speech with intact repetition, comprehension, and naming. Phonation is normal.    Cranial nerves:  CN II: Visual fields are full to confrontation. Pupils are 3mm and reactive to light OU.  CN III, IV, VI: EOMI, no nystagmus, no ptosis  CN V: Facial sensation is intact in all 3 divisions bilaterally.  CN VII: Face is symmetric with normal eye closure and smile.  CN VIII: Hearing is normal bilaterally.  CN IX, X: Palate elevates symmetrically.   CN XI: Head turning and shoulder shrug are intact.  CN XII: Tongue is midline with normal movements and no atrophy.    Motor: Muscle bulk and tone are normal. No pronator drift. 5/5 strength throughout.     Reflexes: Reflexes are 2+ and symmetric at the biceps, triceps, knees, and ankles.     Sensory: Light touch is intact in bilateral upper and lower extremities.    Coordination: Rapid alternating movements and fine finger  movements are intact. There is no dysmetria on finger-to-nose and heel-knee-shin. There are no abnormal or extraneous movements.    Gait/Stance: Posture is normal. Gait is steady with normal steps, base, arm swing, and turning.       ASSESSMENT/PLAN:     Diagnosis/Etiology: History of left occipital, right cerebellar infarctions. Suspect large artery atherosclerosis vs possible ESUS given evidence of significant intracranial stenosis on CT angiogram. No reported new neurologic symptoms since she was last seen in clinic. Plan for management of vascular risk factors as detailed below.   Stroke Risk Factors: DM, HTN, HLD, tobacco use  Effects of Stroke: Cognitive impairment    Recommendations:   - Antiplatelet/Anticoagulation: Continue aspirin 81mg daily.   - Lipid Management: Target is LDL < 70mg/dL. Continue pravastatin daily.   - Diabetes: Target hemoglobin A1c <7%, measured 2X/year or quarterly if not meeting goals  - Hypertension: Target systolic BP <130mmHg. BP elevated today. Recommend medication compliance and close follow-up with her PCP.   - Smoking: Discussed smoking cessation. There is also concern for secondary smoke exposure from family members and friends which is also a risk and family and friends should be encouraged to stop smoking.  - Weight: Target BMI is <25kg/m2 if BMI is > 25-27 kg/m2; if BMI >27kg/m2 10% weight loss is suggested over next 6 months. Dietary consult is suggested to assist in weight control.  - Therapy: No therapy needs at this time.   - Follow-up: RTC in 3 months. Recommend close follow-up with their PCP for monitoring and management of the above vascular risk factors as needed to meet goals.     Patient was instructed in the signs and symptoms of stroke, including: unilateral strength or sensation change, facial droop, language difficulty, diplopia, visual field loss, and acute onset severe vertigo. Patient was instructed to immediately call EMS in the setting of any of these  signs/symptoms to be evaluated for potential therapy. Patient verbalized understanding.    # Cognitive impairment  - Likely multifactorial 2/2 prior stroke (vascular dementia), multiple vascular risk factors that were previously not medically managed. History of medication non-compliance. She has a history of dementia in her family.   - Referral to neuropsychology for additional testing given family's concern (pending)    # Hypertension  - SBP 210s on 2 subsequent measurements in clinic. Patient had not taken her AM medications. Reports a mild headache, otherwise asymptomatic. No new focal neurologic findings on exam. I counseled her on medication compliance (use of a pill sorting system, blood pressure cuff, enrollment into blood pressure monitoring program). Given how high her blood pressures are, I also recommended multiple times that she present to the ED (risks of untreated HTN were discussed) for further management; however, she refused today and elected to return home. Strict return precautions were discussed.     Problem List Items Addressed This Visit          Neuro    History of stroke - Primary    Mild cognitive impairment       Cardiac/Vascular    Hypertension associated with diabetes       Angelica Cuevas MD, PhD  [facility]  Department of Vascular Neurology

## 2024-08-08 ENCOUNTER — HOSPITAL ENCOUNTER (EMERGENCY)
Facility: OTHER | Age: 69
Discharge: HOME | End: 2024-08-08
Attending: EMERGENCY MEDICINE
Payer: MEDICARE

## 2024-08-08 VITALS
BODY MASS INDEX: 30.82 KG/M2 | RESPIRATION RATE: 17 BRPM | SYSTOLIC BLOOD PRESSURE: 194 MMHG | DIASTOLIC BLOOD PRESSURE: 85 MMHG | OXYGEN SATURATION: 100 % | TEMPERATURE: 98 F | WEIGHT: 185 LBS | HEIGHT: 65 IN | HEART RATE: 63 BPM

## 2024-08-08 DIAGNOSIS — I10 HYPERTENSION, UNCONTROLLED: ICD-10-CM

## 2024-08-08 DIAGNOSIS — M54.50 ACUTE RIGHT-SIDED LOW BACK PAIN WITHOUT SCIATICA: Primary | ICD-10-CM

## 2024-08-08 LAB
ALBUMIN SERPL BCP-MCNC: 3.5 G/DL (ref 3.5–5.2)
ALP SERPL-CCNC: 82 U/L (ref 55–135)
ALT SERPL W/O P-5'-P-CCNC: 11 U/L (ref 10–44)
ANION GAP SERPL CALC-SCNC: 10 MMOL/L (ref 8–16)
AST SERPL-CCNC: 9 U/L (ref 10–40)
BACTERIA #/AREA URNS HPF: ABNORMAL /HPF
BASOPHILS # BLD AUTO: 0.02 K/UL (ref 0–0.2)
BASOPHILS NFR BLD: 0.3 % (ref 0–1.9)
BILIRUB SERPL-MCNC: 0.3 MG/DL (ref 0.1–1)
BILIRUB UR QL STRIP: NEGATIVE
BNP SERPL-MCNC: 137 PG/ML (ref 0–99)
BUN SERPL-MCNC: 26 MG/DL (ref 8–23)
CALCIUM SERPL-MCNC: 8.9 MG/DL (ref 8.7–10.5)
CHLORIDE SERPL-SCNC: 107 MMOL/L (ref 95–110)
CLARITY UR: CLEAR
CO2 SERPL-SCNC: 24 MMOL/L (ref 23–29)
COLOR UR: YELLOW
CREAT SERPL-MCNC: 1.5 MG/DL (ref 0.5–1.4)
DIFFERENTIAL METHOD BLD: ABNORMAL
EOSINOPHIL # BLD AUTO: 0.1 K/UL (ref 0–0.5)
EOSINOPHIL NFR BLD: 1.5 % (ref 0–8)
ERYTHROCYTE [DISTWIDTH] IN BLOOD BY AUTOMATED COUNT: 14 % (ref 11.5–14.5)
EST. GFR  (NO RACE VARIABLE): 38 ML/MIN/1.73 M^2
GLUCOSE SERPL-MCNC: 219 MG/DL (ref 70–110)
GLUCOSE UR QL STRIP: ABNORMAL
HCT VFR BLD AUTO: 36.4 % (ref 37–48.5)
HGB BLD-MCNC: 12 G/DL (ref 12–16)
HGB UR QL STRIP: ABNORMAL
HYALINE CASTS #/AREA URNS LPF: 8 /LPF
IMM GRANULOCYTES # BLD AUTO: 0.01 K/UL (ref 0–0.04)
IMM GRANULOCYTES NFR BLD AUTO: 0.2 % (ref 0–0.5)
KETONES UR QL STRIP: NEGATIVE
LEUKOCYTE ESTERASE UR QL STRIP: NEGATIVE
LYMPHOCYTES # BLD AUTO: 1.3 K/UL (ref 1–4.8)
LYMPHOCYTES NFR BLD: 19.7 % (ref 18–48)
MCH RBC QN AUTO: 27.5 PG (ref 27–31)
MCHC RBC AUTO-ENTMCNC: 33 G/DL (ref 32–36)
MCV RBC AUTO: 84 FL (ref 82–98)
MICROSCOPIC COMMENT: ABNORMAL
MONOCYTES # BLD AUTO: 0.4 K/UL (ref 0.3–1)
MONOCYTES NFR BLD: 6.5 % (ref 4–15)
NEUTROPHILS # BLD AUTO: 4.8 K/UL (ref 1.8–7.7)
NEUTROPHILS NFR BLD: 71.8 % (ref 38–73)
NITRITE UR QL STRIP: NEGATIVE
NRBC BLD-RTO: 0 /100 WBC
OHS QRS DURATION: 104 MS
OHS QTC CALCULATION: 460 MS
PH UR STRIP: 6 [PH] (ref 5–8)
PLATELET # BLD AUTO: 207 K/UL (ref 150–450)
PMV BLD AUTO: 11.1 FL (ref 9.2–12.9)
POTASSIUM SERPL-SCNC: 4 MMOL/L (ref 3.5–5.1)
PROT SERPL-MCNC: 7.2 G/DL (ref 6–8.4)
PROT UR QL STRIP: ABNORMAL
RBC # BLD AUTO: 4.36 M/UL (ref 4–5.4)
RBC #/AREA URNS HPF: 2 /HPF (ref 0–4)
SODIUM SERPL-SCNC: 141 MMOL/L (ref 136–145)
SP GR UR STRIP: 1.02 (ref 1–1.03)
SQUAMOUS #/AREA URNS HPF: 3 /HPF
TROPONIN I SERPL DL<=0.01 NG/ML-MCNC: 0.02 NG/ML (ref 0–0.03)
URN SPEC COLLECT METH UR: ABNORMAL
UROBILINOGEN UR STRIP-ACNC: ABNORMAL EU/DL
WBC # BLD AUTO: 6.61 K/UL (ref 3.9–12.7)
WBC #/AREA URNS HPF: 3 /HPF (ref 0–5)

## 2024-08-08 PROCEDURE — 85025 COMPLETE CBC W/AUTO DIFF WBC: CPT | Performed by: EMERGENCY MEDICINE

## 2024-08-08 PROCEDURE — 96360 HYDRATION IV INFUSION INIT: CPT

## 2024-08-08 PROCEDURE — 84484 ASSAY OF TROPONIN QUANT: CPT | Performed by: EMERGENCY MEDICINE

## 2024-08-08 PROCEDURE — 99284 EMERGENCY DEPT VISIT MOD MDM: CPT | Mod: 25

## 2024-08-08 PROCEDURE — 80053 COMPREHEN METABOLIC PANEL: CPT | Performed by: EMERGENCY MEDICINE

## 2024-08-08 PROCEDURE — 93005 ELECTROCARDIOGRAM TRACING: CPT

## 2024-08-08 PROCEDURE — 83880 ASSAY OF NATRIURETIC PEPTIDE: CPT | Performed by: EMERGENCY MEDICINE

## 2024-08-08 PROCEDURE — 25000003 PHARM REV CODE 250: Performed by: EMERGENCY MEDICINE

## 2024-08-08 PROCEDURE — 81000 URINALYSIS NONAUTO W/SCOPE: CPT | Performed by: EMERGENCY MEDICINE

## 2024-08-08 PROCEDURE — 93010 ELECTROCARDIOGRAM REPORT: CPT | Mod: ,,, | Performed by: INTERNAL MEDICINE

## 2024-08-08 RX ORDER — VALSARTAN 40 MG/1
40 TABLET ORAL
Status: COMPLETED | OUTPATIENT
Start: 2024-08-08 | End: 2024-08-08

## 2024-08-08 RX ORDER — AMLODIPINE BESYLATE 5 MG/1
10 TABLET ORAL DAILY
Status: DISCONTINUED | OUTPATIENT
Start: 2024-08-08 | End: 2024-08-08 | Stop reason: HOSPADM

## 2024-08-08 RX ORDER — METHOCARBAMOL 500 MG/1
1000 TABLET, FILM COATED ORAL 3 TIMES DAILY PRN
Qty: 30 TABLET | Refills: 0 | Status: SHIPPED | OUTPATIENT
Start: 2024-08-08 | End: 2024-08-13

## 2024-08-08 RX ORDER — METOPROLOL TARTRATE 50 MG/1
100 TABLET ORAL
Status: COMPLETED | OUTPATIENT
Start: 2024-08-08 | End: 2024-08-08

## 2024-08-08 RX ORDER — ACETAMINOPHEN 500 MG
500 TABLET ORAL EVERY 6 HOURS PRN
Qty: 30 TABLET | Refills: 0 | Status: SHIPPED | OUTPATIENT
Start: 2024-08-08

## 2024-08-08 RX ORDER — ACETAMINOPHEN 325 MG/1
650 TABLET ORAL
Status: COMPLETED | OUTPATIENT
Start: 2024-08-08 | End: 2024-08-08

## 2024-08-08 RX ORDER — METHOCARBAMOL 500 MG/1
1000 TABLET, FILM COATED ORAL
Status: COMPLETED | OUTPATIENT
Start: 2024-08-08 | End: 2024-08-08

## 2024-08-08 RX ADMIN — ACETAMINOPHEN 650 MG: 325 TABLET, FILM COATED ORAL at 08:08

## 2024-08-08 RX ADMIN — METOPROLOL TARTRATE 100 MG: 50 TABLET, FILM COATED ORAL at 08:08

## 2024-08-08 RX ADMIN — METHOCARBAMOL 1000 MG: 500 TABLET ORAL at 08:08

## 2024-08-08 RX ADMIN — AMLODIPINE BESYLATE 10 MG: 5 TABLET ORAL at 08:08

## 2024-08-08 RX ADMIN — VALSARTAN 40 MG: 40 TABLET, FILM COATED ORAL at 08:08

## 2024-08-08 RX ADMIN — SODIUM CHLORIDE 500 ML: 9 INJECTION, SOLUTION INTRAVENOUS at 09:08

## 2024-08-08 NOTE — ED PROVIDER NOTES
Encounter Date: 8/8/2024    SCRIBE #1 NOTE: ICatherine, am scribing for, and in the presence of,  Sally Carpio MD. I have scribed the following portions of the note - Other sections scribed: HPI, ROS, PE.       History     Chief Complaint   Patient presents with    Back Pain     Nontraumatic R side back pain. Pt states she could be dehydrated. Did not take BP meds this am d/t back pain.     Time seen by provider: 7:56 AM    This is a 68 y.o. female who presents with complaint of back pain.  Pain is located in the right lower back. She states the pain started last night without any known trauma or inciting event. She describes the pain as a pressure sensation which radiates up her back and stays on her right side, and rates the pain as 9/10. She has not taken any analgesics for the pain. She also complains of an intermittent headache. She denies any fever, hematuria, dysuria, vomiting, or chest pain. She has a Hx of HTN and TIA. She reports not taking her HTN medication this morning. Her PSHx includes an ankle fracture surgery and hysterectomy. Her SHx includes tobacco use and occasional alcohol consumption. She denies any allergies to medications. This is the extent of the patient's complaints at this time.    The history is provided by the patient.     Review of patient's allergies indicates:   Allergen Reactions    Atorvastatin     Hydrodiuril [hydrochlorothiazide]     Lisinopril      Past Medical History:   Diagnosis Date    Colon polyp     CVA (cerebral vascular accident)     DM2 (diabetes mellitus, type 2)     Essential hypertension      Past Surgical History:   Procedure Laterality Date    ANKLE FRACTURE SURGERY Right     ANKLE FRACTURE SURGERY      COLONOSCOPY N/A 12/11/2020    Procedure: COLONOSCOPY;  Surgeon: Marshal Rogers MD;  Location: [facility] ([address] [place] [address]);  Service: Endoscopy;  Laterality: N/A;  11/18-plavix hold ok see te-covid pcw 12/8-tb    COLONOSCOPY W/ POLYPECTOMY      HYSTERECTOMY       no h/o  malignancy     Family History   Problem Relation Name Age of Onset    Alzheimer's disease Mother      Prostate cancer Father      Diabetes Father      Colon cancer Brother      Breast cancer Paternal Aunt      Heart disease Neg Hx      Stroke Neg Hx       Social History     Tobacco Use    Smoking status: Every Day     Current packs/day: 0.00     Average packs/day: 0.3 packs/day for 45.0 years (11.3 ttl pk-yrs)     Types: Cigarettes     Start date: 7/1/1976     Last attempt to quit: 7/1/2021     Years since quitting: 3.1    Smokeless tobacco: Never   Substance Use Topics    Alcohol use: Yes     Comment: 1-2x's    Drug use: Not Currently     Review of Systems   Constitutional:  Negative for chills and fever.   HENT:  Negative for congestion and sore throat.    Eyes:  Negative for visual disturbance.   Respiratory:  Negative for cough and shortness of breath.    Cardiovascular:  Negative for chest pain and palpitations.   Gastrointestinal:  Negative for abdominal pain, diarrhea and vomiting.   Genitourinary:  Negative for decreased urine volume, dysuria and vaginal discharge.   Musculoskeletal:  Positive for back pain. Negative for joint swelling, neck pain and neck stiffness.   Skin:  Negative for rash and wound.   Neurological:  Positive for headaches. Negative for weakness and numbness.   Psychiatric/Behavioral:  Negative for confusion.        Physical Exam     Initial Vitals [08/08/24 0738]   BP Pulse Resp Temp SpO2   (!) 218/99 88 16 98.4 °F (36.9 °C) 99 %      MAP       --         Physical Exam    Nursing note and vitals reviewed.  Constitutional: She appears well-developed and well-nourished. No distress.   HENT:   Head: Normocephalic and atraumatic.   Mouth/Throat: Oropharynx is clear and moist. No oropharyngeal exudate.   Eyes: Conjunctivae and EOM are normal. Pupils are equal, round, and reactive to light.   Neck: Neck supple.   Cardiovascular:  Normal rate and normal heart sounds.           No  murmur heard.  Pulmonary/Chest: Breath sounds normal. No respiratory distress. She has no wheezes. She has no rhonchi. She has no rales.   Abdominal: Abdomen is soft. There is no abdominal tenderness. There is no rebound and no guarding.   Musculoskeletal:         General: Edema present. No tenderness.      Cervical back: Neck supple.      Comments: Right lumbar paraspinal muscle tenderness.      Neurological: She is alert and oriented to person, place, and time. She has normal strength. GCS score is 15. GCS eye subscore is 4. GCS verbal subscore is 5. GCS motor subscore is 6.   Skin: Skin is warm and dry. No rash noted.   Psychiatric: She has a normal mood and affect. Thought content normal.         ED Course   Procedures  Labs Reviewed   CBC W/ AUTO DIFFERENTIAL - Abnormal       Result Value    WBC 6.61      RBC 4.36      Hemoglobin 12.0      Hematocrit 36.4 (*)     MCV 84      MCH 27.5      MCHC 33.0      RDW 14.0      Platelets 207      MPV 11.1      Immature Granulocytes 0.2      Gran # (ANC) 4.8      Immature Grans (Abs) 0.01      Lymph # 1.3      Mono # 0.4      Eos # 0.1      Baso # 0.02      nRBC 0      Gran % 71.8      Lymph % 19.7      Mono % 6.5      Eosinophil % 1.5      Basophil % 0.3      Differential Method Automated     COMPREHENSIVE METABOLIC PANEL - Abnormal    Sodium 141      Potassium 4.0      Chloride 107      CO2 24      Glucose 219 (*)     BUN 26 (*)     Creatinine 1.5 (*)     Calcium 8.9      Total Protein 7.2      Albumin 3.5      Total Bilirubin 0.3      Alkaline Phosphatase 82      AST 9 (*)     ALT 11      eGFR 38 (*)     Anion Gap 10     URINALYSIS, REFLEX TO URINE CULTURE - Abnormal    Specimen UA Urine, Clean Catch      Color, UA Yellow      Appearance, UA Clear      pH, UA 6.0      Specific Gravity, UA 1.020      Protein, UA 2+ (*)     Glucose, UA Trace (*)     Ketones, UA Negative      Bilirubin (UA) Negative      Occult Blood UA Trace (*)     Nitrite, UA Negative      Urobilinogen,  UA 2.0-3.0 (*)     Leukocytes, UA Negative      Narrative:     Specimen Source->Urine   B-TYPE NATRIURETIC PEPTIDE - Abnormal     (*)    URINALYSIS MICROSCOPIC - Abnormal    RBC, UA 2      WBC, UA 3      Bacteria Occasional      Squam Epithel, UA 3      Hyaline Casts, UA 8 (*)     Microscopic Comment SEE COMMENT      Narrative:     Specimen Source->Urine   TROPONIN I    Troponin I 0.020       EKG Readings: (Independently Interpreted)   Normal sinus rhythm at rate of 73, no STEMI.  There is a nonspecific T-wave abnormality present.  Similar to previous on file.     ECG Results              EKG 12-lead (Final result)        Collection Time Result Time QRS Duration OHS QTC Calculation    08/08/24 08:37:49 08/08/24 13:25:09 104 460                     Final result by Interface, Lab In [facility] (08/08/24 13:25:17)                   Narrative:    Test Reason : I10,    Vent. Rate : 073 BPM     Atrial Rate : 073 BPM     P-R Int : 150 ms          QRS Dur : 104 ms      QT Int : 418 ms       P-R-T Axes : -07 -43 018 degrees     QTc Int : 460 ms    Normal sinus rhythm  Left axis deviation  Nonspecific ST and T wave abnormality  Abnormal ECG    Confirmed by Roddy POLK, Tony ([id number]) on 8/8/2024 1:25:07 PM    Referred By: [email or web address]   SELF           Confirmed By:Tony Pereyra MD                                  Imaging Results    None          Medications   valsartan tablet 40 mg (40 mg Oral Given 8/8/24 0819)   metoprolol tartrate (LOPRESSOR) tablet 100 mg (100 mg Oral Given 8/8/24 0814)   methocarbamoL tablet 1,000 mg (1,000 mg Oral Given 8/8/24 0818)   acetaminophen tablet 650 mg (650 mg Oral Given 8/8/24 0814)   sodium chloride 0.9% bolus 500 mL 500 mL (0 mLs Intravenous Stopped 8/8/24 1015)     Medical Decision Making  My differential diagnosis includes hypertensive urgency or emergency and concomitant organ damage such as kidney injury or renal failure, heart failure, CVA.  Also includes renal colic, pyelonephritis,  musculoskeletal back pain.  I do not suspect cauda equina syndrome or cord compression based on this history and exam.    68-year-old female presents with multiple complaints.  Vital signs reveal significant hypertension.  She admits she did not take her blood pressure medicine this morning, claims she does not know what she takes, and did not want to take medications when they were ordered for her this morning as she prefers to take them with a full meal.  On medical record review, patient has a history of poorly controlled hypertension.  I considered but do not suspect that her back pain represents an aortic dissection or aneurysm, there is no abdominal or extremity pain and pain is lateral, localized to paraspinal muscles.  Workup does reveal 2+ proteinuria, which she has had in the past.  There was no convincing evidence for UTI and no hematuria to suggest renal colic.  There were 8 hyaline casts per high-powered field on microscopy which suggest dehydration.  There is CKD, which is similar to her baseline.  Screening troponin is negative, although BNP is mildly elevated.  She was treated in the ER with IV fluids and home medications with improvement in blood pressure.  She is discharged in good condition with prescriptions for Tylenol and Robaxin for presumed musculoskeletal back pain.  She is advised close follow-up with her PCP and strict return precautions.    Amount and/or Complexity of Data Reviewed  External Data Reviewed: notes.     Details: Prior blood pressures have also been 190-210 systolic, poor control.  Labs: ordered. Decision-making details documented in ED Course.     Details:     ECG/medicine tests: ordered and independent interpretation performed. Decision-making details documented in ED Course.     Details: See interpretation above.      Risk  OTC drugs.  Prescription drug management.            Scribe Attestation:   Scribe #1: I performed the above scribed service and the documentation  accurately describes the services I performed. I attest to the accuracy of the note.    Physician Attestation for Scribe: I, Sally Carpio, reviewed documentation as scribed in my presence, which is both accurate and complete.        ED Course as of 08/08/24 1350   Thu Aug 08, 2024   1010 Urinalysis Microscopic(!)  Reviewed.  Few hyaline casts suggestive of dehydration. [AK]   1010 Urinalysis, Reflex to Urine Culture Urine, Clean Catch(!)  Reviewed.  No evidence of a UTI.  There is 2+ proteinuria present. [AK]   1011 B-Type natriuretic peptide (BNP)(!)  Reviewed.  Mild elevation above upper limits of normal. [AK]   1011 Troponin I  Reviewed and negative. [AK]   1011 Comprehensive metabolic panel(!)  Reviewed.  Patient has mild azotemia but similar to baseline values.  There is no major electrolyte disturbance or abnormal LFTs. [AK]   1011 CBC auto differential(!)  Reviewed and benign.  No leukocytosis or anemia, normal platelet count. [AK]      ED Course User Index  [AK] Sally Carpio MD                           Clinical Impression:  Final diagnoses:  [I10] Hypertension, uncontrolled  [M54.50] Acute right-sided low back pain without sciatica (Primary)          ED Disposition Condition    Discharge Stable          ED Prescriptions       Medication Sig Dispense Start Date End Date Auth. Provider    methocarbamoL (ROBAXIN) 500 MG Tab Take 2 tablets (1,000 mg total) by mouth 3 (three) times daily as needed (muscle spasm). 30 tablet 8/8/2024 8/13/2024 Sally Carpio MD    acetaminophen (TYLENOL) 500 MG tablet Take 1 tablet (500 mg total) by mouth every 6 (six) hours as needed for Pain. 30 tablet 8/8/2024 -- Sally Carpio MD          Follow-up Information       Follow up With Specialties Details Why Contact Info    Your regular primary care doctor  Schedule an appointment as soon as possible for a visit  For symptom recheck and close follow-up     [facility] - Emergency Dept Emergency Medicine  As needed, If  symptoms worsen [address]  [facility] [id number]  [phone]             Sally Carpio MD  08/08/24 1638

## 2024-08-08 NOTE — DISCHARGE INSTRUCTIONS
Take your blood pressure medicines regularly as prescribed.  Your blood pressure is out of control.  Keep a blood pressure log and follow-up with your doctor as you may need additional medications.  Quit smoking.  Return to the ER at any time for new or concerning symptoms.

## 2024-08-08 NOTE — ED TRIAGE NOTES
69 yo female reports to ed with co nontraumatic R sided back pain. Pt also hypertensive, states she didn't take BP meds this morning due to back pain. Pt ambulatory. Awake and alert.

## 2024-08-09 ENCOUNTER — PATIENT OUTREACH (OUTPATIENT)
Dept: EMERGENCY MEDICINE | Facility: OTHER | Age: 69
End: 2024-08-09
Payer: MEDICARE

## 2024-09-25 DIAGNOSIS — Z00.00 ENCOUNTER FOR MEDICARE ANNUAL WELLNESS EXAM: ICD-10-CM

## 2024-10-07 PROBLEM — D63.8 ANEMIA OF CHRONIC DISEASE: Status: ACTIVE | Noted: 2024-10-07

## 2024-10-09 ENCOUNTER — TELEPHONE (OUTPATIENT)
Dept: ORTHOPEDICS | Facility: CLINIC | Age: 69
End: 2024-10-09
Payer: MEDICARE

## 2024-10-16 DIAGNOSIS — E11.9 TYPE 2 DIABETES MELLITUS WITHOUT COMPLICATION: ICD-10-CM

## 2024-10-28 ENCOUNTER — PATIENT OUTREACH (OUTPATIENT)
Dept: ADMINISTRATIVE | Facility: HOSPITAL | Age: 69
End: 2024-10-28
Payer: MEDICARE

## 2024-10-28 DIAGNOSIS — Z12.31 ENCOUNTER FOR SCREENING MAMMOGRAM FOR BREAST CANCER: Primary | ICD-10-CM

## 2024-10-28 DIAGNOSIS — Z78.0 POSTMENOPAUSAL STATUS, AGE-RELATED: ICD-10-CM

## 2024-11-13 ENCOUNTER — PATIENT MESSAGE (OUTPATIENT)
Dept: ADMINISTRATIVE | Facility: HOSPITAL | Age: 69
End: 2024-11-13
Payer: MEDICARE

## 2024-11-29 ENCOUNTER — PATIENT OUTREACH (OUTPATIENT)
Dept: ADMINISTRATIVE | Facility: HOSPITAL | Age: 69
End: 2024-11-29
Payer: MEDICARE

## 2024-12-03 ENCOUNTER — TELEPHONE (OUTPATIENT)
Dept: ADMINISTRATIVE | Facility: CLINIC | Age: 69
End: 2024-12-03
Payer: MEDICARE

## 2024-12-03 NOTE — TELEPHONE ENCOUNTER
Called pt, informed pt I was calling to remind pt of her in office EAWV on 12/4/24; clinic location provided to patient; pt confirmed appointment

## 2024-12-11 ENCOUNTER — PATIENT MESSAGE (OUTPATIENT)
Dept: ADMINISTRATIVE | Facility: HOSPITAL | Age: 69
End: 2024-12-11
Payer: MEDICARE

## 2024-12-30 ENCOUNTER — PATIENT OUTREACH (OUTPATIENT)
Dept: ADMINISTRATIVE | Facility: HOSPITAL | Age: 69
End: 2024-12-30
Payer: MEDICARE

## 2024-12-30 NOTE — PROGRESS NOTES
Health Maintenance reviewed, updated and links triggered. All HM'S due and need to be rescheduled   ([facility]) 12/30/24 portal message sent for scheduling. Portal message sent as 3rd outreach.

## 2025-01-13 DIAGNOSIS — Z00.00 ENCOUNTER FOR MEDICARE ANNUAL WELLNESS EXAM: ICD-10-CM

## 2025-01-30 ENCOUNTER — PATIENT OUTREACH (OUTPATIENT)
Dept: ADMINISTRATIVE | Facility: HOSPITAL | Age: 70
End: 2025-01-30
Payer: MEDICARE

## 2025-01-30 NOTE — PROGRESS NOTES
Health Maintenance reviewed, updated and links triggered. Patient scheduled for labs and Dexa   Scan 2/4/25 1000 and 1120 ([facility]) 1/30/25  Patient requested everything be on the same day. Patient stated Mammogram and Eye exam will be completed when she can.     Care Coordination Encounter Details:       MyChart Portal Status:         [x]  Reviewed MyChart Portal Status offered / enrolled if applicable        Additional Notes:     MyChart Outcomes: Pt is enrolled & active          Updates Requested / Reviewed:        Care Everywhere         Health Maintenance Screening(s) Due:      Health Maintenance Topics Overdue:      VBHM Score: 4     Eye Exam  Mammogram  Foot Exam  Uncontrolled BP    Influenza Vaccine  Pneumonia Vaccine  Shingles/Zoster Vaccine  RSV Vaccine                  Health Maintenance Topic(s) Outreach Outcomes & Actions Taken:    Lab(s) - Outreach Outcomes & Actions Taken  : Overdue Lab(s) Scheduled  labs 2/4/25 1000am    Osteoporosis Screening - Outreach Outcomes & Actions Taken  : Dexa Appointment Scheduled  Dexa Scan 2/4/25 1120am                  Additional Notes:  Health Maintenance reviewed, updated and links triggered. Patient scheduled for labs and Dexa Scan 2/4/25 1000 and 1120 ([facility]) 1/30/25  Patient requested everything be on the same day. Patient stated Mammogram and Eye exam will be completed when she can.            Chronic Disease Management:     Diabetes Measures        Lab Results   Component Value Date    HGBA1C 8.7 (H) 07/11/2024           [x]  Reviewed chart for active Diabetes diagnosis     [x]  Scheduled necessary follow up appointments if needed         Additional Notes:             Hypertension Measures        BP Readings from Last 1 Encounters:   12/04/24 (!) 184/74           [x]  Reviewed chart for active Hypertension diagnosis     [x]  Reviewed & documented Home BP Cuff     [x]  Documented a Remote BP if needed & applicable     [x]  Scheduled necessary follow up  appointments with Primary Care if needed         Additional Notes:             Provider Team Continuity:     Last PCP Visit Date: 7/11/2024          [x]  Reviewed Primary Care Provider Visits, Annual Wellness Visit, and Future          Appointments to ensure appointments have been scheduled and/or           completed        Additional Notes:             Social Determinants of Health          [x]  Reviewed, completed, and/or updated the following sections:                  Food Insecurity, Transportation Needs, Financial Resource Strain,                 Tobacco Use        Additional Notes:             Care Management, Digital Medicine, and/or Education Referrals    OPCM Risk Score: 46.9                 Additional Notes:

## 2025-02-04 ENCOUNTER — OFFICE VISIT (OUTPATIENT)
Dept: INTERNAL MEDICINE | Facility: CLINIC | Age: 70
End: 2025-02-04
Payer: MEDICARE

## 2025-02-04 ENCOUNTER — HOSPITAL ENCOUNTER (OUTPATIENT)
Dept: RADIOLOGY | Facility: OTHER | Age: 70
Discharge: HOME | End: 2025-02-04
Attending: INTERNAL MEDICINE
Payer: MEDICARE

## 2025-02-04 VITALS
HEART RATE: 77 BPM | OXYGEN SATURATION: 99 % | BODY MASS INDEX: 31.09 KG/M2 | SYSTOLIC BLOOD PRESSURE: 144 MMHG | WEIGHT: 186.63 LBS | HEIGHT: 65 IN | DIASTOLIC BLOOD PRESSURE: 80 MMHG

## 2025-02-04 DIAGNOSIS — Z78.0 POSTMENOPAUSAL STATUS, AGE-RELATED: ICD-10-CM

## 2025-02-04 DIAGNOSIS — Z00.00 ROUTINE GENERAL MEDICAL EXAMINATION AT A HEALTH CARE FACILITY: ICD-10-CM

## 2025-02-04 DIAGNOSIS — R79.9 ABNORMAL FINDING OF BLOOD CHEMISTRY, UNSPECIFIED: ICD-10-CM

## 2025-02-04 DIAGNOSIS — I70.0 AORTIC ATHEROSCLEROSIS: ICD-10-CM

## 2025-02-04 DIAGNOSIS — Z86.73 HISTORY OF STROKE: ICD-10-CM

## 2025-02-04 DIAGNOSIS — E11.59 TYPE 2 DIABETES MELLITUS WITH OTHER CIRCULATORY COMPLICATION, WITHOUT LONG-TERM CURRENT USE OF INSULIN: ICD-10-CM

## 2025-02-04 DIAGNOSIS — I10 BENIGN ESSENTIAL HTN: ICD-10-CM

## 2025-02-04 DIAGNOSIS — E11.59 HYPERTENSION ASSOCIATED WITH DIABETES: ICD-10-CM

## 2025-02-04 DIAGNOSIS — Z23 NEED FOR VACCINATION: ICD-10-CM

## 2025-02-04 DIAGNOSIS — E78.5 HYPERLIPIDEMIA LDL GOAL <70: ICD-10-CM

## 2025-02-04 DIAGNOSIS — N18.32 STAGE 3B CHRONIC KIDNEY DISEASE: ICD-10-CM

## 2025-02-04 DIAGNOSIS — I15.2 HYPERTENSION ASSOCIATED WITH DIABETES: ICD-10-CM

## 2025-02-04 DIAGNOSIS — Z12.31 ENCOUNTER FOR SCREENING MAMMOGRAM FOR MALIGNANT NEOPLASM OF BREAST: ICD-10-CM

## 2025-02-04 DIAGNOSIS — F03.A0 MILD DEMENTIA WITHOUT BEHAVIORAL DISTURBANCE, PSYCHOTIC DISTURBANCE, MOOD DISTURBANCE, OR ANXIETY, UNSPECIFIED DEMENTIA TYPE: ICD-10-CM

## 2025-02-04 DIAGNOSIS — Z00.00 ROUTINE GENERAL MEDICAL EXAMINATION AT A HEALTH CARE FACILITY: Primary | ICD-10-CM

## 2025-02-04 PROCEDURE — 77080 DXA BONE DENSITY AXIAL: CPT | Mod: TC

## 2025-02-04 PROCEDURE — 1101F PT FALLS ASSESS-DOCD LE1/YR: CPT | Mod: CPTII,S$GLB,, | Performed by: INTERNAL MEDICINE

## 2025-02-04 PROCEDURE — 1126F AMNT PAIN NOTED NONE PRSNT: CPT | Mod: CPTII,S$GLB,, | Performed by: INTERNAL MEDICINE

## 2025-02-04 PROCEDURE — 77067 SCR MAMMO BI INCL CAD: CPT | Mod: 26,,, | Performed by: RADIOLOGY

## 2025-02-04 PROCEDURE — 77063 BREAST TOMOSYNTHESIS BI: CPT | Mod: 26,,, | Performed by: RADIOLOGY

## 2025-02-04 PROCEDURE — 3079F DIAST BP 80-89 MM HG: CPT | Mod: CPTII,S$GLB,, | Performed by: INTERNAL MEDICINE

## 2025-02-04 PROCEDURE — 1159F MED LIST DOCD IN RCRD: CPT | Mod: CPTII,S$GLB,, | Performed by: INTERNAL MEDICINE

## 2025-02-04 PROCEDURE — 3008F BODY MASS INDEX DOCD: CPT | Mod: CPTII,S$GLB,, | Performed by: INTERNAL MEDICINE

## 2025-02-04 PROCEDURE — 77063 BREAST TOMOSYNTHESIS BI: CPT | Mod: TC

## 2025-02-04 PROCEDURE — 4010F ACE/ARB THERAPY RXD/TAKEN: CPT | Mod: CPTII,S$GLB,, | Performed by: INTERNAL MEDICINE

## 2025-02-04 PROCEDURE — 77080 DXA BONE DENSITY AXIAL: CPT | Mod: 26,,, | Performed by: RADIOLOGY

## 2025-02-04 PROCEDURE — 99397 PER PM REEVAL EST PAT 65+ YR: CPT | Mod: S$GLB,,, | Performed by: INTERNAL MEDICINE

## 2025-02-04 PROCEDURE — 1160F RVW MEDS BY RX/DR IN RCRD: CPT | Mod: CPTII,S$GLB,, | Performed by: INTERNAL MEDICINE

## 2025-02-04 PROCEDURE — 99999 PR PBB SHADOW E&M-EST. PATIENT-LVL V: CPT | Mod: PBBFAC,,, | Performed by: INTERNAL MEDICINE

## 2025-02-04 PROCEDURE — 3288F FALL RISK ASSESSMENT DOCD: CPT | Mod: CPTII,S$GLB,, | Performed by: INTERNAL MEDICINE

## 2025-02-04 PROCEDURE — 3077F SYST BP >= 140 MM HG: CPT | Mod: CPTII,S$GLB,, | Performed by: INTERNAL MEDICINE

## 2025-02-04 RX ORDER — AMLODIPINE BESYLATE 10 MG/1
10 TABLET ORAL DAILY
Qty: 90 TABLET | Refills: 3 | Status: SHIPPED | OUTPATIENT
Start: 2025-02-04

## 2025-02-04 RX ORDER — VALSARTAN 40 MG/1
40 TABLET ORAL DAILY
Qty: 90 TABLET | Refills: 3 | Status: SHIPPED | OUTPATIENT
Start: 2025-02-04 | End: 2026-02-04

## 2025-02-04 RX ORDER — EZETIMIBE 10 MG/1
10 TABLET ORAL DAILY
Qty: 90 TABLET | Refills: 3 | Status: SHIPPED | OUTPATIENT
Start: 2025-02-04 | End: 2026-02-04

## 2025-02-04 RX ORDER — METOPROLOL TARTRATE 100 MG/1
100 TABLET ORAL 2 TIMES DAILY
Qty: 180 TABLET | Refills: 3 | Status: SHIPPED | OUTPATIENT
Start: 2025-02-04 | End: 2026-02-04

## 2025-02-04 NOTE — PROGRESS NOTES
Subjective:      Patient ID: Shabnam Noble is a 69 y.o. female.    Chief Complaint: Follow-up and Diabetic Foot Exam      Shabnam Noble is a 69 y.o. 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 Date of last annual is 7/11/2024    Presents today with her sister.     Memory loss/Hx CVA: She has hx of remote CVA. She was also admitted to the hospital for stroke like symptoms (left arm weakness) in 2017. CT head at that time showed an old infarct. Her stroke work up at the time was negative and her exacerbation of sx was thought to be 2/2 to poorly controlled HTN. She was seen by neurology and neuropsych referral was placed by her neurologist. She was lost to follow up for neurology, will help with rescheduling. Neuropsych referral placed.       HTN: BP now elevated again. She has not been consistent with her BP medications and has been inconsistent with regimen. Stressed the importance of compliance. Reviewed all medications again today.      Poorly controlled DM2: A1c elevated to 8.7 at last physical. She was referred to diabetes advanced NP and has had her first visit with Jina on 5/9/2022. She continues to take metformin 1000mg BID and jardiance 10mg qday. Trulicity qweekly was added to her diabetes regimen which she has discontinued herself off of. She reports that she has only been taking half the metformin that was prescribed and has not been taking the jardiance. Will restart her metformin and jardiance at this time, repeat A1C. She will need endocrinology for further help in management. Consider restarting trulicity once new A1c is ready to review.      HLD/aortic atherosclerosis: Was on crestor 20mg qhs which she stopped due to questionable side effects. Was previously on high  dose pravastatin instead and zetia 10mg, but has not taken these medications since last visit. Resume regimen at this time.      CKD3B: Was see by nephrology and jardiance was increased to 25mg.She has also stopped jardiance. Restart jardiance, discussed that this medication helps with her kidneys and also with her diabetes.      Hx CVA: She has been taking ASA, BB. She is not taking statin at this time.     Visit today is associated with current or anticipated ongoing medical care related to this patient's single serious condition/complex condition of uncontrolled T2DM, hx CVA, mild cognitive deficit, memory loss, HLD, CKD, HTN, MDD. The patient will return to see me as these issues will be followed longitudinally.        The patient consents verbally to being recorded for [employer] service today.       ROS:  General: -fever, -chills, -fatigue, -weight gain, -weight loss  Eyes: -vision changes, -redness, -discharge  ENT: -ear pain, -nasal congestion, -sore throat  Cardiovascular: -chest pain, -palpitations, -lower extremity edema  Respiratory: -cough, -shortness of breath  Gastrointestinal: -abdominal pain, +nausea, -vomiting, -diarrhea, -constipation, -blood in stool  Genitourinary: -dysuria, -hematuria, -frequency  Musculoskeletal: -joint pain, -muscle pain  Skin: -rash, -lesion  Neurological: -headache, -dizziness, -numbness, -tingling  Psychiatric: -anxiety, -depression, -sleep difficulty, +memory problems            Current Outpatient Medications:     aspirin (ECOTRIN) 81 MG EC tablet, Take 1 tablet (81 mg total) by mouth once daily., Disp: 90 tablet, Rfl: 3    blood glucose control, high Soln, 1 drop by Misc.(Non-Drug; Combo Route) route once daily. ICD 10 code: E11.59, Disp: 3 each, Rfl: 3    blood-glucose meter kit, Use as instructed. ICD 10 code E11.59, Disp: 1 each, Rfl: 0    metFORMIN (GLUCOPHAGE) 1000 MG tablet, Take 1 tablet (1,000 mg total) by mouth 2 (two) times daily with meals., Disp:  180 tablet, Rfl: 3    amLODIPine (NORVASC) 10 MG tablet, Take 1 tablet (10 mg total) by mouth once daily., Disp: 90 tablet, Rfl: 3    empagliflozin (JARDIANCE) 25 mg tablet, Take 1 tablet (25 mg total) by mouth once daily., Disp: 90 tablet, Rfl: 3    ezetimibe (ZETIA) 10 mg tablet, Take 1 tablet (10 mg total) by mouth once daily., Disp: 90 tablet, Rfl: 3    metoprolol tartrate (LOPRESSOR) 100 MG tablet, Take 1 tablet (100 mg total) by mouth 2 (two) times daily., Disp: 180 tablet, Rfl: 3    valsartan (DIOVAN) 40 MG tablet, Take 1 tablet (40 mg total) by mouth once daily., Disp: 90 tablet, Rfl: 3    Current Facility-Administered Medications:     influenza (adjuvanted) (Fluad) 45 mcg/0.5 mL IM vaccine (> or = 64 yo) 0.5 mL, 0.5 mL, Intramuscular, 1 time in Clinic/HOD,     pneumoc 20-roxanna conj-dip cr(PF) (PREVNAR-20 (PF)) injection Syrg 0.5 mL, 0.5 mL, Intramuscular, 1 time in Clinic/HOD,     Lab Results   Component Value Date    HGBA1C 8.7 (H) 07/11/2024    HGBA1C 9.2 (H) 04/25/2024    HGBA1C 9.0 (H) 04/07/2022     Lab Results   Component Value Date    MICALBCREAT 404.0 (H) 04/26/2024    MICALBCREAT 404.0 (H) 04/26/2024     Lab Results   Component Value Date    LDLCALC 142.0 07/11/2024    LDLCALC 121.0 04/25/2024    CHOL 212 (H) 07/11/2024    HDL 45 07/11/2024    TRIG 125 07/11/2024       Lab Results   Component Value Date     02/04/2025    K 3.8 02/04/2025     02/04/2025    CO2 23 02/04/2025     (H) 02/04/2025    BUN 26 (H) 02/04/2025    CREATININE 1.5 (H) 02/04/2025    CALCIUM 9.3 02/04/2025    PROT 7.8 02/04/2025    ALBUMIN 3.5 02/04/2025    BILITOT 0.4 02/04/2025    ALKPHOS 91 02/04/2025    AST 9 (L) 02/04/2025    ALT 9 (L) 02/04/2025    ANIONGAP 11 02/04/2025    ESTGFRAFRICA >60.0 01/18/2022    EGFRNONAA 58.8 (A) 01/18/2022    WBC 6.07 02/04/2025    HGB 11.4 (L) 02/04/2025    HGB 12.0 08/08/2024    HCT 35.0 (L) 02/04/2025    MCV 83 02/04/2025     02/04/2025    TSH 1.168 02/04/2025     "HEPCAB Negative 01/21/2020       Lab Results   Component Value Date    [id number] 04/30/2024         Past Medical History:   Diagnosis Date    Colon polyp     CVA (cerebral vascular accident)     DM2 (diabetes mellitus, type 2)     Essential hypertension      Past Surgical History:   Procedure Laterality Date    ANKLE FRACTURE SURGERY Right     ANKLE FRACTURE SURGERY      COLONOSCOPY N/A 12/11/2020    Procedure: COLONOSCOPY;  Surgeon: Marshal Rogers MD;  Location: [facility] ([address] [place] [address]);  Service: Endoscopy;  Laterality: N/A;  11/18-plavix hold ok see te-covid pcw 12/8-tb    COLONOSCOPY W/ POLYPECTOMY      HYSTERECTOMY      no h/o  malignancy     Social History     Social History Narrative    Not on file     Family History   Problem Relation Name Age of Onset    Alzheimer's disease Mother      Prostate cancer Father      Diabetes Father      Colon cancer Brother      Breast cancer Paternal Aunt      Heart disease Neg Hx      Stroke Neg Hx       Vitals:    02/04/25 1000   BP: (!) 144/80   Pulse: 77   SpO2: 99%   Weight: 84.6 kg (186 lb 9.9 oz)   Height: 5' 5" (1.651 m)   PainSc: 0-No pain     Objective:   Physical Exam    Vitals: Blood pressure is in the 140s.  General: No acute distress. Well-developed. Well-nourished.  Eyes: EOMI. Sclerae anicteric.  HENT: Normocephalic. Atraumatic. Nares patent. Moist oral mucosa.  Ears: Bilateral TMs clear. Bilateral EACs clear.  Cardiovascular: Regular rate. Regular rhythm. No murmurs. No rubs. No gallops. Normal S1, S2.  Respiratory: Normal respiratory effort. Clear to auscultation bilaterally. No rales. No rhonchi. No wheezing.  Abdomen: Soft. Non-tender. Non-distended. Normoactive bowel sounds.  Musculoskeletal: No  obvious deformity.  Extremities: No lower extremity edema.  Neurological: Alert & oriented x3. No slurred speech. Normal gait.  Psychiatric: Normal mood. Normal affect. Good insight. Good judgment.  Skin: Warm. Dry. No rash.        Assessment/Plan "     Assessment & Plan    - Assessed blood pressure management; noted elevation likely due to missed doses  - Evaluated diabetes management; considering addition of Jardiance for kidney protection and glucose control  - Discontinued Lasix, as it was likely not prescribed based on patient history and review of chart  - Planning to reassess diabetes treatment based on new A1c results; may consider injectable medication if oral agents inadequate  - Considering reestablishing care with neurology and nephrology for ongoing management    DIABETES:  - Emphasized the importance of Jardiance for diabetes management.  - Initiated Jardiance for diabetes management.  - Continued metformin for diabetes management.  - Ordered labs including HbA1c and comprehensive metabolic panel.  - Referred the patient to endocrinology for diabetes management.  - Referred the patient for diabetes education to provide comprehensive guidance on management strategies.  - Scheduled follow up in 3 months to reassess diabetes management.    KIDNEY DISEASE:  - Emphasized the importance of Jardiance for kidney protection.  - Initiated Jardiance for kidney protection.    HYPERTENSION:  - Continued amlodipine for blood pressure management.  - Continued metoprolol twice daily for blood pressure management.  - Continued valsartan for blood pressure management.  - Noted elevated blood pressure in the 140s, likely due to missed morning medication.    MAMMOGRAM:  - Ordered a mammogram for breast cancer screening.            Routine general medical examination at a health care facility  -     Hemoglobin A1C; Future; Expected date: 02/04/2025  -     CBC Auto Differential; Future; Expected date: 02/04/2025  -     Comprehensive Metabolic Panel; Future; Expected date: 02/04/2025  -     TSH; Future; Expected date: 02/04/2025  -     Cancel: Mammo Digital Screening Bilat; Future; Expected date: 02/04/2025  -     Lipid Panel; Future; Expected date: 02/04/2025  -      Microalbumin/Creatinine Ratio, Urine    Hypertension associated with diabetes  -     amLODIPine (NORVASC) 10 MG tablet; Take 1 tablet (10 mg total) by mouth once daily.  Dispense: 90 tablet; Refill: 3  -     metoprolol tartrate (LOPRESSOR) 100 MG tablet; Take 1 tablet (100 mg total) by mouth 2 (two) times daily.  Dispense: 180 tablet; Refill: 3    Mild dementia without behavioral disturbance, psychotic disturbance, mood disturbance, or anxiety, unspecified dementia type    Stage 3b chronic kidney disease  -     TSH; Future; Expected date: 02/04/2025  -     valsartan (DIOVAN) 40 MG tablet; Take 1 tablet (40 mg total) by mouth once daily.  Dispense: 90 tablet; Refill: 3  -     empagliflozin (JARDIANCE) 25 mg tablet; Take 1 tablet (25 mg total) by mouth once daily.  Dispense: 90 tablet; Refill: 3    Benign essential HTN  -     TSH; Future; Expected date: 02/04/2025  -     amLODIPine (NORVASC) 10 MG tablet; Take 1 tablet (10 mg total) by mouth once daily.  Dispense: 90 tablet; Refill: 3  -     metoprolol tartrate (LOPRESSOR) 100 MG tablet; Take 1 tablet (100 mg total) by mouth 2 (two) times daily.  Dispense: 180 tablet; Refill: 3  -     valsartan (DIOVAN) 40 MG tablet; Take 1 tablet (40 mg total) by mouth once daily.  Dispense: 90 tablet; Refill: 3    History of stroke  -     ezetimibe (ZETIA) 10 mg tablet; Take 1 tablet (10 mg total) by mouth once daily.  Dispense: 90 tablet; Refill: 3    Hyperlipidemia LDL goal <70  -     ezetimibe (ZETIA) 10 mg tablet; Take 1 tablet (10 mg total) by mouth once daily.  Dispense: 90 tablet; Refill: 3    Aortic atherosclerosis  -     ezetimibe (ZETIA) 10 mg tablet; Take 1 tablet (10 mg total) by mouth once daily.  Dispense: 90 tablet; Refill: 3    Type 2 diabetes mellitus with other circulatory complication, without long-term current use of insulin  -     Ambulatory referral/consult to Endocrinology; Future; Expected date: 02/11/2025  -     valsartan (DIOVAN) 40 MG tablet; Take 1 tablet  (40 mg total) by mouth once daily.  Dispense: 90 tablet; Refill: 3  -     Ambulatory referral/consult to Diabetes Education; Future; Expected date: 02/11/2025    Abnormal finding of blood chemistry, unspecified  -     Hemoglobin A1C; Future; Expected date: 02/04/2025  -     CBC Auto Differential; Future; Expected date: 02/04/2025  -     Lipid Panel; Future; Expected date: 02/04/2025    Encounter for screening mammogram for malignant neoplasm of breast  -     Cancel: Mammo Digital Screening Bilat; Future; Expected date: 02/04/2025    Need for vaccination  -     influenza (adjuvanted) (Fluad) 45 mcg/0.5 mL IM vaccine (> or = 66 yo) 0.5 mL  -     pneumoc 20-roxanna conj-dip cr(PF) (PREVNAR-20 (PF)) injection Syrg 0.5 mL        Chronic conditions status updated as per HPI.  Other than changes above, cont current medications and maintain follow up with specialists.  Return to clinic in Follow up in about 3 months (around 5/4/2025).      Krys Michael MD  [facility]    Total time spent on this encounter: 32 minutes. This includes face to face time and non-face to face time preparing to see the patient (eg, review of tests), obtaining and/or reviewing separately obtained history, documenting clinical information in the electronic or other health record, independently interpreting results and communicating results to the patient/family/caregiver, or care coordinator.    This note was generated with the assistance of ambient listening technology. Verbal consent was obtained by the patient and accompanying visitor(s) for the recording of patient appointment to facilitate this note. I attest to having reviewed and edited the generated note for accuracy, though some syntax or spelling errors may persist. Please contact the author of this note for any clarification.       There are no Patient Instructions on file for this visit.  Tests to Keep You Healthy    Mammogram: SCHEDULED  Eye Exam: SCHEDULED  Colon Cancer  Screening: Met on 12/11/2020  Last Blood Pressure <= 139/89 (2/4/2025): NO  Last HbA1c < 8 (07/11/2024): NO  Tobacco Cessation: NO

## 2025-02-04 NOTE — PROGRESS NOTES
Your Bone density test shows OSTEOPENIA - In between normal bone strength and weakened bone strength with osteoporosis.    You are at slightly increased risk for hip fracture, spinal compression fracture in the future, but the experts do not recommend taking a prescription medication at this time.    Here are some recommendations to keep your bones strong:    WEIGHT BEARING EXERCISE (carrying light weights) is the BEST way to strengthen the bone where your muscles insert & prevent future fractures.    Focus on 1200 mg of CALCIUM daily - Which is the equivalent of 3 glasses of milk daily. If you cannot tolerate this, you can substitute yogurt or cheese for one of these servings. Eat foods rich in calcium.Also you can take TUMS supplements or Viactiv chocolate chews calcium supplement.    Also, 1000 units of VITAMIN D daily - This is the equivalent of 10 minutes of direct sunlight daily. If you are not in the sun, consider Foods rich in vitamin D and over the counter supplement.

## 2025-02-06 DIAGNOSIS — E11.65 TYPE 2 DIABETES MELLITUS WITH HYPERGLYCEMIA, UNSPECIFIED WHETHER LONG TERM INSULIN USE: Primary | ICD-10-CM

## 2025-02-07 ENCOUNTER — TELEPHONE (OUTPATIENT)
Dept: INTERNAL MEDICINE | Facility: CLINIC | Age: 70
End: 2025-02-07
Payer: MEDICARE

## 2025-02-07 DIAGNOSIS — N18.32 STAGE 3B CHRONIC KIDNEY DISEASE: ICD-10-CM

## 2025-02-07 NOTE — TELEPHONE ENCOUNTER
Refill Encounter    PCP Visits: Recent Visits  Date Type Provider Dept   02/04/25 Office Visit Krys Michael MD [facility]   07/11/24 Office Visit Krys Michael MD [facility]   Showing recent visits within past 360 days and meeting all other requirements  Future Appointments  Date Type Provider Dept   05/05/25 Appointment Krys Michael MD [facility]   Showing future appointments within next 720 days and meeting all other requirements     Last 3 Blood Pressure:   BP Readings from Last 3 Encounters:   02/04/25 (!) 144/80   12/04/24 (!) 184/74   08/08/24 (!) 194/85     Preferred Pharmacy:   [id number] [address] [address]  Phone: [phone] Fax: [phone]    Requested RX:  Requested Prescriptions     Pending Prescriptions Disp Refills    empagliflozin (JARDIANCE) 25 mg tablet 90 tablet 3     Sig: Take 1 tablet (25 mg total) by mouth once daily.      RX Route: Normal

## 2025-02-07 NOTE — TELEPHONE ENCOUNTER
Krys Michael MD [facility]  We will recheck her bloodwork before her visit. I have ordered a repeat lipid panel and A1c.    I have reviewed your labs. Now that we are back on your medication, let us continue to trend them. I expect the cholesterol level to get better with resuming the cholesterol medication.    Please make sure that you are on metformin and jardiance. If you repeat A1c is not improved, we will add on another agent to your diabetes regimen.    Kidney level is stable. Continue to stay well hydrated.    Rest of the bloodwork is unremarkable.    Spoke with patient , changed pharmacy . She will  the Jardiance from [facility] when medication is ready.

## 2025-02-07 NOTE — TELEPHONE ENCOUNTER
----- Message from Krys Michael MD sent at 2/6/2025 12:39 PM CST -----  We will recheck her bloodwork before her visit. I have ordered a repeat lipid panel and A1c.     I have reviewed your labs. Now that we are back on your medication, let us continue to trend them. I expect the cholesterol level to get better with resuming the cholesterol medication.    Please make sure that you are on metformin and jardiance. If you repeat A1c is not improved, we will add on another agent to your diabetes regimen.     Kidney level is stable. Continue to stay well hydrated.    Rest of the bloodwork is unremarkable.

## 2025-02-07 NOTE — TELEPHONE ENCOUNTER
No care due was identified.  [facility] Embedded Care Due Messages. Reference number: [id number].   2/07/2025 10:38:10 AM CST

## 2025-02-07 NOTE — TELEPHONE ENCOUNTER
Refill Decision Note   Shabnam Noble  is requesting a refill authorization.  Brief Assessment and Rationale for Refill:  Quick Discontinue     Medication Therapy Plan: Status: Receipt confirmed by pharmacy (2/4/2025 10:18 AM CST)      Comments:     Note composed:1:40 PM 02/07/2025

## 2025-02-10 ENCOUNTER — TELEPHONE (OUTPATIENT)
Dept: ADMINISTRATIVE | Facility: OTHER | Age: 70
End: 2025-02-10
Payer: MEDICARE

## 2025-02-10 NOTE — TELEPHONE ENCOUNTER
Unable to reach patient by phone to discuss rescheduling [facility] appointment. Appointment has been rescheduled, and [facility] message to be sent.

## 2025-02-19 ENCOUNTER — TELEPHONE (OUTPATIENT)
Dept: ADMINISTRATIVE | Facility: OTHER | Age: 70
End: 2025-02-19
Payer: MEDICARE

## 2025-02-24 NOTE — PROGRESS NOTES
NEUROPSYCHOLOGY CONSULT    Name Shabnam Noble   MRN [id number]    1955   Age 69 y.o.   Gender female   Ref Provider  No referring provider defined for this encounter.   CC/Med. Necessity Cognitive concerns   Visit Type  Virtual visit with synchronous audio and video  Virtual visit with audio only (telephone)    Time Spent ***   Telemedicine Each patient to whom he or she provides medical services by telemedicine is:  (1) informed of the relationship between the physician and patient and the respective role of any other health care provider with respect to management of the patient; and (2) notified that he or she may decline to receive medical services by telemedicine and may withdraw from such care at any time   Consent The patient expressed an understanding of the purpose of the evaluation and consented to all procedures, including providing consent for Sandie Tobias Psy.D., a clinical neuropsychology fellow under the supervision of Ge Rodriguez Ph.D., to be involved in *** care. We discussed the limits of confidentiality and discussed an emergency plan. They additionally provided consent to speak with ***.       SUMMARY/TREATMENT PLAN   Results from the interview indicate the following diagnoses and treatment plan recommendations. This was discussed and the patient *** follow a treatment plan   independently.    Diagnoses/Plan:  {There are no diagnoses linked to this encounter. (Refresh or delete this SmartLink)}     HPI AND CURRENT SYMPTOMS   uncontrolled T2DM ()(A1C is high, poorly managed, not taking meds as prescribed), hx CVA, mild cognitive deficit, memory loss, HLD, CKD, HTN, aortic atherosclerosis, MDD, tobacco use    CVA (CTA at the time revealed severe stenosis of R M1, stenosis of basilar trunk, and occlusion of L PCA)     2022: 2030 on MOCA. 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 Left thalamic tiny remote lacunar type infarct. Involutional change and suspected sequela chronic microvascular ischemic change     Active problems noted below.     Cognitive Symptoms   Onset/Course: ***Reports memory concerns (noted by others as well) since her stroke that are worsening.     Current Functional Status/Needs   ADLs:   Self-Care Eating Dressing/Mobility Safety   Bathing: Independent  Bathroom: Independent  Other: Independent Independent Independent     Instrumental IADLs:    Driving Medications/Health Household Finances   *** *** *** ***     Current Psychiatric and Behavioral Symptoms   Mood:   Depression/Dysphoria Anxiety/Fearfulness Irritability   *** *** ***     Behavior:   Agitation/Resistance Delusions/Paranoia Hallucinations   None None None   Apathy/Motivation Repetitive/Restlessness Other   None None None     Neurovegetative:   Sleep/Nighttime  Appetite Energy   *** *** ***     Current Physical Concerns   Motor: Sensory Other   *** *** ***     PERTINENT BACKGROUND INFORMATION   Social History   Family Status    Current Living Situation:    Primary Source of Support    Daily Activities:    Stressors    Educational Status    Occupational Status    Family History   Neurologic History No known heritable risk factors   Psychiatric History No known heritable risk factors     MEDICAL STATUS   Problem List[1]  Past Medical History:   Diagnosis Date    Colon polyp     CVA (cerebral vascular accident)     DM2 (diabetes mellitus, type 2)     Essential hypertension      Past Surgical History:   Procedure Laterality Date    ANKLE FRACTURE SURGERY Right     ANKLE FRACTURE SURGERY      COLONOSCOPY N/A 12/11/2020    Procedure: COLONOSCOPY;  Surgeon: Marshal Rogers MD;  Location: [facility] ([address] [place] [address]);  Service: Endoscopy;  Laterality: N/A;  11/18-plavix hold ok see te-covid pcw 12/8-tb    COLONOSCOPY W/ POLYPECTOMY      HYSTERECTOMY      no h/o  malignancy        Relevant Neurologic  "History   Falls NA   TBIs NA   Seizures NA   CVAs NA   Movement Concerns NA   Other presented to the ED on 9/23/17 for left sided arm and leg weakness that began approximately 45 minutes prior to presentation. She awoke from sleep with loss of vision and weakness of the left side of face and arm. Symptoms improved after approximately 2 minutes with resolution of loss of vision but weakness of left side returned. On arrival, symptoms had improved but not completely resolved. At presentation the patient was hypertensive, /100, had mild 4/5 weakness on the left side, facial droop and headache. NIH stroke scale was 3. Glucose 228, potassium 3.3.   CT head showed an old infarct in the distribution of the left PCA and basilar stenosis.  Hospital Course: Patient's symptoms began improving while in ED. An hour later, NIH stroke scale was 0. Patient received ASA and Plavix in the ED and was transferred to MICU due to basilar stenosis. Patient was not a iv tPA candidate due to resolution of symptoms. On 9/25 patient complained of worsening left sided pain and weakness. MRI showed no acute changes, only the former PCA infarct. Patient was hypertensive and was started on Lisinopril with sbp goal <160. Patient was hypokalemic on 9/26, received potassium chloride 20 mEq BID for 2 days. Hypokalemia resolved. Patient continued to have persistent hypertension and complained of headache, received Labetolol 20 mg iv with goal of systolic BP <160. Labetolol resolved hypertension temporarily. On 9/27, patient's blood pressure still not controlled but was asymptomatic, increased Lisinopril dose to 40 mg daily. On 9/28 patient continued to havehypertension with max blood pressure of 200/79.      Relevant Labs   Lab Results   Component Value Date    IOIAEEBZ46 877 04/30/2024     No results found for: "RPR"  No results found for: "FOLATE"  Lab Results   Component Value Date    TSH 1.168 02/04/2025    F8CTMCS 115 08/12/2020     Lab " "Results   Component Value Date    HGBA1C 9.0 (H) 02/04/2025     No results found for: "HIV1X2", "MYS51HQNE"     No results found for: "CGVYQFLN528", "ADEVLRATIO", "XJBH758", "ATNPNFL"      Neurodiagnostics   Results for orders placed or performed during the hospital encounter of 08/07/23   CT Head Without Contrast    Narrative    EXAMINATION:  CT HEAD WITHOUT CONTRAST    CLINICAL HISTORY:  HA with /114;    TECHNIQUE:  Low dose axial images were obtained through the head.  Coronal and sagittal reformations were also performed. Contrast was not administered.    COMPARISON:  MRI of the brain, 11/15/2022, CT brain, 11/15/2022    FINDINGS:  The areas of low density compatible with remote infarcts in the right cerebellum and left occipital lobe appear stable.  No new loss of gray-white junction differentiation mass or pathologic fluid collection.  Hydrocephalus. The calvarium is intact. Air cells are clear. Orbits and contents stable. No soft tissue swelling.      Impression    Stable CT of the brain no evidence of acute hemorrhage, mass or infarction.      Electronically signed by: Efren Helm  Date:    08/07/2023  Time:    18:09   Results for orders placed or performed during the hospital encounter of 11/15/22   MRI Brain Without Contrast    Narrative    EXAMINATION:  MRI BRAIN WITHOUT CONTRAST    CLINICAL HISTORY:  Transient ischemic attack (TIA);    TECHNIQUE:  Multiplanar multisequence MR imaging of the brain was performed without contrast.    COMPARISON:  CT 11/15/2020    FINDINGS:  Intracranial compartment: Brain appears normally formed.  Age-related mild generalized cerebral volume loss.    Volume loss within the left occipital lobe associated with dilatation of the temporal horn of the left lateral ventricle.  The ventricles are otherwise midline without distortion by mass effect or acute hydrocephalus.  Additional encephalomalacia within the right cerebellum.  Upper left thalamic suspected tiny remote " lacunar type infarct.  No acute infarct, edema, or hemorrhage.  Few scattered punctate foci of nonspecific T2/FLAIR hyperintense signal of the subcortical and periventricular white matter likely sequela of chronic microvascular ischemic change.  No extra-axial blood or fluid collections    Normal vascular flow voids are preserved.    Skull/extracranial contents (limited evaluation): Bone marrow signal intensity is normal.  Mild right mastoid air cells effusion.      Impression    No acute intracranial abnormality.    Remote infarct within the left occipital lobe and right cerebellum.  Left thalamic tiny remote lacunar type infarct.    Involutional change and suspected sequela chronic microvascular ischemic change.    Mild right mastoid air cells effusion.    Electronically signed by resident: Froilan Trinidad  Date:    11/15/2022  Time:    17:32    Electronically signed by: Jun Calvillo MD  Date:    11/15/2022  Time:    17:55         Medications   Current Medications[2]     Pertinent Psychiatric History   Sxs:   Longstanding: NA  Intermittent: NA    Substance Use:  Current Use: ***  Prior Problems: None   Treatment Hx:  Medication: None  Current: NA  Pertinent prior:NA  Therapy: None  Current: NA  Pertinent prior: NA  Inpatient: None     BEHAVIORAL OBSERVATIONS   APPEARANCE Casually dressed and adequate grooming/hygiene  ***Not assessed   ALERTNESS/ORIENTATION Attentive and alert.  ***   GAIT Not assessed   SENSORY Not assessed   MOTOR  Not assessed   SPEECH/LANGUAGE Normal in rate, rhythm, tone, and volume. No significant word finding difficulty. Expressive and receptive language was normal.   STATED MOOD/AFFECT Stated mood was *** with congruent affect.   INTERPERSONAL BEHAVIOR Rapport was quickly and easily established   SI/HI None reported   HALLUCINATIONS/DELUSIONS None evidenced or endorsed   THOUGHT PROCESSES/INSIGHT Thoughts seemed logical and goal-directed   TEST TAKING BEHAVIOR / VALIDITY NA      BILLING/CODING   Service Description CPT Code Minutes Units   Psychiatric diagnostic evaluation by physician [id number]  1   Neurobehavioral status exam by physician [id number]  0   Each additional hour by physician [id number]  0     TECHNICIAN NOTES:    Proposed Battery: ***   Feedback Type Feedback Time   ***VV, Phone, In-Clinic  ***30-min, 45-min, 60-min            [1]   Patient Active Problem List  Diagnosis    Type 2 diabetes mellitus with hyperglycemia, without long-term current use of insulin    Financial difficulties    History of stroke    Depression    Generalized headaches    Memory deficit after cerebral infarction    Class 1 obesity due to excess calories with serious comorbidity and body mass index (BMI) of 32.0 to 32.9 in adult    S/P hysterectomy    Current smoker on some days    Primary osteoarthritis of left knee    Environmental allergies    Sepsis    Diverticulitis large intestine    Pneumonia    History of drug use    History of diverticulitis    Trichomonas vaginitis    Recurrent major depressive disorder, in partial remission    Hypertension associated with diabetes    Hyperlipidemia associated with type 2 diabetes mellitus    Tobacco use    Mild cognitive impairment    Stage 3b chronic kidney disease    Secondary hyperparathyroidism of renal origin    Aortic atherosclerosis    Anemia of chronic disease   [2]   Current Outpatient Medications:     amLODIPine (NORVASC) 10 MG tablet, Take 1 tablet (10 mg total) by mouth once daily., Disp: 90 tablet, Rfl: 3    aspirin (ECOTRIN) 81 MG EC tablet, Take 1 tablet (81 mg total) by mouth once daily., Disp: 90 tablet, Rfl: 3    blood glucose control, high Soln, 1 drop by Misc.(Non-Drug; Combo Route) route once daily. ICD 10 code: E11.59, Disp: 3 each, Rfl: 3    blood-glucose meter kit, Use as instructed. ICD 10 code E11.59, Disp: 1 each, Rfl: 0    empagliflozin (JARDIANCE) 25 mg tablet, Take 1 tablet (25 mg total) by mouth once daily., Disp: 90 tablet, Rfl: 3     ezetimibe (ZETIA) 10 mg tablet, Take 1 tablet (10 mg total) by mouth once daily., Disp: 90 tablet, Rfl: 3    metFORMIN (GLUCOPHAGE) 1000 MG tablet, Take 1 tablet (1,000 mg total) by mouth 2 (two) times daily with meals., Disp: 180 tablet, Rfl: 3    metoprolol tartrate (LOPRESSOR) 100 MG tablet, Take 1 tablet (100 mg total) by mouth 2 (two) times daily., Disp: 180 tablet, Rfl: 3    valsartan (DIOVAN) 40 MG tablet, Take 1 tablet (40 mg total) by mouth once daily., Disp: 90 tablet, Rfl: 3

## 2025-02-25 ENCOUNTER — OFFICE VISIT (OUTPATIENT)
Dept: NEUROLOGY | Facility: CLINIC | Age: 70
End: 2025-02-25
Payer: MEDICARE

## 2025-02-25 DIAGNOSIS — I69.311 MEMORY DEFICIT AFTER CEREBRAL INFARCTION: ICD-10-CM

## 2025-02-25 DIAGNOSIS — F41.9 ANXIETY AND DEPRESSION: ICD-10-CM

## 2025-02-25 DIAGNOSIS — F32.A ANXIETY AND DEPRESSION: ICD-10-CM

## 2025-02-25 DIAGNOSIS — F03.A0 MILD DEMENTIA WITHOUT BEHAVIORAL DISTURBANCE, PSYCHOTIC DISTURBANCE, MOOD DISTURBANCE, OR ANXIETY, UNSPECIFIED DEMENTIA TYPE: Primary | ICD-10-CM

## 2025-02-25 PROCEDURE — 99499 UNLISTED E&M SERVICE: CPT | Mod: 93,,, | Performed by: PSYCHIATRY & NEUROLOGY

## 2025-02-25 NOTE — PROGRESS NOTES
NEUROPSYCHOLOGY CONSULT (TELEHEALTH)   Referral Information  Name: Shabnam Noble  MRN: [id number]  : 1955  Age: 69 y.o.  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     Each patient to whom I provide medical services by telemedicine is: (1) informed of the relationship between the physician and patient and the respective role of any other health care provider with respect to management of the patient; and (2) notified that they may decline to receive medical services by telemedicine and may withdraw from such care at any time. Patient verbally consented to receive this service via voice-only telephone call.    This service was not originating from a related E/M service provided within the previous 7 days nor will  to an E/M service or procedure within the next 24 hours or my soonest available appointment.  Prevailing standard of care was able to be met in this audio-only visit.      Consent/Emergency Plan: The patient expressed an understanding of the purpose of the evaluation and consented to all procedures, including providing consent for Israel Spears, Ph.D., a clinical neuropsychology fellow under the supervision of Nilsa Wang Psy.D.,  "to be involved in her care. We discussed the limits of confidentiality and discussed an emergency plan.     SUMMARY    Ms. Noble is a 69 y.o., right-handed, Black or , female with 12 years of formal education. She has a past medical history of type 2 diabetes mellitus, poorly controlled hypertension, history of cerebral vascular accident (?2012), anemia, stage 3b chronic kidney disease, hyperlipidemia, and headaches. Pt is presenting for evaluation of cognitive complaints. Pt reports relatively stable cognitive symptoms since 2017 following an emergency room visit for stroke-like symptoms. ED workup was nonacute, symptoms were attributed to uncontrolled hypertension. Pt has had several hospitalizations in the last 3 years related to uncontrolled hypertension. Neuroimaging is notable for "remote infarct within the left occipital lobe and right cerebellum. Left thalamic tiny remote lacunar type infarct. Involutional change and suspected sequela chronic microvascular ischemic change." No recent reversible memory labs available.. She is not prescribed any medications with anticholinergic burden. She has had 3 falls in the last year. Sleep is good. Pt lives alone with limited social support and is generally independent, but requires oversight of finances by sister due to overspending and admits to inconsistent medication compliance. 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 She reports anxiety and depression related to being unemployed. She has a family history of Alzheimer's disease (mother) who passed in her early 70s.    Ms. Noble is scheduled to complete a neuropsychological evaluation on 03/10/2025 at 08:30am. Full report to follow. Differentials include vascular vs mixed AD/vascular, likely with some interference from mood symptoms.     Problem List Items Addressed This Visit          Neuro    Memory deficit after cerebral " infarction     Other Visit Diagnoses         Mild dementia without behavioral disturbance, psychotic disturbance, mood disturbance, or anxiety, unspecified dementia type    -  Primary      Anxiety and depression               Thank you for allowing us to participate in Ms. Noble's care. If you have any questions, please contact Dr. Wang at [phone].     Israel Spears, Ph.D.  Postdoctoral Fellow in Clinical Neuropsychology  [facility] - Department of Neurology    Nilsa Wang Psy.D.   Clinical Neuropsychologist   [facility] - Department of Neurology    HPI/CURRENT CONCERNS:   Ms. Noble is a 69 y.o. woman with a history of HTN, HLD, DM2, CKD3, anemia, headahces, and h/o stroke who was referred by her neurology team for neuropsychological evaluation in the context of memory changes. Ms. Noble reported a history of prior stoke in 2012 with symptoms of losing her eye sight and left-side weakness, unclear if there was medical workup at the time to confirm stroke. Per records, Ms. Noble experienced stoke-like symptoms of left-sided facial drop, numbness, and weakness in 09/2017. Stroke work-up at the time was negative, imaging was nonacute but did show remote left PCA infarct and basilar stenosis. Symptoms were thought to be related to uncontrolled hypertension. Follow-up brain MRI showed remote left occipital pole stroke. She attended 2 physical therapy appointments to address physical deficits following her hospitalization. At that time, she also reported difficulty with word-finding and occasional stuttering but did not complete speech therapy. Records indicate that Ms. Noble started expressing concerns about her memory in 2018, after the stroke. She was seen by an internal medicine provider in 01/2018 who completed a MoCA (23/30) with her that was below normal limits. Provider referred her for neuropsychological testing; however, testing was never completed.     She was referred to  "neurology for further evaluation of memory concerns and was first seen in 08/2022. At that time, she reported her memory had gotten worse since the stroke and that other people in her life had started to notice changes with her memory. Her neurologist completed a MoCA (20/30) during the visit that was below normal limits. She was referred for neuropsychological evaluation again but it was not completed. She re-established care with a neurologist in 2024, and at her most recent appointment in 07/2024, Ms. Noble reported worsening memory, with more forgetfulness related to bills and medical appointments, but denied that cognitive changes were interfering with her job. She was referred for neuropsychological testing and was awaiting an appointment at the time of her last visit with neurology.     Ms. Noble has a history of poor controlled hypertension. She has been seen in the emergency department 3 times (11/2022, 08/2023, 08/2024) for uncontrolled hypertension. Updated neuroimaging in 11/2022 during one of the emergency department visits documented "remote infarct within the left occipital lobe and right cerebellum. Left thalamic tiny remote lacunar type infarct. Involutional change and suspected sequela chronic microvascular ischemic change." Her most-recent emergency department visit in 08/2024 documented that she presented with complaints of back pain and intermittent headache. She reported not taking her hypertension medication that day. Blood pressure was 218/99 when vitals were taken. Differential at the time included "hypertensive urgency or emergency and concomitant organ damage such as kidney injury or renal failure, heart failure, CVA. Also includes renal colic, pyelonephritis, musculoskeletal back pain." She was discharged with Tylenol and Robaxin for back pain and told to follow up with primary care.     Cognitive Functioning   Previous evaluation(s) & general findings: MoCA = 23/30 (01/2018)          " MoCA = 20/30 (08/2022)    Onset & course of difficulty: Today, Ms. Noble reports acute onset cognitive changes in 2017 following a hospital visit where she had stroke-like symptoms, and that these changes have remained relatively stable over time. Elsewhere in the records, she has expressed concern for symptoms worsening over time.    Examples of cognitive changes:   Attention/Working Memory: No changes reported  Executive Functioning/Planning: No changes reported  Processing Speed: She has noticed slower processing speed. Feels like it takes a moment to understand everything that people are saying to her in conversations.   Language: Has noticed that she sometimes stutters in long conversations (no past history of stutter) and has word finding difficulty. She reports expressive language difficulties and states it's hard to get words to come out but was unable to elaborate or provide an example of what this can look like.   Visuospatial: No changes reported.  Learning & Memory: She forgets conversations, cues are not helpful. She has to write important information down because she is worried she will forget it.    Exacerbating factors: When she feels ill, she feels less alert.   Ameliorating factors: When she is compliant with medications and her vitals (blood pressure, insulin, blood sugar) are in a good range, she feels more alert and overall better.    Daily Functioning    ADLs  Self-Care Eating Safety Concerns Other   Independent. She has physical limitations that make bathing difficult. She sits on the side of the bathtub to bathe because she has difficulty getting fully in/out of the tub. Reports her shower doesn't work. She is looking for accommodations for her bathroom (e.g., guardrails).  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 Since her account was deactivated in Nov 2024, she now only drives if she has to. Denied difficulty driving, getting lost, or accidents in the last 2 years. Plans to resume driving for [employer] when her account is re-activated.  Ms. Noble manages her own medications. Reports taking Norvasc and Metformin as prescribed. States she doesn't always take valsartan and metoprolol. Reports missing 1-3 doses/month  Ms. Noble sister has managed her medical appointments since August/September 2024 because she was forgetting doctors' appointments. Her sister provides reminders about appointments and attends appointments with her now.    When asked about monitoring her blood pressure, Ms. Noble reported she finds the at-home blood pressure machine difficult to manage on her own. States she hasn't checked blood sugar recently because she is out of finger pricks. Independent and without difficulty    Ms. Noble sister has managed the finances since 2024. Pt was forgetting to pay bills and overspending money. Her sister oversees finances and makes sure she doesn't overspend. She denied being the victim of any financial scams.      Physical Symptoms:    Tremor: Reports left-hand resting tremor.  Difficulty walking: Reports she cannot walk long distances because her legs will start to feel weak. When this happens, she has to hold onto something to keep her standing.  Imbalance: no   Falls: Reports 2-3 falls in the last year. She is unsure what causes the fall but attributes it to possibly tripping or missing a step. She denied any injuries with fall, just some stiffness/soreness following the falls.   Weakness/Numbness: Reports bilateral leg weakness and shaking, occasionally.   Trouble with fine motor movements: no   Lightheadedness/Dizziness/Syncope: She feels dizzy/lightheaded sometimes and attributes this to not taking her medications  "consistently. Estimated this happens 1-3x/month. She denied syncope episodes.    Urinary or Bowel Urgency/Incontinence: no   Sensory Sxs: Reports vision is poor and blurry sometimes. States that if her blood pressure is high, her vision will get blurry. She has difficulty reading fine print. She has glasses but they are broken so she hasn't been wearing them. Denied any other sensory changes.  Pain: None reported  Physical Exercise Routine: None reported     Psychiatric/Neuropsychiatric Symptoms   Mood: Reports she's generally a "duenas" person.  Depression: She feels down/depressed since November 2024 coinciding with when she stopped driving with [employer]. Reports she is socially withdrawn from others and only wants to watch TV. Also reports getting frustrated more easily if things aren't going her way.   Current Ideation, Intention, or Plan: Denied  Homicidal Ideation, Intention, or Plan: Denied  Kylie/Hypomania: Denied  Anxiety: She reports new onset of anxiety/worry in November 2024 related to worries about not being able to drive for [employer] since her account has been "offline." Feels her anxiety has gotten worse because she hasn't been driving as much and she really loves driving and being out of the house.   Stress: She is waiting for [employer] to put her account back online and doesn't know when that will happen. She reports being unsure why her account went offline initially.   Coping Mechanisms: Denied  Neurovegetative Sxs:  Appetite: No changes reported. Reports she eats anything she wants, but "just monitors it" and eats in moderation.  Sleep: Good; sleeps 8 hours, occasionally wakes due to nocturia.   Energy: Reports enough energy to get through her day. Will take a daily nap for 30-60 minutes.   Hallucinations: Denied  Delusional/Paranoid Thinking: Denied  Obsessive/Compulsive Behaviors: Denied  Impulsivity/Compulsivity: Denied  Disinhibition: Denied  Irritability/Agitation: Denied  Aggression: " "Denied  Apathy/Indifference: Denied  Other changes in personality: Denied    RELEVANT HISTORY  Psychiatric History   Prior Diagnoses: Depression  History of Trauma/Abuse: no   History of Suicide Attempts: no   Medication(s): no   Hospitalization(s): no   Psychotherapy/Counseling: no   Other: no     Substance Use History   Ms. Noble reports she used to drink beer daily but that she stopped drinking 1 year ago. Reports having 1 beer occasionally now. She smokes cigarettes, 1 pack/week. She denied use of any other substances.     Neurological History    Headaches/Migraines: She reports a history of headaches, managed with OTC medications. States that when her blood pressure is high, she can get headaches. Reports she has a headache during today's appointment.  TBI: no   Seizures: no   Stroke: Ms. Noble reported a history of prior stoke in 2012 with symptoms of losing her eye sight and left-side weakness, unclear if there was medical workup at the time to confirm stroke. She didn't complete physical, occupational, or speech therapy. In September 2017 she presented to the ED with stroke-like symptoms. Imaging at the time was nonacute but did nte remote left PCA infarct and basilar stenosis, and pt was in hypertensive crisis at the time. Updated neuroimaging in 11/2022 documented "remote infarct within the left occipital lobe and right cerebellum.  Left thalamic tiny remote lacunar type infarct. Involutional change and suspected sequela chronic microvascular ischemic change." See HPI above for more details.   Tumor: no   Previous Episodes of Delirium: no   Movement Disorder: no   CNS Infection: no   Other: no    Family Neurological & Psychiatric History     family history includes Alzheimer's disease in her mother; Breast cancer in her paternal aunt; Colon cancer in her brother; Diabetes in her father; Prostate cancer in her father.  Neurologic: Her mother had possible Alzheimer's disease and passed in her early 70s. "   Psychiatric: Negative for heritable risk factors.    Development  Education   Born & raised: [place]   Prenatal and  development: wnl  Developmental milestones: wnl  Language Acquisition: English first language  Level Attained: High School  Learning/Attention/Behavior Difficulties: no  Repeated Grade(s): no  Typical Grades: C student        Occupation  Social    Service: no   Occupational Status: Unemployed since 2024. Waiting for [employer] to janel her access back into her account.   Primary Occupation: She was a  ([employer] for 15 years.    Family Status: Single   Current Living Situation: Ms. oNble lives alone   Support System: Her siblings and a close friend, though she does not talk with these individuals all the time.  Hobbies/Activities: Watching TV (Cheggin)     Medical Status   Problem List[1]  Past Medical History:   Diagnosis Date    Colon polyp     CVA (cerebral vascular accident)     DM2 (diabetes mellitus, type 2)     Essential hypertension      Past Surgical History:   Procedure Laterality Date    ANKLE FRACTURE SURGERY Right     ANKLE FRACTURE SURGERY      COLONOSCOPY N/A 2020    Procedure: COLONOSCOPY;  Surgeon: Marshal Rogers MD;  Location: [address]);  Service: Endoscopy;  Laterality: N/A;  -plavix hold ok see te-covid pcw -tb    COLONOSCOPY W/ POLYPECTOMY      HYSTERECTOMY      no h/o  malignancy       Neurodiagnostics     Results for orders placed or performed during the hospital encounter of 23   CT Head Without Contrast    Narrative    EXAMINATION:  CT HEAD WITHOUT CONTRAST    CLINICAL HISTORY:  HA with /114;    TECHNIQUE:  Low dose axial images were obtained through the head.  Coronal and sagittal reformations were also performed. Contrast was not administered.    COMPARISON:  MRI of the brain, 11/15/2022, CT brain, 11/15/2022    FINDINGS:  The areas of low density compatible with remote infarcts in  the right cerebellum and left occipital lobe appear stable.  No new loss of gray-white junction differentiation mass or pathologic fluid collection.  Hydrocephalus. The calvarium is intact. Air cells are clear. Orbits and contents stable. No soft tissue swelling.      Impression    Stable CT of the brain no evidence of acute hemorrhage, mass or infarction.      Electronically signed by: Efren Helm  Date:    08/07/2023  Time:    18:09   Results for orders placed or performed during the hospital encounter of 11/15/22   MRI Brain Without Contrast    Narrative    EXAMINATION:  MRI BRAIN WITHOUT CONTRAST    CLINICAL HISTORY:  Transient ischemic attack (TIA);    TECHNIQUE:  Multiplanar multisequence MR imaging of the brain was performed without contrast.    COMPARISON:  CT 11/15/2020    FINDINGS:  Intracranial compartment: Brain appears normally formed.  Age-related mild generalized cerebral volume loss.    Volume loss within the left occipital lobe associated with dilatation of the temporal horn of the left lateral ventricle.  The ventricles are otherwise midline without distortion by mass effect or acute hydrocephalus.  Additional encephalomalacia within the right cerebellum.  Upper left thalamic suspected tiny remote lacunar type infarct.  No acute infarct, edema, or hemorrhage.  Few scattered punctate foci of nonspecific T2/FLAIR hyperintense signal of the subcortical and periventricular white matter likely sequela of chronic microvascular ischemic change.  No extra-axial blood or fluid collections    Normal vascular flow voids are preserved.    Skull/extracranial contents (limited evaluation): Bone marrow signal intensity is normal.  Mild right mastoid air cells effusion.      Impression    No acute intracranial abnormality.    Remote infarct within the left occipital lobe and right cerebellum.  Left thalamic tiny remote lacunar type infarct.    Involutional change and suspected sequela chronic microvascular  "ischemic change.    Mild right mastoid air cells effusion.    Electronically signed by resident: Froilan Trinidad  Date:    11/15/2022  Time:    17:32    Electronically signed by: Jun Calvillo MD  Date:    11/15/2022  Time:    17:55       Pertinent Lab Work     Lab Results   Component Value Date    [id number] 877 04/30/2024     No results found for: "RPR"  No results found for: "FOLATE"  Lab Results   Component Value Date    TSH 1.168 02/04/2025    X0CFVHJ 115 08/12/2020     Lab Results   Component Value Date    HGBA1C 9.0 (H) 02/04/2025     No results found for: "HIV1X2", "[id number]"      Medications     Current Outpatient Medications:     amLODIPine (NORVASC) 10 MG tablet, Take 1 tablet (10 mg total) by mouth once daily., Disp: 90 tablet, Rfl: 3    aspirin (ECOTRIN) 81 MG EC tablet, Take 1 tablet (81 mg total) by mouth once daily., Disp: 90 tablet, Rfl: 3    blood glucose control, high Soln, 1 drop by Misc.(Non-Drug; Combo Route) route once daily. ICD 10 code: E11.59, Disp: 3 each, Rfl: 3    blood-glucose meter kit, Use as instructed. ICD 10 code E11.59, Disp: 1 each, Rfl: 0    empagliflozin (JARDIANCE) 25 mg tablet, Take 1 tablet (25 mg total) by mouth once daily., Disp: 90 tablet, Rfl: 3    ezetimibe (ZETIA) 10 mg tablet, Take 1 tablet (10 mg total) by mouth once daily., Disp: 90 tablet, Rfl: 3    metFORMIN (GLUCOPHAGE) 1000 MG tablet, Take 1 tablet (1,000 mg total) by mouth 2 (two) times daily with meals., Disp: 180 tablet, Rfl: 3    metoprolol tartrate (LOPRESSOR) 100 MG tablet, Take 1 tablet (100 mg total) by mouth 2 (two) times daily., Disp: 180 tablet, Rfl: 3    valsartan (DIOVAN) 40 MG tablet, Take 1 tablet (40 mg total) by mouth once daily., Disp: 90 tablet, Rfl: 3       OBJECTIVE:     MENTAL STATUS AND BEHAVIORAL OBSERVATIONS:  Appearance:  Not observed (telephone visit)  Behavior:   alert, calm, cooperative, rapport easily established, and Appropriate interpersonal skills. Good interpretation of " nonverbal cues.   Orientation:   Fully oriented  Sensory:   Hearing and vision adequate for virtual/telephone visit  Gait:   Not observed (virtual/telephone visit)   Psychomotor:  Not observed (telephone visit)  Speech:  Fluent and spontaneous. Normal volume, rate, pitch, tone, and prosody.  Language:  Receptive and expressive language appear intact. Comprehends conversational speech., No evidence of word-finding difficulties in conversational speech.  Mood:   euthymic  Affect:   mood-congruent  Thought Process: goal directed and linear  Thought Content: Denied current SI/HI. and No evidence of psychotic symptoms.  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 cerebral infarction    Class 1 obesity due to excess calories with serious comorbidity and body mass index (BMI) of 32.0 to 32.9 in adult    S/P hysterectomy    Current smoker on some days    Primary osteoarthritis of left knee    Environmental allergies    Sepsis    Diverticulitis large intestine    Pneumonia    History of drug use    History of diverticulitis    Trichomonas vaginitis    Recurrent major depressive disorder, in partial remission    Hypertension associated with diabetes    Hyperlipidemia associated with type 2 diabetes mellitus    Tobacco use    Mild cognitive impairment    Stage 3b chronic kidney disease    Secondary hyperparathyroidism of renal origin    Aortic atherosclerosis    Anemia of chronic disease

## 2025-03-10 ENCOUNTER — OFFICE VISIT (OUTPATIENT)
Dept: NEUROLOGY | Facility: CLINIC | Age: 70
End: 2025-03-10
Payer: MEDICARE

## 2025-03-10 DIAGNOSIS — R41.89 COGNITIVE CHANGE: Primary | ICD-10-CM

## 2025-03-10 DIAGNOSIS — F03.A0 MILD DEMENTIA WITHOUT BEHAVIORAL DISTURBANCE, PSYCHOTIC DISTURBANCE, MOOD DISTURBANCE, OR ANXIETY, UNSPECIFIED DEMENTIA TYPE: ICD-10-CM

## 2025-03-10 PROCEDURE — 96132 NRPSYC TST EVAL PHYS/QHP 1ST: CPT | Mod: S$GLB,,, | Performed by: PSYCHIATRY & NEUROLOGY

## 2025-03-10 PROCEDURE — 99499 UNLISTED E&M SERVICE: CPT | Mod: S$GLB,,, | Performed by: PSYCHIATRY & NEUROLOGY

## 2025-03-10 PROCEDURE — 96139 PSYCL/NRPSYC TST TECH EA: CPT | Mod: S$GLB,,, | Performed by: PSYCHIATRY & NEUROLOGY

## 2025-03-10 PROCEDURE — 96138 PSYCL/NRPSYC TECH 1ST: CPT | Mod: S$GLB,,, | Performed by: PSYCHIATRY & NEUROLOGY

## 2025-03-10 PROCEDURE — 96133 NRPSYC TST EVAL PHYS/QHP EA: CPT | Mod: S$GLB,,, | Performed by: PSYCHIATRY & NEUROLOGY

## 2025-03-11 ENCOUNTER — PATIENT OUTREACH (OUTPATIENT)
Dept: ADMINISTRATIVE | Facility: HOSPITAL | Age: 70
End: 2025-03-11
Payer: MEDICARE

## 2025-03-20 NOTE — PROGRESS NOTES
"NEUROPSYCHOLOGY CONSULT  Referral Information  Name: Shabnam Noble  MRN: [id number]  : 1955  Age: 69 y.o.  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 We discussed the limits of confidentiality and discussed an emergency plan.     SUMMARY    Ms. Noble is a 69 y.o., right-handed, Black or , female with 12 years of formal education. She has a past medical history of type 2 diabetes mellitus, poorly controlled hypertension, history of cerebral vascular accident (?), anemia, stage 3b chronic kidney disease, hyperlipidemia, and headaches. Pt is presenting for evaluation of cognitive complaints. Pt reports relatively stable cognitive symptoms since 2017 following an emergency room visit for stroke-like symptoms. ED workup was nonacute, symptoms were attributed to uncontrolled hypertension. Pt has had several hospitalizations in the last 3 years related to uncontrolled hypertension. Neuroimaging is notable for "remote infarct within the left occipital lobe and right cerebellum. Left thalamic tiny remote lacunar type infarct. Involutional change and suspected sequela chronic microvascular ischemic change." No recent reversible memory labs available. She is not prescribed any medications with anticholinergic burden. She has had 3 falls in the last year. Sleep is good. Pt lives alone with " limited social support and is generally independent, but requires oversight of finances by sister due to overspending and admits to inconsistent medication compliance. 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 She reports anxiety and depression related to being unemployed. She has a family history of Alzheimer's disease (mother) who passed in her early 70s.    Unfortunately, Ms. Noble was reluctant to participate in neuropsychological testing. Her engagement was poor throughout and she ultimately discontinued testing early. As a result, scores are limited, and those we do have are not considered a reliable representation of her abilities and cannot be interpreted. She endorsed severe anxiety on a self-report screening measure.     Poor effort and engagement precludes a formal assessment of the extent and severity of any cognitive changes. However, despite limited testing data I remain very concerned about Ms. Noble, and I suspect that she is likely experiencing either late MCI or mild vascular dementia. There is evidence to suggest she is having difficulty managing finances, medications, and likely driving as well given her account suspension with [employer]. I suspect psychosocial stressors and depression are exacerbating her presentation, but do not explain it entirely. We will try again to get permission from Ms. Noble to contact her sister for collateral information. If appropriate, we can refer to our care management program. We can attempt repeat testing in the future if Ms. Noble is agreeable.     Problem List Items Addressed This Visit          Neuro    Cognitive change - Primary    Mild dementia without behavioral disturbance, psychotic disturbance, mood disturbance, or anxiety    Overview   Provisional, pt seems on the cusp of mild dementia given difficulties with IADLs           Plan  Ms. Noble is scheduled for feedback on  03/26/2025. Will discuss and provide additional information on brain health hygiene, managing vascular health, and compensatory strategies for cognition.   Will discuss proactive options for supporting continued independent management of IADLs, including pill packs, pill dispensers, financial advisors, etc. Will also discuss getting her family members and/or friends more involved in her care particularly given the concern that she will continue to decline over time.  I do not think she should drive  Can repeat testing in the future if she is agreeable       Thank you for allowing us to participate in Ms. Noble's care. If you have any questions, please contact Dr. Wang at [phone].     Israel Spears, Ph.D.  Postdoctoral Fellow in Clinical Neuropsychology  [facility] - Department of Neurology    Nilsa Wang Psy.D.   Clinical Neuropsychologist   [facility] - Department of Neurology    HPI/CURRENT CONCERNS:   Ms. Noble is a 69 y.o. woman with a history of HTN, HLD, DM2, CKD3, anemia, headaches, and h/o stroke who was referred by her neurology team for neuropsychological evaluation in the context of memory changes. Ms. Noble reported a history of prior stoke in 2012 with symptoms of losing her eyesight and left-side weakness, unclear if there was medical workup at the time to confirm stroke. Per records, Ms. Noble experienced stoke-like symptoms of left-sided facial drop, numbness, and weakness in 09/2017. Stroke work-up at the time was negative, imaging was nonacute but did show remote left PCA infarct and basilar stenosis. Symptoms were thought to be related to uncontrolled hypertension. Follow-up brain MRI showed remote left occipital pole stroke. She attended 2 physical therapy appointments to address physical deficits following her hospitalization. At that time, she also reported difficulty with word-finding and occasional stuttering but did not complete speech therapy. Records  "indicate that Ms. Noble started expressing concerns about her memory in 2018, after the stroke. She was seen by an internal medicine provider in 01/2018 who completed a MoCA (23/30) with her that was below normal limits. Provider referred her for neuropsychological testing; however, testing was never completed.     She was referred to neurology for further evaluation of memory concerns and was first seen in 08/2022. At that time, she reported her memory had gotten worse since the stroke and that other people in her life had started to notice changes with her memory. Her neurologist completed a MoCA (20/30) during the visit that was below normal limits. She was referred for neuropsychological evaluation again but it was not completed. She re-established care with a neurologist in 2024, and at her most recent appointment in 07/2024, Ms. Noble reported worsening memory, with more forgetfulness related to bills and medical appointments, but denied that cognitive changes were interfering with her job. She was referred for neuropsychological testing and was awaiting an appointment at the time of her last visit with neurology.     Ms. Noble has a history of poor controlled hypertension. She has been seen in the emergency department 3 times (11/2022, 08/2023, 08/2024) for uncontrolled hypertension. Updated neuroimaging in 11/2022 during one of the emergency department visits documented "remote infarct within the left occipital lobe and right cerebellum. Left thalamic tiny remote lacunar type infarct. Involutional change and suspected sequela chronic microvascular ischemic change." Her most-recent emergency department visit in 08/2024 documented that she presented with complaints of back pain and intermittent headache. She reported not taking her hypertension medication that day. Blood pressure was 218/99 when vitals were taken. Differential at the time included "hypertensive urgency or emergency and concomitant organ " "damage such as kidney injury or renal failure, heart failure, CVA. Also includes renal colic, pyelonephritis, musculoskeletal back pain." She was discharged with Tylenol and Robaxin for back pain and told to follow up with primary care.     Cognitive Functioning   Previous evaluation(s) & general findings: MoCA = 23/30 (01/2018)          MoCA = 20/30 (08/2022)    Onset & course of difficulty: Today, Ms. Noble reports acute onset cognitive changes in 2017 following a hospital visit where she had stroke-like symptoms, and that these changes have remained relatively stable over time. Elsewhere in the records, she has expressed concern for symptoms worsening over time.    Examples of cognitive changes:   Attention/Working Memory: No changes reported  Executive Functioning/Planning: No changes reported  Processing Speed: She has noticed slower processing speed. Feels like it takes a moment to understand everything that people are saying to her in conversations.   Language: Has noticed that she sometimes stutters in long conversations (no past history of stutter) and has word finding difficulty. She reports expressive language difficulties and states it's hard to get words to come out but was unable to elaborate or provide an example of what this can look like.   Visuospatial: No changes reported.  Learning & Memory: She forgets conversations, cues are not helpful. She has to write important information down because she is worried she will forget it.    Exacerbating factors: When she feels ill, she feels less alert.   Ameliorating factors: When she is compliant with medications and her vitals (blood pressure, insulin, blood sugar) are in a good range, she feels more alert and overall better.    Daily Functioning    ADLs  Self-Care Eating Safety Concerns Other   Independent. She has physical limitations that make bathing difficult. She sits on the side of the bathtub to bathe because she has difficulty getting fully " in/out of the tub. Reports her shower doesn't work. She is looking for accommodations for her bathroom (e.g., guardrails).  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 Since her account was deactivated in Nov 2024, she now only drives if she has to. Denied difficulty driving, getting lost, or accidents in the last 2 years. Plans to resume driving for [employer] when her account is re-activated.  Ms. Noble manages her own medications. Reports taking Norvasc and Metformin as prescribed. States she doesn't always take valsartan and metoprolol. Reports missing 1-3 doses/month  Ms. Noble sister has managed her medical appointments since August/September 2024 because she was forgetting doctors' appointments. Her sister provides reminders about appointments and attends appointments with her now.    When asked about monitoring her blood pressure, Ms. Noble reported she finds the at-home blood pressure machine difficult to manage on her own. States she hasn't checked blood sugar recently because she is out of finger pricks. Independent and without difficulty    Ms. Noble sister has managed the finances since 2024. Pt was forgetting to pay bills and overspending money. Her sister oversees finances and makes sure she doesn't overspend. She denied being the victim of any financial scams.      Physical Symptoms:    Tremor: Reports left-hand resting tremor.  Difficulty walking: Reports she cannot walk long distances because her legs will start to feel weak. When this happens, she has to hold onto something to keep her standing.  Imbalance: no   Falls: Reports 2-3 falls in the last year. She is unsure what causes the fall but attributes it to possibly tripping or missing  "a step. She denied any injuries with fall, just some stiffness/soreness following the falls.   Weakness/Numbness: Reports bilateral leg weakness and shaking, occasionally.   Trouble with fine motor movements: no   Lightheadedness/Dizziness/Syncope: She feels dizzy/lightheaded sometimes and attributes this to not taking her medications consistently. Estimated this happens 1-3x/month. She denied syncope episodes.    Urinary or Bowel Urgency/Incontinence: no   Sensory Sxs: Reports vision is poor and blurry sometimes. States that if her blood pressure is high, her vision will get blurry. She has difficulty reading fine print. She has glasses but they are broken so she hasn't been wearing them. Denied any other sensory changes.  Pain: None reported  Physical Exercise Routine: None reported     Psychiatric/Neuropsychiatric Symptoms   Mood: Reports she's generally a "duenas" person.  Depression: She feels down/depressed since November 2024 coinciding with when she stopped driving with [employer]. Reports she is socially withdrawn from others and only wants to watch TV. Also reports getting frustrated more easily if things aren't going her way.   Current Ideation, Intention, or Plan: Denied  Homicidal Ideation, Intention, or Plan: Denied  Kylie/Hypomania: Denied  Anxiety: She reports new onset of anxiety/worry in November 2024 related to worries about not being able to drive for [employer] since her account has been "offline." Feels her anxiety has gotten worse because she hasn't been driving as much and she really loves driving and being out of the house.   Stress: She is waiting for [employer] to put her account back online and doesn't know when that will happen. She reports being unsure why her account went offline initially.   Coping Mechanisms: Denied  Neurovegetative Sxs:  Appetite: No changes reported. Reports she eats anything she wants, but "just monitors it" and eats in moderation.  Sleep: Good; sleeps 8 hours, occasionally wakes " "due to nocturia.   Energy: Reports enough energy to get through her day. Will take a daily nap for 30-60 minutes.   Hallucinations: Denied  Delusional/Paranoid Thinking: Denied  Obsessive/Compulsive Behaviors: Denied  Impulsivity/Compulsivity: Denied  Disinhibition: Denied  Irritability/Agitation: Denied  Aggression: Denied  Apathy/Indifference: Denied  Other changes in personality: Denied    RELEVANT HISTORY  Psychiatric History   Prior Diagnoses: Depression  History of Trauma/Abuse: no   History of Suicide Attempts: no   Medication(s): no   Hospitalization(s): no   Psychotherapy/Counseling: no   Other: no     Substance Use History   Ms. Noble reports she used to drink beer daily but that she stopped drinking 1 year ago. Reports having 1 beer occasionally now. She smokes cigarettes, 1 pack/week. She denied use of any other substances.     Neurological History    Headaches/Migraines: She reports a history of headaches, managed with OTC medications. States that when her blood pressure is high, she can get headaches. Reports she has a headache during today's appointment.  TBI: no   Seizures: no   Stroke: Ms. Noble reported a history of prior stoke in 2012 with symptoms of losing her eyesight and left-side weakness, unclear if there was medical workup at the time to confirm stroke. She didn't complete physical, occupational, or speech therapy. In September 2017 she presented to the ED with stroke-like symptoms. Imaging at the time was nonacute but did note remote left PCA infarct and basilar stenosis, and pt was in hypertensive crisis at the time. Updated neuroimaging in 11/2022 documented "remote infarct within the left occipital lobe and right cerebellum.  Left thalamic tiny remote lacunar type infarct. Involutional change and suspected sequela chronic microvascular ischemic change." See HPI above for more details.   Tumor: no   Previous Episodes of Delirium: no   Movement Disorder: no   CNS Infection: no "   Other: no    Family Neurological & Psychiatric History     family history includes Alzheimer's disease in her mother; Breast cancer in her paternal aunt; Colon cancer in her brother; Diabetes in her father; Prostate cancer in her father.  Neurologic: Her mother had possible Alzheimer's disease and passed in her early 70s.   Psychiatric: Negative for heritable risk factors.    Development  Education   Born & raised: [place]   Prenatal and  development: wnl  Developmental milestones: wnl  Language Acquisition: English first language  Level Attained: High School  Learning/Attention/Behavior Difficulties: no  Repeated Grade(s): no  Typical Grades: C student        Occupation  Social    Service: no   Occupational Status: Unemployed since 2024. Waiting for [employer] to janel her access back into her account.   Primary Occupation: She was a  ([employer]) for 15 years.    Family Status: Single   Current Living Situation: Ms. Noble lives alone   Support System: Her siblings and a close friend, though she does not talk with these individuals all the time.  Hobbies/Activities: Watching TV ([employer]     Medical Status   Problem List[1]  Past Medical History:   Diagnosis Date    Colon polyp     CVA (cerebral vascular accident)     DM2 (diabetes mellitus, type 2)     Essential hypertension      Past Surgical History:   Procedure Laterality Date    ANKLE FRACTURE SURGERY Right     ANKLE FRACTURE SURGERY      COLONOSCOPY N/A 2020    Procedure: COLONOSCOPY;  Surgeon: Marshal Rogers MD;  Location: [address];  Service: Endoscopy;  Laterality: N/A;  -plavix hold ok see te-covid pcw -tb    COLONOSCOPY W/ POLYPECTOMY      HYSTERECTOMY      no h/o  malignancy       Neurodiagnostics     Results for orders placed or performed during the hospital encounter of 23   CT Head Without Contrast    Narrative    EXAMINATION:  CT HEAD WITHOUT CONTRAST    CLINICAL  HISTORY:  HA with /114;    TECHNIQUE:  Low dose axial images were obtained through the head.  Coronal and sagittal reformations were also performed. Contrast was not administered.    COMPARISON:  MRI of the brain, 11/15/2022, CT brain, 11/15/2022    FINDINGS:  The areas of low density compatible with remote infarcts in the right cerebellum and left occipital lobe appear stable.  No new loss of gray-white junction differentiation mass or pathologic fluid collection.  Hydrocephalus. The calvarium is intact. Air cells are clear. Orbits and contents stable. No soft tissue swelling.      Impression    Stable CT of the brain no evidence of acute hemorrhage, mass or infarction.      Electronically signed by: Efren Helm  Date:    08/07/2023  Time:    18:09   Results for orders placed or performed during the hospital encounter of 11/15/22   MRI Brain Without Contrast    Narrative    EXAMINATION:  MRI BRAIN WITHOUT CONTRAST    CLINICAL HISTORY:  Transient ischemic attack (TIA);    TECHNIQUE:  Multiplanar multisequence MR imaging of the brain was performed without contrast.    COMPARISON:  CT 11/15/2020    FINDINGS:  Intracranial compartment: Brain appears normally formed.  Age-related mild generalized cerebral volume loss.    Volume loss within the left occipital lobe associated with dilatation of the temporal horn of the left lateral ventricle.  The ventricles are otherwise midline without distortion by mass effect or acute hydrocephalus.  Additional encephalomalacia within the right cerebellum.  Upper left thalamic suspected tiny remote lacunar type infarct.  No acute infarct, edema, or hemorrhage.  Few scattered punctate foci of nonspecific T2/FLAIR hyperintense signal of the subcortical and periventricular white matter likely sequela of chronic microvascular ischemic change.  No extra-axial blood or fluid collections    Normal vascular flow voids are preserved.    Skull/extracranial contents (limited  "evaluation): Bone marrow signal intensity is normal.  Mild right mastoid air cells effusion.      Impression    No acute intracranial abnormality.    Remote infarct within the left occipital lobe and right cerebellum.  Left thalamic tiny remote lacunar type infarct.    Involutional change and suspected sequela chronic microvascular ischemic change.    Mild right mastoid air cells effusion.    Electronically signed by resident: Froilan Trinidad  Date:    11/15/2022  Time:    17:32    Electronically signed by: Jun Calvillo MD  Date:    11/15/2022  Time:    17:55       Pertinent Lab Work     Lab Results   Component Value Date    NBBPGKVH74 877 04/30/2024     No results found for: "RPR"  No results found for: "FOLATE"  Lab Results   Component Value Date    TSH 1.168 02/04/2025    F1OJZTC 115 08/12/2020     Lab Results   Component Value Date    HGBA1C 9.0 (H) 02/04/2025     No results found for: "HIV1X2", "[id number]"      Medications     Current Outpatient Medications:     amLODIPine (NORVASC) 10 MG tablet, Take 1 tablet (10 mg total) by mouth once daily., Disp: 90 tablet, Rfl: 3    aspirin (ECOTRIN) 81 MG EC tablet, Take 1 tablet (81 mg total) by mouth once daily., Disp: 90 tablet, Rfl: 3    blood glucose control, high Soln, 1 drop by Misc.(Non-Drug; Combo Route) route once daily. ICD 10 code: E11.59, Disp: 3 each, Rfl: 3    blood-glucose meter kit, Use as instructed. ICD 10 code E11.59, Disp: 1 each, Rfl: 0    empagliflozin (JARDIANCE) 25 mg tablet, Take 1 tablet (25 mg total) by mouth once daily., Disp: 90 tablet, Rfl: 3    ezetimibe (ZETIA) 10 mg tablet, Take 1 tablet (10 mg total) by mouth once daily., Disp: 90 tablet, Rfl: 3    metFORMIN (GLUCOPHAGE) 1000 MG tablet, Take 1 tablet (1,000 mg total) by mouth 2 (two) times daily with meals., Disp: 180 tablet, Rfl: 3    metoprolol tartrate (LOPRESSOR) 100 MG tablet, Take 1 tablet (100 mg total) by mouth 2 (two) times daily., Disp: 180 tablet, Rfl: 3    valsartan " (DIOVAN) 40 MG tablet, Take 1 tablet (40 mg total) by mouth once daily., Disp: 90 tablet, Rfl: 3       OBJECTIVE:     MENTAL STATUS AND BEHAVIORAL OBSERVATIONS:  Appearance:  Casually dressed and Well groomed  Behavior:   alert and reluctantly cooperative  Orientation:  Pt was disoriented to the date, but oriented to the month, year, weekday, and current city/location. Pt arrived to the appointment late and alone. She required assistance from staff to find the location of the appointment.   Sensory:   Hearing and vision appeared adequate for testing purposes.  Gait:   Pt ambulates independently.   Psychomotor:  Within Normal Limits  Speech:  Fluent and spontaneous. Normal volume, rate, pitch, tone, and prosody.  Language:  Receptive and expressive language appear intact. Comprehends conversational speech., No evidence of word-finding difficulties in conversational speech., Phonemic paraphasic errors present  Mood:   irritable  Affect:   mood-congruent  Thought Process: goal directed and linear  Thought Content: Denied current SI/HI. and No evidence of psychotic symptoms.  Judgment/Insight: Fair  Validity:  Valid, PVT variable, Pt tended to give up easily on difficult test items , and Pt was reluctant to guess  Test Taking Bx: Performance on standalone and/or embedded performance validity measures suggested poor test engagement. Behaviorally, the pt tended to give up easily, was reluctant to guess, and appeared highly anxious, which appeared to periodically impact test engagement. Pt exhibited limited persistence on more difficult tasks, and gave up easily when challenged. Pt ultimately refused to continue testing. As a result, scores likely underestimate the pt's actual abilities. Out of concern for false positives, low scores will not be interpreted further.   Test Taking Bx:  Per psychometrist observations, before testing started, pt seemed upset and her eyes were watery and red. When testing began, she requested  "that testing be rescheduled. Pt stated she didn't have her glasses, hadn't eaten, was "parked wrong", and that she wasn't prepared for testing and would not stay for the full appointment. She agreed to start on testing but complained of a headache. Psychometrist consulted with me and the test battery was shortened. She refused a break and water when offered by the psychometrist. After 69 minutes of testing, pt reported a bad headache and stated "enough is enough. I'm getting a very bad headache and I'm tired." Testing was discontinued and pt was scheduled for feedback appt.       PROCEDURES/TESTS ADMINISTERED:  In addition to performing a review of pertinent medical records, reviewing limits to confidentiality, conducting a clinical interview, and explaining procedures, the following measures were administered:   Shaggy Cognitive Assessment (MOCA), Test of Premorbid Functioning (TOPF), Chang Verbal Learning Test (HVLT), Wechsler Memory Scale (WMS - LM), Brief Visuospatial Memory Test - Revised (BVMT-R), and Generalized Anxiety Disorder - 7 Item Scale (GAD7). Manual norms were used unless otherwise indicated.     NEUROPSYCHOLOGICAL ASSESSMENT RESULTS:  The following should not be interpreted in isolation from the neuropsychological evaluation report.  Scores on stand-alone and/or embedded performance validity measures were variable.    PREMORBID FUNCTIONING Raw Score Type of Standardized Score Standardized Score   TOPF simple dem. eFSIQ - SS 84   TOPF pred. eFSIQ - SS 84   TOPF simple + pred. eFSIQ - SS 81   COGNITIVE SCREENING Raw Score Type of Standardized Score Standardized Score   MoCA 16 - -   Orientation - Place 2/2 - -   Orientation - Date 3/4 - -   LANGUAGE FUNCTIONING Raw Score Type of Standardized Score Standardized Score   TOPF Word Reading 22 SS 82   LEARNING & MEMORY Raw Score Type of Standardized Score Standardized Score   HVLT-R      Total Immediate 3 Tscore <20   Delayed Recall 0 Tscore 23 " "  Retention % 0 Tscore <20   Hits 7 - -   False Positives 4 - -   Discrimination  3 Tscore <20   WMS-IV Subtests      LM I 9 ss 2   LM II 4 ss 3   LM Recognition 13 - -   BVMT-R      IR 4 Tscore 28.67   MENTAL PROCESSING SPEED Raw Score Type of Standardized Score Standardized Score   TMT A  101 Tscore 17   TMT A Total Err. 0 - -   EXECUTIVE FUNCTIONING Raw Score Type of Standardized Score Standardized Score   TMT B 439 Tscore 17   TMT B Total Err. 7 - -   MOOD & PERSONALITY Raw Score Type of Standardized Score Standardized Score   JAMES-7 17 - -   ss = scaled score (mean = 10, SD = 3); SS = standard score (mean = 100, SD = 15); Tscore mean = 50, SD = 10; zscore (mean = 0.00, SD = 1)       PROCESS NOTES:   PERFORMANCE VALIDITY: variable   PREMORBID: Estimated to be in the low average range, consistent with educational and occupational history.  GLOBAL COGNITIVE FUNCTIONING: Performance on a global cognitive screener was below expectation. Pt lost points for: trails (-1), figure copy (-1), clock drawing (-2), naming (-1), repetition (-2), verbal fluency (-1), abstraction (-1), delayed recall (-5), orientation (-1).   LANGUAGE: Word reading was notable for phonemic paraphasic errors.   LEARNING/MEMORY: On a list-learning task, pt responded after the first learning trial, "how do you expect me to remember all of those words?" She produced one response for each learning trial. When prompted if she could recall additional words, she responded "no, I don't remember." Pt reported she couldn't remember any words following a delay. Performance on the associated recognition measure was notable for semantically-related and semantically unrelated false-positive errors. Performance was similar on a contextual story memory task. On a visual memory task, she did not provide any response for the first learning trial. The test was discontinued before the delay because pt left.  PROCESSING SPEED: Performance was below expectation on a " speeded visuomotor tracking task.  EXECUTIVE FUNCTIONING: Performance on a mental flexibility and set shifting task was notable for 7 errors and took the pt 7 minutes and 19 seconds to complete.   MOOD/PERSONALITY: Pt endorsed severe anxiety on a brief symptom screener including uncontrollable worry, difficulty relaxing, restlessness, easily annoyed/irritable, and fear of something awful happening.         Billing/Services Summary          Neurobehavioral Status Exam Base Code (11168)  Total Units: 0    Face-to-face Total Time: 0 min.         Professional Neuropsychological Testing Evaluation Services Base Code (15294)   Total Units: 1 Add-on (43541)  Total Units: 2   Referral review/initial test selection 15 min.    Intra-session Clinical Decision-Making 0 min         Tech consult/test review/modification 0 min.         Patient behavior management 0 min.    Face-to-face Interpretive Feedback 60 min.    Record Review/Integration/Report Generation 120 min.     Total Time: 195 min.         Test Administration by Technician  Technician Name: GILBERT Carroll Base Code (13250)   Total Units: 1 Add-on (52374)\  Total Units: 2   Face-to-Face Testin min.    Scoring 35 min.     Total Time: 104 min.    DOS is the date of the evaluation unless specified       [1]   Patient Active Problem List  Diagnosis    Type 2 diabetes mellitus with hyperglycemia, without long-term current use of insulin    Financial difficulties    History of stroke    Depression    Generalized headaches    Memory deficit after cerebral infarction    Class 1 obesity due to excess calories with serious comorbidity and body mass index (BMI) of 32.0 to 32.9 in adult    S/P hysterectomy    Current smoker on some days    Primary osteoarthritis of left knee    Environmental allergies    Sepsis    Diverticulitis large intestine    Pneumonia    History of drug use    History of diverticulitis    Trichomonas vaginitis    Recurrent major depressive disorder, in  partial remission    Hypertension associated with diabetes    Hyperlipidemia associated with type 2 diabetes mellitus    Tobacco use    Cognitive change    Stage 3b chronic kidney disease    Secondary hyperparathyroidism of renal origin    Aortic atherosclerosis    Anemia of chronic disease    Mild dementia without behavioral disturbance, psychotic disturbance, mood disturbance, or anxiety

## 2025-03-25 ENCOUNTER — TELEPHONE (OUTPATIENT)
Dept: INTERNAL MEDICINE | Facility: CLINIC | Age: 70
End: 2025-03-25
Payer: MEDICARE

## 2025-03-25 PROBLEM — R41.89 COGNITIVE CHANGE: Status: ACTIVE | Noted: 2022-08-26

## 2025-03-25 PROBLEM — F03.A0 MILD DEMENTIA WITHOUT BEHAVIORAL DISTURBANCE, PSYCHOTIC DISTURBANCE, MOOD DISTURBANCE, OR ANXIETY: Status: ACTIVE | Noted: 2025-03-25

## 2025-03-25 NOTE — TELEPHONE ENCOUNTER
Returned pt. Sister(Maria L) call and assisted her with Affordable Housing information. Pt. Will call back at a later date to get a HH referral placed.

## 2025-03-25 NOTE — TELEPHONE ENCOUNTER
----- Message from Med Assistant Britt sent at 3/24/2025 11:34 AM CDT -----  Who called:Maria L (Sister)What is the request in detail:Has a question , gave no further details Can the clinic reply by [facility]? NoNo Would the patient rather a call back or a response via [facility]? Call backCallback Best call back number:[phone] (Mobile)Additional Information:Thank you.

## 2025-03-26 ENCOUNTER — OFFICE VISIT (OUTPATIENT)
Dept: NEUROLOGY | Facility: CLINIC | Age: 70
End: 2025-03-26
Payer: MEDICARE

## 2025-03-26 DIAGNOSIS — F01.A0 MILD VASCULAR DEMENTIA WITHOUT BEHAVIORAL DISTURBANCE, PSYCHOTIC DISTURBANCE, MOOD DISTURBANCE, OR ANXIETY: Primary | ICD-10-CM

## 2025-03-26 PROCEDURE — 99499 UNLISTED E&M SERVICE: CPT | Mod: 93,,, | Performed by: PSYCHIATRY & NEUROLOGY

## 2025-03-26 NOTE — PROGRESS NOTES
NEUROPSYCHOLOGICAL EVALUATION FEEDBACK    Shabnam Noble attended a feedback session today accompanied by her two sisters.  We discussed the results of the neuropsychological evaluation (35 minutes). Handouts provided in AVS. All of her questions were answered.    1. Mild vascular dementia without behavioral disturbance, psychotic disturbance, mood disturbance, or anxiety            PLAN:   RTC 1 year   Will refer sisters to our dementia care management program     Nilsa Wang PsyD  Licensed Clinical Neuropsychologist  [facility] - Department of Neurology      Audio Only Telehealth Visit  The patient location is: [place]   The chief complaint leading to consultation is: feedback  Visit type: Virtual visit with audio only (telephone)  Total time spent in medical discussion with patient: 35 minutes  Total time spent on date of the encounter:49 minutes  The reason for the audio only service rather than synchronous audio and video virtual visit was related to technical difficulties or patient preference/necessity.  Each patient to whom I provide medical services by telemedicine is:  (1) informed of the relationship between the physician and patient and the respective role of any other health care provider with respect to management of the patient; and (2) notified that they may decline to receive medical services by telemedicine and may withdraw from such care at any time. Patient verbally consented to receive this service via voice-only telephone call.     This service was not originating from a related E/M service provided within the previous 7 days nor will  to an E/M service or procedure within the next 24 hours or my soonest available appointment.  Prevailing standard of care was able to be met in this audio-only visit.

## 2025-03-27 ENCOUNTER — HOSPITAL ENCOUNTER (EMERGENCY)
Facility: OTHER | Age: 70
Discharge: HOME | End: 2025-03-28
Attending: EMERGENCY MEDICINE
Payer: MEDICARE

## 2025-03-27 DIAGNOSIS — R51.9 HEADACHE: ICD-10-CM

## 2025-03-27 DIAGNOSIS — I10 ELEVATED BLOOD PRESSURE READING IN OFFICE WITH DIAGNOSIS OF HYPERTENSION: Primary | ICD-10-CM

## 2025-03-27 LAB
ABSOLUTE EOSINOPHIL (OHS): 0.11 K/UL
ABSOLUTE MONOCYTE (OHS): 0.57 K/UL (ref 0.3–1)
ABSOLUTE NEUTROPHIL COUNT (OHS): 4.49 K/UL (ref 1.8–7.7)
ALBUMIN SERPL BCP-MCNC: 3.7 G/DL (ref 3.5–5.2)
ALP SERPL-CCNC: 82 UNIT/L (ref 40–150)
ALT SERPL W/O P-5'-P-CCNC: 9 UNIT/L (ref 10–44)
ANION GAP (OHS): 12 MMOL/L (ref 8–16)
AST SERPL-CCNC: 13 UNIT/L (ref 11–45)
BASOPHILS # BLD AUTO: 0.03 K/UL
BASOPHILS NFR BLD AUTO: 0.4 %
BILIRUB SERPL-MCNC: 0.3 MG/DL (ref 0.1–1)
BUN SERPL-MCNC: 28 MG/DL (ref 8–23)
CALCIUM SERPL-MCNC: 9.3 MG/DL (ref 8.7–10.5)
CHLORIDE SERPL-SCNC: 109 MMOL/L (ref 95–110)
CO2 SERPL-SCNC: 20 MMOL/L (ref 23–29)
CREAT SERPL-MCNC: 1.9 MG/DL (ref 0.5–1.4)
ERYTHROCYTE [DISTWIDTH] IN BLOOD BY AUTOMATED COUNT: 14.3 % (ref 11.5–14.5)
GFR SERPLBLD CREATININE-BSD FMLA CKD-EPI: 28 ML/MIN/1.73/M2
GLUCOSE SERPL-MCNC: 129 MG/DL (ref 70–110)
HCT VFR BLD AUTO: 35.4 % (ref 37–48.5)
HGB BLD-MCNC: 11.7 GM/DL (ref 12–16)
IMM GRANULOCYTES # BLD AUTO: 0.01 K/UL (ref 0–0.04)
IMM GRANULOCYTES NFR BLD AUTO: 0.1 % (ref 0–0.5)
LYMPHOCYTES # BLD AUTO: 1.89 K/UL (ref 1–4.8)
MCH RBC QN AUTO: 27.5 PG (ref 27–50)
MCHC RBC AUTO-ENTMCNC: 33.1 G/DL (ref 32–36)
MCV RBC AUTO: 83 FL (ref 82–98)
NUCLEATED RBC (/100WBC) (OHS): 0 /100 WBC
PLATELET # BLD AUTO: 212 K/UL (ref 150–450)
PMV BLD AUTO: 11.4 FL (ref 9.2–12.9)
POTASSIUM SERPL-SCNC: 3.9 MMOL/L (ref 3.5–5.1)
PROT SERPL-MCNC: 8 GM/DL (ref 6–8.4)
RBC # BLD AUTO: 4.26 M/UL (ref 4–5.4)
RELATIVE EOSINOPHIL (OHS): 1.5 %
RELATIVE LYMPHOCYTE (OHS): 26.6 % (ref 18–48)
RELATIVE MONOCYTE (OHS): 8 % (ref 4–15)
RELATIVE NEUTROPHIL (OHS): 63.4 % (ref 38–73)
SODIUM SERPL-SCNC: 141 MMOL/L (ref 136–145)
WBC # BLD AUTO: 7.1 K/UL (ref 3.9–12.7)

## 2025-03-27 PROCEDURE — 93010 ELECTROCARDIOGRAM REPORT: CPT | Mod: ,,, | Performed by: INTERNAL MEDICINE

## 2025-03-27 PROCEDURE — 80053 COMPREHEN METABOLIC PANEL: CPT | Performed by: EMERGENCY MEDICINE

## 2025-03-27 PROCEDURE — 63600175 PHARM REV CODE 636 W HCPCS: Mod: JZ,TB | Performed by: EMERGENCY MEDICINE

## 2025-03-27 PROCEDURE — 85025 COMPLETE CBC W/AUTO DIFF WBC: CPT | Performed by: EMERGENCY MEDICINE

## 2025-03-27 PROCEDURE — 96374 THER/PROPH/DIAG INJ IV PUSH: CPT

## 2025-03-27 PROCEDURE — 99284 EMERGENCY DEPT VISIT MOD MDM: CPT | Mod: 25

## 2025-03-27 PROCEDURE — 25000003 PHARM REV CODE 250: Performed by: EMERGENCY MEDICINE

## 2025-03-27 PROCEDURE — 93005 ELECTROCARDIOGRAM TRACING: CPT

## 2025-03-27 RX ORDER — METOPROLOL TARTRATE 50 MG/1
100 TABLET ORAL
Status: COMPLETED | OUTPATIENT
Start: 2025-03-27 | End: 2025-03-27

## 2025-03-27 RX ORDER — KETOROLAC TROMETHAMINE 30 MG/ML
10 INJECTION, SOLUTION INTRAMUSCULAR; INTRAVENOUS
Status: COMPLETED | OUTPATIENT
Start: 2025-03-27 | End: 2025-03-27

## 2025-03-27 RX ORDER — VALSARTAN 40 MG/1
40 TABLET ORAL
Status: COMPLETED | OUTPATIENT
Start: 2025-03-27 | End: 2025-03-27

## 2025-03-27 RX ADMIN — KETOROLAC TROMETHAMINE 10 MG: 30 INJECTION, SOLUTION INTRAMUSCULAR; INTRAVENOUS at 10:03

## 2025-03-27 RX ADMIN — METOPROLOL TARTRATE 100 MG: 50 TABLET, FILM COATED ORAL at 10:03

## 2025-03-27 RX ADMIN — VALSARTAN 40 MG: 40 TABLET, FILM COATED ORAL at 10:03

## 2025-03-27 NOTE — FIRST PROVIDER EVALUATION
"Medical screening examination initiated.  I have conducted a focused provider triage encounter, findings are as follows:    Brief history of present illness:  Headache X a few hours, back of head. No trauma or injury. No blurry vision. Hx of headaches when her BP is high. Took tylenol 1hr pta. No abdominal pain, no N/V. Normal neuro exam. Unsure if she has taken all her BP meds today--recent dx of dementia. Sister at bedside.    Vitals:    03/27/25 1810   BP: (!) 208/96   Pulse: 90   Resp: 18   Temp: 98.2 °F (36.8 °C)   TempSrc: Oral   SpO2: 99%   Height: 5' 5" (1.651 m)       Pertinent physical exam:  lungs cta bilaterally.    Brief workup plan:  pain meds    Preliminary workup initiated; this workup will be continued and followed by the physician or advanced practice provider that is assigned to the patient when roomed.  "

## 2025-03-28 VITALS
BODY MASS INDEX: 30.99 KG/M2 | HEART RATE: 57 BPM | TEMPERATURE: 98 F | HEIGHT: 65 IN | DIASTOLIC BLOOD PRESSURE: 85 MMHG | SYSTOLIC BLOOD PRESSURE: 200 MMHG | OXYGEN SATURATION: 100 % | RESPIRATION RATE: 21 BRPM | WEIGHT: 186 LBS

## 2025-03-28 NOTE — ED NOTES
LOC: The patient is awake, alert, and oriented to self, place, time, and situation. Pt is calm and cooperative. Affect is appropriate.  Speech is appropriate and clear.     APPEARANCE: Patient sitting on stretcher in no acute distress.  Patient is clean and well groomed.    SKIN: The skin is warm and dry; color consistent with ethnicity.  Patient has normal skin turgor and moist mucus membranes.  Skin intact; no breakdown or bruising noted.     MUSCULOSKELETAL: Patient moving upper and lower extremities without difficulty; denies pain in the extremities or back.  Denies weakness.     RESPIRATORY: Airway is open and patent. Respirations spontaneous, even, easy, and non-labored.  Patient has a normal effort and rate.  No accessory muscle use noted. Denies cough.     CARDIAC: Pt is urgently hypertensive. Normal heart rate noted.  No peripheral edema noted. No complaints of chest pain.     ABDOMEN: Soft and non tender to palpation.  No distention noted. Pt denies abdominal pain; denies nausea, vomiting, diarrhea, or constipation.    NEUROLOGIC: Eyes open spontaneously.  Behavior appropriate to situation.  Follows commands; facial expression symmetrical.  Purposeful motor response noted; normal sensation in all extremities. Pt reports a headache with blurred vision; denies loss of balance; denies unilateral weakness.

## 2025-03-28 NOTE — ED TRIAGE NOTES
Pt presents to the ER with complaints of a headache that started today. Pt is hypertensive; non-compliant with her mediations. Pt also reporting an episode of dizziness that has currently resolved.

## 2025-03-28 NOTE — ED PROVIDER NOTES
Encounter Date: 3/27/2025    SCRIBE #1 NOTE: I, Patricia Onofre, am scribing for, and in the presence of,  Maxwell Mccann MD. I have scribed the following portions of the note - Other sections scribed: HPI, ROS, PE.       History     Chief Complaint   Patient presents with    Headache     Posterior headache that started today. Reports non-compliance with BP meds. /96 on arrival. Denies blurry vision, dizziness, or any other symptoms. Family member states she usually gets headaches when her BP is elevated.     Time seen by provider: 9:35 PM    This is a 69 y.o. female with a PMHx of hypertension, diabetes mellitus, and dementia who presents with complaint of a headache. Pt reports that earlier today, she took her blood pressure medicine, but still got a headache due to a high pressure. She reported that her headache began approximately x1 hour before she arrived, and that it still persists. She states that the pain is at the back of her head, and denies any pain in the back of her neck. She said that she took x1 Tylenol in order to alleviate the pain, but that it has only slightly bettered. She also states that she has been having blurred vision, but does not recall whether it is in one or both eyes. Pt denies having nausea. She also denies any chest pain or dyspnea. Pt confirms that she has not been taking one of her blood pressure medicines, Valsartan, because she has none in her possession.    This is the extent of the patient's complaints at this time.    The history is provided by the patient. No  was used.     Review of patient's allergies indicates:   Allergen Reactions    Atorvastatin     Hydrodiuril [hydrochlorothiazide]     Lisinopril      Past Medical History:   Diagnosis Date    Colon polyp     CVA (cerebral vascular accident)     DM2 (diabetes mellitus, type 2)     Essential hypertension      Past Surgical History:   Procedure Laterality Date    ANKLE FRACTURE SURGERY Right      ANKLE FRACTURE SURGERY      COLONOSCOPY N/A 12/11/2020    Procedure: COLONOSCOPY;  Surgeon: Marshal Rogers MD;  Location: [facility] ([address] [place] [address]);  Service: Endoscopy;  Laterality: N/A;  11/18-plavix hold ok see te-covid pcw 12/8-tb    COLONOSCOPY W/ POLYPECTOMY      HYSTERECTOMY      no h/o  malignancy     Family History   Problem Relation Name Age of Onset    Alzheimer's disease Mother      Prostate cancer Father      Diabetes Father      Colon cancer Brother      Breast cancer Paternal Aunt      Heart disease Neg Hx      Stroke Neg Hx       Social History[1]  Review of Systems   Constitutional:  Negative for fever.   HENT:  Negative for congestion.    Eyes:  Positive for visual disturbance (blurred vision). Negative for redness.   Respiratory:  Negative for shortness of breath.    Cardiovascular:  Negative for chest pain.   Gastrointestinal:  Negative for abdominal pain.   Genitourinary:  Negative for dysuria.   Skin:  Negative for rash.   Neurological:  Positive for headaches.   Psychiatric/Behavioral:  Negative for confusion.        Physical Exam     Initial Vitals [03/27/25 1810]   BP Pulse Resp Temp SpO2   (!) 208/96 90 18 98.2 °F (36.8 °C) 99 %      MAP       --         Physical Exam    Constitutional: She appears well-developed and well-nourished. She is not diaphoretic. No distress.   HENT:   Head: Normocephalic and atraumatic.   Eyes: Conjunctivae are normal.   Neck: Neck supple.   Cardiovascular:  Normal rate, regular rhythm, S1 normal, S2 normal, normal heart sounds and intact distal pulses.           No murmur heard.  Pulmonary/Chest: Breath sounds normal. No respiratory distress. She has no wheezes. She has no rhonchi. She has no rales.   Abdominal: Abdomen is soft. There is no abdominal tenderness.   Musculoskeletal:         General: No edema.      Cervical back: Neck supple. No tenderness.      Comments: No cervical spine tenderness     Neurological: She is alert and oriented to person, place, and  time. She has normal strength. No cranial nerve deficit.   Skin: Skin is warm and dry.   Psychiatric: She has a normal mood and affect.         ED Course   Procedures  Labs Reviewed   COMPREHENSIVE METABOLIC PANEL - Abnormal       Result Value    Sodium 141      Potassium 3.9      Chloride 109      CO2 20 (*)     Glucose 129 (*)     BUN 28 (*)     Creatinine 1.9 (*)     Calcium 9.3      Protein Total 8.0      Albumin 3.7      Bilirubin Total 0.3      ALP 82      AST 13      ALT 9 (*)     Anion Gap 12      eGFR 28 (*)     Narrative:     Specimen slightly hemolyzed   CBC WITH DIFFERENTIAL - Abnormal    WBC 7.10      RBC 4.26      HGB 11.7 (*)     HCT 35.4 (*)     MCV 83      MCH 27.5      MCHC 33.1      RDW 14.3      Platelet Count 212      MPV 11.4      Nucleated RBC 0      Neut % 63.4      Lymph % 26.6      Mono % 8.0      Eos % 1.5      Basophil % 0.4      Imm Grans % 0.1      Neut # 4.49      Lymph # 1.89      Mono # 0.57      Eos # 0.11      Baso # 0.03      Imm Grans # 0.01     CBC W/ AUTO DIFFERENTIAL    Narrative:     The following orders were created for panel order CBC auto differential.  Procedure                               Abnormality         Status                     ---------                               -----------         ------                     CBC with Differential[[id number]]       Abnormal            Final result                 Please view results for these tests on the individual orders.     EKG Readings: (Independently Interpreted)   Normal sinus rhythm at rate 77, slight ST depression inferiorly and laterally, similar to previous, possible LVH       Imaging Results    None          Medications   valsartan tablet 40 mg (40 mg Oral Given 3/27/25 2230)   ketorolac injection 9.999 mg (9.999 mg Intravenous Given 3/27/25 2215)   metoprolol tartrate (LOPRESSOR) tablet 100 mg (100 mg Oral Given 3/27/25 2230)     Medical Decision Making      69 year old female with history of HTN, DM, recently  diagnosed dementia presents with family for evaluation of headache that started 1 hour prior to arrival.  Patient describes headache as posterior, took Tylenol prior to arrival with some improvement, no associated nausea.  She denies any neck pain, CP/SOB or other associated symptoms.  She has had similar headache when her pressure has been elevated.  Patient saw Neurology yesterday and was diagnosed with mild vascular dementia, and sister is unsure whether she has been taking all appropriate medications.  They did bring her home medications here and she is only taking amlodipine for her pressure, did not take metoprolol or valsartan prescribed on PCP visit last month, and only taking metformin not Jardiance for diabetes due to high cost of Jardiance.  On exam patient resting comfortably with /94 but no clinical sign of CVA, no C-spine tenderness or clinical sign of CHF.  Differential diagnosis includes hypertensive urgency, worsening CKD, noncompliance with medication; no clinical sign of CVA/ICH at this time, will defer head CT and give home meds, reassess.      Labs with no leukocytosis or anemia, but CMP concerning for CR 1.9, with previous baseline closer to 1.5.  I suspect this may be related to poorly controlled blood pressure and diabetes, no associated hyperkalemia or indication for emergent Nephrology evaluation at this time.  Glucose 129 with no evidence for DKA.  After a dose of Toradol patient's headache is resolved, she is now feeling much better.  EKG with no significant changes from previous, she likely has LVH, no chest pain to suggest ACS.  Repeat BP slightly improved, patient advised to continue ED observation to ensure further improvement of her BP with home meds, but she is feeling better and requesting discharge.  Patient and sister at bedside extensively counseled on need for compliance with all prescribed medications and I went through their medication list to ensure this.  They also  advised on need for close follow up with PCP for repeat renal function and to review BP log, and to get alternative to Jardiance.  Patient understands return to the ED for any worsening headache or severely elevated blood pressures.        Amount and/or Complexity of Data Reviewed  Labs: ordered.    Risk  Prescription drug management.            Scribe Attestation:   Scribe #1: I performed the above scribed service and the documentation accurately describes the services I performed. I attest to the accuracy of the note.              I, Dr. Maxwell Mccann, personally performed the services described in this documentation. All medical record entries made by the scribe were at my direction and in my presence.  I have reviewed the chart and agree that the record reflects my personal performance and is accurate and complete. Maxwell Mccann MD.                      Clinical Impression:  Final diagnoses:  [R51.9] Headache  [I10] Elevated blood pressure reading in office with diagnosis of hypertension (Primary)          ED Disposition Condition    Discharge Stable          ED Prescriptions    None       Follow-up Information       Follow up With Specialties Details Why Contact Info    Krys Michael MD Internal Medicine Call in 1 day  [address] [facility] [address]  [phone]                   [1]   Social History  Tobacco Use    Smoking status: Every Day     Current packs/day: 0.00     Average packs/day: 0.3 packs/day for 45.0 years (11.3 ttl pk-yrs)     Types: Cigarettes     Start date: 7/1/1976     Last attempt to quit: 7/1/2021     Years since quitting: 3.7    Smokeless tobacco: Never   Substance Use Topics    Alcohol use: Yes     Alcohol/week: 3.0 standard drinks of alcohol     Types: 3 Cans of beer per week     Comment: 1-2x's    Drug use: Not Currently        Mxawell Mccann MD  03/28/25 0328

## 2025-03-29 ENCOUNTER — PATIENT OUTREACH (OUTPATIENT)
Facility: OTHER | Age: 70
End: 2025-03-29
Payer: MEDICARE

## 2025-03-29 LAB
OHS QRS DURATION: 100 MS
OHS QTC CALCULATION: 463 MS

## 2025-03-31 ENCOUNTER — PATIENT OUTREACH (OUTPATIENT)
Dept: NEUROLOGY | Facility: CLINIC | Age: 70
End: 2025-03-31
Payer: MEDICARE

## 2025-03-31 ENCOUNTER — PATIENT MESSAGE (OUTPATIENT)
Dept: ADMINISTRATIVE | Facility: HOSPITAL | Age: 70
End: 2025-03-31
Payer: MEDICARE

## 2025-04-03 ENCOUNTER — TELEPHONE (OUTPATIENT)
Dept: INTERNAL MEDICINE | Facility: CLINIC | Age: 70
End: 2025-04-03
Payer: MEDICARE

## 2025-04-03 NOTE — TELEPHONE ENCOUNTER
----- Message from Summer sent at 4/2/2025 10:08 AM CDT -----  Type:  Patient Returning Call  Name of who is calling:maria l pt sister   What is request in detail:Maria L is requesting a call back in regards to paperwork, pt has dementia and needs someone papers signed by  Also needs information on pt diabetic medication, please assist.   Can clinic reply by [facility]:no  [phone]   Split-Thickness Skin Graft Text: The defect edges were debeveled with a #15 scalpel blade.  Given the location of the defect, shape of the defect and the proximity to free margins a split thickness skin graft was deemed most appropriate.  Using a sterile surgical marker, the primary defect shape was transferred to the donor site. The split thickness graft was then harvested.  The skin graft was then placed in the primary defect and oriented appropriately.

## 2025-04-03 NOTE — TELEPHONE ENCOUNTER
I returned pt. Call and explained to process of signing power of  paperwork and requested a new DM medication due to price increase.

## 2025-04-04 ENCOUNTER — HOSPITAL ENCOUNTER (INPATIENT)
Facility: OTHER | Age: 70
LOS: 5 days | Discharge: NURSING, REHAB OR LONG-TERM CARE | DRG: 066 | End: 2025-04-09
Attending: EMERGENCY MEDICINE | Admitting: HOSPITALIST
Payer: MEDICARE

## 2025-04-04 DIAGNOSIS — I10 BENIGN ESSENTIAL HTN: ICD-10-CM

## 2025-04-04 DIAGNOSIS — E11.59 TYPE 2 DIABETES MELLITUS WITH OTHER CIRCULATORY COMPLICATION, WITHOUT LONG-TERM CURRENT USE OF INSULIN: ICD-10-CM

## 2025-04-04 DIAGNOSIS — R05.9 COUGH: ICD-10-CM

## 2025-04-04 DIAGNOSIS — I63.9 STROKE: ICD-10-CM

## 2025-04-04 DIAGNOSIS — I10 HTN (HYPERTENSION): ICD-10-CM

## 2025-04-04 DIAGNOSIS — N18.32 STAGE 3B CHRONIC KIDNEY DISEASE: ICD-10-CM

## 2025-04-04 DIAGNOSIS — I63.9 CVA (CEREBRAL VASCULAR ACCIDENT): ICD-10-CM

## 2025-04-04 DIAGNOSIS — R42 DIZZINESS: Primary | ICD-10-CM

## 2025-04-04 PROBLEM — E87.6 HYPOKALEMIA: Status: ACTIVE | Noted: 2025-04-04

## 2025-04-04 LAB
ABSOLUTE EOSINOPHIL (OHS): 0.02 K/UL
ABSOLUTE MONOCYTE (OHS): 0.5 K/UL (ref 0.3–1)
ABSOLUTE NEUTROPHIL COUNT (OHS): 4.8 K/UL (ref 1.8–7.7)
ALBUMIN SERPL BCP-MCNC: 3.9 G/DL (ref 3.5–5.2)
ALP SERPL-CCNC: 83 UNIT/L (ref 40–150)
ALT SERPL W/O P-5'-P-CCNC: 12 UNIT/L (ref 10–44)
ANION GAP (OHS): 12 MMOL/L (ref 8–16)
AST SERPL-CCNC: 13 UNIT/L (ref 11–45)
BACTERIA #/AREA URNS AUTO: NORMAL /HPF
BASOPHILS # BLD AUTO: 0.02 K/UL
BASOPHILS NFR BLD AUTO: 0.3 %
BILIRUB SERPL-MCNC: 0.6 MG/DL (ref 0.1–1)
BILIRUB UR QL STRIP.AUTO: NEGATIVE
BNP SERPL-MCNC: 441 PG/ML (ref 0–99)
BUN SERPL-MCNC: 23 MG/DL (ref 8–23)
CALCIUM SERPL-MCNC: 9.4 MG/DL (ref 8.7–10.5)
CHLORIDE SERPL-SCNC: 110 MMOL/L (ref 95–110)
CLARITY UR: CLEAR
CO2 SERPL-SCNC: 18 MMOL/L (ref 23–29)
COLOR UR AUTO: YELLOW
CREAT SERPL-MCNC: 1.5 MG/DL (ref 0.5–1.4)
CTP QC/QA: YES
CTP QC/QA: YES
ERYTHROCYTE [DISTWIDTH] IN BLOOD BY AUTOMATED COUNT: 14.1 % (ref 11.5–14.5)
GFR SERPLBLD CREATININE-BSD FMLA CKD-EPI: 38 ML/MIN/1.73/M2
GLUCOSE SERPL-MCNC: 178 MG/DL (ref 70–110)
GLUCOSE UR QL STRIP: ABNORMAL
HCT VFR BLD AUTO: 35.9 % (ref 37–48.5)
HCV AB SERPL QL IA: NEGATIVE
HGB BLD-MCNC: 12.2 GM/DL (ref 12–16)
HGB UR QL STRIP: ABNORMAL
HIV 1+2 AB+HIV1 P24 AG SERPL QL IA: NEGATIVE
HYALINE CASTS UR QL AUTO: 0 /LPF (ref 0–1)
IMM GRANULOCYTES # BLD AUTO: 0.01 K/UL (ref 0–0.04)
IMM GRANULOCYTES NFR BLD AUTO: 0.2 % (ref 0–0.5)
KETONES UR QL STRIP: ABNORMAL
LEUKOCYTE ESTERASE UR QL STRIP: NEGATIVE
LIPASE SERPL-CCNC: 16 U/L (ref 4–60)
LYMPHOCYTES # BLD AUTO: 1.27 K/UL (ref 1–4.8)
MAGNESIUM SERPL-MCNC: 1.5 MG/DL (ref 1.6–2.6)
MCH RBC QN AUTO: 27.1 PG (ref 27–31)
MCHC RBC AUTO-ENTMCNC: 34 G/DL (ref 32–36)
MCV RBC AUTO: 80 FL (ref 82–98)
MICROSCOPIC COMMENT: NORMAL
NITRITE UR QL STRIP: NEGATIVE
NUCLEATED RBC (/100WBC) (OHS): 0 /100 WBC
OHS QRS DURATION: 100 MS
OHS QTC CALCULATION: 463 MS
PH UR STRIP: 6 [PH]
PLATELET # BLD AUTO: 233 K/UL (ref 150–450)
PMV BLD AUTO: 11.2 FL (ref 9.2–12.9)
POC MOLECULAR INFLUENZA A AGN: NEGATIVE
POC MOLECULAR INFLUENZA B AGN: NEGATIVE
POCT GLUCOSE: 219 MG/DL (ref 70–110)
POCT GLUCOSE: 236 MG/DL (ref 70–110)
POTASSIUM SERPL-SCNC: 3.2 MMOL/L (ref 3.5–5.1)
PROT SERPL-MCNC: 8.2 GM/DL (ref 6–8.4)
PROT UR QL STRIP: ABNORMAL
RBC # BLD AUTO: 4.51 M/UL (ref 4–5.4)
RBC #/AREA URNS AUTO: 2 /HPF (ref 0–4)
RELATIVE EOSINOPHIL (OHS): 0.3 %
RELATIVE LYMPHOCYTE (OHS): 19.2 % (ref 18–48)
RELATIVE MONOCYTE (OHS): 7.6 % (ref 4–15)
RELATIVE NEUTROPHIL (OHS): 72.4 % (ref 38–73)
SARS-COV-2 RDRP RESP QL NAA+PROBE: NEGATIVE
SODIUM SERPL-SCNC: 140 MMOL/L (ref 136–145)
SP GR UR STRIP: >=1.03
TROPONIN I SERPL DL<=0.01 NG/ML-MCNC: 0.03 NG/ML
UROBILINOGEN UR STRIP-ACNC: NEGATIVE EU/DL
WBC # BLD AUTO: 6.62 K/UL (ref 3.9–12.7)
WBC #/AREA URNS AUTO: 1 /HPF (ref 0–5)

## 2025-04-04 PROCEDURE — 81003 URINALYSIS AUTO W/O SCOPE: CPT | Performed by: EMERGENCY MEDICINE

## 2025-04-04 PROCEDURE — 85025 COMPLETE CBC W/AUTO DIFF WBC: CPT | Performed by: EMERGENCY MEDICINE

## 2025-04-04 PROCEDURE — 63600175 PHARM REV CODE 636 W HCPCS: Performed by: NURSE PRACTITIONER

## 2025-04-04 PROCEDURE — 80053 COMPREHEN METABOLIC PANEL: CPT | Performed by: EMERGENCY MEDICINE

## 2025-04-04 PROCEDURE — 86803 HEPATITIS C AB TEST: CPT | Performed by: EMERGENCY MEDICINE

## 2025-04-04 PROCEDURE — 87635 SARS-COV-2 COVID-19 AMP PRB: CPT | Performed by: EMERGENCY MEDICINE

## 2025-04-04 PROCEDURE — 99291 CRITICAL CARE FIRST HOUR: CPT

## 2025-04-04 PROCEDURE — 25500020 PHARM REV CODE 255: Performed by: HOSPITALIST

## 2025-04-04 PROCEDURE — 83690 ASSAY OF LIPASE: CPT | Performed by: EMERGENCY MEDICINE

## 2025-04-04 PROCEDURE — 93005 ELECTROCARDIOGRAM TRACING: CPT

## 2025-04-04 PROCEDURE — 63600175 PHARM REV CODE 636 W HCPCS: Performed by: EMERGENCY MEDICINE

## 2025-04-04 PROCEDURE — 99448 NTRPROF PH1/NTRNET/EHR 21-30: CPT | Mod: ,,, | Performed by: STUDENT IN AN ORGANIZED HEALTH CARE EDUCATION/TRAINING PROGRAM

## 2025-04-04 PROCEDURE — 84484 ASSAY OF TROPONIN QUANT: CPT | Performed by: EMERGENCY MEDICINE

## 2025-04-04 PROCEDURE — 87389 HIV-1 AG W/HIV-1&-2 AB AG IA: CPT | Performed by: EMERGENCY MEDICINE

## 2025-04-04 PROCEDURE — 96374 THER/PROPH/DIAG INJ IV PUSH: CPT

## 2025-04-04 PROCEDURE — 83880 ASSAY OF NATRIURETIC PEPTIDE: CPT | Performed by: EMERGENCY MEDICINE

## 2025-04-04 PROCEDURE — 12000002 HC ACUTE/MED SURGE SEMI-PRIVATE ROOM

## 2025-04-04 PROCEDURE — 93010 ELECTROCARDIOGRAM REPORT: CPT | Mod: ,,, | Performed by: INTERNAL MEDICINE

## 2025-04-04 PROCEDURE — 25000003 PHARM REV CODE 250: Performed by: NURSE PRACTITIONER

## 2025-04-04 PROCEDURE — 25000003 PHARM REV CODE 250: Performed by: EMERGENCY MEDICINE

## 2025-04-04 PROCEDURE — 83735 ASSAY OF MAGNESIUM: CPT | Performed by: EMERGENCY MEDICINE

## 2025-04-04 PROCEDURE — 83036 HEMOGLOBIN GLYCOSYLATED A1C: CPT | Performed by: NURSE PRACTITIONER

## 2025-04-04 RX ORDER — CLOPIDOGREL BISULFATE 300 MG/1
300 TABLET, FILM COATED ORAL ONCE
Status: COMPLETED | OUTPATIENT
Start: 2025-04-04 | End: 2025-04-04

## 2025-04-04 RX ORDER — NAPROXEN SODIUM 220 MG/1
81 TABLET, FILM COATED ORAL DAILY
Status: DISCONTINUED | OUTPATIENT
Start: 2025-04-05 | End: 2025-04-05

## 2025-04-04 RX ORDER — LABETALOL HYDROCHLORIDE 5 MG/ML
10 INJECTION, SOLUTION INTRAVENOUS
Status: DISCONTINUED | OUTPATIENT
Start: 2025-04-04 | End: 2025-04-09 | Stop reason: HOSPADM

## 2025-04-04 RX ORDER — GLUCAGON 1 MG
1 KIT INJECTION
Status: DISCONTINUED | OUTPATIENT
Start: 2025-04-04 | End: 2025-04-05

## 2025-04-04 RX ORDER — CLOPIDOGREL BISULFATE 75 MG/1
75 TABLET ORAL DAILY
Status: DISCONTINUED | OUTPATIENT
Start: 2025-04-05 | End: 2025-04-09 | Stop reason: HOSPADM

## 2025-04-04 RX ORDER — INSULIN ASPART 100 [IU]/ML
0-5 INJECTION, SOLUTION INTRAVENOUS; SUBCUTANEOUS
Status: DISCONTINUED | OUTPATIENT
Start: 2025-04-04 | End: 2025-04-05

## 2025-04-04 RX ORDER — ACETAMINOPHEN 500 MG
1000 TABLET ORAL
Status: COMPLETED | OUTPATIENT
Start: 2025-04-04 | End: 2025-04-04

## 2025-04-04 RX ORDER — HEPARIN SODIUM 5000 [USP'U]/ML
5000 INJECTION, SOLUTION INTRAVENOUS; SUBCUTANEOUS EVERY 8 HOURS
Status: DISCONTINUED | OUTPATIENT
Start: 2025-04-04 | End: 2025-04-09 | Stop reason: HOSPADM

## 2025-04-04 RX ORDER — ONDANSETRON HYDROCHLORIDE 2 MG/ML
4 INJECTION, SOLUTION INTRAVENOUS EVERY 12 HOURS PRN
Status: DISCONTINUED | OUTPATIENT
Start: 2025-04-04 | End: 2025-04-09 | Stop reason: HOSPADM

## 2025-04-04 RX ORDER — HYDRALAZINE HYDROCHLORIDE 20 MG/ML
10 INJECTION INTRAMUSCULAR; INTRAVENOUS
Status: COMPLETED | OUTPATIENT
Start: 2025-04-04 | End: 2025-04-04

## 2025-04-04 RX ORDER — ASPIRIN 81 MG/1
81 TABLET ORAL ONCE
Status: COMPLETED | OUTPATIENT
Start: 2025-04-04 | End: 2025-04-04

## 2025-04-04 RX ORDER — ENOXAPARIN SODIUM 100 MG/ML
40 INJECTION SUBCUTANEOUS EVERY 24 HOURS
Status: DISCONTINUED | OUTPATIENT
Start: 2025-04-05 | End: 2025-04-04

## 2025-04-04 RX ORDER — SODIUM CHLORIDE 0.9 % (FLUSH) 0.9 %
10 SYRINGE (ML) INJECTION
Status: DISCONTINUED | OUTPATIENT
Start: 2025-04-04 | End: 2025-04-09 | Stop reason: HOSPADM

## 2025-04-04 RX ORDER — EZETIMIBE 10 MG/1
10 TABLET ORAL DAILY
Status: DISCONTINUED | OUTPATIENT
Start: 2025-04-05 | End: 2025-04-09 | Stop reason: HOSPADM

## 2025-04-04 RX ORDER — IBUPROFEN 200 MG
24 TABLET ORAL
Status: DISCONTINUED | OUTPATIENT
Start: 2025-04-04 | End: 2025-04-09 | Stop reason: HOSPADM

## 2025-04-04 RX ORDER — IBUPROFEN 200 MG
16 TABLET ORAL
Status: DISCONTINUED | OUTPATIENT
Start: 2025-04-04 | End: 2025-04-09 | Stop reason: HOSPADM

## 2025-04-04 RX ORDER — POTASSIUM CHLORIDE 20 MEQ/1
40 TABLET, EXTENDED RELEASE ORAL ONCE
Status: COMPLETED | OUTPATIENT
Start: 2025-04-04 | End: 2025-04-04

## 2025-04-04 RX ORDER — LANOLIN ALCOHOL/MO/W.PET/CERES
400 CREAM (GRAM) TOPICAL EVERY 4 HOURS
Status: COMPLETED | OUTPATIENT
Start: 2025-04-04 | End: 2025-04-05

## 2025-04-04 RX ORDER — BISACODYL 10 MG/1
10 SUPPOSITORY RECTAL DAILY PRN
Status: DISCONTINUED | OUTPATIENT
Start: 2025-04-04 | End: 2025-04-09 | Stop reason: HOSPADM

## 2025-04-04 RX ADMIN — HYDRALAZINE HYDROCHLORIDE 10 MG: 20 INJECTION, SOLUTION INTRAMUSCULAR; INTRAVENOUS at 01:04

## 2025-04-04 RX ADMIN — CLOPIDOGREL BISULFATE 300 MG: 300 TABLET, FILM COATED ORAL at 07:04

## 2025-04-04 RX ADMIN — INSULIN ASPART 1 UNITS: 100 INJECTION, SOLUTION INTRAVENOUS; SUBCUTANEOUS at 09:04

## 2025-04-04 RX ADMIN — IOHEXOL 100 ML: 350 INJECTION, SOLUTION INTRAVENOUS at 07:04

## 2025-04-04 RX ADMIN — POTASSIUM CHLORIDE 40 MEQ: 1500 TABLET, EXTENDED RELEASE ORAL at 09:04

## 2025-04-04 RX ADMIN — ASPIRIN 81 MG: 81 TABLET, COATED ORAL at 07:04

## 2025-04-04 RX ADMIN — LABETALOL HYDROCHLORIDE 10 MG: 5 INJECTION INTRAVENOUS at 09:04

## 2025-04-04 RX ADMIN — Medication 400 MG: at 09:04

## 2025-04-04 RX ADMIN — ACETAMINOPHEN 1000 MG: 500 TABLET ORAL at 05:04

## 2025-04-04 RX ADMIN — HEPARIN SODIUM 5000 UNITS: 5000 INJECTION INTRAVENOUS; SUBCUTANEOUS at 09:04

## 2025-04-04 NOTE — TELEMEDICINE CONSULT
[facility]  Vascular Neurology  Comprehensive Stroke Center  TeleVascular Neurology Interprofessional Consult Note           Consult Information  Consults    Consulting Provider: ALIDA BURNETT   Patient Location:  [facility] EMERGENCY DEPARTMENT     Summary of patient's symptoms:  68F with a PMHx significant for prior stroke, DM, HTN, tobacco use who presents to the ED with generalized weakness and dizziness and gait imbalance x 2 days. LKN 4/2. MRI brain without contrast showed multifocal infarcts in the left cerebellum. NIHSS 1 per ED team.     Vitals:    04/04/25 1720   BP: (!) 186/79   Pulse: 70   Resp: 13   Temp:        Images personally reviewed and interpreted:  Study: MRI Brain  Study Interpretation: Multifocal acute infarcts within the left cerebellar hemisphere.      Assessment and plan:  # Left cerebellar stroke  She presented outside of the therapeutic window for IV thrombolysis. VAN- exam.     Imaging:   - Recommend an expedited CTA head/neck to evaluate for potential symptomatic stenosis/occlusive lesion that might significantly increase risk of recurrent or worsening signs/symptoms.   - Recommend STAT NCCTH for any exam changes.   - If above studies are abnormal, please load images to tel[employer] imaging system and contact us to review.    Antithrombotics for secondary stroke prevention: Load clopidogrel 300mg x 1 now. Then start dual-antiplatelet therapy with ASA 81mg and clopidogrel 75mg daily for 21 days. Then continue ASA 81mg indefinitely.     Statins for secondary stroke prevention and hyperlipidemia, if present: Recommend initiation or continuation of a high-intensity statin for goal LDL < 70mg/dL.    Aggressive risk factor modification: HTN, DM, HLD     Rehab efforts: The patient has been evaluated by a stroke team provider and the therapy needs have been fully considered based off the presenting complaints and exam findings. The following therapy evaluations are  needed: PT evaluate and treat, OT evaluate and treat, SLP evaluate and treat, PM&R evaluate for appropriate placement    Diagnostics recommended:   - CTA head/neck with contrast  - TTE without bubble study, monitor on telemetry while admitted  - Labs: hemoglobin A1c (goal <7%), lipid panel (LDL goal <70mg/dL), TSH  - Treat hyperglycemia to achieve a blood glucose level in a range of 140-180 mg/dL and to closely monitor to prevent hypoglycemia (Class IIa, Level C-LD).    VTE prophylaxis: Enoxaparin 40 mg SQ every 24 hours  Mechanical prophylaxis: Place SCDs    BP parameters: Infarct: No intervention, SBP <220    - Recommend permissive hypertension for the initial 24 to 48 hours of acute ischemic stroke unless the blood pressure is >220 mm Hg systolic or >120 mm Hg diastolic, or there is a concomitant specific medical condition that would benefit from blood pressure lowering (e.g., MI, aortic dissection).  - In the setting of possible hypovolemia, initiate IV fluids to maintain adequate hydration and euvolemia (although be cautious in patients at risk of fluid overload, such as those with renal or heart failure).  - The optimal time after the onset of acute ischemic stroke to restart or start long-term antihypertensive therapy has not been established and may depend on various patient and stroke characteristics (e.g., pressure dependent state), but in most patients it is reasonable to initiate long-term antihypertensive therapy after the initial 48 to 72 hours from stroke onset. Carefully monitor for any neurologic changes with blood pressure management.     Please contact us with any further questions or concerns or if patient has any acute neurological changes (new symptoms, worsening deficits).       I spent approximately 25 minutes on this encounter. More than half of that time was spent communicating with the consulting provider and coordinating patient care.       Angelica Cuevas MD        This encounter was conducted  as an interprofessional communication between providers at the [facility] and vascular neurologist. The interaction was completed over the phone or via secure messaging (electronic medical record - [facility] Secure Chat).     Once this note was completed, a written copy was sent back to the provider via fax or electronic medical record.

## 2025-04-04 NOTE — ED NOTES
Pt returned from MRI; replaced on continuous cardiac and pulse ox monitoring with blood pressure to cycle every 30 minutes. Pt remains hypertensive though an improvement in blood pressure is noted. NSR noted. Pt continues to complain of a headache that she is currently rating as an 8/10 and is also asking for something to eat. Dr Day made aware via secure chat. Awaiting additional orders.  Bed locked in lowest position; side rails up and locked x 2; call light, bedside table, and personal belongings within reach. Family member remains at bedside.  Pt informed of expected wait time for results; instructed to alert nurse for assistance and before attempting to get out of bed.  Pt verbalizes understanding.  Pt denies additional needs or complaints at this time; monitoring ongoing.

## 2025-04-04 NOTE — ED NOTES
Pt placed on continuous cardiac and pulse ox monitoring with blood pressure to cycle every 30 minutes. Pt is urgently hypertensive; sinus bradycardia with a HR in the 50's noted. Bed locked in lowest position; side rails up and locked x 2; call light, bedside table, and personal belongings within reach.  Pt instructed to alert nurse for assistance before attempting to get out of bed; verbalizes understanding. Monitoring ongoing.

## 2025-04-04 NOTE — ED PROVIDER NOTES
Encounter Date: 4/4/2025    SCRIBE #1 NOTE: I, Beverly Irizarry, am scribing for, and in the presence of,  Macario Day MD. I have scribed the following portions of the note - Other sections scribed: HPI, ROS.       History     Chief Complaint   Patient presents with    Fatigue     Generalized weakness, headache, N/V x 2 days. CBG  183     This is a 69 y.o. female with past medical history of hypertension, DM, CVA , and dementia who presents with complaint of generalized weakness and dizziness for the past two days. She has associated fever, nausea, tremors, diaphoresis, and headache. Per EMS, she had one episode of vomiting en route and was given Zofran. Her dizziness worsens when she stands up and she has difficulty catching her balance. She denies falls. She does not use a cane or walker. She lives alone. She denies recent sick contact. She denies cough, congestion, chest pain, shortness of breath, abdominal pain, or dysuria. Her bowel movements are normal. She is compliant with her daily medications including Metform, Asprin, and Valsartan. She has not eaten or taken her medications today. This is the extent of the patient's complaints at this time.        The history is provided by the patient. No  was used.     Review of patient's allergies indicates:   Allergen Reactions    Atorvastatin     Hydrodiuril [hydrochlorothiazide]     Lisinopril      Past Medical History:   Diagnosis Date    Colon polyp     CVA (cerebral vascular accident)     DM2 (diabetes mellitus, type 2)     Essential hypertension      Past Surgical History:   Procedure Laterality Date    ANKLE FRACTURE SURGERY Right     ANKLE FRACTURE SURGERY      COLONOSCOPY N/A 12/11/2020    Procedure: COLONOSCOPY;  Surgeon: Marshal Rogers MD;  Location: [facility] ([address] [place] [address]);  Service: Endoscopy;  Laterality: N/A;  11/18-plavix hold ok see te-covid pcw 12/8-tb    COLONOSCOPY W/ POLYPECTOMY      HYSTERECTOMY      no h/o  malignancy      Family History   Problem Relation Name Age of Onset    Alzheimer's disease Mother      Prostate cancer Father      Diabetes Father      Colon cancer Brother      Breast cancer Paternal Aunt      Heart disease Neg Hx      Stroke Neg Hx       Social History[1]  Review of Systems  Constitutional-no fever  HEENT-no congestion  Eyes-no redness  Respiratory-no shortness of breath  Cardio-no chest pain  GI-no abdominal pain  Endocrine-no cold intolerance  -no difficulty urinating  MSK-no myalgias, gait issues  Skin-no rashes  Allergy-no environmental allergy  Neurologic-, no headache, weakness, dizziness  Hematology-no swollen nodes  Behavioral-no confusion    Physical Exam     Initial Vitals [04/04/25 1316]   BP Pulse Resp Temp SpO2   (!) 184/85 (!) 57 18 97.7 °F (36.5 °C) 100 %      MAP       --         Physical Exam  Constitutional: uncomfortable appearing 68 yo woman in mild distress  Eyes: reproducible horizontal nystagmus most pronounced to the right.  ENT       Head: Normocephalic, atraumatic.       Nose: No congestion.       Mouth/Throat: Mucous membranes are moist.  Hematological/Lymphatic/Immunilogical: No cervical lymphadenopathy.  Cardiovascular: Normal rate, regular rhythm. Normal and symmetric distal pulses.  Respiratory: Normal respiratory effort. Breath sounds are normal.  Gastrointestinal: Soft, nontender.   Musculoskeletal: Normal range of motion in all extremities. No obvious deformities or swelling.  Neurologic: Alert, oriented.   5/5 strength all extremities  Ataxia present heel shin RLE  CN2-12 intact  NIH-1.  Skin: Skin is warm, dry. No rash noted.  Psychiatric: Mood and affect are normal.     ED Course   Critical Care    Date/Time: 4/4/2025 2:52 PM    Performed by: Macario Day MD  Authorized by: Macario Day MD  Direct patient critical care time: 25 minutes  Additional history critical care time: 5 minutes  Ordering / reviewing critical care time: 5 minutes  Documentation  critical care time: 5 minutes  Total critical care time (exclusive of procedural time) : 40 minutes  Critical care time was exclusive of separately billable procedures and treating other patients and teaching time.  Critical care was necessary to treat or prevent imminent or life-threatening deterioration of the following conditions: CNS failure or compromise.  Critical care was time spent personally by me on the following activities: blood draw for specimens, development of treatment plan with patient or surrogate, discussions with consultants, discussions with primary provider, interpretation of cardiac output measurements, evaluation of patient's response to treatment, examination of patient, obtaining history from patient or surrogate, ordering and performing treatments and interventions, ordering and review of laboratory studies, ordering and review of radiographic studies, pulse oximetry, re-evaluation of patient's condition and review of old charts.        Labs Reviewed   COMPREHENSIVE METABOLIC PANEL - Abnormal       Result Value    Sodium 140      Potassium 3.2 (*)     Chloride 110      CO2 18 (*)     Glucose 178 (*)     BUN 23      Creatinine 1.5 (*)     Calcium 9.4      Protein Total 8.2      Albumin 3.9      Bilirubin Total 0.6      ALP 83      AST 13      ALT 12      Anion Gap 12      eGFR 38 (*)    B-TYPE NATRIURETIC PEPTIDE - Abnormal     (*)    TROPONIN I - Abnormal    Troponin-I 0.032 (*)    MAGNESIUM - Abnormal    Magnesium  1.5 (*)    CBC WITH DIFFERENTIAL - Abnormal    WBC 6.62      RBC 4.51      HGB 12.2      HCT 35.9 (*)     MCV 80 (*)     MCH 27.1      MCHC 34.0      RDW 14.1      Platelet Count 233      MPV 11.2      Nucleated RBC 0      Neut % 72.4      Lymph % 19.2      Mono % 7.6      Eos % 0.3      Basophil % 0.3      Imm Grans % 0.2      Neut # 4.80      Lymph # 1.27      Mono # 0.50      Eos # 0.02      Baso # 0.02      Imm Grans # 0.01     POCT GLUCOSE - Abnormal    POCT Glucose  236 (*)    LIPASE - Normal    Lipase Level 16     HEPATITIS C ANTIBODY - Normal    Hep C Ab Interp Negative     HIV 1 / 2 ANTIBODY - Normal    HIV 1/2 Ag/Ab Negative     CBC W/ AUTO DIFFERENTIAL    Narrative:     The following orders were created for panel order CBC auto differential.  Procedure                               Abnormality         Status                     ---------                               -----------         ------                     CBC with Differential[[id number]]       Abnormal            Final result                 Please view results for these tests on the individual orders.   URINALYSIS, REFLEX TO URINE CULTURE   HEMOGLOBIN A1C   POCT INFLUENZA A/B MOLECULAR    POC Molecular Influenza A Ag Negative      POC Molecular Influenza B Ag Negative       Acceptable Yes     SARS-COV-2 RDRP GENE    POC Rapid COVID Negative       Acceptable Yes     POCT GLUCOSE MONITORING CONTINUOUS        ECG Results              EKG 12-lead (Final result)        Collection Time Result Time QRS Duration OHS QTC Calculation    04/04/25 13:34:33 04/04/25 15:06:40 100 463                     Final result by Interface, Lab In [facility] (04/04/25 15:06:43)                   Narrative:    Test Reason : I10,    Vent. Rate :  59 BPM     Atrial Rate :  59 BPM     P-R Int : 154 ms          QRS Dur : 100 ms      QT Int : 468 ms       P-R-T Axes :   6  30 -15 degrees    QTcB Int : 463 ms    Sinus bradycardia  Nonspecific ST and T wave abnormality  Abnormal ECG    Confirmed by ANKIT Guerrero ([id number]) on 4/4/2025 3:06:39 PM    Referred By: AAAREFERRAL SELF           Confirmed By: ANKIT Guerrero                      Wet Read by Macario Day MD (04/04/25 13:58:07, [facility] - Emergency Dept, Emergency Medicine)    My EKG interpretation sinus bradycardia 59 bpm, q wave v1,v2,v3, st segment depression v5, v6 when compared to previous EKG 3/27/2025 sinus ella has replaced sinus rhythm, otherwise  unchanged                                  Imaging Results              CTA Head and Neck (xpd) (Final result)  Result time 04/04/25 19:48:12   Procedure changed from CTA Head     Final result by Pedro Howard MD (04/04/25 19:48:12)                   Impression:      1. Small acute infarcts within the left cerebellar hemisphere, as discussed and better appreciated on earlier brain MRI.  2. CTA head and neck with no evidence of high-grade stenosis, large vessel occlusion, or dissection.  3. Questionable small 1-2 mm aneurysm involving the supraclinoid portion of the left distal ICA.      Electronically signed by: Pedro Howard MD  Date:    04/04/2025  Time:    19:48               Narrative:    EXAMINATION:  CTA HEAD AND NECK (XPD)    CLINICAL HISTORY:  Cerebral aneurysm, follow-up;    TECHNIQUE:  Non contrast low dose axial images were obtained through the head.  CT angiogram was performed from the level of the leroy to the top of the head following the IV administration of 100mL of Omnipaque 350.   Sagittal and coronal reconstructions and maximum intensity projection reconstructions were performed. Arterial stenosis percentages are based on NASCET measurement criteria.  Rapid AI screening was performed.    COMPARISON:  CT head and MRI brain from the same date.    FINDINGS:  As discussed on earlier MRI brain, small acute infarcts are seen within the left cerebellar hemisphere.  Small remote infarcts are seen within the right cerebellar hemisphere.  No evidence of additional acute/recent major vascular distribution cerebral infarction, intraparenchymal hemorrhage, or intra-axial space occupying lesion. The ventricular system is stable in size and configuration with no evidence of hydrocephalus. No effacement of the skull-base cisterns. No abnormal extra-axial fluid collections or blood products. Visualized paranasal sinuses and mastoid air cells are clear. The calvarium shows no significant abnormality.    CTA  Neck: The origins of the right brachiocephalic, left common carotid and left subclavian arteries from the arch are within normal limits.  The origins of the vertebral arteries are within normal limits.  The vertebral arteries are patent without evidence for focal stenosis or occlusion.   The bilateral common carotid arteries and internal carotid arteries are patent without evidence for focal stenosis or occlusion.  Plaquing is seen at the right carotid bifurcation without significant stenosis.  No evidence of carotid or vertebral artery dissection.    Anterior circulation: Questionable small 1-2 mm aneurysm is seen involving the supraclinoid portion of the left distal ICA.  The bilateral distal cervical, petrous, cavernous, and supraclinoid ICAs are patent with otherwise no significant stenosis or additional aneurysm.  The anterior and middle cerebral arteries are patent without significant stenosis, occlusion, or aneurysm.  Right A1 segment is hypoplastic.  Flow to the right anterior cerebral artery appears supplied via the anterior communicating artery.    Posterior circulation: Distal vertebral arteries, basilar artery and posterior cerebral arteries are patent without significant focal stenosis, occlusion, or aneurysm.    Airways: Unremarkable.    Glands/Nodes: The parotid, submandibular, and thyroid glands are unremarkable.    Spine: No acute cervical spine abnormalities are identified.    Lungs: Visualized lung apices are clear.                                        MRI Brain Without Contrast (Final result)  Result time 04/04/25 17:21:51      Final result by Pedro Howard MD (04/04/25 17:21:51)                   Impression:      Multifocal acute infarcts within the left cerebellar hemisphere.    This report was flagged in Epic as abnormal.      Electronically signed by: Pedro Howard MD  Date:    04/04/2025  Time:    17:21               Narrative:    EXAMINATION:  MRI BRAIN WITHOUT CONTRAST    CLINICAL  HISTORY:  Stroke, follow up;    TECHNIQUE:  Multiplanar multisequence MR imaging of the brain was performed without contrast.    COMPARISON:  CT head from the same date.  MRI brain November 2022.    FINDINGS:  The brain is normal in contour and morphology.  Multifocal acute infarcts are seen within the left cerebellar hemisphere.  Stable small remote infarcts are seen in the right cerebellar hemisphere.  There is minimal chronic microvascular ischemic disease.  Ventricles are stable in size and configuration without evidence for hydrocephalus.  No intracranial hemorrhage or extraaxial fluid collection.  No mass or mass effect.  Flow voids are normal in appearance.    Visualized paranasal sinuses and mastoid air cells are clear.  Orbits appear normal.                                       CT Head Without Contrast (Final result)  Result time 04/04/25 14:20:09      Final result by Tc Kelly MD (04/04/25 14:20:09)                   Impression:      Age indeterminate but potentially recent small left cerebellar infarct not visualized on prior studies.  No intracranial hemorrhage or mass effect.  MR imaging to be considered, particularly in light of the history, if clinically warranted.      Electronically signed by: Tc Kelly  Date:    04/04/2025  Time:    14:20               Narrative:    EXAMINATION:  CT HEAD WITHOUT CONTRAST    CLINICAL HISTORY:  Dizziness, persistent/recurrent, cardiac or vascular cause suspected; fatigue, nausea    TECHNIQUE:  Low dose axial images were obtained through the head.  Coronal and sagittal reformations were also performed. Contrast was not administered.    COMPARISON:  08/27/2023    FINDINGS:  There is an element of volume loss again identified.    There is an age indeterminate but potentially recent/subacute left inferior cerebellar infarct that is not visualized on the prior studies.    No hydrocephalus, mass effect, or intracranial hemorrhage.    Small chronic right  cerebellar infarcts and chronic left occipital infarct again identified.    The calvarium is intact.  The visualized sinuses and mastoid air cells are clear.                                       X-Ray Chest AP Portable (Final result)  Result time 04/04/25 13:49:51      Final result by Fabrizio Rodas MD (04/04/25 13:49:51)                   Impression:      As above      Electronically signed by: Fabrizio Rodas  Date:    04/04/2025  Time:    13:49               Narrative:    EXAMINATION:  XR CHEST AP PORTABLE    CLINICAL HISTORY:  Cough, unspecified    TECHNIQUE:  Single frontal view of the chest was performed.    COMPARISON:  August 7, 2023    FINDINGS:  Stable enlargement of cardiopericardial silhouette as well as some right paratracheal soft tissue prominence felt related to tortuous arch vessels.  Pulmonary vasculature normal.  No focal infiltrates, pleural effusions, or pneumothorax.  No acute bony findings.  EKG leads superimposed chest and abdomen.                                       Medications   sodium chloride 0.9% flush 10 mL (has no administration in time range)   sodium chloride 0.9% bolus 500 mL 500 mL (has no administration in time range)   labetaloL injection 10 mg (10 mg Intravenous Given 4/4/25 2135)   bisacodyL suppository 10 mg (has no administration in time range)   aspirin chewable tablet 81 mg (has no administration in time range)   clopidogreL tablet 75 mg (has no administration in time range)   ondansetron injection 4 mg (has no administration in time range)   ezetimibe tablet 10 mg (has no administration in time range)   glucose chewable tablet 16 g (has no administration in time range)   glucose chewable tablet 24 g (has no administration in time range)   dextrose 50% injection 12.5 g (has no administration in time range)   dextrose 50% injection 25 g (has no administration in time range)   glucagon (human recombinant) injection 1 mg (has no administration in time range)   insulin  aspart U-100 pen 0-5 Units (1 Units Subcutaneous Given 4/4/25 2112)   magnesium oxide tablet 400 mg (400 mg Oral Given 4/4/25 2108)   heparin (porcine) injection 5,000 Units (5,000 Units Subcutaneous Given 4/4/25 2110)   hydrALAZINE injection 10 mg (10 mg Intravenous Given 4/4/25 1347)   acetaminophen tablet 1,000 mg (1,000 mg Oral Given 4/4/25 1740)   clopidogreL tablet 300 mg (300 mg Oral Given 4/4/25 1900)   aspirin EC tablet 81 mg (81 mg Oral Given 4/4/25 1900)   iohexoL (OMNIPAQUE 350) injection 100 mL (100 mLs Intravenous Given 4/4/25 1933)   potassium chloride SA CR tablet 40 mEq (40 mEq Oral Given 4/4/25 2108)     Medical Decision Making  Ddx- htn urgency, hypertensive emergency, CVA, IPH, neoplasm,     Exam concerning for posterior cva  Marked HTN  IV hydral hiven  Imaging concerning for CVA in cerebellum  Labs otherwise stable save trop up  Mri obtained, permissive htn  MRI consistent with acute infarct.  Given time course (>24 hr) deferred stroke activation   Plan for admission  Discussed with Dr. Cuevas neuro oncall  Dr. Gaviria on call HM  Will plan for admission and reasessment as needed.     Problems Addressed:  Cough: acute illness or injury  CVA (cerebral vascular accident): acute illness or injury  HTN (hypertension): chronic illness or injury    Amount and/or Complexity of Data Reviewed  Independent Historian: caregiver     Details: Sister at bedside notes difficulty with walking for days   External Data Reviewed: labs, radiology, ECG and notes.     Details: Recent eval for htn   Labs: ordered. Decision-making details documented in ED Course.  Radiology: ordered and independent interpretation performed. Decision-making details documented in ED Course.  ECG/medicine tests: ordered and independent interpretation performed. Decision-making details documented in ED Course.    Risk  OTC drugs.  Prescription drug management.  Parenteral controlled substances.  Drug therapy requiring intensive monitoring  for toxicity.  Decision regarding hospitalization.  Diagnosis or treatment significantly limited by social determinants of health.            Scribe Attestation:   Scribe #1: I performed the above scribed service and the documentation accurately describes the services I performed. I attest to the accuracy of the note.              Physician Attestation for Scribe: I, macario day, reviewed documentation as scribed in my presence, which is both accurate and complete.                   Clinical Impression:  Final diagnoses:  [R05.9] Cough  [I10] HTN (hypertension)  [I63.9] CVA (cerebral vascular accident)  [R42] Dizziness (Primary)          ED Disposition Condition    Admit Stable                    [1]   Social History  Tobacco Use    Smoking status: Every Day     Current packs/day: 0.00     Average packs/day: 0.3 packs/day for 45.0 years (11.3 ttl pk-yrs)     Types: Cigarettes     Start date: 7/1/1976     Last attempt to quit: 7/1/2021     Years since quitting: 3.7    Smokeless tobacco: Never   Substance Use Topics    Alcohol use: Yes     Alcohol/week: 3.0 standard drinks of alcohol     Types: 3 Cans of beer per week     Comment: 1-2x's    Drug use: Not Currently        Macario Day MD  04/04/25 0538

## 2025-04-04 NOTE — ED NOTES
LOC: The patient is awake, alert, and oriented to self, place, time, and situation. Pt is calm and cooperative. Affect is appropriate.  Speech is appropriate and clear.     APPEARANCE: Patient resting on stretcher in no acute distress.  Patient is clean and well groomed.    SKIN: The skin is warm and dry; color consistent with ethnicity.  Patient has normal skin turgor and moist mucus membranes.  Skin intact; no breakdown or bruising noted.     MUSCULOSKELETAL: Patient moving upper and lower extremities without difficulty; denies pain in the extremities or back.  Pt reports generalized weakness and fatigue.     RESPIRATORY: Airway is open and patent. Respirations spontaneous, even, easy, and non-labored.  Patient has a normal effort and rate.  No accessory muscle use noted. Denies cough.     CARDIAC: Pt is emergently hypertensive. Sinus bradycardia with a HR in the 50's noted.  No peripheral edema noted. No complaints of chest pain.     ABDOMEN: Soft and non tender to palpation.  No distention noted. Pt denies abdominal pain. Pt reports a recent episode of nausea and vomiting; denies diarrhea or constipation.    NEUROLOGIC: Eyes open spontaneously.  Behavior appropriate to situation.  Follows commands; facial expression symmetrical.  Purposeful motor response noted; normal sensation in all extremities. Pt reports a frontal headache with lightheadedness and dizziness. Pt also reports loss of balance with blurred vision; denies unilateral weakness.

## 2025-04-04 NOTE — HPI
Shabnam Noble is a 69 year old female with a PMHx of hypertension, diabetes mellitus, CKD and dementia who presents with generalized weakness and dizziness for the past 2 days.  She reports associated subjective fever, nausea, tremors, diaphoresis and headache.  Per EMS, patient had 1 episode of vomiting PTA that was relieved with IV Zofran.  ED note reports patient has dizziness worsens when she stands up and she has difficulty maintaining her balance.  No recent falls reported and she does not use a cane or walker.  Patient resides home alone and is usually able to manage.  She denies chest pain and has had no further episodes of vomiting since arriving to the ED. patient lethargic on exam, arouses to verbal stimuli.  Information obtained per patient and sister at bedside.    Upon arrival to the ED, patient found to be hypertensive with systolic 220s.  Creatinine 1.5 (near baseline), , potassium 3.2 and magnesium 1.5.  COVID and flu negative.  CBC unremarkable.  Chest x-ray with stable cardiomegaly.  MRI revealed multifocal acute infarcts within the left cerebellar hemisphere and CTA head and neck showed small acute infarcts in the left hemisphere and small remote infarcts in the right cerebellar hemisphere.  No focal stenosis, occlusion or dissection.  Small 1-2 mm aneurysm involving the supraclinoid portion of the left distal ICA.  Tele neurology consulted in the ED recommendations provided.  Patient referred to Hospital Medicine and will be admitted for further evaluation and management.

## 2025-04-04 NOTE — ED TRIAGE NOTES
Pt presents to the ER with complaints of generalized fatigue with weakness, dizziness, loss of balance, and headaches for the past week and a half. Pt also reporting an episode of vomiting this morning. Pt was seen in this ER last week for similar complaints. Pt was found to be urgently hypertensive after calling EMS; has not had her BP meds today.

## 2025-04-05 PROBLEM — E83.42 HYPOMAGNESEMIA: Status: ACTIVE | Noted: 2025-04-05

## 2025-04-05 LAB
ABSOLUTE EOSINOPHIL (OHS): 0.06 K/UL
ABSOLUTE MONOCYTE (OHS): 0.49 K/UL (ref 0.3–1)
ABSOLUTE NEUTROPHIL COUNT (OHS): 3.41 K/UL (ref 1.8–7.7)
ALBUMIN SERPL BCP-MCNC: 3.4 G/DL (ref 3.5–5.2)
ALP SERPL-CCNC: 71 UNIT/L (ref 40–150)
ALT SERPL W/O P-5'-P-CCNC: 12 UNIT/L (ref 10–44)
ANION GAP (OHS): 6 MMOL/L (ref 8–16)
AORTIC ROOT ANNULUS: 2.3 CM
APTT PPP: 24.8 SECONDS (ref 21–32)
ASCENDING AORTA: 2.22 CM
AST SERPL-CCNC: 11 UNIT/L (ref 11–45)
AV INDEX (PROSTH): 0.62
AV MEAN GRADIENT: 10 MMHG
AV PEAK GRADIENT: 19 MMHG
AV REGURGITATION PRESSURE HALF TIME: 417 MS
AV VALVE AREA BY VELOCITY RATIO: 1.9 CM²
AV VALVE AREA: 1.9 CM²
AV VELOCITY RATIO: 0.59
BASOPHILS # BLD AUTO: 0.01 K/UL
BASOPHILS NFR BLD AUTO: 0.2 %
BILIRUB SERPL-MCNC: 0.3 MG/DL (ref 0.1–1)
BSA FOR ECHO PROCEDURE: 1.97 M2
BUN SERPL-MCNC: 21 MG/DL (ref 8–23)
CALCIUM SERPL-MCNC: 8.5 MG/DL (ref 8.7–10.5)
CHLORIDE SERPL-SCNC: 113 MMOL/L (ref 95–110)
CHOLEST SERPL-MCNC: 179 MG/DL (ref 120–199)
CHOLEST/HDLC SERPL: 5.6 {RATIO} (ref 2–5)
CO2 SERPL-SCNC: 21 MMOL/L (ref 23–29)
CREAT SERPL-MCNC: 1.5 MG/DL (ref 0.5–1.4)
CV ECHO LV RWT: 0.67 CM
DOP CALC AO PEAK VEL: 2.2 M/S
DOP CALC AO VTI: 46.9 CM
DOP CALC LVOT AREA: 3.1 CM2
DOP CALC LVOT DIAMETER: 2 CM
DOP CALC LVOT PEAK VEL: 1.3 M/S
DOP CALC LVOT STROKE VOLUME: 91.1 CM3
DOP CALCLVOT PEAK VEL VTI: 29 CM
E WAVE DECELERATION TIME: 296 MSEC
E/A RATIO: 1.02
E/E' RATIO: 16 M/S
EAG (OHS): 183 MG/DL (ref 68–131)
ECHO LV POSTERIOR WALL: 1.3 CM (ref 0.6–1.1)
ERYTHROCYTE [DISTWIDTH] IN BLOOD BY AUTOMATED COUNT: 14.5 % (ref 11.5–14.5)
FRACTIONAL SHORTENING: 41 % (ref 28–44)
GFR SERPLBLD CREATININE-BSD FMLA CKD-EPI: 38 ML/MIN/1.73/M2
GLUCOSE SERPL-MCNC: 184 MG/DL (ref 70–110)
HBA1C MFR BLD: 8 % (ref 4–5.6)
HCT VFR BLD AUTO: 33.8 % (ref 37–48.5)
HDLC SERPL-MCNC: 32 MG/DL (ref 40–75)
HDLC SERPL: 17.9 % (ref 20–50)
HGB BLD-MCNC: 11.1 GM/DL (ref 12–16)
IMM GRANULOCYTES # BLD AUTO: 0 K/UL (ref 0–0.04)
IMM GRANULOCYTES NFR BLD AUTO: 0 % (ref 0–0.5)
INR PPP: 1 (ref 0.8–1.2)
INTERVENTRICULAR SEPTUM: 1.4 CM (ref 0.6–1.1)
IVC DIAMETER: 1.79 CM
LA MAJOR: 4.9 CM
LA MINOR: 5.1 CM
LA WIDTH: 4.8 CM
LAAS-AP2: 22 CM2
LAAS-AP4: 18 CM2
LDLC SERPL CALC-MCNC: 123 MG/DL (ref 63–159)
LEFT ATRIUM AREA SYSTOLIC (APICAL 2 CHAMBER): 22.8 CM2
LEFT ATRIUM AREA SYSTOLIC (APICAL 4 CHAMBER): 18.4 CM2
LEFT ATRIUM SIZE: 3.5 CM
LEFT ATRIUM VOLUME INDEX MOD: 36 ML/M2
LEFT ATRIUM VOLUME INDEX: 37 ML/M2
LEFT ATRIUM VOLUME MOD: 70 ML
LEFT ATRIUM VOLUME: 71 CM3
LEFT INTERNAL DIMENSION IN SYSTOLE: 2.3 CM (ref 2.1–4)
LEFT VENTRICLE DIASTOLIC VOLUME INDEX: 33.85 ML/M2
LEFT VENTRICLE DIASTOLIC VOLUME: 65 ML
LEFT VENTRICLE END SYSTOLIC VOLUME APICAL 2 CHAMBER: 81.47 ML
LEFT VENTRICLE END SYSTOLIC VOLUME APICAL 4 CHAMBER: 55.11 ML
LEFT VENTRICLE MASS INDEX: 99.2 G/M2
LEFT VENTRICLE SYSTOLIC VOLUME INDEX: 9.4 ML/M2
LEFT VENTRICLE SYSTOLIC VOLUME: 18 ML
LEFT VENTRICULAR INTERNAL DIMENSION IN DIASTOLE: 3.9 CM (ref 3.5–6)
LEFT VENTRICULAR MASS: 190.4 G
LV LATERAL E/E' RATIO: 13.9 M/S
LV SEPTAL E/E' RATIO: 18.5 M/S
LVED V (TEICH): 65.44 ML
LVES V (TEICH): 17.59 ML
LVOT MG: 3.89 MMHG
LVOT MV: 0.9 CM/S
LYMPHOCYTES # BLD AUTO: 1.53 K/UL (ref 1–4.8)
MAGNESIUM SERPL-MCNC: 1.7 MG/DL (ref 1.6–2.6)
MAGNESIUM SERPL-MCNC: 1.8 MG/DL (ref 1.6–2.6)
MCH RBC QN AUTO: 26.9 PG (ref 27–31)
MCHC RBC AUTO-ENTMCNC: 32.8 G/DL (ref 32–36)
MCV RBC AUTO: 82 FL (ref 82–98)
MV PEAK A VEL: 1.09 M/S
MV PEAK E VEL: 1.11 M/S
MV STENOSIS PRESSURE HALF TIME: 85.78 MS
MV VALVE AREA P 1/2 METHOD: 2.56 CM2
NONHDLC SERPL-MCNC: 147 MG/DL
NUCLEATED RBC (/100WBC) (OHS): 0 /100 WBC
OHS CV RV/LV RATIO: 0.44 CM
PHOSPHATE SERPL-MCNC: 4.5 MG/DL (ref 2.7–4.5)
PISA AR MAX VEL: 3.98 M/S
PISA MRMAX VEL: 3.22 M/S
PISA TR MAX VEL: 2.5 M/S
PLATELET # BLD AUTO: 228 K/UL (ref 150–450)
PMV BLD AUTO: 11.5 FL (ref 9.2–12.9)
POCT GLUCOSE: 207 MG/DL (ref 70–110)
POCT GLUCOSE: 222 MG/DL (ref 70–110)
POTASSIUM SERPL-SCNC: 3.8 MMOL/L (ref 3.5–5.1)
PROT SERPL-MCNC: 7.1 GM/DL (ref 6–8.4)
PROTHROMBIN TIME: 11.3 SECONDS (ref 9–12.5)
PV MV: 1.1 M/S
PV PEAK GRADIENT: 9 MMHG
PV PEAK VELOCITY: 1.46 M/S
RA MAJOR: 4.07 CM
RA PRESSURE ESTIMATED: 3 MMHG
RA WIDTH: 1.5 CM
RBC # BLD AUTO: 4.13 M/UL (ref 4–5.4)
RELATIVE EOSINOPHIL (OHS): 1.1 %
RELATIVE LYMPHOCYTE (OHS): 27.8 % (ref 18–48)
RELATIVE MONOCYTE (OHS): 8.9 % (ref 4–15)
RELATIVE NEUTROPHIL (OHS): 62 % (ref 38–73)
RIGHT VENTRICLE DIASTOLIC BASEL DIMENSION: 1.7 CM
RIGHT VENTRICULAR END-DIASTOLIC DIMENSION: 1.71 CM
RV TB RVSP: 6 MMHG
SINUS: 2.1 CM
SODIUM SERPL-SCNC: 140 MMOL/L (ref 136–145)
STJ: 2.23 CM
TDI LATERAL: 0.08 M/S
TDI SEPTAL: 0.06 M/S
TDI: 0.07 M/S
TR MAX PG: 25 MMHG
TRICUSPID ANNULAR PLANE SYSTOLIC EXCURSION: 2.1 CM
TRIGL SERPL-MCNC: 120 MG/DL (ref 30–150)
TROPONIN I SERPL DL<=0.01 NG/ML-MCNC: 0.04 NG/ML
TSH SERPL-ACNC: 1.16 UIU/ML (ref 0.4–4)
TV REST PULMONARY ARTERY PRESSURE: 28 MMHG
WBC # BLD AUTO: 5.5 K/UL (ref 3.9–12.7)
Z-SCORE OF LEFT VENTRICULAR DIMENSION IN END DIASTOLE: -3.29
Z-SCORE OF LEFT VENTRICULAR DIMENSION IN END SYSTOLE: -2.94

## 2025-04-05 PROCEDURE — 63600175 PHARM REV CODE 636 W HCPCS: Performed by: NURSE PRACTITIONER

## 2025-04-05 PROCEDURE — 25000003 PHARM REV CODE 250: Performed by: HOSPITALIST

## 2025-04-05 PROCEDURE — 25000003 PHARM REV CODE 250: Performed by: NURSE PRACTITIONER

## 2025-04-05 PROCEDURE — 21400001 HC TELEMETRY ROOM

## 2025-04-05 PROCEDURE — 84484 ASSAY OF TROPONIN QUANT: CPT | Performed by: NURSE PRACTITIONER

## 2025-04-05 PROCEDURE — 92610 EVALUATE SWALLOWING FUNCTION: CPT

## 2025-04-05 PROCEDURE — 84100 ASSAY OF PHOSPHORUS: CPT | Performed by: NURSE PRACTITIONER

## 2025-04-05 PROCEDURE — 97166 OT EVAL MOD COMPLEX 45 MIN: CPT

## 2025-04-05 PROCEDURE — 97535 SELF CARE MNGMENT TRAINING: CPT

## 2025-04-05 PROCEDURE — 84443 ASSAY THYROID STIM HORMONE: CPT | Performed by: NURSE PRACTITIONER

## 2025-04-05 PROCEDURE — 85610 PROTHROMBIN TIME: CPT | Performed by: NURSE PRACTITIONER

## 2025-04-05 PROCEDURE — 82040 ASSAY OF SERUM ALBUMIN: CPT | Performed by: NURSE PRACTITIONER

## 2025-04-05 PROCEDURE — 83735 ASSAY OF MAGNESIUM: CPT | Performed by: NURSE PRACTITIONER

## 2025-04-05 PROCEDURE — 63600175 PHARM REV CODE 636 W HCPCS: Performed by: HOSPITALIST

## 2025-04-05 PROCEDURE — 85730 THROMBOPLASTIN TIME PARTIAL: CPT | Performed by: NURSE PRACTITIONER

## 2025-04-05 PROCEDURE — 85025 COMPLETE CBC W/AUTO DIFF WBC: CPT | Performed by: NURSE PRACTITIONER

## 2025-04-05 PROCEDURE — 80061 LIPID PANEL: CPT | Performed by: NURSE PRACTITIONER

## 2025-04-05 RX ORDER — INSULIN ASPART 100 [IU]/ML
0-10 INJECTION, SOLUTION INTRAVENOUS; SUBCUTANEOUS
Status: DISCONTINUED | OUTPATIENT
Start: 2025-04-05 | End: 2025-04-09 | Stop reason: HOSPADM

## 2025-04-05 RX ORDER — ASPIRIN 325 MG
325 TABLET ORAL DAILY
Status: DISCONTINUED | OUTPATIENT
Start: 2025-04-05 | End: 2025-04-09 | Stop reason: HOSPADM

## 2025-04-05 RX ORDER — INSULIN GLARGINE 100 [IU]/ML
10 INJECTION, SOLUTION SUBCUTANEOUS DAILY
Status: DISCONTINUED | OUTPATIENT
Start: 2025-04-05 | End: 2025-04-07

## 2025-04-05 RX ORDER — VALSARTAN 40 MG/1
40 TABLET ORAL DAILY
Status: DISCONTINUED | OUTPATIENT
Start: 2025-04-05 | End: 2025-04-08

## 2025-04-05 RX ADMIN — Medication 400 MG: at 06:04

## 2025-04-05 RX ADMIN — ASPIRIN 325 MG ORAL TABLET 325 MG: 325 PILL ORAL at 11:04

## 2025-04-05 RX ADMIN — INSULIN ASPART 1 UNITS: 100 INJECTION, SOLUTION INTRAVENOUS; SUBCUTANEOUS at 08:04

## 2025-04-05 RX ADMIN — INSULIN ASPART 2 UNITS: 100 INJECTION, SOLUTION INTRAVENOUS; SUBCUTANEOUS at 06:04

## 2025-04-05 RX ADMIN — CLOPIDOGREL 75 MG: 75 TABLET ORAL at 08:04

## 2025-04-05 RX ADMIN — HEPARIN SODIUM 5000 UNITS: 5000 INJECTION INTRAVENOUS; SUBCUTANEOUS at 09:04

## 2025-04-05 RX ADMIN — HEPARIN SODIUM 5000 UNITS: 5000 INJECTION INTRAVENOUS; SUBCUTANEOUS at 06:04

## 2025-04-05 RX ADMIN — INSULIN GLARGINE 10 UNITS: 100 INJECTION, SOLUTION SUBCUTANEOUS at 02:04

## 2025-04-05 RX ADMIN — Medication 400 MG: at 02:04

## 2025-04-05 RX ADMIN — INSULIN ASPART 2 UNITS: 100 INJECTION, SOLUTION INTRAVENOUS; SUBCUTANEOUS at 08:04

## 2025-04-05 RX ADMIN — VALSARTAN 40 MG: 40 TABLET, FILM COATED ORAL at 02:04

## 2025-04-05 RX ADMIN — ASPIRIN 81 MG CHEWABLE TABLET 81 MG: 81 TABLET CHEWABLE at 08:04

## 2025-04-05 RX ADMIN — HEPARIN SODIUM 5000 UNITS: 5000 INJECTION INTRAVENOUS; SUBCUTANEOUS at 02:04

## 2025-04-05 RX ADMIN — EZETIMIBE 10 MG: 10 TABLET ORAL at 11:04

## 2025-04-05 NOTE — SUBJECTIVE & OBJECTIVE
HPI:  69 y.o. female with a PMHx of hypertension, diabetes mellitus, CKD and dementia who presents with generalized weakness and dizziness for the past 2 days.  Acute tele stroke team was reached out after MRI showed left cerebellar stroke.  No intravenous thrombolysis was recommended since patient was out of the treatment window.  Loading with dual antiplatelet was recommended.  This morning, patient had new left facial droop and right tongue deviation noted by ER nurse and on-call vascular neurology team was reached out.  On my conversation with patient, patient reports that she has had previous stroke.  Further chart review clarifies that patient has been seen in 2017 at [place] for stroke (location:  Left occipital lobe on MRI) and she was treated with antiplatelet for secondary stroke prevention.  She is unclear if she is taking her aspirin.  At that time in 2017, CT angio head demonstrated left P1 occlusion, right M2 stenosis and basilar artery stenosis (imaging are not available to review but reports are under care everywhere).      Images personally reviewed and interpreted:  Study: Head CT, CTA Head & Neck, and MRI Brain  Study Interpretation:   Imaging:   Imaging: Images were reviewed remotely as they were acquired.    CTH:  Possible small left cerebellar infarct.    CTA H/N (personal review):  Left P1 occlusion (chronic), right intracranial vertebral artery (V4 segment) stenosis, right inferior M2 origin stenosis, luminal irregularity of the basilar artery, all of the above appears to be chronic    MRI brain without contrast:  Multifocal acute infarcts within the left cerebellar hemisphere without mass effect         Additional studies reviewed:   Study: Cardiac_Neuro: 2D echo  Study Interpretation:  Ordered but not performed    Laboratory studies reviewed:  BMP:   Lab Results   Component Value Date    GLUCOSE 184 (H) 04/05/2025     04/05/2025     02/04/2025    K 3.8 04/05/2025    K 3.8  02/04/2025     (H) 04/05/2025     02/04/2025    CO2 21 (L) 04/05/2025    CO2 23 02/04/2025    BUN 21 04/05/2025    CREATININE 1.5 (H) 04/05/2025    CALCIUM 8.5 (L) 04/05/2025    CALCIUM 9.3 02/04/2025     CBC:   Lab Results   Component Value Date    WBC 5.50 04/05/2025    RBC 4.13 04/05/2025    RBC 4.21 02/04/2025    HGB 11.1 (L) 04/05/2025    HGB 11.4 (L) 02/04/2025    HCT 33.8 (L) 04/05/2025    HCT 35.0 (L) 02/04/2025    HCT 37 06/10/2021     04/05/2025     02/04/2025    MCV 82 04/05/2025    MCV 83 02/04/2025    MCH 26.9 (L) 04/05/2025    MCHC 32.8 04/05/2025    MCHC 32.6 02/04/2025     Lipid Panel:   Lab Results   Component Value Date    CHOL 223 (H) 02/04/2025    LDLCALC 160.0 (H) 02/04/2025    HDL 40 02/04/2025    TRIG 115 02/04/2025     Coagulation:   Lab Results   Component Value Date    INR 1.0 04/05/2025    APTT 24.8 04/05/2025    APTT 27.6 11/15/2022     Hgb A1C:   Lab Results   Component Value Date    HGBA1C 8.0 (H) 04/04/2025    HGBA1C 9.0 (H) 02/04/2025     TSH:   Lab Results   Component Value Date    TSH 1.157 04/05/2025    TSH 1.168 02/04/2025       Documentation personally reviewed:  Notes: Notes: H&P and other:  Extensive chart review from [facility] from 2017     Post charge discharge plan:  Clinic follow up: Vascular Neurology    Visit Type: in person or virtual  Timeframe: 2 weeks

## 2025-04-05 NOTE — ASSESSMENT & PLAN NOTE
- Most recent A1c 9.0 performed 02/04/2025.  Home regimen is metformin and Jardiance.  Sister reported Jardiance was too expensive and patient has not started this medication.  Home regimen on hold.  - Glucose level reviewed, currently above target  - Start Lantus 10 units subQ daily today, and increase sliding scale insulin to moderate dose range

## 2025-04-05 NOTE — PT/OT/SLP PROGRESS
Physical Therapy      Patient Name:  Shabnam Noble   MRN:  [id number]    Patient not seen today secondary to Testing/imaging (xray/CT/MRI). Will follow-up tomorrow.

## 2025-04-05 NOTE — PT/OT/SLP EVAL
Speech Language Pathology Evaluation  Bedside Swallow    Patient Name:  Shabnam Noble   MRN:  [id number]  Admitting Diagnosis: Cerebellar stroke    Recommendations:                 General Recommendations:    SLP will follow 3-4x/week to complete ongoing swallow assessment/treatment, provide pt/family education, and assessment of cognitive-linguistic function to determine additional therapeutic needs.   GI consult   Diet recommendations:  Regular Diet - IDDSI Level 7, Thin liquids - IDDSI Level 0   Aspiration Precautions:  Assistance with meal set-up due to visual impairments   Small bites/sips presented 1 at a time  Present utensils to the right to assist with labial seal  Alternating bites/sips  Frequent oral care  HOB to 90 degrees  Meds whole 1 at a time  General Precautions: Standard, aspiration, fall, vision impaired  Communication strategies:  go to room if call light pushed    Assessment:     Shabnam Noble is a 69 y.o. female with an SLP diagnosis of cerebellar stroke. SLP consulted for bedside swallow evaluation. Patient demonstrates signs of oropharyngeal dysphagia characterized by reduced labial seal resulting in anterior loss, intermittent difficulty siphoning liquids from the straw, mildly increased mastication and bolus prep time, and oral residue. Patient tolerated most PO trials, however did display one, productive cough following a regular solid liquid wash. Recommend a regular/thin diet given implementation of safe swallow precautions. Additionally, patient may benefit from a GI consult as she did present with some deficits appearing more esophageal in nature. SLP will follow for mentioned deficits.     History:     Past Medical History:   Diagnosis Date    Colon polyp     CVA (cerebral vascular accident)     DM2 (diabetes mellitus, type 2)     Essential hypertension        Past Surgical History:   Procedure Laterality Date    ANKLE FRACTURE SURGERY Right     ANKLE FRACTURE SURGERY      COLONOSCOPY  "N/A 12/11/2020    Procedure: COLONOSCOPY;  Surgeon: Marshal Rogers MD;  Location: [facility] ([address] [place] [address]);  Service: Endoscopy;  Laterality: N/A;  11/18-plavix hold ok see te-covid pcw 12/8-tb    COLONOSCOPY W/ POLYPECTOMY      HYSTERECTOMY      no h/o  malignancy     HPI: "Patient is a 69 year old female with a PMHx of hypertension, diabetes mellitus, CKD and dementia who presents with generalized weakness and dizziness for the past 2 days.  She reports associated subjective fever, nausea, tremors, diaphoresis and headache.  Per EMS, patient had 1 episode of vomiting PTA that was relieved with IV Zofran.  ED note reports patient has dizziness worsens when she stands up and she has difficulty maintaining her balance.  No recent falls reported and she does not use a cane or walker.  Patient resides home alone and is usually able to manage.  She denies chest pain and has had no further episodes of vomiting since arriving to the ED. patient lethargic on exam, arouses to verbal stimuli.  Information obtained per patient and sister at bedside.     Upon arrival to the ED, patient found to be hypertensive with systolic 220s.  Creatinine 1.5 (near baseline), , potassium 3.2 and magnesium 1.5.  COVID and flu negative.  CBC unremarkable.  Chest x-ray with stable cardiomegaly.  MRI revealed multifocal acute infarcts within the left cerebellar hemisphere and CTA head and neck showed small acute infarcts in the left hemisphere and small remote infarcts in the right cerebellar hemisphere.  No focal stenosis, occlusion or dissection.  Small 1-2 mm aneurysm involving the supraclinoid portion of the left distal ICA.  Tele neurology consulted in the ED recommendations provided.  Patient referred to Hospital Medicine and will be admitted for further evaluation and management."    Social History: Patient lives with alone.    Prior Intubation HX:  None this admission.    Modified Barium Swallow: None on file.    Chest X-Rays: " "4/4: "Stable enlargement of cardiopericardial silhouette as well as some right paratracheal soft tissue prominence felt related to tortuous arch vessels. Pulmonary vasculature normal. No focal infiltrates, pleural effusions, or pneumothorax. No acute bony findings. EKG leads superimposed chest and abdomen."     Prior diet: Regular/thin    Subjective     Upon second attempt, RN cleared pt for bedside swallow evaluation. RN reports pt lethargic though tolerating recent regular/thin meals without difficulty.     Pain/Comfort:  Pain Rating 1: 0/10  Pain Rating Post-Intervention 1: 0/10    Respiratory Status: Room air    Objective:     BRIEF COGNITIVE/LINGUISTIC STATUS  Pt asleep upon entry into the room. She easily rouse with min verbal and tactile cues. She was awake/alert during the evaluation though appeared lethargic by the end of it. Able to sustain alertness for 22 minutes independently  Pt with intermittent, facial grimacing and right sided upper extremity tremors during evaluation.  Pt able to sustain attention for 22 minutes this date.   Pt oriented to person, place, month, year, and reason for admit given increased response time.   Pt followed simple directives throughout assessment with 100 % accuracy independently  Able to answer yes/no questions with 100% accuracy this date  Pt able to communicate her basic wants/needs though intermittently displayed word-finding deficits.   Pt was 100% intelligible at the conversation level. Intermittent pauses noted. Pt reports "slurred speech" since acute CVA.   Pt reports that she wears glasses at baseline,however glasses not present at bedside.     OROPHARYNGEAL SWALLOW:   Secretion management  Adequate secretion management     Oral motor evaluation:  Lingual function  Deviation to the right side noted on protrusion   Left lingual weakness observed  Labial function  Initially, pt with an open mouth posture at rest. However, pt able to achieve closure once prompted.  "   Left facial asymmetry noted  Impaired retraction, protrusion, seal, and coordination observed.  Mandibular function:  Adequate mandibular depression/elevation with functional strength to break solid bolus  Velar function  Velar elevation is symmetrical during OME, no hypernasality/hyponasality noted during speech, adequate function  Vocal quality  Vocal quality clear and within functional limits  Cough assessment:  Volitional cough is clear and within functional limits   Dentition  Pt presents with scattered dentition    PO TRIALS :   Thin liquid: Single and consecutive sips of water (~5 oz) via cup and straw  Puree: 4 oz of apple sauce via tsp  Solids: Small bites of a 1/2 of a josé miguel cracker    ORAL PHASE:  Functional mandibular depression and elevation for adequate bolus acceptance  Reduced labial seal on tsp/cup/straw with reduced bolus stripping, requiring assistance to achieve labial seal  Difficulty siphoning liquids from the straw when placed medially and on the left side, improved when straw placed on the right  Anterior loss observed with thin liquids and puree  Functional mandibular depression and elevation for adequate initial biteforce of solid bolus  Increased time required for mastication of regular solids, though appeared functional  Increased time required for bolus formation of puree and regular solids   Oral cavity with mild oral stasis after the swallow. Oral clearance achieved with liquid wash.    PHARYNGEAL PHASE:  Single swallows per bolus noted; Spontaneous double swallow observed with puree x1.  Audible swallows noted with large sips of liquids   Cough x1 observed following regular solid liquid wash. No coughing noted with prior or subsequent bites of regular solids.   No overt signs of airway compromise observed with single/consecutive sips of liquids and puree.   It is important to note that the pt displayed coughing in absence of PO consumption.   Multiple, belches observed toward the  end of the PO trials.  Laryngeal rise noted in subjectively perceived timely manner- unable to objectively assess at bedside.    IMPRESSION   Patient demonstrates signs of oropharyngeal dysphagia characterized by reduced labial seal resulting in anterior loss, intermittent difficulty siphoning liquids from the straw, increased mastication and bolus prep time, and oral residue. Patient tolerated most PO trials, however did display one, productive cough following a regular solid liquid wash. Recommend a regular/thin diet given implementation of safe swallow precautions. Additionally, patient may benefit from a GI consult as she did present with some deficits appearing more esophageal in nature. SLP will follow for mentioned deficits.     Education: Education provided re: role of SLP, diet recs, swallow precs, s/s aspiration, and POC.  Pt and sister verbalized understanding and agreement. RN and MD notified on overall impressions and recommendations.       Goals:   Multidisciplinary Problems       SLP Goals          Problem: SLP    Goal Priority Disciplines Outcome   SLP Goal     SLP Progressing   Description: Speech Language Pathology Goals  Goals expected to be met by 4/19:     1. The pt will tolerate a regular/thin diet given implementation of safe swallow strategies/precautions and without displaying overt signs of airway compromise.   2. The pt will participate in a cognitive-linguistic evaluation to determine additional therapeutic needs.                                Plan:     Patient to be seen:  3 x/week, 4 x/week   Plan of Care expires:  05/03/25  Plan of Care reviewed with:  patient, sibling   SLP Follow-Up:  Yes       Discharge recommendations:   (SLP at next level of care)   Barriers to Discharge:  Level of Skilled Assistance Needed      Time Tracking:     SLP Treatment Date:   04/05/25  Speech Start Time:  1219  Speech Stop Time:  1241     Speech Total Time (min):  22 min    Billable Minutes: Rylan  Swallow and Oral Function 22 04/05/2025

## 2025-04-05 NOTE — PT/OT/SLP EVAL
Occupational Therapy   Evaluation and Treatment    Name: Shabnam Noble  MRN: [id number]  Admitting Diagnosis: Cerebellar stroke  Recent Surgery: * No surgery found *      Recommendations:     Discharge Recommendations: High Intensity Therapy  Discharge Equipment Recommendations:  bedside commode, bath bench, walker, rolling, to be determined by next level of care  Barriers to discharge:  Decreased caregiver support (current functional level)    Assessment:   Initial OT eval/treat complete.  Demos some impulsivity with fast eating and abrupt initial stand and quick movements with bed mobility requiring cues for slowing pace for safety.  Step transfer EOB<>BSC with HHA and MOD A with cues for proper footing and safe hand placement during transitions as Pt. Initially with widened BUD; also requiring assist for lift and increased assist with steadying d/t incoordinated steps and mild posterior lean during task.  MOD A for tioleting as Pt. Handed wipes and able to clean front nell region seated after void though requiring increased steadying assist and assist with task completion for managing adult brief in stance d/t incoordination and impaired dynamic standing balance with slight posterior lean noted.  Previously independent in ADL and iADL; though reports that her family no longer allows her drive since dementia dx.  Will require intense interdisciplinary therapy services to return safely to previous living situation and PLOF.  Has comfort height toilet.  Needs TTB, RW, and BSC currently though will defer to next level of care.  Recommend post acute High Intensity therapy.  Lives alone though her sister drives her to grocery shop and to MD appts; sister visits throughout the week.  To benefit from continued acute care OT services to increase independence in self-care/functional transfers.  OT to follow.      Shabnam Noble is a 69 y.o. female with a medical diagnosis of Cerebellar stroke.  She presents with below deficits  "decreasing independence in self-care/functional transfers. Performance deficits affecting function: weakness, impaired endurance, impaired self care skills, impaired functional mobility, gait instability, impaired balance, decreased lower extremity function, decreased upper extremity function, decreased ROM, decreased safety awareness, impaired cardiopulmonary response to activity.      Rehab Prognosis: Good; patient would benefit from acute skilled OT services to address these deficits and reach maximum level of function.       Plan:     Patient to be seen 5 x/week to address the above listed problems via self-care/home management, therapeutic activities, therapeutic exercises, neuromuscular re-education  Plan of Care Expires: 04/19/25  Plan of Care Reviewed with: patient    Subjective     Chief Complaint: No c/o pain.   Patient/Family Comments/goals: No goals stated at this time.  Pt. States, "I'm weak."    Occupational Profile:  Lives alone  SSH; threshold entry  Bathroom setup  Tub/shower combo  Jacuzzi tub  Comfort height toilet  Previously independent with ADL  Cooks and cleans   No longer drives since dementia dx  Sister drives to MD appts and grocery shopping  Equipment Used at Home: none  Assistance upon Discharge: Lives alone; sister assists with iADL though does not visit daily.     Pain/Comfort:  Pain Rating 1: 0/10  Pain Rating Post-Intervention 1: 0/10    Patients cultural, spiritual, Religion conflicts given the current situation:  (None stated.)    Objective:     Communicated with: Dorcas SALAS RN prior to session.  Patient found HOB elevated with peripheral IV, telemetry, bed alarm upon OT entry to room.    General Precautions: Standard, fall, aspiration, vision impaired  Orthopedic Precautions: N/A  Braces: N/A  Respiratory Status: Room air    Occupational Performance:    Bed Mobility:    Supine<>sit SBA  Increased time  HOB elevated   Used of grab bar    Functional Mobility/Transfers:  Sit<>stand " EOB HHA with initial widened BUD MOD A   Pt. Standing quickly and with wide BUD  Instructed to return to seated position  Sit<>stand 2nd trial EOB HHA again with MOD A   Initial cues for preparatory positioning prior to task  Proper footing and safe hand placement  Step transfer EOB<>BSC HHA MOD A   Lift assist needed for boost  Increased assist with balance required   Decreased coordination noted with task  Mild posterior trunk lean in stance again with widened BUD once reaching BS  Cues needed for safe hand placement and hand transitions     Activities of Daily Living:  Toileting MOD A   Assist needed for managing adult diaper as would underwear   Handed wipes and able to clean front nell region seated with RUE-hand  Adult diaper completely removed   Fresh pads placed at bed  PCT present at end of session and aware of need for new Purewick   Hand hygiene seated at [facility] with setup of needed items  Eating with SBA  Requiring assist for opening containers and tearing salad dressing packet then pouring contents  MOD cues for slowing pace while eating  Setup of towel across hospital gown to avoid further soiling gown  PCT present at end of session with plans to place clean gown       Cognitive/Visual Perceptual:  Cognitive/Psychosocial Skills:  -       Oriented to: Person, Place, Time, and Situation   -       Follows Commands/attention:Easily distracted, Follows one-step commands, and impulsive as demos in abrupt initial stand and fast eating  -       Communication: able to make basic needs known and answer questions appropriately  -       Memory: No Deficits noted  -       Safety awareness/insight to disability: impaired   -       Mood/Affect/Coping skills/emotional control: Cooperative  Visual/Perceptual:  reports blurry vision    Physical Exam:  Postural examination/scapula alignment: -       Rounded shoulders  -       Forward head  Skin integrity: Visible skin intact  Upper Extremity Range of Motion:  -       Right  Upper Extremity: WFL  -       Left Upper Extremity: WFL  Upper Extremity Strength: -       Right Upper Extremity: 4-/5 gross  -       Left Upper Extremity: 4-/5 gross   Strength: -       Right Upper Extremity: WFL  -       Left Upper Extremity: WFL  Fine Motor Coordination: -       Intact  Right hand, finger to nose, Left hand thumb/finger opposition skills, and Right hand thumb/finger opposition skills  -       Impaired  Left hand, finger to nose moderately impaired demos 50% accuracy with reaching target in comparison to RUE; increased RUE-hand tremor noted with tasks with activity      AMPAC 6 Click ADL:  AMPAC Total Score: 15    Treatment & Education:  Educated on role of OT, POC, functional transfer/ADL safety, review of call light, and importance of calling for assist as needed.        Patient left HOB elevated with all lines intact, call button in reach, bed alarm on, nursing notified, and PCT present    GOALS:   Multidisciplinary Problems       Occupational Therapy Goals          Problem: Occupational Therapy    Goal Priority Disciplines Outcome Interventions   Occupational Therapy Goal     OT, PT/OT Progressing    Description: Goals to be met by: 4/19/2025     Patient will increase functional independence with ADLs by performing:    UE Dressing with Stand-by Assistance.  LE Dressing with Minimal Assistance.  Grooming while seated at sink with Stand-by Assistance.  Toileting from bedside commode with Contact Guard Assistance for hygiene and clothing management.   Toilet transfer to bedside commode with Contact Guard Assistance.                         DME Justifications:   Shabnam requires a commode for home use because she is confined to a single room.   Shabnam's mobility limitation cannot be sufficiently resolved by the use of a cane. Her functional mobility deficit can be sufficiently resolved with the use of a Rolling Walker. Patient's mobility limitation significantly impairs their ability to  participate in one of more activities of daily living.  The use of a RW will significantly improve the patient's ability to participate in MRADLS and the patient will use it on regular basis in the home.    History:     Past Medical History:   Diagnosis Date    Colon polyp     CVA (cerebral vascular accident)     DM2 (diabetes mellitus, type 2)     Essential hypertension          Past Surgical History:   Procedure Laterality Date    ANKLE FRACTURE SURGERY Right     ANKLE FRACTURE SURGERY      COLONOSCOPY N/A 12/11/2020    Procedure: COLONOSCOPY;  Surgeon: Marshal Rogers MD;  Location: [facility] ([address] [place] [address]);  Service: Endoscopy;  Laterality: N/A;  11/18-plavix hold ok see te-covid pcw 12/8-tb    COLONOSCOPY W/ POLYPECTOMY      HYSTERECTOMY      no h/o  malignancy       Time Tracking:     OT Date of Treatment: 04/05/25  OT Start Time: 1637  OT Stop Time: 1713  OT Total Time (min): 36 min    Billable Minutes:Evaluation 13  Self Care/Home Management 23    4/5/2025

## 2025-04-05 NOTE — ASSESSMENT & PLAN NOTE
- Home regimen of amlodipine 10 mg p.o. daily, metoprolol 100 mg p.o. b.i.d. and valsartan 40 mg p.o. daily.  - All medications on hold at this time for permissive hypertension  - Will begin restart medication is stepwise fashion starting with valsartan 40 mg p.o. daily

## 2025-04-05 NOTE — ED NOTES
Pt provided with a warm meal. Family member is assisting pt with eating as pt is having trouble with her vision. Pt normally wears glasses.

## 2025-04-05 NOTE — ASSESSMENT & PLAN NOTE
Problem noted.  Most recent A1c 9.0 performed 02/04/2025.  Patient is prescribed metformin and Jardiance.  Sister reports Jardiance was too expensive and patient has not started to take it yet.  She is compliant with metformin as prescribed      -hold metformin and Jardiance  -low-dose SSI  -Accu-Cheks

## 2025-04-05 NOTE — PT/OT/SLP PROGRESS
Speech Language Pathology      Shabnam Noble  MRN: [id number]    SLP has received and reviewed SLP orders. SLP with an attempt to see the patient, however patient JESSICA for MRI. SLP will re-attempt later to complete a bedside swallow evaluation.

## 2025-04-05 NOTE — ASSESSMENT & PLAN NOTE
- Creatinine on admission was 1.5, consistent with baseline creatinine ranged between 1.3 -1.6  - Monitor I&Os  - Trend with labs and correct electrolyte abnormalities as needed

## 2025-04-05 NOTE — PLAN OF CARE
Problem: Adult Inpatient Plan of Care  Goal: Plan of Care Review  Outcome: Progressing  Goal: Patient-Specific Goal (Individualized)  Outcome: Progressing  Goal: Absence of Hospital-Acquired Illness or Injury  Outcome: Progressing  Goal: Optimal Comfort and Wellbeing  Outcome: Progressing  Goal: Readiness for Transition of Care  Outcome: Progressing     Problem: Stroke, Ischemic (Includes Transient Ischemic Attack)  Goal: Optimal Coping  Outcome: Progressing  Goal: Effective Bowel Elimination  Outcome: Progressing  Goal: Optimal Cerebral Tissue Perfusion  Outcome: Progressing  Goal: Optimal Cognitive Function  Outcome: Progressing  Goal: Improved Communication Skills  Outcome: Progressing  Goal: Optimal Functional Ability  Outcome: Progressing  Goal: Optimal Nutrition Intake  Outcome: Progressing  Goal: Effective Oxygenation and Ventilation  Outcome: Progressing  Goal: Improved Sensorimotor Function  Outcome: Progressing  Goal: Safe and Effective Swallow  Outcome: Progressing  Goal: Effective Urinary Elimination  Outcome: Progressing     Problem: Fall Injury Risk  Goal: Absence of Fall and Fall-Related Injury  Outcome: Progressing     Problem: Infection  Goal: Absence of Infection Signs and Symptoms  Outcome: Progressing     Problem: Sepsis/Septic Shock  Goal: Optimal Coping  Outcome: Progressing  Goal: Absence of Bleeding  Outcome: Progressing  Goal: Blood Glucose Level Within Targeted Range  Outcome: Progressing  Goal: Absence of Infection Signs and Symptoms  Outcome: Progressing  Goal: Optimal Nutrition Intake  Outcome: Progressing     Problem: Pneumonia  Goal: Fluid Balance  Outcome: Progressing  Goal: Resolution of Infection Signs and Symptoms  Outcome: Progressing  Goal: Effective Oxygenation and Ventilation  Outcome: Progressing     Problem: Diabetes Comorbidity  Goal: Blood Glucose Level Within Targeted Range  Outcome: Progressing     Problem: Skin Injury Risk Increased  Goal: Skin Health and  Integrity  Outcome: Progressing

## 2025-04-05 NOTE — ED NOTES
Late entry secondary to direct patient care. Pt then noted to have left sided facial droop noted at mouth, nasolabial folds and with some L eye ptosis, R tongue deviation. Entire neuro assessment repeated, no limb weakness or ataxia. Pt continues to endorse blurry vision. MD aware. Pt to stat head CT at this time.

## 2025-04-05 NOTE — PROGRESS NOTES
[facility] Emergency Dept  [facility]  Progress Note    Patient Name: Shabnam Noble  MRN: [id number]  Patient Class: IP- Inpatient   Admission Date: 4/4/2025  Length of Stay: 1 days  Attending Physician: Bridgette Lechuga MD  Primary Care Provider: Krys Michael MD        Subjective     Principal Problem:Cerebellar stroke        HPI:  Shabnam Noble is a 69 year old female with a PMHx of hypertension, diabetes mellitus, CKD and dementia who presents with generalized weakness and dizziness for the past 2 days.  She reports associated subjective fever, nausea, tremors, diaphoresis and headache.  Per EMS, patient had 1 episode of vomiting PTA that was relieved with IV Zofran.  ED note reports patient has dizziness worsens when she stands up and she has difficulty maintaining her balance.  No recent falls reported and she does not use a cane or walker.  Patient resides home alone and is usually able to manage.  She denies chest pain and has had no further episodes of vomiting since arriving to the ED. patient lethargic on exam, arouses to verbal stimuli.  Information obtained per patient and sister at bedside.    Upon arrival to the ED, patient found to be hypertensive with systolic 220s.  Creatinine 1.5 (near baseline), , potassium 3.2 and magnesium 1.5.  COVID and flu negative.  CBC unremarkable.  Chest x-ray with stable cardiomegaly.  MRI revealed multifocal acute infarcts within the left cerebellar hemisphere and CTA head and neck showed small acute infarcts in the left hemisphere and small remote infarcts in the right cerebellar hemisphere.  No focal stenosis, occlusion or dissection.  Small 1-2 mm aneurysm involving the supraclinoid portion of the left distal ICA.  Tele neurology consulted in the ED recommendations provided.  Patient referred to Hospital Medicine and will be admitted for further evaluation and management.    Overview/Hospital Course:  Ms. Noble presented weakness and dizziness for greater  than 2 days.  Admitted with acute CVA involving the left cerebellar and brainstem.  Treatment initiated with permissive hypertension, aspirin and Plavix, but statin not given due to reported allergy.  Vascular neurology consulted.    Interval History:  Upon waking this morning, patient reported vision changes, and she noted to have a faint left facial droop which was unclear if it was present yesterday.  Stat head CT obtained with no significant change.  Again seen by Tele-neurology post repeat head CT scan.  Upon my visit with at the bedside, and vision changes were not new and she uses glasses as pointed out by her family.    Review of Systems   Constitutional:  Negative for chills and fever.   Eyes:  Positive for visual disturbance.   Musculoskeletal:  Positive for gait problem.   Neurological:  Positive for dizziness, facial asymmetry and weakness.     Objective:     Vital Signs (Most Recent):  Temp: 98.3 °F (36.8 °C) (04/05/25 1322)  Pulse: 73 (04/05/25 1320)  Resp: 18 (04/05/25 1320)  BP: (Abnormal) 208/96 (04/05/25 1320)  SpO2: 100 % (04/05/25 1320) Vital Signs (24h Range):  Temp:  [97.5 °F (36.4 °C)-98.6 °F (37 °C)] 98.3 °F (36.8 °C)  Pulse:  [62-91] 73  Resp:  [11-28] 18  SpO2:  [98 %-100 %] 100 %  BP: (173-230)/(59-97) 208/96     Weight: 84.4 kg (186 lb)  Body mass index is 30.95 kg/m².  No intake or output data in the 24 hours ending 04/05/25 1359      Physical Exam  Vitals and nursing note reviewed.   Constitutional:       General: She is not in acute distress.     Appearance: Normal appearance. She is not ill-appearing or toxic-appearing.   HENT:      Head: Normocephalic and atraumatic.      Comments: Left facial droop     Nose: Nose normal.   Eyes:      General: Lids are normal. Gaze aligned appropriately.      Extraocular Movements: Extraocular movements intact.      Conjunctiva/sclera: Conjunctivae normal.   Cardiovascular:      Rate and Rhythm: Normal rate and regular rhythm.      Pulses: Normal  pulses.      Heart sounds: Normal heart sounds.   Pulmonary:      Effort: Pulmonary effort is normal.      Breath sounds: Normal breath sounds.   Abdominal:      General: Bowel sounds are normal.      Palpations: Abdomen is soft.      Tenderness: There is no abdominal tenderness. There is no guarding or rebound.   Musculoskeletal:         General: Normal range of motion.      Cervical back: Normal range of motion.   Skin:     General: Skin is warm and dry.   Neurological:      Mental Status: She is alert and oriented to person, place, and time.      Cranial Nerves: Facial asymmetry present.      Sensory: Sensation is intact.      Coordination: Coordination abnormal. Finger-Nose-Finger Test abnormal.      Comments: Noted left-sided slight facial droop, smile asymmetrical; no appreciable strength deficits bilaterally in upper and lower extremities, abnormal coordination.  Gait was not assessed.   Psychiatric:         Attention and Perception: Attention normal.         Speech: Speech normal.               Significant Labs: All pertinent labs within the past 24 hours have been reviewed.    Significant Imaging: I have reviewed all pertinent imaging results/findings within the past 24 hours.      Assessment & Plan  Cerebellar stroke  History of CVA (cerebrovascular accident)  - Patient presented with unsteady gait, weakness and dizziness for > 2 days.  Imaging revealed multifocal acute infarcts in the left cerebellar hemisphere and small remote infarcts in the right.  Facial droop and ataxia on exam.  - Tele vascular neurology consulted; appreciate recommendations  - S/p loading dose of 300 mg clopidogrel x 1 and followed by 75 mg clopidogrel daily  - Increasing aspirin dosage to 325 mg p.o. daily as per Tele-Neurology recommendations  - Repeat stat head CT followed by MRI of the brain without contrast without significant change today  - Patient reported she was allergic to atorvastatin, and this medication was not  started.  This was again addressed today with family at the bedside with reports of nausea being the main side effect reported, but otherwise no reported myalgias or change in liver function tests  - Will give trial of atorvastatin 80 mg p.o. daily today and assess daily  - 2D echo and bilateral lower extremity ultrasound is pending  - Consult placed to PT, OT, and speech therapy  - Continue neuro checks q.4 and monitor on telemetry  - Hold blood pressure medications  - Will continue allow for permissive hypertension for now and will plan on starting blood pressure medications in stepwise fashion later today  Essential hypertension  - Home regimen of amlodipine 10 mg p.o. daily, metoprolol 100 mg p.o. b.i.d. and valsartan 40 mg p.o. daily.  - All medications on hold at this time for permissive hypertension  - Will begin restart medication is stepwise fashion starting with valsartan 40 mg p.o. daily  Type 2 diabetes mellitus with hyperglycemia, without long-term current use of insulin  - Most recent A1c 9.0 performed 02/04/2025.  Home regimen is metformin and Jardiance.  Sister reported Jardiance was too expensive and patient has not started this medication.  Home regimen on hold.  - Glucose level reviewed, currently above target  - Start Lantus 10 units subQ daily today, and increase sliding scale insulin to moderate dose range  Stage 3b chronic kidney disease  Hypokalemia  Hypomagnesemia  - Creatinine on admission was 1.5, consistent with baseline creatinine ranged between 1.3 -1.6  - Monitor I&Os  - Trend with labs and correct electrolyte abnormalities as needed  Mild dementia without behavioral disturbance, psychotic disturbance, mood disturbance, or anxiety  - Patient with history of mild vascular dementia.  She lives alone and family is available nearby  - Mentation is at baseline, and she is oriented as per family at the bedside  VTE Risk Mitigation (From admission, onward)           Ordered     heparin  (porcine) injection 5,000 Units  Every 8 hours         04/04/25 2026     IP VTE HIGH RISK PATIENT  Once         04/04/25 1810     Place sequential compression device  Until discontinued         04/04/25 1810                        Bridgette Lechuga MD  Department of Hospital Medicine   [facility], [place], operated by [facility] - Emergency Dept

## 2025-04-05 NOTE — ED NOTES
Assumed care of patient from previous RN. Pt resting on stretcher eating her breakfast. On cardiac and respiratory monitoring. Pt denies any needs at this time. Awaiting ready bed upstairs.

## 2025-04-05 NOTE — ED NOTES
Pt resting quietly on stretcher with eyes closed; opens eyes to verbal stimuli. Pt remains on continuous cardiac and pulse ox monitoring with non-invasive blood pressure to cycle every 30 minutes. Pt remains hypertensive though no intervention is needed at this time. NSR noted. Pt reporting a mild headache that he is currently rating as a 4/10. No acute distress observed.  Pt requesting restroom. Pt placed on Pure wick external catheter to continuous low wall suction. Pt is able to reposition self on stretcher. Bed locked in lowest position; side rails up and locked x 2; call light, bedside table, and personal belongings within reach. Room assessed for safety measures and cleanliness; no action needed at this time. Plan of care discussed; awaiting hospital bed assignment. Pt instructed to alert nurse for assistance and before attempting to get out of bed; verbalizes understanding. Pt denies needs or complaints at this time; monitoring ongoing.

## 2025-04-05 NOTE — ASSESSMENT & PLAN NOTE
Per chart review, patient with history of mild vascular dementia.  She resides alone and family is available as needed    -follow up with PCP/Neurology outpatient

## 2025-04-05 NOTE — SUBJECTIVE & OBJECTIVE
Past Medical History:   Diagnosis Date    Colon polyp     CVA (cerebral vascular accident)     DM2 (diabetes mellitus, type 2)     Essential hypertension        Past Surgical History:   Procedure Laterality Date    ANKLE FRACTURE SURGERY Right     ANKLE FRACTURE SURGERY      COLONOSCOPY N/A 12/11/2020    Procedure: COLONOSCOPY;  Surgeon: Marshal Rogers MD;  Location: [address]);  Service: Endoscopy;  Laterality: N/A;  11/18-plavix hold ok see te-covid pcw 12/8-tb    COLONOSCOPY W/ POLYPECTOMY      HYSTERECTOMY      no h/o  malignancy       Review of patient's allergies indicates:   Allergen Reactions    Atorvastatin     Hydrodiuril [hydrochlorothiazide]     Lisinopril        No current facility-administered medications on file prior to encounter.     Current Outpatient Medications on File Prior to Encounter   Medication Sig    amLODIPine (NORVASC) 10 MG tablet Take 1 tablet (10 mg total) by mouth once daily.    aspirin (ECOTRIN) 81 MG EC tablet Take 1 tablet (81 mg total) by mouth once daily.    ezetimibe (ZETIA) 10 mg tablet Take 1 tablet (10 mg total) by mouth once daily.    metFORMIN (GLUCOPHAGE) 1000 MG tablet Take 1 tablet (1,000 mg total) by mouth 2 (two) times daily with meals.    metoprolol tartrate (LOPRESSOR) 100 MG tablet Take 1 tablet (100 mg total) by mouth 2 (two) times daily.    valsartan (DIOVAN) 40 MG tablet Take 1 tablet (40 mg total) by mouth once daily.    blood glucose control, high Soln 1 drop by Misc.(Non-Drug; Combo Route) route once daily. ICD 10 code: E11.59    blood-glucose meter kit Use as instructed. ICD 10 code E11.59    empagliflozin (JARDIANCE) 25 mg tablet Take 1 tablet (25 mg total) by mouth once daily.     Family History       Problem Relation (Age of Onset)    Alzheimer's disease Mother    Breast cancer Paternal Aunt    Colon cancer Brother    Diabetes Father    Prostate cancer Father          Tobacco Use    Smoking status: Every Day     Current packs/day: 0.00      Average packs/day: 0.3 packs/day for 45.0 years (11.3 ttl pk-yrs)     Types: Cigarettes     Start date: 7/1/1976     Last attempt to quit: 7/1/2021     Years since quitting: 3.7    Smokeless tobacco: Never   Substance and Sexual Activity    Alcohol use: Yes     Alcohol/week: 3.0 standard drinks of alcohol     Types: 3 Cans of beer per week     Comment: 1-2x's    Drug use: Not Currently    Sexual activity: Not Currently     Review of Systems   Constitutional:  Negative for activity change, appetite change, chills and fever.   HENT:  Negative for congestion, sore throat and trouble swallowing.    Eyes:  Negative for photophobia and visual disturbance.   Respiratory:  Negative for cough, chest tightness and shortness of breath.    Cardiovascular:  Negative for chest pain, palpitations and leg swelling.   Gastrointestinal:  Negative for abdominal pain, diarrhea and nausea.   Genitourinary:  Negative for dysuria, flank pain and hematuria.   Musculoskeletal:  Positive for gait problem. Negative for back pain.   Neurological:  Positive for dizziness and weakness. Negative for headaches.   Psychiatric/Behavioral:  Negative for confusion.      Objective:     Vital Signs (Most Recent):  Temp: 97.7 °F (36.5 °C) (04/04/25 1316)  Pulse: 76 (04/04/25 2003)  Resp: 15 (04/04/25 2003)  BP: (!) 217/97 (04/04/25 2003)  SpO2: 98 % (04/04/25 2003) Vital Signs (24h Range):  Temp:  [97.7 °F (36.5 °C)] 97.7 °F (36.5 °C)  Pulse:  [56-79] 76  Resp:  [13-20] 15  SpO2:  [98 %-100 %] 98 %  BP: (181-244)/() 217/97     Weight: 84.4 kg (186 lb)  Body mass index is 30.95 kg/m².     Physical Exam  Vitals reviewed.   Constitutional:       Appearance: Normal appearance. She is normal weight. She is not ill-appearing.   HENT:      Head: Normocephalic.      Mouth/Throat:      Mouth: Mucous membranes are moist.      Pharynx: Oropharynx is clear.   Eyes:      General: Lids are normal. Gaze aligned appropriately.      Conjunctiva/sclera: Conjunctivae  normal.   Cardiovascular:      Rate and Rhythm: Normal rate and regular rhythm.      Pulses: Normal pulses.      Heart sounds: Normal heart sounds.   Pulmonary:      Effort: Pulmonary effort is normal.      Breath sounds: Normal breath sounds.   Abdominal:      General: Bowel sounds are normal.      Palpations: Abdomen is soft.   Musculoskeletal:         General: Normal range of motion.      Cervical back: Normal range of motion.   Skin:     General: Skin is warm and dry.   Neurological:      Mental Status: She is oriented to person, place, and time. She is lethargic.      Cranial Nerves: Facial asymmetry present.      Sensory: Sensation is intact.      Motor: Motor function is intact.      Coordination: Finger-Nose-Finger Test abnormal.      Comments: Unable to assess gait   Psychiatric:         Mood and Affect: Mood normal.                Significant Labs: All pertinent labs within the past 24 hours have been reviewed.  CBC:   Recent Labs   Lab 04/04/25  1347   WBC 6.62   HGB 12.2   HCT 35.9*        CMP:   Recent Labs   Lab 04/04/25  1347      K 3.2*      CO2 18*   BUN 23   CREATININE 1.5*   CALCIUM 9.4   ALBUMIN 3.9   BILITOT 0.6   ALKPHOS 83   AST 13   ALT 12   ANIONGAP 12       Significant Imaging: I have reviewed all pertinent imaging results/findings within the past 24 hours.  Imaging Results              CTA Head and Neck (xpd) (Final result)  Result time 04/04/25 19:48:12   Procedure changed from CTA Head     Final result by Pedro Howard MD (04/04/25 19:48:12)                   Impression:      1. Small acute infarcts within the left cerebellar hemisphere, as discussed and better appreciated on earlier brain MRI.  2. CTA head and neck with no evidence of high-grade stenosis, large vessel occlusion, or dissection.  3. Questionable small 1-2 mm aneurysm involving the supraclinoid portion of the left distal ICA.      Electronically signed by: Pedro Howard  MD  Date:    04/04/2025  Time:    19:48               Narrative:    EXAMINATION:  CTA HEAD AND NECK (XPD)    CLINICAL HISTORY:  Cerebral aneurysm, follow-up;    TECHNIQUE:  Non contrast low dose axial images were obtained through the head.  CT angiogram was performed from the level of the leroy to the top of the head following the IV administration of 100mL of Omnipaque 350.   Sagittal and coronal reconstructions and maximum intensity projection reconstructions were performed. Arterial stenosis percentages are based on NASCET measurement criteria.  Rapid AI screening was performed.    COMPARISON:  CT head and MRI brain from the same date.    FINDINGS:  As discussed on earlier MRI brain, small acute infarcts are seen within the left cerebellar hemisphere.  Small remote infarcts are seen within the right cerebellar hemisphere.  No evidence of additional acute/recent major vascular distribution cerebral infarction, intraparenchymal hemorrhage, or intra-axial space occupying lesion. The ventricular system is stable in size and configuration with no evidence of hydrocephalus. No effacement of the skull-base cisterns. No abnormal extra-axial fluid collections or blood products. Visualized paranasal sinuses and mastoid air cells are clear. The calvarium shows no significant abnormality.    CTA Neck: The origins of the right brachiocephalic, left common carotid and left subclavian arteries from the arch are within normal limits.  The origins of the vertebral arteries are within normal limits.  The vertebral arteries are patent without evidence for focal stenosis or occlusion.   The bilateral common carotid arteries and internal carotid arteries are patent without evidence for focal stenosis or occlusion.  Plaquing is seen at the right carotid bifurcation without significant stenosis.  No evidence of carotid or vertebral artery dissection.    Anterior circulation: Questionable small 1-2 mm aneurysm is seen involving the  supraclinoid portion of the left distal ICA.  The bilateral distal cervical, petrous, cavernous, and supraclinoid ICAs are patent with otherwise no significant stenosis or additional aneurysm.  The anterior and middle cerebral arteries are patent without significant stenosis, occlusion, or aneurysm.  Right A1 segment is hypoplastic.  Flow to the right anterior cerebral artery appears supplied via the anterior communicating artery.    Posterior circulation: Distal vertebral arteries, basilar artery and posterior cerebral arteries are patent without significant focal stenosis, occlusion, or aneurysm.    Airways: Unremarkable.    Glands/Nodes: The parotid, submandibular, and thyroid glands are unremarkable.    Spine: No acute cervical spine abnormalities are identified.    Lungs: Visualized lung apices are clear.                                        MRI Brain Without Contrast (Final result)  Result time 04/04/25 17:21:51      Final result by Pedro Howard MD (04/04/25 17:21:51)                   Impression:      Multifocal acute infarcts within the left cerebellar hemisphere.    This report was flagged in Epic as abnormal.      Electronically signed by: Pedro Howard MD  Date:    04/04/2025  Time:    17:21               Narrative:    EXAMINATION:  MRI BRAIN WITHOUT CONTRAST    CLINICAL HISTORY:  Stroke, follow up;    TECHNIQUE:  Multiplanar multisequence MR imaging of the brain was performed without contrast.    COMPARISON:  CT head from the same date.  MRI brain November 2022.    FINDINGS:  The brain is normal in contour and morphology.  Multifocal acute infarcts are seen within the left cerebellar hemisphere.  Stable small remote infarcts are seen in the right cerebellar hemisphere.  There is minimal chronic microvascular ischemic disease.  Ventricles are stable in size and configuration without evidence for hydrocephalus.  No intracranial hemorrhage or extraaxial fluid collection.  No mass or mass effect.   Flow voids are normal in appearance.    Visualized paranasal sinuses and mastoid air cells are clear.  Orbits appear normal.                                       CT Head Without Contrast (Final result)  Result time 04/04/25 14:20:09      Final result by Tc Kelly MD (04/04/25 14:20:09)                   Impression:      Age indeterminate but potentially recent small left cerebellar infarct not visualized on prior studies.  No intracranial hemorrhage or mass effect.  MR imaging to be considered, particularly in light of the history, if clinically warranted.      Electronically signed by: Tc Kelly  Date:    04/04/2025  Time:    14:20               Narrative:    EXAMINATION:  CT HEAD WITHOUT CONTRAST    CLINICAL HISTORY:  Dizziness, persistent/recurrent, cardiac or vascular cause suspected; fatigue, nausea    TECHNIQUE:  Low dose axial images were obtained through the head.  Coronal and sagittal reformations were also performed. Contrast was not administered.    COMPARISON:  08/27/2023    FINDINGS:  There is an element of volume loss again identified.    There is an age indeterminate but potentially recent/subacute left inferior cerebellar infarct that is not visualized on the prior studies.    No hydrocephalus, mass effect, or intracranial hemorrhage.    Small chronic right cerebellar infarcts and chronic left occipital infarct again identified.    The calvarium is intact.  The visualized sinuses and mastoid air cells are clear.                                       X-Ray Chest AP Portable (Final result)  Result time 04/04/25 13:49:51      Final result by Fabrizio Rodas MD (04/04/25 13:49:51)                   Impression:      As above      Electronically signed by: Fabrizio Rodas  Date:    04/04/2025  Time:    13:49               Narrative:    EXAMINATION:  XR CHEST AP PORTABLE    CLINICAL HISTORY:  Cough, unspecified    TECHNIQUE:  Single frontal view of the chest was  performed.    COMPARISON:  August 7, 2023    FINDINGS:  Stable enlargement of cardiopericardial silhouette as well as some right paratracheal soft tissue prominence felt related to tortuous arch vessels.  Pulmonary vasculature normal.  No focal infiltrates, pleural effusions, or pneumothorax.  No acute bony findings.  EKG leads superimposed chest and abdomen.

## 2025-04-05 NOTE — CONSULTS
"  [facility] - [facility] Dept  Adult Nutrition  Consult Note    SUMMARY     Recommendations    Recommendation:  1. Add heart healthy restrictions to current diabetic diet.   2. Encourage intake at meals as tolerated. 3. Monitor weight/labs.   4. RD to follow to monitor po intake    Goals:  Pt will tolerate diet with at least 50-75% intake at meals by RD follow up  Nutrition Goal Status: new  Communication of RD Recs: reviewed with RN    Nutrition Discharge Planning   Nutrition Discharge Planning: Therapeutic diet (comments)  Therapeutic diet (comments): diabetic heart healthy    Assessment and Plan   No nutrition dx at this time    Malnutrition Assessment  Unable to assess NFPE 2/2 RD working remotely for weekend coverage    Reason for Assessment  Reason For Assessment: consult (stroke pathway)  Diagnosis: stroke/CVA  General Information Comments: Pt admitted with weakness, fatigue, dizziness, and loss of balance. Pt seen by SLP. Started on 2000 calorie consistent carb diet. No recent weight loss noted. Unable to assess NFPE 2/2 RD working remotely for weekend coverage. Heart healthy diet handouts attached to d/c paperwork.    Past Medical History:   Diagnosis Date    Colon polyp     CVA (cerebral vascular accident)     DM2 (diabetes mellitus, type 2)     Essential hypertension         Nutrition/Diet History  Food Preferences: no Confucianism or cultural food prefs idenitfied  Spiritual, Cultural Beliefs, Buddhist Practices, Values that Affect Care: no  Factors Affecting Nutritional Intake: None identified at this time    Anthropometrics  Height: 5' 5" (165.1 cm)  Height (inches): 65 in  Height Method: Stated  Weight: 84.4 kg (186 lb 1.1 oz)  Weight (lb): 186.07 lb  Weight Method: Stated  Ideal Body Weight (IBW), Female: 125 lb  % Ideal Body Weight, Female (lb): 148.86 %  BMI (Calculated): 31  BMI Grade: 30 - 34.9- obesity - grade I  Usual Body Weight (UBW), k.9 kg (8/8)  % Usual Body Weight: 100.81  % Weight Change " From Usual Weight: 0.6 %     Lab/Procedures/Meds  Pertinent Labs Reviewed: reviewed  Pertinent Labs Comments: Crea 1.5H, Glu 184H, Ca 8.5L, Alb 3.4L  Pertinent Medications Reviewed: reviewed  Pertinent Medications Comments: aspirin, heparin, insulin    Estimated/Assessed Needs  Weight Used For Calorie Calculations: 56.8 kg (125 lb 3.5 oz) (IBW)  Energy Calorie Requirements (kcal): 1704 (30 kcal/kg)  Energy Need Method: Kcal/kg  Protein Requirements: 56-68g (1.0-1.2g/kg)  Weight Used For Protein Calculations: 56.8 kg (125 lb 3.5 oz) (IBW)  Estimated Fluid Requirement Method: RDA Method  RDA Method (mL): 1704  CHO Requirement: 180g    Nutrition Prescription Ordered  Current Diet Order: 2000 calorie consistent carb    Evaluation of Received Nutrient/Fluid Intake  I/O: none recorded  Energy Calories Required: meeting needs  Protein Required: meeting needs  Fluid Required: meeting needs  Comments: LBM 4/4  % Intake of Estimated Energy Needs: Other: intake not recorded  % Meal Intake: Other: intake not recorded    Nutrition Risk  Level of Risk/Frequency of Follow-up:  (1xweekly)     Monitor and Evaluation  Monitor and Evaluation: Food and beverage intake     Nutrition Related Social Determinants of Health: SDOH: Adequate food in home environment     Nutrition Follow-Up  RD Follow-up?: Yes

## 2025-04-05 NOTE — HOSPITAL COURSE
Ms. Noble presented weakness and dizziness for greater than 2 days.  Admitted with acute CVA involving the left cerebellar and brainstem.  Treatment initiated with permissive hypertension, aspirin and Plavix, but statin not given due to reported allergy.  Vascular neurology consulted. Blood pressure medications restarted. Working with PT and OT, and seeking high intensity placement.

## 2025-04-05 NOTE — ASSESSMENT & PLAN NOTE
Hypomagnesemia    Potassium 3.2 and magnesium 1.5    Plan  - Replete electrolytes as needed  - Monitor electrolytes Daily

## 2025-04-05 NOTE — H&P
[facility] Medicine  History & Physical    Patient Name: Shabnam Noble  MRN: [id number]  Patient Class: IP- Inpatient  Admission Date: 4/4/2025  Attending Physician: Bridgette Lechuga MD   Primary Care Provider: Krys Michael MD         Patient information was obtained from patient, relative(s), past medical records, and ER records.     Subjective:     Principal Problem:Cerebellar stroke    Chief Complaint:   Chief Complaint   Patient presents with    Fatigue     Generalized weakness, headache, N/V x 2 days. CBG  183        HPI: Shabnam Noble is a 69 year old female with a PMHx of hypertension, diabetes mellitus, CKD and dementia who presents with generalized weakness and dizziness for the past 2 days.  She reports associated subjective fever, nausea, tremors, diaphoresis and headache.  Per EMS, patient had 1 episode of vomiting PTA that was relieved with IV Zofran.  ED note reports patient has dizziness worsens when she stands up and she has difficulty maintaining her balance.  No recent falls reported and she does not use a cane or walker.  Patient resides home alone and is usually able to manage.  She denies chest pain and has had no further episodes of vomiting since arriving to the ED. patient lethargic on exam, arouses to verbal stimuli.  Information obtained per patient and sister at bedside.    Upon arrival to the ED, patient found to be hypertensive with systolic 220s.  Creatinine 1.5 (near baseline), , potassium 3.2 and magnesium 1.5.  COVID and flu negative.  CBC unremarkable.  Chest x-ray with stable cardiomegaly.  MRI revealed multifocal acute infarcts within the left cerebellar hemisphere and CTA head and neck showed small acute infarcts in the left hemisphere and small remote infarcts in the right cerebellar hemisphere.  No focal stenosis, occlusion or dissection.  Small 1-2 mm aneurysm involving the supraclinoid portion of the left distal ICA.  Tele neurology consulted in  the ED recommendations provided.  Patient referred to Hospital Medicine and will be admitted for further evaluation and management.    Past Medical History:   Diagnosis Date    Colon polyp     CVA (cerebral vascular accident)     DM2 (diabetes mellitus, type 2)     Essential hypertension        Past Surgical History:   Procedure Laterality Date    ANKLE FRACTURE SURGERY Right     ANKLE FRACTURE SURGERY      COLONOSCOPY N/A 12/11/2020    Procedure: COLONOSCOPY;  Surgeon: Marshal Rogers MD;  Location: [address]);  Service: Endoscopy;  Laterality: N/A;  11/18-plavix hold ok see te-covid pcw 12/8-tb    COLONOSCOPY W/ POLYPECTOMY      HYSTERECTOMY      no h/o  malignancy       Review of patient's allergies indicates:   Allergen Reactions    Atorvastatin     Hydrodiuril [hydrochlorothiazide]     Lisinopril        No current facility-administered medications on file prior to encounter.     Current Outpatient Medications on File Prior to Encounter   Medication Sig    amLODIPine (NORVASC) 10 MG tablet Take 1 tablet (10 mg total) by mouth once daily.    aspirin (ECOTRIN) 81 MG EC tablet Take 1 tablet (81 mg total) by mouth once daily.    ezetimibe (ZETIA) 10 mg tablet Take 1 tablet (10 mg total) by mouth once daily.    metFORMIN (GLUCOPHAGE) 1000 MG tablet Take 1 tablet (1,000 mg total) by mouth 2 (two) times daily with meals.    metoprolol tartrate (LOPRESSOR) 100 MG tablet Take 1 tablet (100 mg total) by mouth 2 (two) times daily.    valsartan (DIOVAN) 40 MG tablet Take 1 tablet (40 mg total) by mouth once daily.    blood glucose control, high Soln 1 drop by Misc.(Non-Drug; Combo Route) route once daily. ICD 10 code: E11.59    blood-glucose meter kit Use as instructed. ICD 10 code E11.59    empagliflozin (JARDIANCE) 25 mg tablet Take 1 tablet (25 mg total) by mouth once daily.     Family History       Problem Relation (Age of Onset)    Alzheimer's disease Mother    Breast cancer Paternal Aunt    Colon cancer  Brother    Diabetes Father    Prostate cancer Father          Tobacco Use    Smoking status: Every Day     Current packs/day: 0.00     Average packs/day: 0.3 packs/day for 45.0 years (11.3 ttl pk-yrs)     Types: Cigarettes     Start date: 7/1/1976     Last attempt to quit: 7/1/2021     Years since quitting: 3.7    Smokeless tobacco: Never   Substance and Sexual Activity    Alcohol use: Yes     Alcohol/week: 3.0 standard drinks of alcohol     Types: 3 Cans of beer per week     Comment: 1-2x's    Drug use: Not Currently    Sexual activity: Not Currently     Review of Systems   Constitutional:  Negative for activity change, appetite change, chills and fever.   HENT:  Negative for congestion, sore throat and trouble swallowing.    Eyes:  Negative for photophobia and visual disturbance.   Respiratory:  Negative for cough, chest tightness and shortness of breath.    Cardiovascular:  Negative for chest pain, palpitations and leg swelling.   Gastrointestinal:  Negative for abdominal pain, diarrhea and nausea.   Genitourinary:  Negative for dysuria, flank pain and hematuria.   Musculoskeletal:  Positive for gait problem. Negative for back pain.   Neurological:  Positive for dizziness and weakness. Negative for headaches.   Psychiatric/Behavioral:  Negative for confusion.      Objective:     Vital Signs (Most Recent):  Temp: 97.7 °F (36.5 °C) (04/04/25 1316)  Pulse: 76 (04/04/25 2003)  Resp: 15 (04/04/25 2003)  BP: (!) 217/97 (04/04/25 2003)  SpO2: 98 % (04/04/25 2003) Vital Signs (24h Range):  Temp:  [97.7 °F (36.5 °C)] 97.7 °F (36.5 °C)  Pulse:  [56-79] 76  Resp:  [13-20] 15  SpO2:  [98 %-100 %] 98 %  BP: (181-244)/() 217/97     Weight: 84.4 kg (186 lb)  Body mass index is 30.95 kg/m².     Physical Exam  Vitals reviewed.   Constitutional:       Appearance: Normal appearance. She is normal weight. She is not ill-appearing.   HENT:      Head: Normocephalic.      Mouth/Throat:      Mouth: Mucous membranes are moist.       Pharynx: Oropharynx is clear.   Eyes:      General: Lids are normal. Gaze aligned appropriately.      Conjunctiva/sclera: Conjunctivae normal.   Cardiovascular:      Rate and Rhythm: Normal rate and regular rhythm.      Pulses: Normal pulses.      Heart sounds: Normal heart sounds.   Pulmonary:      Effort: Pulmonary effort is normal.      Breath sounds: Normal breath sounds.   Abdominal:      General: Bowel sounds are normal.      Palpations: Abdomen is soft.   Musculoskeletal:         General: Normal range of motion.      Cervical back: Normal range of motion.   Skin:     General: Skin is warm and dry.   Neurological:      Mental Status: She is oriented to person, place, and time. She is lethargic.      Cranial Nerves: Facial asymmetry present.      Sensory: Sensation is intact.      Motor: Motor function is intact.      Coordination: Finger-Nose-Finger Test abnormal.      Comments: Unable to assess gait   Psychiatric:         Mood and Affect: Mood normal.                Significant Labs: All pertinent labs within the past 24 hours have been reviewed.  CBC:   Recent Labs   Lab 04/04/25  1347   WBC 6.62   HGB 12.2   HCT 35.9*        CMP:   Recent Labs   Lab 04/04/25  1347      K 3.2*      CO2 18*   BUN 23   CREATININE 1.5*   CALCIUM 9.4   ALBUMIN 3.9   BILITOT 0.6   ALKPHOS 83   AST 13   ALT 12   ANIONGAP 12       Significant Imaging: I have reviewed all pertinent imaging results/findings within the past 24 hours.  Imaging Results              CTA Head and Neck (xpd) (Final result)  Result time 04/04/25 19:48:12   Procedure changed from CTA Head     Final result by Pedro Howard MD (04/04/25 19:48:12)                   Impression:      1. Small acute infarcts within the left cerebellar hemisphere, as discussed and better appreciated on earlier brain MRI.  2. CTA head and neck with no evidence of high-grade stenosis, large vessel occlusion, or dissection.  3. Questionable small 1-2 mm  aneurysm involving the supraclinoid portion of the left distal ICA.      Electronically signed by: Pedro Howard MD  Date:    04/04/2025  Time:    19:48               Narrative:    EXAMINATION:  CTA HEAD AND NECK (XPD)    CLINICAL HISTORY:  Cerebral aneurysm, follow-up;    TECHNIQUE:  Non contrast low dose axial images were obtained through the head.  CT angiogram was performed from the level of the leroy to the top of the head following the IV administration of 100mL of Omnipaque 350.   Sagittal and coronal reconstructions and maximum intensity projection reconstructions were performed. Arterial stenosis percentages are based on NASCET measurement criteria.  Rapid AI screening was performed.    COMPARISON:  CT head and MRI brain from the same date.    FINDINGS:  As discussed on earlier MRI brain, small acute infarcts are seen within the left cerebellar hemisphere.  Small remote infarcts are seen within the right cerebellar hemisphere.  No evidence of additional acute/recent major vascular distribution cerebral infarction, intraparenchymal hemorrhage, or intra-axial space occupying lesion. The ventricular system is stable in size and configuration with no evidence of hydrocephalus. No effacement of the skull-base cisterns. No abnormal extra-axial fluid collections or blood products. Visualized paranasal sinuses and mastoid air cells are clear. The calvarium shows no significant abnormality.    CTA Neck: The origins of the right brachiocephalic, left common carotid and left subclavian arteries from the arch are within normal limits.  The origins of the vertebral arteries are within normal limits.  The vertebral arteries are patent without evidence for focal stenosis or occlusion.   The bilateral common carotid arteries and internal carotid arteries are patent without evidence for focal stenosis or occlusion.  Plaquing is seen at the right carotid bifurcation without significant stenosis.  No evidence of carotid or  vertebral artery dissection.    Anterior circulation: Questionable small 1-2 mm aneurysm is seen involving the supraclinoid portion of the left distal ICA.  The bilateral distal cervical, petrous, cavernous, and supraclinoid ICAs are patent with otherwise no significant stenosis or additional aneurysm.  The anterior and middle cerebral arteries are patent without significant stenosis, occlusion, or aneurysm.  Right A1 segment is hypoplastic.  Flow to the right anterior cerebral artery appears supplied via the anterior communicating artery.    Posterior circulation: Distal vertebral arteries, basilar artery and posterior cerebral arteries are patent without significant focal stenosis, occlusion, or aneurysm.    Airways: Unremarkable.    Glands/Nodes: The parotid, submandibular, and thyroid glands are unremarkable.    Spine: No acute cervical spine abnormalities are identified.    Lungs: Visualized lung apices are clear.                                        MRI Brain Without Contrast (Final result)  Result time 04/04/25 17:21:51      Final result by Pedro Howard MD (04/04/25 17:21:51)                   Impression:      Multifocal acute infarcts within the left cerebellar hemisphere.    This report was flagged in Epic as abnormal.      Electronically signed by: Pedro Howard MD  Date:    04/04/2025  Time:    17:21               Narrative:    EXAMINATION:  MRI BRAIN WITHOUT CONTRAST    CLINICAL HISTORY:  Stroke, follow up;    TECHNIQUE:  Multiplanar multisequence MR imaging of the brain was performed without contrast.    COMPARISON:  CT head from the same date.  MRI brain November 2022.    FINDINGS:  The brain is normal in contour and morphology.  Multifocal acute infarcts are seen within the left cerebellar hemisphere.  Stable small remote infarcts are seen in the right cerebellar hemisphere.  There is minimal chronic microvascular ischemic disease.  Ventricles are stable in size and configuration without  evidence for hydrocephalus.  No intracranial hemorrhage or extraaxial fluid collection.  No mass or mass effect.  Flow voids are normal in appearance.    Visualized paranasal sinuses and mastoid air cells are clear.  Orbits appear normal.                                       CT Head Without Contrast (Final result)  Result time 04/04/25 14:20:09      Final result by Tc Kelly MD (04/04/25 14:20:09)                   Impression:      Age indeterminate but potentially recent small left cerebellar infarct not visualized on prior studies.  No intracranial hemorrhage or mass effect.  MR imaging to be considered, particularly in light of the history, if clinically warranted.      Electronically signed by: Tc Kelly  Date:    04/04/2025  Time:    14:20               Narrative:    EXAMINATION:  CT HEAD WITHOUT CONTRAST    CLINICAL HISTORY:  Dizziness, persistent/recurrent, cardiac or vascular cause suspected; fatigue, nausea    TECHNIQUE:  Low dose axial images were obtained through the head.  Coronal and sagittal reformations were also performed. Contrast was not administered.    COMPARISON:  08/27/2023    FINDINGS:  There is an element of volume loss again identified.    There is an age indeterminate but potentially recent/subacute left inferior cerebellar infarct that is not visualized on the prior studies.    No hydrocephalus, mass effect, or intracranial hemorrhage.    Small chronic right cerebellar infarcts and chronic left occipital infarct again identified.    The calvarium is intact.  The visualized sinuses and mastoid air cells are clear.                                       X-Ray Chest AP Portable (Final result)  Result time 04/04/25 13:49:51      Final result by Fabrizio Rodas MD (04/04/25 13:49:51)                   Impression:      As above      Electronically signed by: Fabrizio Rodas  Date:    04/04/2025  Time:    13:49               Narrative:    EXAMINATION:  XR CHEST AP  PORTABLE    CLINICAL HISTORY:  Cough, unspecified    TECHNIQUE:  Single frontal view of the chest was performed.    COMPARISON:  August 7, 2023    FINDINGS:  Stable enlargement of cardiopericardial silhouette as well as some right paratracheal soft tissue prominence felt related to tortuous arch vessels.  Pulmonary vasculature normal.  No focal infiltrates, pleural effusions, or pneumothorax.  No acute bony findings.  EKG leads superimposed chest and abdomen.                                      Assessment/Plan:     Assessment & Plan  Cerebellar stroke  History of past CVA    Patient presenting with unsteady gait, weakness and dizziness for the past 2 days.  Imaging revealed multifocal acute infarcts in the left cerebellar hemisphere and small remote infarcts in the right.  Facial droop and ataxia on exam.    -tele vascular neurology consulted; appreciate recommendations  -continue aspirin 81 mg daily  -start loading dose 300 mg clopidogrel x 1 and 75 mg clopidogrel daily  -patient is allergic to atorvastatin; unable to start secondary to allergy.  She is unclear on what happens if she takes statins, she only knows she is unable to take them   -OT/PT/ST consulted  -neuro checks q.4  -hold blood pressure medications    VTE prophylaxis: Heparin 5000 units SQ every 8 hours    BP parameters: Infarct: No intervention, SBP <220; utilize prn labetalol for systolic>220 and diastolic >110      Essential hypertension  Patient's blood pressure range in the last 24 hours was: BP  Min: 181/81  Max: 244/105.The patient's inpatient anti-hypertensive regimen is listed below:    Current Antihypertensives  Amlodipine 10 mg daily  Metoprolol 100 mg b.i.d.  Valsartan 40 mg daily    Plan  - hold blood pressure medications for permissive hypertension as above  Type 2 diabetes mellitus with hyperglycemia, without long-term current use of insulin  Problem noted.  Most recent A1c 9.0 performed 02/04/2025.  Patient is prescribed metformin  and Jardiance.  Sister reports Jardiance was too expensive and patient has not started to take it yet.  She is compliant with metformin as prescribed      -hold metformin and Jardiance  -low-dose SSI  -Accu-Cheks  Stage 3b chronic kidney disease  Creatinine currently 1.5 with baseline creatinine 1.3-1.6    -continue to monitor  Mild dementia without behavioral disturbance, psychotic disturbance, mood disturbance, or anxiety  Per chart review, patient with history of mild vascular dementia.  She resides alone and family is available as needed    -follow up with PCP/Neurology outpatient  Hypokalemia  Hypomagnesemia    Potassium 3.2 and magnesium 1.5    Plan  - Replete electrolytes as needed  - Monitor electrolytes Daily    VTE Risk Mitigation (From admission, onward)           Ordered     heparin (porcine) injection 5,000 Units  Every 8 hours         04/04/25 2026     IP VTE HIGH RISK PATIENT  Once         04/04/25 1810     Place sequential compression device  Until discontinued         04/04/25 1810                       Jeni Flowers NP  Department of Hospital Medicine  [facility] - Emergency Dept

## 2025-04-05 NOTE — SUBJECTIVE & OBJECTIVE
Interval History:  Upon waking this morning, patient reported vision changes, and she noted to have a faint left facial droop which was unclear if it was present yesterday.  Stat head CT obtained with no significant change.  Again seen by Tele-neurology post repeat head CT scan.  Upon my visit with at the bedside, and vision changes were not new and she uses glasses as pointed out by her family.    Review of Systems   Constitutional:  Negative for chills and fever.   Eyes:  Positive for visual disturbance.   Musculoskeletal:  Positive for gait problem.   Neurological:  Positive for dizziness, facial asymmetry and weakness.     Objective:     Vital Signs (Most Recent):  Temp: 98.3 °F (36.8 °C) (04/05/25 1322)  Pulse: 73 (04/05/25 1320)  Resp: 18 (04/05/25 1320)  BP: (Abnormal) 208/96 (04/05/25 1320)  SpO2: 100 % (04/05/25 1320) Vital Signs (24h Range):  Temp:  [97.5 °F (36.4 °C)-98.6 °F (37 °C)] 98.3 °F (36.8 °C)  Pulse:  [62-91] 73  Resp:  [11-28] 18  SpO2:  [98 %-100 %] 100 %  BP: (173-230)/(59-97) 208/96     Weight: 84.4 kg (186 lb)  Body mass index is 30.95 kg/m².  No intake or output data in the 24 hours ending 04/05/25 1359      Physical Exam  Vitals and nursing note reviewed.   Constitutional:       General: She is not in acute distress.     Appearance: Normal appearance. She is not ill-appearing or toxic-appearing.   HENT:      Head: Normocephalic and atraumatic.      Comments: Left facial droop     Nose: Nose normal.   Eyes:      General: Lids are normal. Gaze aligned appropriately.      Extraocular Movements: Extraocular movements intact.      Conjunctiva/sclera: Conjunctivae normal.   Cardiovascular:      Rate and Rhythm: Normal rate and regular rhythm.      Pulses: Normal pulses.      Heart sounds: Normal heart sounds.   Pulmonary:      Effort: Pulmonary effort is normal.      Breath sounds: Normal breath sounds.   Abdominal:      General: Bowel sounds are normal.      Palpations: Abdomen is soft.       Tenderness: There is no abdominal tenderness. There is no guarding or rebound.   Musculoskeletal:         General: Normal range of motion.      Cervical back: Normal range of motion.   Skin:     General: Skin is warm and dry.   Neurological:      Mental Status: She is alert and oriented to person, place, and time.      Cranial Nerves: Facial asymmetry present.      Sensory: Sensation is intact.      Coordination: Coordination abnormal. Finger-Nose-Finger Test abnormal.      Comments: Noted left-sided slight facial droop, smile asymmetrical; no appreciable strength deficits bilaterally in upper and lower extremities, abnormal coordination.  Gait was not assessed.   Psychiatric:         Attention and Perception: Attention normal.         Speech: Speech normal.               Significant Labs: All pertinent labs within the past 24 hours have been reviewed.    Significant Imaging: I have reviewed all pertinent imaging results/findings within the past 24 hours.

## 2025-04-05 NOTE — ED NOTES
"Pt c/o blurry vision "since I woke up with morning." Md Lechuga contacted via secure chat. No other deficits noted.  "

## 2025-04-05 NOTE — ASSESSMENT & PLAN NOTE
History of past CVA    Patient presenting with unsteady gait, weakness and dizziness for the past 2 days.  Imaging revealed multifocal acute infarcts in the left cerebellar hemisphere and small remote infarcts in the right.  Facial droop and ataxia on exam.    -tele vascular neurology consulted; appreciate recommendations  -continue aspirin 81 mg daily  -start loading dose 300 mg clopidogrel x 1 and 75 mg clopidogrel daily  -patient is allergic to atorvastatin; unable to start secondary to allergy.  She is unclear on what happens if she takes statins, she only knows she is unable to take them   -OT/PT/ST consulted  -neuro checks q.4  -hold blood pressure medications    VTE prophylaxis: Heparin 5000 units SQ every 8 hours    BP parameters: Infarct: No intervention, SBP <220; utilize prn labetalol for systolic>220 and diastolic >110

## 2025-04-05 NOTE — PLAN OF CARE
Problem: SLP  Goal: SLP Goal  Description: Speech Language Pathology Goals  Goals expected to be met by 4/19:     1. The pt will tolerate a regular/thin diet given implementation of safe swallow strategies/precautions and without displaying overt signs of airway compromise.   lingual lateralization against resistance, and mandibular extension x15 per session   2. The pt will participate in a cognitive-linguistic evaluation to determine additional therapeutic needs.     Outcome: Progressing     Bedside swallow evaluation completed. SLP recommends a regular/thin diet given implementation of the following swallow precautions: 1:1 assist with set-up due to visual impairment, small bites/sips 1 at a time, alternate bites/sips, HOB elevated with all meals, oral care, and monitor for signs of airway compromise. SLP will follow up to monitor diet tolerance, provide pt/family training, and to complete a cognitive-linguistic evaluation.

## 2025-04-05 NOTE — ASSESSMENT & PLAN NOTE
- Patient with history of mild vascular dementia.  She lives alone and family is available nearby  - Mentation is at baseline, and she is oriented as per family at the bedside

## 2025-04-05 NOTE — PLAN OF CARE
Recommendation:  1. Add heart healthy restrictions to current diabetic diet.   2. Encourage intake at meals as tolerated. 3. Monitor weight/labs.   4. RD to follow to monitor po intake    Goals:  Pt will tolerate diet with at least 50-75% intake at meals by RD follow up  Nutrition Goal Status: new

## 2025-04-05 NOTE — PLAN OF CARE
Cued into the room with permission. Patient slow to respond but answered some questions. Oriented to person and place. Plan of care reviewed with patient. No family at bedside. Will continue to be available as needed

## 2025-04-05 NOTE — ASSESSMENT & PLAN NOTE
- Patient presented with unsteady gait, weakness and dizziness for > 2 days.  Imaging revealed multifocal acute infarcts in the left cerebellar hemisphere and small remote infarcts in the right.  Facial droop and ataxia on exam.  - Tele vascular neurology consulted; appreciate recommendations  - S/p loading dose of 300 mg clopidogrel x 1 and followed by 75 mg clopidogrel daily  - Increasing aspirin dosage to 325 mg p.o. daily as per Tele-Neurology recommendations  - Repeat stat head CT followed by MRI of the brain without contrast without significant change today  - Patient reported she was allergic to atorvastatin, and this medication was not started.  This was again addressed today with family at the bedside with reports of nausea being the main side effect reported, but otherwise no reported myalgias or change in liver function tests  - Will give trial of atorvastatin 80 mg p.o. daily today and assess daily  - 2D echo and bilateral lower extremity ultrasound is pending  - Consult placed to PT, OT, and speech therapy  - Continue neuro checks q.4 and monitor on telemetry  - Hold blood pressure medications  - Will continue allow for permissive hypertension for now and will plan on starting blood pressure medications in stepwise fashion later today

## 2025-04-05 NOTE — ASSESSMENT & PLAN NOTE
Patient's blood pressure range in the last 24 hours was: BP  Min: 181/81  Max: 244/105.The patient's inpatient anti-hypertensive regimen is listed below:    Current Antihypertensives  Amlodipine 10 mg daily  Metoprolol 100 mg b.i.d.  Valsartan 40 mg daily    Plan  - hold blood pressure medications for permissive hypertension as above

## 2025-04-05 NOTE — PLAN OF CARE
Problem: Occupational Therapy  Goal: Occupational Therapy Goal  Description: Goals to be met by: 4/19/2025     Patient will increase functional independence with ADLs by performing:    UE Dressing with Stand-by Assistance.  LE Dressing with Minimal Assistance.  Grooming while seated at sink with Stand-by Assistance.  Toileting from bedside commode with Contact Guard Assistance for hygiene and clothing management.   Toilet transfer to bedside commode with Contact Guard Assistance.    Outcome: Progressing     Initial OT eval/treat complete.  Has comfort height toilet.  Needs TTB, RW, and BSC currently though will defer to next level of care.  Recommend post acute Moderate Intensity therapy.  To benefit from continued acute care OT services to increase independence in self-care/functional transfers.  OT to follow.

## 2025-04-05 NOTE — TELEMEDICINE CONSULT
[facility]  Tele-Vascular Neurology   Consult Note      Consult Information  Inpatient consult to Neurology Services (Vascular Neurology)  Consult performed by: Ramsey Ontiveros MD  Consult ordered by: Jeni Flowers NP  Reason for consult: Acute left cerebellar infarct, history of prior infarct, multifocal intracranial atherosclerosis  Assessment/Recommendations: Risk factors: HTN, HLD, DM II, Age, prior stroke    -Transthoracic ECHO (TTE) with Bubble. If + for PFO, perform b/l LE USG to r/o DVT. If neg for DVT, Holter monitor at d/c if TTE is negative. If holter is neg/inconclusive, consider ILR (implantable loop recorder)  -load with ASA 325mg and Plavix 300mg (if not loaded yesterday)  -Daily aspirin 325 mg +  clopidogrel 75 mg x 90 days (intracranial atherosclerosis and suspected atheroembolism) followed by ASA 81mg OD monotherapy therafter. Aspirin and Plavix response study. If not therapeutically adequate, switch to Brilinta 90mg BID  -High intensity statin (Atorvastatin 80mg OD/Rosuvastatin 20mg OD)   -Permissive HTN x 24 hrs post index event   -Goal Parameters for TIA/stroke: LDL<70 mg/dl, HbA1C: <7.0 %,  SBP<130/80 (discussed risk factor optimization in order to reduce the risk of future events with help of PCP)  -PT/OT/Speech recs for discharge planning    -close follow up with vascular neurology in the clinic                Consulting Provider: ALIDA BURNETT   Current Providers  No providers found    Patient Location:  [facility] EMERGENCY DEPARTMENT IP Unit    Spoke hospital nurse at bedside with patient assisting consultant.  Patient information was obtained from patient.       Vascular Neurology Documentation       NIH Scale:         Modified Talmage:    Inderjit Coma Scale:     ABCD2 Score:    [id number] Score:    HAS -BLED Score:    ICH Score:    Hunt & Danielle Classification:      Blood pressure (!) 200/88, pulse 79, temperature 97.5 °F (36.4 °C), temperature source Oral, resp. rate (!)  "24, height 5' 5" (1.651 m), weight 84.4 kg (186 lb), SpO2 99%, not currently breastfeeding.    VAN Stroke Assessment: Negative    Medical Decision Making  HPI:  69 y.o. female with a PMHx of hypertension, diabetes mellitus, CKD and dementia who presents with generalized weakness and dizziness for the past 2 days.  Acute tele stroke team was reached out after MRI showed left cerebellar stroke.  No intravenous thrombolysis was recommended since patient was out of the treatment window.  Loading with dual antiplatelet was recommended.  This morning, patient had new left facial droop and right tongue deviation noted by ER nurse and on-call vascular neurology team was reached out.  On my conversation with patient, patient reports that she has had previous stroke.  Further chart review clarifies that patient has been seen in 2017 at [facility] for stroke (location:  Left occipital lobe on MRI) and she was treated with antiplatelet for secondary stroke prevention.  She is unclear if she is taking her aspirin.  At that time in 2017, CT angio head demonstrated left P1 occlusion, right M2 stenosis and basilar artery stenosis (imaging are not available to review but reports are under care everywhere).      Images personally reviewed and interpreted:  Study: Head CT, CTA Head & Neck, and MRI Brain  Study Interpretation:   Imaging:   Imaging: Images were reviewed remotely as they were acquired.    CTH:  Possible small left cerebellar infarct.    CTA H/N (personal review):  Left P1 occlusion (chronic), right intracranial vertebral artery (V4 segment) stenosis, right inferior M2 origin stenosis, luminal irregularity of the basilar artery, all of the above appears to be chronic    MRI brain without contrast:  Multifocal acute infarcts within the left cerebellar hemisphere without mass effect         Additional studies reviewed:   Study: Cardiac_Neuro: 2D echo  Study Interpretation:  Ordered but not performed    Laboratory studies " reviewed:  BMP:   Lab Results   Component Value Date    GLUCOSE 184 (H) 04/05/2025     04/05/2025     02/04/2025    K 3.8 04/05/2025    K 3.8 02/04/2025     (H) 04/05/2025     02/04/2025    CO2 21 (L) 04/05/2025    CO2 23 02/04/2025    BUN 21 04/05/2025    CREATININE 1.5 (H) 04/05/2025    CALCIUM 8.5 (L) 04/05/2025    CALCIUM 9.3 02/04/2025     CBC:   Lab Results   Component Value Date    WBC 5.50 04/05/2025    RBC 4.13 04/05/2025    RBC 4.21 02/04/2025    HGB 11.1 (L) 04/05/2025    HGB 11.4 (L) 02/04/2025    HCT 33.8 (L) 04/05/2025    HCT 35.0 (L) 02/04/2025    HCT 37 06/10/2021     04/05/2025     02/04/2025    MCV 82 04/05/2025    MCV 83 02/04/2025    MCH 26.9 (L) 04/05/2025    MCHC 32.8 04/05/2025    MCHC 32.6 02/04/2025     Lipid Panel:   Lab Results   Component Value Date    CHOL 223 (H) 02/04/2025    LDLCALC 160.0 (H) 02/04/2025    HDL 40 02/04/2025    TRIG 115 02/04/2025     Coagulation:   Lab Results   Component Value Date    INR 1.0 04/05/2025    APTT 24.8 04/05/2025    APTT 27.6 11/15/2022     Hgb A1C:   Lab Results   Component Value Date    HGBA1C 8.0 (H) 04/04/2025    HGBA1C 9.0 (H) 02/04/2025     TSH:   Lab Results   Component Value Date    TSH 1.157 04/05/2025    TSH 1.168 02/04/2025       Documentation personally reviewed:  Notes: Notes: H&P and other:  Extensive chart review from [facility] from 2017     Post charge discharge plan:  Clinic follow up: Vascular Neurology    Visit Type: in person or virtual  Timeframe: 2 weeks        Additional Physical Exam, History, & ROS  ROS  Physical Exam  Neurological:      Mental Status: She is alert.      GCS: GCS eye subscore is 4. GCS verbal subscore is 5. GCS motor subscore is 6.      Cranial Nerves: Cranial nerve deficit (Left lower facial asymmetry) present.      Motor: Motor function is intact.      Coordination: Heel to Shin Test abnormal (According to nurse on left).       Past Medical History:   Diagnosis Date     Colon polyp     CVA (cerebral vascular accident)     DM2 (diabetes mellitus, type 2)     Essential hypertension      Past Surgical History:   Procedure Laterality Date    ANKLE FRACTURE SURGERY Right     ANKLE FRACTURE SURGERY      COLONOSCOPY N/A 12/11/2020    Procedure: COLONOSCOPY;  Surgeon: Marshal Rogers MD;  Location: [facility] ([address] [place] [address]);  Service: Endoscopy;  Laterality: N/A;  11/18-plavix hold ok see te-covid pcw 12/8-tb    COLONOSCOPY W/ POLYPECTOMY      HYSTERECTOMY      no h/o  malignancy     Family History   Problem Relation Name Age of Onset    Alzheimer's disease Mother      Prostate cancer Father      Diabetes Father      Colon cancer Brother      Breast cancer Paternal Aunt      Heart disease Neg Hx      Stroke Neg Hx         Diagnoses  No problems updated.    Ramsey Ontiveros MD    Neurology consultation requested by spoke provider. Audiovisual encounter with the patient performed using a secure connection.  Results and impressions from the visit are documented on this note and were communicated to the consulting provider/team via direct communication. The note has been shared for addition to the patients electronic medical record.

## 2025-04-06 LAB
ABSOLUTE EOSINOPHIL (OHS): 0.21 K/UL
ABSOLUTE MONOCYTE (OHS): 0.45 K/UL (ref 0.3–1)
ABSOLUTE NEUTROPHIL COUNT (OHS): 2.91 K/UL (ref 1.8–7.7)
ALBUMIN SERPL BCP-MCNC: 3.4 G/DL (ref 3.5–5.2)
ALP SERPL-CCNC: 70 UNIT/L (ref 40–150)
ALT SERPL W/O P-5'-P-CCNC: 12 UNIT/L (ref 10–44)
ANION GAP (OHS): 9 MMOL/L (ref 8–16)
AST SERPL-CCNC: 11 UNIT/L (ref 11–45)
BASOPHILS # BLD AUTO: 0.03 K/UL
BASOPHILS NFR BLD AUTO: 0.6 %
BILIRUB SERPL-MCNC: 0.2 MG/DL (ref 0.1–1)
BUN SERPL-MCNC: 21 MG/DL (ref 8–23)
CALCIUM SERPL-MCNC: 8.9 MG/DL (ref 8.7–10.5)
CHLORIDE SERPL-SCNC: 114 MMOL/L (ref 95–110)
CO2 SERPL-SCNC: 21 MMOL/L (ref 23–29)
CREAT SERPL-MCNC: 1.4 MG/DL (ref 0.5–1.4)
ERYTHROCYTE [DISTWIDTH] IN BLOOD BY AUTOMATED COUNT: 14.4 % (ref 11.5–14.5)
GFR SERPLBLD CREATININE-BSD FMLA CKD-EPI: 41 ML/MIN/1.73/M2
GLUCOSE SERPL-MCNC: 132 MG/DL (ref 70–110)
HCT VFR BLD AUTO: 34 % (ref 37–48.5)
HGB BLD-MCNC: 11.2 GM/DL (ref 12–16)
IMM GRANULOCYTES # BLD AUTO: 0.01 K/UL (ref 0–0.04)
IMM GRANULOCYTES NFR BLD AUTO: 0.2 % (ref 0–0.5)
LYMPHOCYTES # BLD AUTO: 1.46 K/UL (ref 1–4.8)
MCH RBC QN AUTO: 27.2 PG (ref 27–31)
MCHC RBC AUTO-ENTMCNC: 32.9 G/DL (ref 32–36)
MCV RBC AUTO: 83 FL (ref 82–98)
NUCLEATED RBC (/100WBC) (OHS): 0 /100 WBC
PLATELET # BLD AUTO: 229 K/UL (ref 150–450)
PMV BLD AUTO: 11.4 FL (ref 9.2–12.9)
POCT GLUCOSE: 113 MG/DL (ref 70–110)
POCT GLUCOSE: 137 MG/DL (ref 70–110)
POCT GLUCOSE: 157 MG/DL (ref 70–110)
POCT GLUCOSE: 160 MG/DL (ref 70–110)
POCT GLUCOSE: 201 MG/DL (ref 70–110)
POCT GLUCOSE: 216 MG/DL (ref 70–110)
POTASSIUM SERPL-SCNC: 4 MMOL/L (ref 3.5–5.1)
PROT SERPL-MCNC: 7.2 GM/DL (ref 6–8.4)
RBC # BLD AUTO: 4.12 M/UL (ref 4–5.4)
RELATIVE EOSINOPHIL (OHS): 4.1 %
RELATIVE LYMPHOCYTE (OHS): 28.8 % (ref 18–48)
RELATIVE MONOCYTE (OHS): 8.9 % (ref 4–15)
RELATIVE NEUTROPHIL (OHS): 57.4 % (ref 38–73)
SODIUM SERPL-SCNC: 144 MMOL/L (ref 136–145)
WBC # BLD AUTO: 5.07 K/UL (ref 3.9–12.7)

## 2025-04-06 PROCEDURE — 80053 COMPREHEN METABOLIC PANEL: CPT | Performed by: NURSE PRACTITIONER

## 2025-04-06 PROCEDURE — 97162 PT EVAL MOD COMPLEX 30 MIN: CPT

## 2025-04-06 PROCEDURE — 97530 THERAPEUTIC ACTIVITIES: CPT

## 2025-04-06 PROCEDURE — 63600175 PHARM REV CODE 636 W HCPCS: Performed by: NURSE PRACTITIONER

## 2025-04-06 PROCEDURE — 63600175 PHARM REV CODE 636 W HCPCS: Performed by: HOSPITALIST

## 2025-04-06 PROCEDURE — 85025 COMPLETE CBC W/AUTO DIFF WBC: CPT | Performed by: NURSE PRACTITIONER

## 2025-04-06 PROCEDURE — 36415 COLL VENOUS BLD VENIPUNCTURE: CPT | Performed by: NURSE PRACTITIONER

## 2025-04-06 PROCEDURE — 25000003 PHARM REV CODE 250: Performed by: HOSPITALIST

## 2025-04-06 PROCEDURE — 11000001 HC ACUTE MED/SURG PRIVATE ROOM

## 2025-04-06 PROCEDURE — 25000003 PHARM REV CODE 250: Performed by: NURSE PRACTITIONER

## 2025-04-06 RX ORDER — METOPROLOL TARTRATE 50 MG/1
50 TABLET ORAL 2 TIMES DAILY
Status: DISCONTINUED | OUTPATIENT
Start: 2025-04-06 | End: 2025-04-07

## 2025-04-06 RX ORDER — ATORVASTATIN CALCIUM 20 MG/1
80 TABLET, FILM COATED ORAL DAILY
Status: DISCONTINUED | OUTPATIENT
Start: 2025-04-06 | End: 2025-04-09 | Stop reason: HOSPADM

## 2025-04-06 RX ORDER — METOPROLOL TARTRATE 50 MG/1
100 TABLET ORAL 2 TIMES DAILY
Status: DISCONTINUED | OUTPATIENT
Start: 2025-04-06 | End: 2025-04-06

## 2025-04-06 RX ADMIN — HEPARIN SODIUM 5000 UNITS: 5000 INJECTION INTRAVENOUS; SUBCUTANEOUS at 02:04

## 2025-04-06 RX ADMIN — METOPROLOL TARTRATE 50 MG: 50 TABLET, FILM COATED ORAL at 08:04

## 2025-04-06 RX ADMIN — VALSARTAN 40 MG: 40 TABLET, FILM COATED ORAL at 08:04

## 2025-04-06 RX ADMIN — HEPARIN SODIUM 5000 UNITS: 5000 INJECTION INTRAVENOUS; SUBCUTANEOUS at 06:04

## 2025-04-06 RX ADMIN — CLOPIDOGREL 75 MG: 75 TABLET ORAL at 08:04

## 2025-04-06 RX ADMIN — HEPARIN SODIUM 5000 UNITS: 5000 INJECTION INTRAVENOUS; SUBCUTANEOUS at 09:04

## 2025-04-06 RX ADMIN — INSULIN GLARGINE 10 UNITS: 100 INJECTION, SOLUTION SUBCUTANEOUS at 08:04

## 2025-04-06 RX ADMIN — INSULIN ASPART 4 UNITS: 100 INJECTION, SOLUTION INTRAVENOUS; SUBCUTANEOUS at 12:04

## 2025-04-06 RX ADMIN — EZETIMIBE 10 MG: 10 TABLET ORAL at 08:04

## 2025-04-06 RX ADMIN — INSULIN ASPART 2 UNITS: 100 INJECTION, SOLUTION INTRAVENOUS; SUBCUTANEOUS at 08:04

## 2025-04-06 RX ADMIN — ASPIRIN 325 MG ORAL TABLET 325 MG: 325 PILL ORAL at 08:04

## 2025-04-06 RX ADMIN — ATORVASTATIN CALCIUM 80 MG: 20 TABLET, FILM COATED ORAL at 02:04

## 2025-04-06 NOTE — ASSESSMENT & PLAN NOTE
- Patient presented with unsteady gait, weakness and dizziness for > 2 days.  Imaging revealed multifocal acute infarcts in the left cerebellar hemisphere and small remote infarcts in the right.  Facial droop and ataxia on exam.  - Tele vascular neurology consulted; appreciate recommendations  - S/p loading dose of 300 mg clopidogrel x 1 and followed by 75 mg clopidogrel daily  - Increased aspirin dosage to 325 mg p.o. daily as per Tele-Neurology recommendations  - Repeat stat head CT followed by MRI of the brain without contrast without significant change   - 2D echo overall unremarkable with EF to 60-65% and bilateral lower extremity ultrasound negative for DVT  - Patient reported she was allergic to atorvastatin, and this medication was not started.  This was again addressed with patient and family at the bedside with reports of nausea being the main side effect reported, but otherwise no reported myalgias or change in liver function tests  - Will give trial of atorvastatin 80 mg p.o. daily today and assess daily  - Consult placed to PT, OT, and speech therapy  - Continue neuro checks q.4 and monitor on telemetry  - Allowed for permissive hypertension and then re-started blood pressure medications in stepwise fashion later yesterday.  Tolerating valsartan 40 mg p.o. daily and metoprolol 50 mg p.o. b.i.d.  - Will consider further titration of blood pressure  - Patient will need rehab placement

## 2025-04-06 NOTE — PLAN OF CARE
Case Management Assessment     PCP: Dr. Krys Michael  Pharmacy: [facility] on [address]    Patient Arrived From: Home  Existing Help at Home: None    Barriers to Discharge: No help at home    Discharge Plan:    A. Home   B. Home    Patient is A/O x3. Patient lives at home alone. Patient denies any dme use. Patient is not on coumadin or dialysis. Patient's family will provide transportation home at discharge. Cm will continue to follow.       04/06/25 1056   Discharge Assessment   Assessment Type Discharge Planning Assessment   Confirmed/corrected address, phone number and insurance Yes   Confirmed Demographics Correct on Facesheet   Source of Information patient   Reason For Admission Cerebellar stroke   People in Home alone   Facility Arrived From: Home   Do you expect to return to your current living situation? Yes   Do you have help at home or someone to help you manage your care at home? No   Prior to hospitilization cognitive status: Alert/Oriented   Current cognitive status: Alert/Oriented   Walking or Climbing Stairs Difficulty no   Dressing/Bathing Difficulty no   Equipment Currently Used at Home none   Readmission within 30 days? No   Patient currently being followed by outpatient case management? No   Do you currently have service(s) that help you manage your care at home? No   Do you take prescription medications? Yes   Do you have prescription coverage? Yes   Do you have any problems affording any of your prescribed medications? No   Is the patient taking medications as prescribed? yes   Who is going to help you get home at discharge? Family   How do you get to doctors appointments? car, drives self   Are you on dialysis? No   Do you take coumadin? No   Discharge Plan A Home   Discharge Plan B Home   DME Needed Upon Discharge  none   Transition of Care Barriers None     [facility] - Med Surg ([place])  Initial Discharge Assessment       Primary Care Provider: Krys Michael MD    Admission Diagnosis: Cough  [R05.9]  Dizziness [R42]  HTN (hypertension) [I10]  CVA (cerebral vascular accident) [I63.9]  Stroke [I63.9]    Admission Date: 4/4/2025  Expected Discharge Date:     Transition of Care Barriers: (P) None    Payor: [employer] [facility] / Plan: eegoes CHOICES / Product Type: Medicare Advantage /     Extended Emergency Contact Information  Primary Emergency Contact: Maria L Vanessa  Address: [facility] [place] [address] [facility]  Home Phone: [phone]  Mobile Phone: [phone]  Relation: Sister  Secondary Emergency Contact: O'Gwinn,Collette  Relation: Relative  Preferred language: English   needed? No    Discharge Plan A: (P) Home  Discharge Plan B: (P) Home      [id number] - [place], [place] - [address]  [address]  [facility] [address]  Phone: [phone] Fax: [phone]    [id number] [place], [place] - [address] [address]  Phone: [phone] Fax: [phone]      Initial Assessment (most recent)       Adult Discharge Assessment - 04/06/25 1056          Discharge Assessment    Assessment Type Discharge Planning Assessment (P)      Confirmed/corrected address, phone number and insurance Yes (P)      Confirmed Demographics Correct on Facesheet (P)      Source of Information patient (P)      Reason For Admission Cerebellar stroke (P)      People in Home alone (P)      Facility Arrived From: Home (P)      Do you expect to return to your current living situation? Yes (P)      Do you have help at home or someone to help you manage your care at home? No (P)      Prior to hospitilization cognitive status: Alert/Oriented (P)      Current cognitive status: Alert/Oriented (P)      Walking or Climbing Stairs Difficulty no (P)      Dressing/Bathing Difficulty no (P)      Equipment Currently Used at Home none (P)       Readmission within 30 days? No (P)      Patient currently being followed by outpatient case management? No (P)      Do you currently have service(s) that help you manage your care at home? No (P)      Do you take prescription medications? Yes (P)      Do you have prescription coverage? Yes (P)      Do you have any problems affording any of your prescribed medications? No (P)      Is the patient taking medications as prescribed? yes (P)      Who is going to help you get home at discharge? Family (P)      How do you get to doctors appointments? car, drives self (P)      Are you on dialysis? No (P)      Do you take coumadin? No (P)      Discharge Plan A Home (P)      Discharge Plan B Home (P)      DME Needed Upon Discharge  none (P)      Transition of Care Barriers None (P)

## 2025-04-06 NOTE — ASSESSMENT & PLAN NOTE
- Home regimen of amlodipine 10 mg p.o. daily, metoprolol 100 mg p.o. b.i.d. and valsartan 40 mg p.o. daily.  - All medications on hold at this time for permissive hypertension  - Blood pressure medication restarted with valsartan 40 mg p.o. daily, then follow up by metoprolol at lower dose at 50 mg p.o. b.i.d.

## 2025-04-06 NOTE — PLAN OF CARE
Problem: Adult Inpatient Plan of Care  Goal: Plan of Care Review  4/6/2025 0424 by Kt Persaud LPN  Outcome: Progressing  4/6/2025 0422 by Kt Persaud LPN  Outcome: Progressing  Goal: Patient-Specific Goal (Individualized)  4/6/2025 0424 by Kt Persaud LPN  Outcome: Progressing  4/6/2025 0422 by Kt Persaud LPN  Outcome: Progressing  Goal: Absence of Hospital-Acquired Illness or Injury  4/6/2025 0424 by tK Persaud LPN  Outcome: Progressing  4/6/2025 0422 by Kt Persaud LPN  Outcome: Progressing  Goal: Optimal Comfort and Wellbeing  4/6/2025 0424 by Kt Persaud LPN  Outcome: Progressing  4/6/2025 0422 by Kt Persaud LPN  Outcome: Progressing  Goal: Readiness for Transition of Care  4/6/2025 0424 by Kt Persaud LPN  Outcome: Progressing  4/6/2025 0422 by Kt Persaud LPN  Outcome: Progressing     Problem: Stroke, Ischemic (Includes Transient Ischemic Attack)  Goal: Optimal Coping  4/6/2025 0424 by Kt Persaud LPN  Outcome: Progressing  4/6/2025 0422 by Kt Persaud LPN  Outcome: Progressing  Goal: Effective Bowel Elimination  4/6/2025 0424 by Kt Persaud LPN  Outcome: Progressing  4/6/2025 0422 by Kt Persaud LPN  Outcome: Progressing  Goal: Optimal Cerebral Tissue Perfusion  4/6/2025 0424 by Kt Persaud LPN  Outcome: Progressing  4/6/2025 0422 by Kt Persaud LPN  Outcome: Progressing  Goal: Optimal Cognitive Function  4/6/2025 0424 by Kt Persaud LPN  Outcome: Progressing  4/6/2025 0422 by Kt Persaud LPN  Outcome: Progressing  Goal: Improved Communication Skills  4/6/2025 0424 by Kt Persaud LPN  Outcome: Progressing  4/6/2025 0422 by Kt Persaud LPN  Outcome: Progressing  Goal: Optimal Functional Ability  4/6/2025 0424 by Kt Persaud LPN  Outcome: Progressing  4/6/2025 0422 by Kt Persaud LPN  Outcome: Progressing  Goal: Optimal Nutrition Intake  4/6/2025 0424 by Kt Persaud LPN  Outcome: Progressing  4/6/2025 0422 by Kt Persaud LPN  Outcome:  Progressing  Goal: Effective Oxygenation and Ventilation  4/6/2025 0424 by Kt Persaud LPN  Outcome: Progressing  4/6/2025 0422 by Kt Persaud LPN  Outcome: Progressing  Goal: Improved Sensorimotor Function  4/6/2025 0424 by Kt Persaud LPN  Outcome: Progressing  4/6/2025 0422 by Kt Persaud LPN  Outcome: Progressing  Goal: Safe and Effective Swallow  4/6/2025 0424 by Kt Persaud LPN  Outcome: Progressing  4/6/2025 0422 by Kt Persaud LPN  Outcome: Progressing  Goal: Effective Urinary Elimination  4/6/2025 0424 by Kt Persaud LPN  Outcome: Progressing  4/6/2025 0422 by Kt Persaud LPN  Outcome: Progressing     Problem: Fall Injury Risk  Goal: Absence of Fall and Fall-Related Injury  4/6/2025 0424 by Kt Persaud LPN  Outcome: Progressing  4/6/2025 0422 by Kt Persaud LPN  Outcome: Progressing     Problem: Infection  Goal: Absence of Infection Signs and Symptoms  4/6/2025 0424 by Kt Persaud LPN  Outcome: Progressing  4/6/2025 0422 by Kt Persaud LPN  Outcome: Progressing     Problem: Sepsis/Septic Shock  Goal: Optimal Coping  4/6/2025 0424 by Kt Persaud LPN  Outcome: Progressing  4/6/2025 0422 by Kt Persaud LPN  Outcome: Progressing  Goal: Absence of Bleeding  4/6/2025 0424 by Kt Persaud LPN  Outcome: Progressing  4/6/2025 0422 by Kt Persaud LPN  Outcome: Progressing  Goal: Blood Glucose Level Within Targeted Range  4/6/2025 0424 by Kt Persaud LPN  Outcome: Progressing  4/6/2025 0422 by Kt Persaud LPN  Outcome: Progressing  Goal: Absence of Infection Signs and Symptoms  4/6/2025 0424 by Kt Persaud LPN  Outcome: Progressing  4/6/2025 0422 by Kt Persaud LPN  Outcome: Progressing  Goal: Optimal Nutrition Intake  4/6/2025 0424 by Vinnett, Keah, LPN  Outcome: Progressing  4/6/2025 0422 by Kt Persaud LPN  Outcome: Progressing     Problem: Pneumonia  Goal: Fluid Balance  4/6/2025 0424 by Kt Persaud LPN  Outcome: Progressing  4/6/2025 0422 by  Vinnett, Keah, LPN  Outcome: Progressing  Goal: Resolution of Infection Signs and Symptoms  4/6/2025 0424 by Kt Persaud LPN  Outcome: Progressing  4/6/2025 0422 by Kt Persaud LPN  Outcome: Progressing  Goal: Effective Oxygenation and Ventilation  4/6/2025 0424 by Kt Persaud LPN  Outcome: Progressing  4/6/2025 0422 by Kt Persaud LPN  Outcome: Progressing     Problem: Diabetes Comorbidity  Goal: Blood Glucose Level Within Targeted Range  4/6/2025 0424 by Kt Persaud LPN  Outcome: Progressing  4/6/2025 0422 by Kt Persaud LPN  Outcome: Progressing     Problem: Skin Injury Risk Increased  Goal: Skin Health and Integrity  4/6/2025 0424 by Kt Persaud LPN  Outcome: Progressing  4/6/2025 0422 by Kt Persaud LPN  Outcome: Progressing     Problem: Comorbidity Management  Goal: Blood Pressure in Desired Range  4/6/2025 0424 by Kt Persaud LPN  Outcome: Progressing  4/6/2025 0422 by Kt Persaud LPN  Outcome: Progressing     Problem: Nausea and Vomiting  Goal: Nausea and Vomiting Relief  4/6/2025 0424 by Kt Persaud LPN  Outcome: Progressing  4/6/2025 0422 by Kt Persaud LPN  Outcome: Progressing

## 2025-04-06 NOTE — ASSESSMENT & PLAN NOTE
- Most recent A1c 9.0 performed 02/04/2025.  Home regimen is metformin and Jardiance.  Sister reported Jardiance was too expensive and patient has not started this medication.  Home regimen on hold.  - Glucose level reviewed, currently above target  - ContinueLantus 10 units subQ daily today, and increase sliding scale insulin to moderate dose range

## 2025-04-06 NOTE — SUBJECTIVE & OBJECTIVE
Interval History:  No acute events, still with dizziness and weakness with attempt of movement, but otherwise fine in bed.  No new complaint.  Discussed the need for rehab placement as recommended by therapy..    Review of Systems   Constitutional:  Negative for chills and fever.   Musculoskeletal:  Positive for gait problem.   Neurological:  Positive for dizziness, facial asymmetry and weakness.     Objective:     Vital Signs (Most Recent):  Temp: 97.9 °F (36.6 °C) (04/06/25 1114)  Pulse: 76 (04/06/25 1114)  Resp: 17 (04/06/25 1114)  BP: (Abnormal) 147/73 (04/06/25 1114)  SpO2: 100 % (04/06/25 1114) Vital Signs (24h Range):  Temp:  [97.6 °F (36.4 °C)-98.8 °F (37.1 °C)] 97.9 °F (36.6 °C)  Pulse:  [66-87] 76  Resp:  [17-22] 17  SpO2:  [98 %-100 %] 100 %  BP: (147-218)/() 147/73     Weight: 84.4 kg (186 lb 1.1 oz)  Body mass index is 30.96 kg/m².    Intake/Output Summary (Last 24 hours) at 4/6/2025 1142  Last data filed at 4/6/2025 0719  Gross per 24 hour   Intake 850 ml   Output 1 ml   Net 849 ml         Physical Exam  Vitals and nursing note reviewed.   Constitutional:       General: She is not in acute distress.     Appearance: Normal appearance. She is not ill-appearing or toxic-appearing.   HENT:      Head: Normocephalic and atraumatic.      Comments: Left facial droop     Nose: Nose normal.   Eyes:      General: Lids are normal. Gaze aligned appropriately.      Extraocular Movements: Extraocular movements intact.      Conjunctiva/sclera: Conjunctivae normal.   Cardiovascular:      Rate and Rhythm: Normal rate and regular rhythm.      Pulses: Normal pulses.      Heart sounds: Normal heart sounds.   Pulmonary:      Effort: Pulmonary effort is normal.      Breath sounds: Normal breath sounds.   Abdominal:      General: Bowel sounds are normal.      Palpations: Abdomen is soft.      Tenderness: There is no abdominal tenderness. There is no guarding or rebound.   Musculoskeletal:         General: Normal range  of motion.      Cervical back: Normal range of motion.   Skin:     General: Skin is warm and dry.   Neurological:      Mental Status: She is alert and oriented to person, place, and time.      Cranial Nerves: Facial asymmetry present.      Sensory: Sensation is intact.      Coordination: Coordination abnormal. Finger-Nose-Finger Test abnormal.      Comments: Noted left-sided slight facial droop, smile asymmetrical; no appreciable strength deficits bilaterally in upper and lower extremities, abnormal coordination.  Gait was not assessed.   Psychiatric:         Attention and Perception: Attention normal.         Speech: Speech normal.               Significant Labs: All pertinent labs within the past 24 hours have been reviewed.    Significant Imaging: I have reviewed all pertinent imaging results/findings within the past 24 hours.

## 2025-04-06 NOTE — PLAN OF CARE
Problem: Adult Inpatient Plan of Care  Goal: Plan of Care Review  Outcome: Progressing  Goal: Patient-Specific Goal (Individualized)  Outcome: Progressing  Goal: Absence of Hospital-Acquired Illness or Injury  Outcome: Progressing  Goal: Optimal Comfort and Wellbeing  Outcome: Progressing  Goal: Readiness for Transition of Care  Outcome: Progressing     Problem: Stroke, Ischemic (Includes Transient Ischemic Attack)  Goal: Optimal Coping  Outcome: Progressing  Goal: Effective Bowel Elimination  Outcome: Progressing  Goal: Optimal Cerebral Tissue Perfusion  Outcome: Progressing  Goal: Optimal Cognitive Function  Outcome: Progressing  Goal: Improved Communication Skills  Outcome: Progressing  Goal: Optimal Functional Ability  Outcome: Progressing  Goal: Optimal Nutrition Intake  Outcome: Progressing  Goal: Effective Oxygenation and Ventilation  Outcome: Progressing  Goal: Improved Sensorimotor Function  Outcome: Progressing  Goal: Safe and Effective Swallow  Outcome: Progressing  Goal: Effective Urinary Elimination  Outcome: Progressing     Problem: Fall Injury Risk  Goal: Absence of Fall and Fall-Related Injury  Outcome: Progressing     Problem: Infection  Goal: Absence of Infection Signs and Symptoms  Outcome: Progressing     Problem: Sepsis/Septic Shock  Goal: Optimal Coping  Outcome: Progressing  Goal: Absence of Bleeding  Outcome: Progressing  Goal: Blood Glucose Level Within Targeted Range  Outcome: Progressing  Goal: Absence of Infection Signs and Symptoms  Outcome: Progressing  Goal: Optimal Nutrition Intake  Outcome: Progressing     Problem: Pneumonia  Goal: Fluid Balance  Outcome: Progressing  Goal: Resolution of Infection Signs and Symptoms  Outcome: Progressing  Goal: Effective Oxygenation and Ventilation  Outcome: Progressing     Problem: Diabetes Comorbidity  Goal: Blood Glucose Level Within Targeted Range  Outcome: Progressing     Problem: Skin Injury Risk Increased  Goal: Skin Health and  Integrity  Outcome: Progressing     Problem: Comorbidity Management  Goal: Blood Pressure in Desired Range  Outcome: Progressing     Problem: Nausea and Vomiting  Goal: Nausea and Vomiting Relief  Outcome: Progressing

## 2025-04-06 NOTE — PT/OT/SLP EVAL
Physical Therapy Evaluation    Patient Name:  Shabnam Noble   MRN:  [id number]    Recommendations:     Discharge Recommendations: High Intensity Therapy   Discharge Equipment Recommendations: bedside commode, walker, rolling, bath bench, to be determined by next level of care   Barriers to discharge: Inaccessible home and Decreased caregiver support    Assessment:     Shabnam Noble is a 69 y.o. female admitted with a medical diagnosis of Cerebellar stroke.  She presents with the following impairments/functional limitations: decreased safety awareness, impaired balance, weakness, impaired endurance, impaired cardiopulmonary response to activity, impaired self care skills, impaired functional mobility, gait instability, decreased lower extremity function, decreased upper extremity function, impaired coordination, decreased ROM, decreased coordination, visual deficits, impaired fine motor, impaired cognition .     Patient evaluated by PT and goals established. Shabnam reports significant dizziness upon sitting EOB, BP assessed and read 195/79. The patient demonstrates poor coordination and safety awareness upon attempted side stepping EOB with RW. Patient demonstrates poor RW management and posterior trunk lean, requiring minimal assist to maintain balance.  The patient lives alone with minimal support.  PT will continue to follow and progress as tolerated. Rec for dc to High Intensity Therapy.    Prior to admission pt was modified independent with mobility and self-care and there is expectation of returning to prior level of function to maintain independence avoiding readmission. Pt is at high risk of unplanned readmission due to fall risk and lack of 24 hour caregiver in prior setting. The lower level of care cannot provide total interdisciplinary approach needed. Pt is able to tolerate 3 hours of daily therapy. Pt is pleasant and motivated to return to prior level of function.      The mobility limitation cannot be  sufficiently resolved by the use of a cane.   Patient's functional mobility deficit can be sufficiently resolved with the use of a rolling walker. Patient's mobility limitation significantly impairs their ability to participate in one of more activities of daily living. The use of a rolling walker will significantly improve the patient's ability to participate in MRADLS and the patient will use it on regular basis in the home.       Rehab Prognosis: Good; patient would benefit from acute skilled PT services to address these deficits and reach maximum level of function.    Recent Surgery: * No surgery found *      Plan:     During this hospitalization, patient to be seen 5 x/week to address the identified rehab impairments via gait training, therapeutic activities, therapeutic exercises and progress toward the following goals:    Plan of Care Expires:  05/06/25    Subjective     Chief Complaint: dizziness   Patient/Family Comments/goals: patient agrees to participate in PT evaluation   Pain/Comfort:  Pain Rating 1: 0/10    Patients cultural, spiritual, Syn[employer] conflicts given the current situation: no    Living Environment:  Patient lives alone and was previously independent with all functional mobility and ADLs. Patient reports her sister will drive her as needed.   Prior to admission, patients level of function was independent with functional mobility and ADLs.  Equipment used at home: none.  DME owned (not currently used): none.  Upon discharge, patient will have assistance from sister occasionally.    Objective:     Communicated with nursing prior to session.  Patient found supine with peripheral IV, telemetry, bed alarm  upon PT entry to room.    General Precautions: Standard, fall  Orthopedic Precautions:N/A   Braces: N/A  Respiratory Status: Room air    Exams:  Cognition:   Patient is oriented to person, place, time and situation.  Pt follows approximately 100% of  one and two step commands.    Mood:  Pleasant and cooperative.   Safety Awareness: poor  Musculoskeletal:  BMI: 30  Posture:  slouched posture, forward head posture   LE ROM/Strength:   R ROM: WNL  L ROM: WNL  R Strength:   Knee extension: 4/5  Dorsiflexion: 4/5   L Strength:   Knee extension: 4/5  Dorsiflexion: 4/5   Neuromuscular:  Sensation: Intact to light touch bilateral LEs.   Tone/Reflexes: No impairments identified with functional mobility. No formal testing performed.   Coordination:  Heel to shin: significantly impaired   Balance:   Static sitting: good (SBA)  Static standing: poor (minimal assist)  Dynamic standing: poor (mod to max A)  Visual-vestibular: No impairments identified with functional mobility. No formal testing performed.  Integument: Visible skin intact  Cardiopulmonary:   Edema: none noted  '    Functional Mobility:  Bed Mobility:     Supine to Sit: contact guard assistance  Sit to Supine: contact guard assistance  Transfers:     Sit to Stand:  minimum assistance with rolling walker  Gait: Patient attempted side stepping along EOB. Patient with poor management of RW, poor safety awareness and impulsivity with side stepping. Significant posterior lean/LOB noted. Wide and ataxic gait noted. Patient requires moderate assist with occ maximal assist to regain stability with attempted side step.       AM-PAC 6 CLICK MOBILITY  Total Score:15       Treatment & Education:  PT educated patient on the following   PT plan of care/role of PT  Safety with OOB mobility  Use of RW  Discharge disposition    Pt  verbalized understanding       Patient left supine with all lines intact and call button in reach.    GOALS:   Multidisciplinary Problems       Physical Therapy Goals          Problem: Physical Therapy    Goal Priority Disciplines Outcome Interventions   Physical Therapy Goal     PT, PT/OT Progressing    Description: Goals to be met by: 2025    Patient will increase functional independence with mobility by performin.  Sit<>stand with SBA with RW.  2. Gait x 50 feet with CGA with RW.  3. Ascend/descend threshold  step(s) with least restrictive assistive device and minimal assist .                           DME Justifications:   Shabnam's mobility limitation cannot be sufficiently resolved by the use of a cane. Her functional mobility deficit can be sufficiently resolved with the use of a Rolling Walker. Patient's mobility limitation significantly impairs their ability to participate in one of more activities of daily living.  The use of a RW will significantly improve the patient's ability to participate in MRADLS and the patient will use it on regular basis in the home.    History:     Past Medical History:   Diagnosis Date    Colon polyp     CVA (cerebral vascular accident)     DM2 (diabetes mellitus, type 2)     Essential hypertension        Past Surgical History:   Procedure Laterality Date    ANKLE FRACTURE SURGERY Right     ANKLE FRACTURE SURGERY      COLONOSCOPY N/A 12/11/2020    Procedure: COLONOSCOPY;  Surgeon: Marshal Rogers MD;  Location: [address]);  Service: Endoscopy;  Laterality: N/A;  11/18-plavix hold ok see te-covid pcw 12/8-tb    COLONOSCOPY W/ POLYPECTOMY      HYSTERECTOMY      no h/o  malignancy       Time Tracking:     PT Received On: 04/06/25  PT Start Time: 1005     PT Stop Time: 1025  PT Total Time (min): 20 min     Billable Minutes: Evaluation 10 and Therapeutic Activity 10      04/06/2025

## 2025-04-06 NOTE — PROGRESS NOTES
[facility]  Progress Note    Patient Name: Shabnam Noble  MRN: [id number]  Patient Class: IP- Inpatient   Admission Date: 4/4/2025  Length of Stay: 2 days  Attending Physician: Bridgette Lechuga MD  Primary Care Provider: Krys Michael MD        Subjective     Principal Problem:Cerebellar stroke        HPI:  Shabnam Noble is a 69 year old female with a PMHx of hypertension, diabetes mellitus, CKD and dementia who presents with generalized weakness and dizziness for the past 2 days.  She reports associated subjective fever, nausea, tremors, diaphoresis and headache.  Per EMS, patient had 1 episode of vomiting PTA that was relieved with IV Zofran.  ED note reports patient has dizziness worsens when she stands up and she has difficulty maintaining her balance.  No recent falls reported and she does not use a cane or walker.  Patient resides home alone and is usually able to manage.  She denies chest pain and has had no further episodes of vomiting since arriving to the ED. patient lethargic on exam, arouses to verbal stimuli.  Information obtained per patient and sister at bedside.    Upon arrival to the ED, patient found to be hypertensive with systolic 220s.  Creatinine 1.5 (near baseline), , potassium 3.2 and magnesium 1.5.  COVID and flu negative.  CBC unremarkable.  Chest x-ray with stable cardiomegaly.  MRI revealed multifocal acute infarcts within the left cerebellar hemisphere and CTA head and neck showed small acute infarcts in the left hemisphere and small remote infarcts in the right cerebellar hemisphere.  No focal stenosis, occlusion or dissection.  Small 1-2 mm aneurysm involving the supraclinoid portion of the left distal ICA.  Tele neurology consulted in the ED recommendations provided.  Patient referred to Hospital Medicine and will be admitted for further evaluation and management.    Overview/Hospital Course:  Ms. Noble presented weakness and dizziness for  greater than 2 days.  Admitted with acute CVA involving the left cerebellar and brainstem.  Treatment initiated with permissive hypertension, aspirin and Plavix, but statin not given due to reported allergy.  Vascular neurology consulted.    Interval History:  No acute events, still with dizziness and weakness with attempt of movement, but otherwise fine in bed.  No new complaint.  Discussed the need for rehab placement as recommended by therapy..    Review of Systems   Constitutional:  Negative for chills and fever.   Musculoskeletal:  Positive for gait problem.   Neurological:  Positive for dizziness, facial asymmetry and weakness.     Objective:     Vital Signs (Most Recent):  Temp: 97.9 °F (36.6 °C) (04/06/25 1114)  Pulse: 76 (04/06/25 1114)  Resp: 17 (04/06/25 1114)  BP: (Abnormal) 147/73 (04/06/25 1114)  SpO2: 100 % (04/06/25 1114) Vital Signs (24h Range):  Temp:  [97.6 °F (36.4 °C)-98.8 °F (37.1 °C)] 97.9 °F (36.6 °C)  Pulse:  [66-87] 76  Resp:  [17-22] 17  SpO2:  [98 %-100 %] 100 %  BP: (147-218)/() 147/73     Weight: 84.4 kg (186 lb 1.1 oz)  Body mass index is 30.96 kg/m².    Intake/Output Summary (Last 24 hours) at 4/6/2025 1142  Last data filed at 4/6/2025 0719  Gross per 24 hour   Intake 850 ml   Output 1 ml   Net 849 ml         Physical Exam  Vitals and nursing note reviewed.   Constitutional:       General: She is not in acute distress.     Appearance: Normal appearance. She is not ill-appearing or toxic-appearing.   HENT:      Head: Normocephalic and atraumatic.      Comments: Left facial droop     Nose: Nose normal.   Eyes:      General: Lids are normal. Gaze aligned appropriately.      Extraocular Movements: Extraocular movements intact.      Conjunctiva/sclera: Conjunctivae normal.   Cardiovascular:      Rate and Rhythm: Normal rate and regular rhythm.      Pulses: Normal pulses.      Heart sounds: Normal heart sounds.   Pulmonary:      Effort: Pulmonary effort is normal.      Breath sounds:  Normal breath sounds.   Abdominal:      General: Bowel sounds are normal.      Palpations: Abdomen is soft.      Tenderness: There is no abdominal tenderness. There is no guarding or rebound.   Musculoskeletal:         General: Normal range of motion.      Cervical back: Normal range of motion.   Skin:     General: Skin is warm and dry.   Neurological:      Mental Status: She is alert and oriented to person, place, and time.      Cranial Nerves: Facial asymmetry present.      Sensory: Sensation is intact.      Coordination: Coordination abnormal. Finger-Nose-Finger Test abnormal.      Comments: Noted left-sided slight facial droop, smile asymmetrical; no appreciable strength deficits bilaterally in upper and lower extremities, abnormal coordination.  Gait was not assessed.   Psychiatric:         Attention and Perception: Attention normal.         Speech: Speech normal.               Significant Labs: All pertinent labs within the past 24 hours have been reviewed.    Significant Imaging: I have reviewed all pertinent imaging results/findings within the past 24 hours.      Assessment & Plan  Cerebellar stroke  History of CVA (cerebrovascular accident)  - Patient presented with unsteady gait, weakness and dizziness for > 2 days.  Imaging revealed multifocal acute infarcts in the left cerebellar hemisphere and small remote infarcts in the right.  Facial droop and ataxia on exam.  - Tele vascular neurology consulted; appreciate recommendations  - S/p loading dose of 300 mg clopidogrel x 1 and followed by 75 mg clopidogrel daily  - Increased aspirin dosage to 325 mg p.o. daily as per Tele-Neurology recommendations  - Repeat stat head CT followed by MRI of the brain without contrast without significant change   - 2D echo overall unremarkable with EF to 60-65% and bilateral lower extremity ultrasound negative for DVT  - Patient reported she was allergic to atorvastatin, and this medication was not started.  This was again  addressed with patient and family at the bedside with reports of nausea being the main side effect reported, but otherwise no reported myalgias or change in liver function tests  - Will give trial of atorvastatin 80 mg p.o. daily today and assess daily  - Consult placed to PT, OT, and speech therapy  - Continue neuro checks q.4 and monitor on telemetry  - Allowed for permissive hypertension and then re-started blood pressure medications in stepwise fashion later yesterday.  Tolerating valsartan 40 mg p.o. daily and metoprolol 50 mg p.o. b.i.d.  - Will consider further titration of blood pressure  - Patient will need rehab placement  Essential hypertension  - Home regimen of amlodipine 10 mg p.o. daily, metoprolol 100 mg p.o. b.i.d. and valsartan 40 mg p.o. daily.  - All medications on hold at this time for permissive hypertension  - Blood pressure medication restarted with valsartan 40 mg p.o. daily, then follow up by metoprolol at lower dose at 50 mg p.o. b.i.d.  Type 2 diabetes mellitus with hyperglycemia, without long-term current use of insulin  - Most recent A1c 9.0 performed 02/04/2025.  Home regimen is metformin and Jardiance.  Sister reported Jardiance was too expensive and patient has not started this medication.  Home regimen on hold.  - Glucose level reviewed, currently above target  - ContinueLantus 10 units subQ daily today, and increase sliding scale insulin to moderate dose range  Stage 3b chronic kidney disease  Hypokalemia  Hypomagnesemia  - Creatinine on admission was 1.5, consistent with baseline creatinine ranged between 1.3 -1.6  - Monitor I&Os  - Trend with labs and correct electrolyte abnormalities as needed  Mild dementia without behavioral disturbance, psychotic disturbance, mood disturbance, or anxiety  - Patient with history of mild vascular dementia.  She lives alone and family is available nearby  - Mentation is at baseline, and she is oriented as per family at the bedside  VTE Risk  Mitigation (From admission, onward)           Ordered     heparin (porcine) injection 5,000 Units  Every 8 hours         04/04/25 2026     IP VTE HIGH RISK PATIENT  Once         04/04/25 1810     Place sequential compression device  Until discontinued         04/04/25 1810                      Bridgette Lechuga MD  Department of Hospital Medicine   [facility])

## 2025-04-06 NOTE — PLAN OF CARE
04/06/25 1101   Medicare Message   Important Message from Medicare regarding Discharge Appeal Rights Given to patient/caregiver;Explained to patient/caregiver;Signed/date by patient/caregiver   Date IMM was signed 04/06/25   Time IMM was signed 0941

## 2025-04-06 NOTE — PLAN OF CARE
Problem: Physical Therapy  Goal: Physical Therapy Goal  Description: Goals to be met by: 2025    Patient will increase functional independence with mobility by performin. Sit<>stand with SBA with RW.  2. Gait x 50 feet with CGA with RW.  3. Ascend/descend threshold  step(s) with least restrictive assistive device and minimal assist .      Outcome: Progressing   Patient evaluated by PT and goals established. Shabnam reports significant dizziness upon sitting EOB, BP assessed and read 195/79. The patient demonstrates poor coordination and safety awareness upon attempted side stepping EOB with RW. Patient demonstrates poor RW management and posterior trunk lean, requiring minimal assist to maintain balance.  The patient lives alone with minimal support.  PT will continue to follow and progress as tolerated. Rec for dc to High Intensity Therapy.

## 2025-04-07 LAB
ALBUMIN SERPL BCP-MCNC: 3.3 G/DL (ref 3.5–5.2)
ALP SERPL-CCNC: 70 UNIT/L (ref 40–150)
ALT SERPL W/O P-5'-P-CCNC: 10 UNIT/L (ref 10–44)
ANION GAP (OHS): 8 MMOL/L (ref 8–16)
AST SERPL-CCNC: 11 UNIT/L (ref 11–45)
BILIRUB SERPL-MCNC: 0.2 MG/DL (ref 0.1–1)
BUN SERPL-MCNC: 21 MG/DL (ref 8–23)
CALCIUM SERPL-MCNC: 8.9 MG/DL (ref 8.7–10.5)
CHLORIDE SERPL-SCNC: 110 MMOL/L (ref 95–110)
CO2 SERPL-SCNC: 22 MMOL/L (ref 23–29)
CREAT SERPL-MCNC: 1.4 MG/DL (ref 0.5–1.4)
GFR SERPLBLD CREATININE-BSD FMLA CKD-EPI: 41 ML/MIN/1.73/M2
GLUCOSE SERPL-MCNC: 201 MG/DL (ref 70–110)
MAGNESIUM SERPL-MCNC: 1.8 MG/DL (ref 1.6–2.6)
PHOSPHATE SERPL-MCNC: 4.2 MG/DL (ref 2.7–4.5)
POCT GLUCOSE: 128 MG/DL (ref 70–110)
POCT GLUCOSE: 195 MG/DL (ref 70–110)
POCT GLUCOSE: 212 MG/DL (ref 70–110)
POCT GLUCOSE: 217 MG/DL (ref 70–110)
POCT GLUCOSE: 223 MG/DL (ref 70–110)
POTASSIUM SERPL-SCNC: 4.4 MMOL/L (ref 3.5–5.1)
PROT SERPL-MCNC: 7 GM/DL (ref 6–8.4)
SODIUM SERPL-SCNC: 140 MMOL/L (ref 136–145)

## 2025-04-07 PROCEDURE — 25000003 PHARM REV CODE 250: Performed by: NURSE PRACTITIONER

## 2025-04-07 PROCEDURE — 80053 COMPREHEN METABOLIC PANEL: CPT | Performed by: HOSPITALIST

## 2025-04-07 PROCEDURE — 36415 COLL VENOUS BLD VENIPUNCTURE: CPT | Performed by: HOSPITALIST

## 2025-04-07 PROCEDURE — 25000003 PHARM REV CODE 250: Performed by: HOSPITALIST

## 2025-04-07 PROCEDURE — 83735 ASSAY OF MAGNESIUM: CPT | Performed by: HOSPITALIST

## 2025-04-07 PROCEDURE — 84100 ASSAY OF PHOSPHORUS: CPT | Performed by: HOSPITALIST

## 2025-04-07 PROCEDURE — 11000001 HC ACUTE MED/SURG PRIVATE ROOM

## 2025-04-07 PROCEDURE — 92526 ORAL FUNCTION THERAPY: CPT

## 2025-04-07 PROCEDURE — 63600175 PHARM REV CODE 636 W HCPCS: Performed by: HOSPITALIST

## 2025-04-07 PROCEDURE — 63600175 PHARM REV CODE 636 W HCPCS: Performed by: NURSE PRACTITIONER

## 2025-04-07 PROCEDURE — 94761 N-INVAS EAR/PLS OXIMETRY MLT: CPT

## 2025-04-07 PROCEDURE — 97535 SELF CARE MNGMENT TRAINING: CPT

## 2025-04-07 RX ORDER — AMLODIPINE BESYLATE 5 MG/1
10 TABLET ORAL DAILY
Status: DISCONTINUED | OUTPATIENT
Start: 2025-04-07 | End: 2025-04-09

## 2025-04-07 RX ORDER — INSULIN GLARGINE 100 [IU]/ML
10 INJECTION, SOLUTION SUBCUTANEOUS 2 TIMES DAILY
Status: DISCONTINUED | OUTPATIENT
Start: 2025-04-07 | End: 2025-04-09 | Stop reason: HOSPADM

## 2025-04-07 RX ORDER — METOPROLOL TARTRATE 50 MG/1
100 TABLET ORAL 2 TIMES DAILY
Status: DISCONTINUED | OUTPATIENT
Start: 2025-04-07 | End: 2025-04-09

## 2025-04-07 RX ORDER — ACETAMINOPHEN 325 MG/1
650 TABLET ORAL EVERY 6 HOURS PRN
Status: DISCONTINUED | OUTPATIENT
Start: 2025-04-07 | End: 2025-04-09 | Stop reason: HOSPADM

## 2025-04-07 RX ADMIN — METOPROLOL TARTRATE 100 MG: 50 TABLET, FILM COATED ORAL at 08:04

## 2025-04-07 RX ADMIN — VALSARTAN 40 MG: 40 TABLET, FILM COATED ORAL at 08:04

## 2025-04-07 RX ADMIN — INSULIN ASPART 4 UNITS: 100 INJECTION, SOLUTION INTRAVENOUS; SUBCUTANEOUS at 04:04

## 2025-04-07 RX ADMIN — HEPARIN SODIUM 5000 UNITS: 5000 INJECTION INTRAVENOUS; SUBCUTANEOUS at 09:04

## 2025-04-07 RX ADMIN — ACETAMINOPHEN 650 MG: 325 TABLET, FILM COATED ORAL at 05:04

## 2025-04-07 RX ADMIN — METOPROLOL TARTRATE 50 MG: 50 TABLET, FILM COATED ORAL at 08:04

## 2025-04-07 RX ADMIN — CLOPIDOGREL 75 MG: 75 TABLET ORAL at 08:04

## 2025-04-07 RX ADMIN — HEPARIN SODIUM 5000 UNITS: 5000 INJECTION INTRAVENOUS; SUBCUTANEOUS at 01:04

## 2025-04-07 RX ADMIN — INSULIN GLARGINE 10 UNITS: 100 INJECTION, SOLUTION SUBCUTANEOUS at 08:04

## 2025-04-07 RX ADMIN — ASPIRIN 325 MG ORAL TABLET 325 MG: 325 PILL ORAL at 08:04

## 2025-04-07 RX ADMIN — HEPARIN SODIUM 5000 UNITS: 5000 INJECTION INTRAVENOUS; SUBCUTANEOUS at 05:04

## 2025-04-07 RX ADMIN — INSULIN GLARGINE 10 UNITS: 100 INJECTION, SOLUTION SUBCUTANEOUS at 10:04

## 2025-04-07 RX ADMIN — AMLODIPINE BESYLATE 10 MG: 5 TABLET ORAL at 01:04

## 2025-04-07 RX ADMIN — LABETALOL HYDROCHLORIDE 10 MG: 5 INJECTION INTRAVENOUS at 12:04

## 2025-04-07 RX ADMIN — EZETIMIBE 10 MG: 10 TABLET ORAL at 08:04

## 2025-04-07 RX ADMIN — INSULIN ASPART 2 UNITS: 100 INJECTION, SOLUTION INTRAVENOUS; SUBCUTANEOUS at 09:04

## 2025-04-07 RX ADMIN — ATORVASTATIN CALCIUM 80 MG: 20 TABLET, FILM COATED ORAL at 08:04

## 2025-04-07 NOTE — PLAN OF CARE
Problem: Adult Inpatient Plan of Care  Goal: Plan of Care Review  Flowsheets (Taken 4/7/2025 0203)  Plan of Care Reviewed With: patient  Goal: Absence of Hospital-Acquired Illness or Injury  Intervention: Identify and Manage Fall Risk  Flowsheets (Taken 4/7/2025 0203)  Safety Promotion/Fall Prevention:   Fall Risk reviewed with patient/family   lighting adjusted   medications reviewed   side rails raised x 2  Intervention: Prevent Skin Injury  Flowsheets (Taken 4/7/2025 0203)  Body Position: position changed independently  Intervention: Prevent and Manage VTE (Venous Thromboembolism) Risk  Flowsheets (Taken 4/7/2025 0203)  VTE Prevention/Management: ROM (active) performed  Goal: Optimal Comfort and Wellbeing  Intervention: Monitor Pain and Promote Comfort  Flowsheets (Taken 4/7/2025 0203)  Pain Management Interventions:   relaxation techniques promoted   pain management plan reviewed with patient/caregiver  Intervention: Provide Person-Centered Care  Flowsheets (Taken 4/7/2025 0203)  Trust Relationship/Rapport:   care explained   choices provided   questions answered   questions encouraged   reassurance provided   thoughts/feelings acknowledged     Problem: Stroke, Ischemic (Includes Transient Ischemic Attack)  Goal: Optimal Coping  Intervention: Support Psychosocial Response to Stroke  Flowsheets (Taken 4/7/2025 0203)  Supportive Measures:   relaxation techniques promoted   positive reinforcement provided  Goal: Optimal Cerebral Tissue Perfusion  Intervention: Protect and Optimize Cerebral Perfusion  Flowsheets (Taken 4/7/2025 0203)  Cerebral Perfusion Promotion: blood pressure monitored  Goal: Optimal Cognitive Function  Intervention: Optimize Cognitive Function  Flowsheets (Taken 4/7/2025 0203)  Environment Familiarity/Consistency: daily routine followed  Goal: Optimal Functional Ability  Intervention: Optimize Functional Ability  Flowsheets (Taken 4/7/2025 0203)  Self-Care Promotion:   independence  encouraged   BADL personal objects within reach     Problem: Diabetes Comorbidity  Goal: Blood Glucose Level Within Targeted Range  Intervention: Monitor and Manage Glycemia  Flowsheets (Taken 4/7/2025 0203)  Glycemic Management:   blood glucose monitored   supplemental insulin given     Problem: Comorbidity Management  Goal: Blood Pressure in Desired Range  Intervention: Maintain Blood Pressure Management  Flowsheets (Taken 4/7/2025 0203)  Medication Review/Management: medications reviewed

## 2025-04-07 NOTE — SUBJECTIVE & OBJECTIVE
Interval History:  No acute events, still with dizziness and weakness with attempt of movement which is improving, but otherwise fine in bed.  No new complaints.  Discussed the need for rehab placement as recommended by therapy..    Review of Systems   Constitutional:  Negative for chills and fever.   Musculoskeletal:  Positive for gait problem.   Neurological:  Positive for dizziness, facial asymmetry and weakness.     Objective:     Vital Signs (Most Recent):  Temp: 97.7 °F (36.5 °C) (04/07/25 1543)  Pulse: (Abnormal) 56 (04/07/25 1543)  Resp: 18 (04/07/25 1543)  BP: (Abnormal) 208/95 (04/07/25 1543)  SpO2: 100 % (04/07/25 1543) Vital Signs (24h Range):  Temp:  [97.6 °F (36.4 °C)-98.4 °F (36.9 °C)] 97.7 °F (36.5 °C)  Pulse:  [56-72] 56  Resp:  [18] 18  SpO2:  [98 %-100 %] 100 %  BP: (145-232)/() 208/95     Weight: 84.4 kg (186 lb 1.1 oz)  Body mass index is 30.96 kg/m².    Intake/Output Summary (Last 24 hours) at 4/7/2025 1723  Last data filed at 4/7/2025 1423  Gross per 24 hour   Intake 920 ml   Output 800 ml   Net 120 ml         Physical Exam  Vitals and nursing note reviewed.   Constitutional:       General: She is not in acute distress.     Appearance: Normal appearance. She is not ill-appearing or toxic-appearing.   HENT:      Head: Normocephalic and atraumatic.      Comments: Left facial droop     Nose: Nose normal.   Eyes:      General: Lids are normal. Gaze aligned appropriately.      Extraocular Movements: Extraocular movements intact.      Conjunctiva/sclera: Conjunctivae normal.   Cardiovascular:      Rate and Rhythm: Normal rate and regular rhythm.      Pulses: Normal pulses.      Heart sounds: Normal heart sounds.   Pulmonary:      Effort: Pulmonary effort is normal.      Breath sounds: Normal breath sounds.   Abdominal:      General: Bowel sounds are normal.      Palpations: Abdomen is soft.      Tenderness: There is no abdominal tenderness. There is no guarding or rebound.   Musculoskeletal:          General: Normal range of motion.      Cervical back: Normal range of motion.   Skin:     General: Skin is warm and dry.   Neurological:      Mental Status: She is alert and oriented to person, place, and time.      Cranial Nerves: Facial asymmetry present.      Sensory: Sensation is intact.      Coordination: Coordination abnormal. Finger-Nose-Finger Test abnormal.      Comments: Noted left-sided slight facial droop slightly improved, smile asymmetrical; no appreciable strength deficits bilaterally in upper and lower extremities, abnormal coordination.  Gait was not assessed.   Psychiatric:         Attention and Perception: Attention normal.         Speech: Speech normal.               Significant Labs: All pertinent labs within the past 24 hours have been reviewed.    Significant Imaging: I have reviewed all pertinent imaging results/findings within the past 24 hours.

## 2025-04-07 NOTE — ASSESSMENT & PLAN NOTE
- Most recent A1c 9.0 performed 02/04/2025.  Home regimen is metformin and Jardiance.  Sister reported Jardiance was too expensive and patient has not started this medication.  Home regimen on hold.  - Glucose level reviewed, currently at targets  - Continue Lantus 10 units subQ daily, and sliding scale insulin, moderate dose range

## 2025-04-07 NOTE — PT/OT/SLP PROGRESS
Speech Language Pathology Treatment    Patient Name:  Shabnam Noble   MRN:  [id number]  Admitting Diagnosis: Cerebellar stroke    Recommendations:                 General Recommendations:    SLP will follow 3-4x/week to complete ongoing swallow assessment/treatment, provide pt/family education, and assessment of cognitive-linguistic function to determine additional therapeutic needs.   GI consult   Diet recommendations:  Regular Diet - IDDSI Level 7, Thin liquids - IDDSI Level 0   Aspiration Precautions:  Assistance with meal set-up due to visual impairments   Small bites/sips presented 1 at a time  Present utensils to the right to assist with labial seal  Alternating bites/sips  Frequent oral care  HOB to 90 degrees  Meds whole 1 at a time  General Precautions: Standard, aspiration, fall, vision impaired  Communication strategies:  go to room if call light pushed     Assessment:     Shabnam Noble is a 69 y.o. female with an SLP diagnosis of cerebellar stroke. SLP consulted for bedside swallow evaluation. Patient demonstrates signs of oropharyngeal dysphagia characterized by reduced labial seal resulting in anterior loss, intermittent difficulty siphoning liquids from the straw, mildly increased mastication and bolus prep time, and oral residue. Passed lauren swallow protocol this date without signs of aspiration. Recommend a regular/thin diet given implementation of safe swallow precautions. Additionally, patient may benefit from a GI consult as she did present with some deficits appearing more esophageal in nature on initial evaluation. SLP will follow for mentioned deficits.      Subjective     Patient seen with family members present at bedside    Respiratory Status: Room air    Objective:       BRIEF COGNITIVE/LINGUISTIC STATUS  Pt awake/alert upon entry to room speaking to family members Able to sustain alertness for duration of session independently  Initially pt reports no deficits and appeared confused by SLP  assessment, as session progressed admitted to noting deficits and more agreeable to working with SLP. Given presence of family members and initial hesitancy to work with SLP deferred more formal cognitive evaluation, though, would recommend more structured evaluation to be completed  Pt able to sustain attention for 22 minutes this date.   Pt oriented to person, place, month, year, and reason for admit given increased response time.   Pt followed simple directives throughout assessment with 100 % accuracy, did require repetition of instructions for improved accuracy intermittently  Pt able to communicate her basic wants/needs though intermittently displayed word-finding deficits and did note one instance of semantic paraphasia.   Pt was 85% intelligible at the conversation level, mildly reduced intelligibility noted secondary to reduced volume and rapid rate of speech. Improved given cues for  diaphragmatic breathing and increased volume, slow rate of speech, syllabic breakdown  Able to complete 1 drill with use of strategies stating days of week given min cues for use of strategies     OROPHARYNGEAL SWALLOW:   Secretion management  Adequate secretion management      Oral motor evaluation:  Lingual function  Deviation to the right side noted on protrusion   Left lingual weakness observed  Labial function  Left facial asymmetry noted; labial drooping with flattening of nasolabial fold. Able to retract though asymmetric as compared to R side retraction   Impaired retraction, protrusion, seal, and coordination observed.  Mandibular function:  Adequate mandibular depression/elevation with functional strength to break solid bolus  Velar function  Velar elevation is symmetrical during OME, no hypernasality/hyponasality noted during speech, adequate function  Vocal quality  Vocal quality clear and within functional limits  Cough assessment:  Volitional cough is clear and within functional limits   Dentition  Pt presents  with scattered dentition     PO TRIALS :   Thin liquid: Single and consecutive sips of water (~5 oz) via cup and straw  solids: Small bites of a 1/2 of a josé miguel cracker     ORAL PHASE:  Functional mandibular depression and elevation for adequate bolus acceptance  Reduced labial seal on cup/straw with reduced bolus stripping, requiring assistance to achieve labial seal  Able to improve given cues for placement on R side of oral cavity  Difficulty siphoning liquids from the straw when placed medially and on the left side, improved when straw placed on the right  No anterior loss given cues for R side placement  Functional mandibular depression and elevation for adequate initial biteforce of solid bolus  Increased time required for mastication of regular solids, though appeared functional  Increased time required for bolus formation of puree and regular solids   Oral cavity with mild oral stasis after the swallow. Oral clearance achieved with liquid wash.     PHARYNGEAL PHASE:  Single swallows per bolus noted   No overt signs of airway compromise observed across consistencies this date  Laryngeal rise noted in subjectively perceived timely manner- unable to objectively assess at bedside.  Patient able to consume consecutive sips of thin liquids x3 oz, indicating a passed [place] swallow protocol     IMPRESSION   Patient demonstrates signs of oropharyngeal dysphagia characterized by reduced labial seal resulting in anterior loss, intermittent difficulty siphoning liquids from the straw, increased mastication and bolus prep time, and oral residue. Recommend a regular/thin diet given implementation of safe swallow precautions. Additionally, patient may benefit from a GI consult as she did present with some deficits appearing more esophageal in nature. SLP will follow for mentioned deficits.     Goals:   Multidisciplinary Problems       SLP Goals          Problem: SLP    Goal Priority Disciplines Outcome   SLP Goal     SLP  Progressing   Description: Speech Language Pathology Goals  Goals expected to be met by 4/19:     1. The pt will tolerate a regular/thin diet given implementation of safe swallow strategies/precautions and without displaying overt signs of airway compromise.   2. The pt will participate in a cognitive-linguistic evaluation to determine additional therapeutic needs.                                Plan:     Patient to be seen:  3 x/week, 4 x/week   Plan of Care expires:  05/03/25  Plan of Care reviewed with:  patient, sibling   SLP Follow-Up:  Yes       Discharge recommendations:   (SLP at next level of care)   Barriers to Discharge:  need for acute rehab, recommend d/c to [facility]     Time Tracking:     SLP Treatment Date:   04/07/25  Speech Start Time:  1615  Speech Stop Time:  1635     Speech Total Time (min):  20 min    Billable Minutes: Treatment Swallowing Dysfunction 20 04/07/2025

## 2025-04-07 NOTE — PROGRESS NOTES
[facility] Medicine  Progress Note    Patient Name: Shabnam Noble  MRN: [id number]  Patient Class: IP- Inpatient   Admission Date: 4/4/2025  Length of Stay: 3 days  Attending Physician: Bridgette Lechuga MD  Primary Care Provider: Krys Michael MD        Subjective     Principal Problem:Cerebellar stroke        HPI:  Shabnam Noble is a 69 year old female with a PMHx of hypertension, diabetes mellitus, CKD and dementia who presents with generalized weakness and dizziness for the past 2 days.  She reports associated subjective fever, nausea, tremors, diaphoresis and headache.  Per EMS, patient had 1 episode of vomiting PTA that was relieved with IV Zofran.  ED note reports patient has dizziness worsens when she stands up and she has difficulty maintaining her balance.  No recent falls reported and she does not use a cane or walker.  Patient resides home alone and is usually able to manage.  She denies chest pain and has had no further episodes of vomiting since arriving to the ED. patient lethargic on exam, arouses to verbal stimuli.  Information obtained per patient and sister at bedside.    Upon arrival to the ED, patient found to be hypertensive with systolic 220s.  Creatinine 1.5 (near baseline), , potassium 3.2 and magnesium 1.5.  COVID and flu negative.  CBC unremarkable.  Chest x-ray with stable cardiomegaly.  MRI revealed multifocal acute infarcts within the left cerebellar hemisphere and CTA head and neck showed small acute infarcts in the left hemisphere and small remote infarcts in the right cerebellar hemisphere.  No focal stenosis, occlusion or dissection.  Small 1-2 mm aneurysm involving the supraclinoid portion of the left distal ICA.  Tele neurology consulted in the ED recommendations provided.  Patient referred to Hospital Medicine and will be admitted for further evaluation and management.    Overview/Hospital Course:  Ms. Noble presented weakness and dizziness for  greater than 2 days.  Admitted with acute CVA involving the left cerebellar and brainstem.  Treatment initiated with permissive hypertension, aspirin and Plavix, but statin not given due to reported allergy.  Vascular neurology consulted. Blood pressure medications restarted. Working with PT and OT, and seeking high intensity placement.    Interval History:  No acute events, still with dizziness and weakness with attempt of movement which is improving, but otherwise fine in bed.  No new complaints.  Discussed the need for rehab placement as recommended by therapy..    Review of Systems   Constitutional:  Negative for chills and fever.   Musculoskeletal:  Positive for gait problem.   Neurological:  Positive for dizziness, facial asymmetry and weakness.     Objective:     Vital Signs (Most Recent):  Temp: 97.7 °F (36.5 °C) (04/07/25 1543)  Pulse: (Abnormal) 56 (04/07/25 1543)  Resp: 18 (04/07/25 1543)  BP: (Abnormal) 208/95 (04/07/25 1543)  SpO2: 100 % (04/07/25 1543) Vital Signs (24h Range):  Temp:  [97.6 °F (36.4 °C)-98.4 °F (36.9 °C)] 97.7 °F (36.5 °C)  Pulse:  [56-72] 56  Resp:  [18] 18  SpO2:  [98 %-100 %] 100 %  BP: (145-232)/() 208/95     Weight: 84.4 kg (186 lb 1.1 oz)  Body mass index is 30.96 kg/m².    Intake/Output Summary (Last 24 hours) at 4/7/2025 1723  Last data filed at 4/7/2025 1423  Gross per 24 hour   Intake 920 ml   Output 800 ml   Net 120 ml         Physical Exam  Vitals and nursing note reviewed.   Constitutional:       General: She is not in acute distress.     Appearance: Normal appearance. She is not ill-appearing or toxic-appearing.   HENT:      Head: Normocephalic and atraumatic.      Comments: Left facial droop     Nose: Nose normal.   Eyes:      General: Lids are normal. Gaze aligned appropriately.      Extraocular Movements: Extraocular movements intact.      Conjunctiva/sclera: Conjunctivae normal.   Cardiovascular:      Rate and Rhythm: Normal rate and regular rhythm.      Pulses:  Normal pulses.      Heart sounds: Normal heart sounds.   Pulmonary:      Effort: Pulmonary effort is normal.      Breath sounds: Normal breath sounds.   Abdominal:      General: Bowel sounds are normal.      Palpations: Abdomen is soft.      Tenderness: There is no abdominal tenderness. There is no guarding or rebound.   Musculoskeletal:         General: Normal range of motion.      Cervical back: Normal range of motion.   Skin:     General: Skin is warm and dry.   Neurological:      Mental Status: She is alert and oriented to person, place, and time.      Cranial Nerves: Facial asymmetry present.      Sensory: Sensation is intact.      Coordination: Coordination abnormal. Finger-Nose-Finger Test abnormal.      Comments: Noted left-sided slight facial droop slightly improved, smile asymmetrical; no appreciable strength deficits bilaterally in upper and lower extremities, abnormal coordination.  Gait was not assessed.   Psychiatric:         Attention and Perception: Attention normal.         Speech: Speech normal.               Significant Labs: All pertinent labs within the past 24 hours have been reviewed.    Significant Imaging: I have reviewed all pertinent imaging results/findings within the past 24 hours.      Assessment & Plan  Cerebellar stroke  History of CVA (cerebrovascular accident)  - Patient presented with unsteady gait, weakness and dizziness for > 2 days.  Imaging revealed multifocal acute infarcts in the left cerebellar hemisphere and small remote infarcts in the right.  Facial droop and ataxia on exam.  - Tele vascular neurology consulted; appreciate recommendations  - S/p loading dose of 300 mg clopidogrel x 1 and followed by 75 mg clopidogrel daily  - Increased aspirin dosage to 325 mg p.o. daily as per Tele-Neurology recommendations  - Repeat stat head CT followed by MRI of the brain without contrast without significant change   - 2D echo overall unremarkable with EF to 60-65% and bilateral lower  extremity ultrasound negative for DVT  - Patient reported she was allergic to atorvastatin, and this medication was not started on admit.  This was again addressed with patient and family at the bedside with reports of nausea being the main side effect reported, but otherwise no reported myalgias or change in liver function tests  - Trial of atorvastatin 80 mg p.o. daily on 4/6, so far tolerated but assess daily  - Continue neuro checks q.4 and monitor on telemetry  - Allowed for permissive hypertension and then re-started blood pressure medications in stepwise fashion on 4/5. Tolerating valsartan 40 mg p.o. daily and metoprolol 50 mg p.o. b.i.d., blood pressure still high.  - Will increase metoprolol to 100 mg p.o. b.i.d. and restart amlodipine 10 mg p.o. daily today  - Consult placed to PT, OT, and speech therapy; patient will need high intensity placement  - Seeking rehab placement  Essential hypertension  - Home regimen of amlodipine 10 mg p.o. daily, metoprolol 100 mg p.o. b.i.d. and valsartan 40 mg p.o. daily.  - All medications on hold at this time for permissive hypertension  - Restarted medications as discussed above  Type 2 diabetes mellitus with hyperglycemia, without long-term current use of insulin  - Most recent A1c 9.0 performed 02/04/2025.  Home regimen is metformin and Jardiance.  Sister reported Jardiance was too expensive and patient has not started this medication.  Home regimen on hold.  - Glucose level reviewed, currently at targets  - Continue Lantus 10 units subQ daily, and sliding scale insulin, moderate dose range  Stage 3b chronic kidney disease  Hypokalemia  Hypomagnesemia  - Creatinine on admission was 1.5, consistent with baseline creatinine ranged between 1.3 -1.6  - Monitor I&Os  - Trend with labs and correct electrolyte abnormalities as needed  Mild dementia without behavioral disturbance, psychotic disturbance, mood disturbance, or anxiety  - Patient with history of mild vascular  dementia.  She lives alone and family is available nearby  - Mentation is at baseline, and she is oriented as per family at the bedside  VTE Risk Mitigation (From admission, onward)           Ordered     heparin (porcine) injection 5,000 Units  Every 8 hours         04/04/25 2026     IP VTE HIGH RISK PATIENT  Once         04/04/25 1810     Place sequential compression device  Until discontinued         04/04/25 1810                        Bridgette Lechuga MD  Department of Hospital Medicine   [facility] ([place])

## 2025-04-07 NOTE — ASSESSMENT & PLAN NOTE
- Patient presented with unsteady gait, weakness and dizziness for > 2 days.  Imaging revealed multifocal acute infarcts in the left cerebellar hemisphere and small remote infarcts in the right.  Facial droop and ataxia on exam.  - Tele vascular neurology consulted; appreciate recommendations  - S/p loading dose of 300 mg clopidogrel x 1 and followed by 75 mg clopidogrel daily  - Increased aspirin dosage to 325 mg p.o. daily as per Tele-Neurology recommendations  - Repeat stat head CT followed by MRI of the brain without contrast without significant change   - 2D echo overall unremarkable with EF to 60-65% and bilateral lower extremity ultrasound negative for DVT  - Patient reported she was allergic to atorvastatin, and this medication was not started on admit.  This was again addressed with patient and family at the bedside with reports of nausea being the main side effect reported, but otherwise no reported myalgias or change in liver function tests  - Trial of atorvastatin 80 mg p.o. daily on 4/6, so far tolerated but assess daily  - Continue neuro checks q.4 and monitor on telemetry  - Allowed for permissive hypertension and then re-started blood pressure medications in stepwise fashion on 4/5. Tolerating valsartan 40 mg p.o. daily and metoprolol 50 mg p.o. b.i.d., blood pressure still high.  - Will increase metoprolol to 100 mg p.o. b.i.d. and restart amlodipine 10 mg p.o. daily today  - Consult placed to PT, OT, and speech therapy; patient will need high intensity placement  - Seeking rehab placement

## 2025-04-07 NOTE — PLAN OF CARE
Met with patient to review discharge recommendation of [facility] and is agreeable to plan    Patient/family provided list of facilities in-network with patient's payor plan. Providers that are owned, operated, or affiliated with [facility] are included on the list.     Patient has declined to select a preferred provider and elects placement with the first accepting in network provider that is available to provide services as ordered by the physician       If an additional preferred facility not listed above is identified, additional referral to be sent. If above facilities unable to accept, will send additional referrals to in-network providers.     04/07/25 1003   Post-Acute Status   Post-Acute Authorization Placement   Post-Acute Placement Status Referrals Sent   Patient choice form signed by patient/caregiver List with quality metrics by geographic area provided;List from CMS Compare;List from System Post-Acute Care   Discharge Delays (!) Post-Acute Set-up   Discharge Plan   Discharge Plan A Rehab   Discharge Plan B Skilled Nursing Facility     [id number] Patient has been accepted to [facility]. Insurance auth pending.

## 2025-04-07 NOTE — ASSESSMENT & PLAN NOTE
- Home regimen of amlodipine 10 mg p.o. daily, metoprolol 100 mg p.o. b.i.d. and valsartan 40 mg p.o. daily.  - All medications on hold at this time for permissive hypertension  - Restarted medications as discussed above

## 2025-04-08 ENCOUNTER — PATIENT OUTREACH (OUTPATIENT)
Dept: ADMINISTRATIVE | Facility: HOSPITAL | Age: 70
End: 2025-04-08
Payer: MEDICARE

## 2025-04-08 LAB
ABSOLUTE EOSINOPHIL (OHS): 0.26 K/UL
ABSOLUTE MONOCYTE (OHS): 0.47 K/UL (ref 0.3–1)
ABSOLUTE NEUTROPHIL COUNT (OHS): 3.88 K/UL (ref 1.8–7.7)
ANION GAP (OHS): 8 MMOL/L (ref 8–16)
BASOPHILS # BLD AUTO: 0.03 K/UL
BASOPHILS NFR BLD AUTO: 0.4 %
BUN SERPL-MCNC: 23 MG/DL (ref 8–23)
CALCIUM SERPL-MCNC: 9 MG/DL (ref 8.7–10.5)
CHLORIDE SERPL-SCNC: 110 MMOL/L (ref 95–110)
CO2 SERPL-SCNC: 22 MMOL/L (ref 23–29)
CREAT SERPL-MCNC: 1.5 MG/DL (ref 0.5–1.4)
ERYTHROCYTE [DISTWIDTH] IN BLOOD BY AUTOMATED COUNT: 14 % (ref 11.5–14.5)
GFR SERPLBLD CREATININE-BSD FMLA CKD-EPI: 38 ML/MIN/1.73/M2
GLUCOSE SERPL-MCNC: 123 MG/DL (ref 70–110)
HCT VFR BLD AUTO: 33.8 % (ref 37–48.5)
HGB BLD-MCNC: 10.6 GM/DL (ref 12–16)
IMM GRANULOCYTES # BLD AUTO: 0.01 K/UL (ref 0–0.04)
IMM GRANULOCYTES NFR BLD AUTO: 0.1 % (ref 0–0.5)
LYMPHOCYTES # BLD AUTO: 2.34 K/UL (ref 1–4.8)
MAGNESIUM SERPL-MCNC: 1.7 MG/DL (ref 1.6–2.6)
MCH RBC QN AUTO: 26.2 PG (ref 27–31)
MCHC RBC AUTO-ENTMCNC: 31.4 G/DL (ref 32–36)
MCV RBC AUTO: 84 FL (ref 82–98)
NUCLEATED RBC (/100WBC) (OHS): 0 /100 WBC
PHOSPHATE SERPL-MCNC: 3.9 MG/DL (ref 2.7–4.5)
PLATELET # BLD AUTO: 213 K/UL (ref 150–450)
PMV BLD AUTO: 11.5 FL (ref 9.2–12.9)
POCT GLUCOSE: 106 MG/DL (ref 70–110)
POCT GLUCOSE: 152 MG/DL (ref 70–110)
POCT GLUCOSE: 194 MG/DL (ref 70–110)
POCT GLUCOSE: 196 MG/DL (ref 70–110)
POTASSIUM SERPL-SCNC: 4.5 MMOL/L (ref 3.5–5.1)
RBC # BLD AUTO: 4.04 M/UL (ref 4–5.4)
RELATIVE EOSINOPHIL (OHS): 3.7 %
RELATIVE LYMPHOCYTE (OHS): 33.5 % (ref 18–48)
RELATIVE MONOCYTE (OHS): 6.7 % (ref 4–15)
RELATIVE NEUTROPHIL (OHS): 55.6 % (ref 38–73)
SODIUM SERPL-SCNC: 140 MMOL/L (ref 136–145)
WBC # BLD AUTO: 6.99 K/UL (ref 3.9–12.7)

## 2025-04-08 PROCEDURE — 25000003 PHARM REV CODE 250: Performed by: HOSPITALIST

## 2025-04-08 PROCEDURE — 80048 BASIC METABOLIC PNL TOTAL CA: CPT | Performed by: HOSPITALIST

## 2025-04-08 PROCEDURE — 25000003 PHARM REV CODE 250: Performed by: NURSE PRACTITIONER

## 2025-04-08 PROCEDURE — 36415 COLL VENOUS BLD VENIPUNCTURE: CPT | Performed by: HOSPITALIST

## 2025-04-08 PROCEDURE — 94761 N-INVAS EAR/PLS OXIMETRY MLT: CPT

## 2025-04-08 PROCEDURE — 25000003 PHARM REV CODE 250: Performed by: INTERNAL MEDICINE

## 2025-04-08 PROCEDURE — 63600175 PHARM REV CODE 636 W HCPCS: Performed by: NURSE PRACTITIONER

## 2025-04-08 PROCEDURE — 11000001 HC ACUTE MED/SURG PRIVATE ROOM

## 2025-04-08 PROCEDURE — 97116 GAIT TRAINING THERAPY: CPT | Mod: CQ

## 2025-04-08 PROCEDURE — 85025 COMPLETE CBC W/AUTO DIFF WBC: CPT | Performed by: HOSPITALIST

## 2025-04-08 PROCEDURE — 63600175 PHARM REV CODE 636 W HCPCS: Performed by: HOSPITALIST

## 2025-04-08 PROCEDURE — 83735 ASSAY OF MAGNESIUM: CPT | Performed by: HOSPITALIST

## 2025-04-08 PROCEDURE — 97535 SELF CARE MNGMENT TRAINING: CPT

## 2025-04-08 PROCEDURE — 84100 ASSAY OF PHOSPHORUS: CPT | Performed by: HOSPITALIST

## 2025-04-08 RX ORDER — VALSARTAN 80 MG/1
80 TABLET ORAL 2 TIMES DAILY
Status: DISCONTINUED | OUTPATIENT
Start: 2025-04-08 | End: 2025-04-09

## 2025-04-08 RX ORDER — VALSARTAN 40 MG/1
40 TABLET ORAL ONCE
Status: COMPLETED | OUTPATIENT
Start: 2025-04-08 | End: 2025-04-08

## 2025-04-08 RX ORDER — VALSARTAN 80 MG/1
80 TABLET ORAL DAILY
Status: DISCONTINUED | OUTPATIENT
Start: 2025-04-09 | End: 2025-04-08

## 2025-04-08 RX ORDER — CLOPIDOGREL BISULFATE 75 MG/1
75 TABLET ORAL DAILY
Qty: 90 TABLET | Refills: 0 | Status: ON HOLD | OUTPATIENT
Start: 2025-04-09 | End: 2025-07-08

## 2025-04-08 RX ORDER — MECLIZINE HCL 12.5 MG 12.5 MG/1
25 TABLET ORAL 3 TIMES DAILY PRN
Status: DISCONTINUED | OUTPATIENT
Start: 2025-04-08 | End: 2025-04-09 | Stop reason: HOSPADM

## 2025-04-08 RX ORDER — MECLIZINE HYDROCHLORIDE 25 MG/1
25 TABLET ORAL 3 TIMES DAILY PRN
Status: ON HOLD
Start: 2025-04-08

## 2025-04-08 RX ORDER — ATORVASTATIN CALCIUM 80 MG/1
80 TABLET, FILM COATED ORAL DAILY
Qty: 30 TABLET | Refills: 0 | Status: ON HOLD | OUTPATIENT
Start: 2025-04-09

## 2025-04-08 RX ORDER — VALSARTAN 80 MG/1
80 TABLET ORAL DAILY
Start: 2025-04-08 | End: 2025-04-09 | Stop reason: HOSPADM

## 2025-04-08 RX ADMIN — ATORVASTATIN CALCIUM 80 MG: 20 TABLET, FILM COATED ORAL at 11:04

## 2025-04-08 RX ADMIN — INSULIN GLARGINE 10 UNITS: 100 INJECTION, SOLUTION SUBCUTANEOUS at 08:04

## 2025-04-08 RX ADMIN — AMLODIPINE BESYLATE 10 MG: 5 TABLET ORAL at 11:04

## 2025-04-08 RX ADMIN — CLOPIDOGREL 75 MG: 75 TABLET ORAL at 11:04

## 2025-04-08 RX ADMIN — INSULIN GLARGINE 10 UNITS: 100 INJECTION, SOLUTION SUBCUTANEOUS at 11:04

## 2025-04-08 RX ADMIN — ASPIRIN 325 MG ORAL TABLET 325 MG: 325 PILL ORAL at 11:04

## 2025-04-08 RX ADMIN — HEPARIN SODIUM 5000 UNITS: 5000 INJECTION INTRAVENOUS; SUBCUTANEOUS at 01:04

## 2025-04-08 RX ADMIN — METOPROLOL TARTRATE 100 MG: 50 TABLET, FILM COATED ORAL at 11:04

## 2025-04-08 RX ADMIN — VALSARTAN 40 MG: 40 TABLET, FILM COATED ORAL at 11:04

## 2025-04-08 RX ADMIN — HEPARIN SODIUM 5000 UNITS: 5000 INJECTION INTRAVENOUS; SUBCUTANEOUS at 09:04

## 2025-04-08 RX ADMIN — EZETIMIBE 10 MG: 10 TABLET ORAL at 11:04

## 2025-04-08 RX ADMIN — HEPARIN SODIUM 5000 UNITS: 5000 INJECTION INTRAVENOUS; SUBCUTANEOUS at 05:04

## 2025-04-08 RX ADMIN — ACETAMINOPHEN 650 MG: 325 TABLET, FILM COATED ORAL at 08:04

## 2025-04-08 RX ADMIN — VALSARTAN 40 MG: 40 TABLET, FILM COATED ORAL at 12:04

## 2025-04-08 RX ADMIN — INSULIN ASPART 1 UNITS: 100 INJECTION, SOLUTION INTRAVENOUS; SUBCUTANEOUS at 08:04

## 2025-04-08 NOTE — PT/OT/SLP PROGRESS
Occupational Therapy   Treatment    Name: Shabnam Noble  MRN: [id number]  Admitting Diagnosis:  Cerebellar stroke       Recommendations:     Discharge Recommendations: High Intensity Therapy  Discharge Equipment Recommendations:  bedside commode, bath bench, walker, rolling, to be determined by next level of care  Barriers to discharge:  Inaccessible home environment, Decreased caregiver support (Current functional level)    Assessment:     Shabnam Noble is a 69 y.o. female with a medical diagnosis of Cerebellar stroke.  She presents with the following performance deficits affecting function: weakness, impaired endurance, impaired self care skills, impaired functional mobility, gait instability, impaired sensation, impaired cognition, decreased upper extremity function, decreased lower extremity function, impaired balance, decreased safety awareness, impaired coordination, impaired fine motor, impaired cardiopulmonary response to activity (Mild Dementia).     Patient was independent at home prior to this event for ADL, ADL mobility, and IADL. There is expectation of returning to prior level of function to maintain independence avoiding readmission.  Pt needing a total interdisciplinary treatment approach. Pt is at high risk of unplanned readmission due to fall risk and lack of 24 hour caregiver in prior setting.  Pt is able to tolerate 3 hours of daily therapy. Pt is motivated to return to prior level of function.      Rehab Prognosis:  Good; patient would benefit from acute skilled OT services to address these deficits and reach maximum level of function.       Plan:     Patient to be seen 5 x/week to address the above listed problems via self-care/home management, therapeutic activities, therapeutic exercises, neuromuscular re-education, sensory integration  Plan of Care Expires: 04/19/25  Plan of Care Reviewed with: patient    Subjective     Chief Complaint: Needing to have a BM but unable after sitting on BSC.     Patient/Family Comments/goals: Pt agreeable to OT treatment.  Pain/Comfort:  Pain Rating 1: 0/10    Objective:     Communicated with: nurse prior to session.  Patient found  attempting to get out of bed without assist  with bed alarm, telemetry, peripheral IV (2 family members present) upon OT entry to room.    General Precautions: Standard, fall    Orthopedic Precautions:N/A  Braces: N/A  Respiratory Status: Room air     Occupational Performance:     Bed Mobility:    Supine to sit: SBA  Sit to supine: Min A     Functional Mobility/Transfers:  Sit to stand: Min A  Bed <>BSC stand pivot transfer: Min A      Activities of Daily Living:  Toileting: Mod A , Pt asking family and OT to step out of the room as she tried to have a BM, OT waited outside partially opened door to room, OT checking on pt often.  Pt reporting she was feeling dizzy and room spinning. When asked if pt was straining to have a BM, she replied she was.  Education on avoiding straining and effects of straining when attempting to have a BM.  /95.  Once back in bed /88.  Nurse notified of pt straining and pt's BP.  Family asking for medication to assist pt with having a BM.  Pt had a very small soft BM when sitting on BSC.      Flat affect.  Decreased overall attention.  Cues to turn head or look at person communicating with pt. Poor safety awareness, impulsive at times.  Left facial weakness. Recommended to nurse pt have AVASYS telesitter due to pt attempting to get out of bed on her own when OT entered room.      Education on IPR and need for continued interdisciplinary therapy.        [facility] 6 Click ADL: 15    Treatment & Education:  Role of OT, POC, ADL and ADL mobility, safety, discharge recs    Patient left with bed in chair position with all lines intact, call button in reach, bed alarm on, nurse notified, and family present    GOALS:   Multidisciplinary Problems       Occupational Therapy Goals          Problem: Occupational Therapy     Goal Priority Disciplines Outcome Interventions   Occupational Therapy Goal     OT, PT/OT Progressing    Description: Goals to be met by: 4/19/2025     Patient will increase functional independence with ADLs by performing:    UE Dressing with Stand-by Assistance.  LE Dressing with Minimal Assistance.  Grooming while seated at sink with Stand-by Assistance.  Toileting from bedside commode with Contact Guard Assistance for hygiene and clothing management.   Toilet transfer to bedside commode with Contact Guard Assistance.                         DME Justifications:   Shabnam's mobility limitation cannot be sufficiently resolved by the use of a cane. Her functional mobility deficit can be sufficiently resolved with the use of a Rolling Walker. Patient's mobility limitation significantly impairs their ability to participate in one of more activities of daily living.  The use of a RW will significantly improve the patient's ability to participate in MRADLS and the patient will use it on regular basis in the home.    Time Tracking:     OT Date of Treatment: 04/07/25  OT Start Time: 1525  OT Stop Time: 1607  OT Total Time (min): 42 min    Billable Minutes:Self Care/Home Management 42    OT/GERMAN: OT          4/7/2025

## 2025-04-08 NOTE — PLAN OF CARE
[facility]    FACILITY TRANSFER ORDERS      Patient Name: Shabnam Noble  YOB: 1955    PCP: Krys Michael MD   PCP Address: [facility] [facility] [address]  PCP Phone Number: [phone]  PCP Fax: [phone]    Encounter Date: 04/09/2025    Admit to: IP Rehab    Vital Signs:  Routine    Diagnoses:   Active Hospital Problems    Diagnosis  POA    *Cerebellar stroke [I63.9]  Yes    Hypomagnesemia [E83.42]  Yes    Hypokalemia [E87.6]  Yes    Mild dementia without behavioral disturbance, psychotic disturbance, mood disturbance, or anxiety [F03.A0]  Yes     Provisional, pt seems on the cusp of mild dementia given difficulties with IADLs      Stage 3b chronic kidney disease [N18.32]  Yes     worsening  Last eGFR 4/25/2024: 41 mL/min/1.73 m^2; stage G3bA3 likely secondary to diabetes  Last sCr 4/25/2024: 1.4 mg/dL; baseline creatinine 1.3-1.6  Last UPCR  : No results found for requested labs within last 416 days 16 hours.; baseline UPCR unk  historic complications  none  BP uncontrolled on ACE/ARB, CCB, and BB  glucose uncontrolled on Biguanide and SGLT2 inhibitor  UPCR controlled on ACE/ARB and SGLT2 inhibitor  Native kidney US No results found for this or any previous visit.    current diuretic therapy: furo 20  current bicarbonate therapy: none   current CKD-MBD therapy: start vit d  current anemia therapy: none    Recently referred here for aid in management of CKD likely due to chronic kidney disease. Discussed high risk for dialysis if glucose not well controlled. Stop ibuprofen. Start vit d, check kidney ultrasound. Was not taking ARB or SGLT2. Discussed importance and started today.  10/2024: reinforced medication compliance        Type 2 diabetes mellitus with hyperglycemia, without long-term current use of insulin [E11.65]  Yes     Started sglt2      History of CVA (cerebrovascular accident) [Z86.73]  Not Applicable    Essential hypertension [I10]  Yes      Resolved  Hospital Problems   No resolved problems to display.       Allergies:  Review of patient's allergies indicates:   Allergen Reactions    Hydrodiuril [hydrochlorothiazide]     Lisinopril        Diet: cardiac diet and diabetic diet: 2000 calorie    Activities: Activity as tolerated    Goals of Care Treatment Preferences:  Code Status: Full Code    Nursing: Per unit routine     Labs: Per unit routine    CONSULTS:    Physical Therapy to evaluate and treat. , Occupational Therapy to evaluate and treat., Speech Therapy to evaluate and treat for Language, Swallowing, and Cognition., and  to evaluate for community resources/long-range planning.    MISCELLANEOUS CARE:  Routine Skin for Bedridden Patients: Apply moisture barrier cream to all skin folds and wet areas in perineal area daily and after baths and all bowel movements. and Diabetes Care:   SN to perform and educate Diabetic management with blood glucose monitoring:, Fingerstick blood sugar AC and HS, and Report CBG < 60 or > 350 to physician.    WOUND CARE ORDERS  None    Medications: Review discharge medications with patient and family and provide education.         Medication List        START taking these medications      atorvastatin 80 MG tablet  Commonly known as: LIPITOR  Take 1 tablet (80 mg total) by mouth once daily.     carvediloL 25 MG tablet  Commonly known as: COREG  Take 1 tablet (25 mg total) by mouth 2 (two) times daily with meals.     clopidogreL 75 mg tablet  Commonly known as: PLAVIX  Take 1 tablet (75 mg total) by mouth once daily for 90 days.     meclizine 25 mg tablet  Commonly known as: ANTIVERT  Take 1 tablet (25 mg total) by mouth 3 (three) times daily as needed for Dizziness.     NIFEdipine 60 MG (OSM) 24 hr tablet  Commonly known as: PROCARDIA-XL  Take 1 tablet (60 mg total) by mouth 2 (two) times a day.            CHANGE how you take these medications      valsartan 320 MG tablet  Commonly known as: DIOVAN  Take 1 tablet (320  mg total) by mouth once daily.  What changed:   medication strength  how much to take            CONTINUE taking these medications      aspirin 81 MG EC tablet  Commonly known as: ECOTRIN  Take 1 tablet (81 mg total) by mouth once daily.     ezetimibe 10 mg tablet  Commonly known as: ZETIA  Take 1 tablet (10 mg total) by mouth once daily.            STOP taking these medications      amLODIPine 10 MG tablet  Commonly known as: NORVASC     metoprolol tartrate 100 MG tablet  Commonly known as: LOPRESSOR          _________________________________  D Heath Marrufo MD  04/09/2025

## 2025-04-08 NOTE — PLAN OF CARE
Recommendations    Continue consistent carbohydrate 2000 calorie diet.   Encourage intake at meals as tolerated.   Monitor weight/labs.   RD to follow to monitor PO intake.    Goals: Pt will tolerate diet with at least 50-75% intake at meals by RD follow up  Nutrition Goal Status: goal met  Communication of RD Recs: POC

## 2025-04-08 NOTE — PT/OT/SLP PROGRESS
"Physical Therapy Treatment    Patient Name:  Shabnam Noble   MRN:  [id number]    Recommendations:     Discharge Recommendations: High Intensity Therapy  Discharge Equipment Recommendations: bedside commode, bath bench, walker, rolling, to be determined by next level of care  Barriers to discharge: Decreased caregiver support    Assessment:     Shabnam Noble is a 69 y.o. female admitted with a medical diagnosis of Cerebellar stroke.  She presents with the following impairments/functional limitations: weakness, impaired endurance, impaired self care skills, impaired functional mobility, gait instability, impaired balance, impaired cognition, decreased coordination, decreased upper extremity function, decreased lower extremity function, decreased safety awareness, impaired coordination ;pt with fair mobility today, amb'd short distance in room w/ RW and min/modA x 1; lots of cueing for safety, pt impulsive. .    Rehab Prognosis: Good; patient would benefit from acute skilled PT services to address these deficits and reach maximum level of function.    Recent Surgery: * No surgery found *      Plan:     During this hospitalization, patient to be seen 5 x/week to address the identified rehab impairments via gait training, therapeutic activities, therapeutic exercises and progress toward the following goals:    Plan of Care Expires:  05/06/25    Subjective     Chief Complaint: none stated  Patient/Family Comments/goals: pt agreeable to session, though during amb stating "I'm gonna fall, I'm gonna fall". Pt reassured she was being held by gt belt, but needing constant cueing for safety.   Pain/Comfort:  Pain Rating 1: 0/10  Pain Rating Post-Intervention 1: 0/10      Objective:     Communicated with nurse prior to session.  Patient found HOB elevated with peripheral IV, PureWick upon PT entry to room.     General Precautions: Standard, fall, diabetic  Orthopedic Precautions: N/A  Braces: N/A  Respiratory Status: Room air   " "  Functional Mobility:  Bed Mobility:     Supine to Sit: stand by assistance  Transfers:     Sit to Stand:  minimum assistance with rolling walker  Gait: amb'd 15' w/ RW and min/modA x 1, constant cueing for safety, pt w/ ataxia      AM-PAC 6 CLICK MOBILITY  Turning over in bed (including adjusting bedclothes, sheets and blankets)?: 4  Sitting down on and standing up from a chair with arms (e.g., wheelchair, bedside commode, etc.): 3  Moving from lying on back to sitting on the side of the bed?: 3  Moving to and from a bed to a chair (including a wheelchair)?: 3  Need to walk in hospital room?: 2  Climbing 3-5 steps with a railing?: 1  Basic Mobility Total Score: 16       Treatment & Education:  Pt perf'd seated LAQ"s and AP's x 5 ea. On BLE's, actively.    Patient left up in chair with all lines intact, call button in reach, and nurse and sister present..    GOALS:   Multidisciplinary Problems       Physical Therapy Goals          Problem: Physical Therapy    Goal Priority Disciplines Outcome Interventions   Physical Therapy Goal     PT, PT/OT Progressing    Description: Goals to be met by: 2025    Patient will increase functional independence with mobility by performin. Sit<>stand with SBA with RW.  2. Gait x 50 feet with CGA with RW.  3. Ascend/descend threshold  step(s) with least restrictive assistive device and minimal assist .                           DME Justifications:   Shabnam requires a commode for home use because she is confined to a single room.   Shabnam's mobility limitation cannot be sufficiently resolved by the use of a cane. Her functional mobility deficit can be sufficiently resolved with the use of a Rolling Walker. Patient's mobility limitation significantly impairs their ability to participate in one of more activities of daily living.  The use of a RW will significantly improve the patient's ability to participate in MRADLS and the patient will use it on regular basis in the " home.    Time Tracking:     PT Received On: 04/08/25  PT Start Time: 1128     PT Stop Time: 1145  PT Total Time (min): 17 min     Billable Minutes: Gait Training 17    Treatment Type: Treatment  PT/PTA: PTA     Number of PTA visits since last PT visit: 1 04/08/2025

## 2025-04-08 NOTE — PROGRESS NOTES
"[facility]  Adult Nutrition  Progress Note    SUMMARY       Recommendations    Continue consistent carbohydrate 2000 calorie diet.   Encourage intake at meals as tolerated.   Monitor weight/labs.   RD to follow to monitor PO intake.    Goals: Pt will tolerate diet with at least 50-75% intake at meals by RD follow up  Nutrition Goal Status: goal met  Communication of RD Recs: POC    Nutrition Discharge Planning    Nutrition Discharge Planning: Therapeutic diet (comments)  Therapeutic diet (comments): diabetic heart healthy    Assessment and Plan    No nutrition dx at this time.        Reason for Assessment    Reason For Assessment: RD follow-up  Diagnosis: stroke/CVA  General Information Comments: Pt on consistent carb 2000 calorie diet, consuming % of meals. No N/V/C/D noted. Pt with facility transfer orders - expected to discharge today pending rehab placement.    Nutrition Related Social Determinants of Health: SDOH: Unable to assess at this time.       Nutrition/Diet History    Food Preferences: no Yarsani or cultural food prefs idenitfied  Spiritual, Cultural Beliefs, Latter day Practices, Values that Affect Care: no  Factors Affecting Nutritional Intake: None identified at this time    Anthropometrics    Height: 5' 5" (165.1 cm)  Height (inches): 65 in  Height Method: Stated  Weight: 84.4 kg (186 lb 1.1 oz)  Weight (lb): 186.07 lb  Weight Method: Bed Scale  Ideal Body Weight (IBW), Female: 125 lb  % Ideal Body Weight, Female (lb): 148.86 %  BMI (Calculated): 31  BMI Grade: 30 - 34.9- obesity - grade I  Usual Body Weight (UBW), k.9 kg (8/8)  % Usual Body Weight: 100.81  % Weight Change From Usual Weight: 0.6 %       Lab/Procedures/Meds    Pertinent Labs Reviewed: reviewed  Pertinent Labs Comments: Creatinine 1.5, eGFR 38, glucose 123  Pertinent Medications Reviewed: reviewed  Scheduled Meds:   amLODIPine  10 mg Oral Daily    aspirin  325 mg Oral Daily    atorvastatin  80 mg Oral Daily "    clopidogreL  75 mg Oral Daily    ezetimibe  10 mg Oral Daily    heparin (porcine)  5,000 Units Subcutaneous Q8H    insulin glargine U-100  10 Units Subcutaneous BID    metoprolol tartrate  100 mg Oral BID    [START ON 4/9/2025] valsartan  80 mg Oral Daily     Estimated/Assessed Needs    Weight Used For Calorie Calculations: 56.8 kg (125 lb 3.5 oz) (IBW)  Energy Calorie Requirements (kcal): 1704 (30 kcal/kg)  Energy Need Method: Kcal/kg  Protein Requirements: 56-68g (1.0-1.2g/kg)  Weight Used For Protein Calculations: 56.8 kg (125 lb 3.5 oz) (IBW)     Estimated Fluid Requirement Method: RDA Method  RDA Method (mL): 1704  CHO Requirement: 180g      Nutrition Prescription Ordered    Current Diet Order: 2000 calorie consistent carb    Evaluation of Received Nutrient/Fluid Intake    I/O: - 151  Comments: LBM 4/8  % Intake of Estimated Energy Needs: 75 - 100 %  % Meal Intake: 75 - 100 %    Nutrition Risk    Level of Risk/Frequency of Follow-up:  (1xweekly)     Monitor and Evaluation    Monitor and Evaluation: Food and beverage intake     Nutrition Follow-Up    RD Follow-up?: Yes    [facility], Registration Eligible, Provisional LDN

## 2025-04-08 NOTE — PT/OT/SLP PROGRESS
Occupational Therapy   Treatment    Name: Shabnam Noble  MRN: [id number]  Admitting Diagnosis:  Cerebellar stroke       Recommendations:     Discharge Recommendations: High Intensity Therapy  Discharge Equipment Recommendations:  bedside commode, bath bench, walker, rolling, to be determined by next level of care  Barriers to discharge:  Inaccessible home environment, Decreased caregiver support (Current functional level)    Assessment:     Shabnam Noble is a 69 y.o. female with a medical diagnosis of Cerebellar stroke.  She presents with the following performance deficits affecting function: weakness, impaired endurance, impaired self care skills, impaired functional mobility, gait instability, impaired balance, impaired sensation, decreased upper extremity function, decreased lower extremity function, decreased safety awareness, impaired coordination, impaired fine motor, impaired cognition, visual deficits.     Rehab Prognosis:  Good; patient would benefit from acute skilled OT services to address these deficits and reach maximum level of function.       Plan:     Patient to be seen 5 x/week to address the above listed problems via self-care/home management, therapeutic activities, therapeutic exercises, neuromuscular re-education, sensory integration, cognitive retraining  Plan of Care Expires: 04/19/25  Plan of Care Reviewed with: patient, sibling    Subjective     Chief Complaint: None  Patient/Family Comments/goals: Pt stating she is wanting to go to Rehab and asking questions about Rehab.  Pain/Comfort:  Pain Rating 1: 0/10    Objective:     Communicated with: nurse prior to session.  Patient found HOB elevated with peripheral IV, PureWick (Pt's sister in room) upon OT entry to room.    General Precautions: Standard, diabetic, fall    Orthopedic Precautions:N/A  Braces: N/A  Respiratory Status: Room air     Occupational Performance:     Bed Mobility:    Supine to sit: SBA  Sit to supine: SBA     Functional  Mobility/Transfers: Gait belt and  socks  Sit to stand: Min A with RW  Transfers: Min A with RW regressing to Mod A with RW  Functional Mobility: Mod A with RW, assist for navigation of RW, assist with problem solving and safety, cues for staying inside RW    Activities of Daily Living:  Grooming: Applying lotion to legs: Set up/SBA/verbal cues while long sitting in bed, Oral Care: Min A sitting at sink, due to ataxia and balance deficits, pt sat on BSC to perform, Pt did stand to spit out mouthwash in sink  Toileting: Max A for hygiene and clothing management when using BSC, using female external catheter to suction throughout the day  LB Dressing: SBA to doff/don socks sitting on EOB with foot hiked up on bed    Apraxia, impaired motor control during functional tasks, observed pt having less control of left side compared to right during functional tasks, education on pt being a high fall risk, education on safety and use of call button for ADL and ADL mobility.       [facility] 6 Click ADL: 15    Treatment & Education:  Role of OT, POC, ADL and ADL mobility training, safety, discharge recs    Patient left HOB elevated with all lines intact, call button in reach, bed alarm on, nurse notified, and sister present    GOALS:   Multidisciplinary Problems       Occupational Therapy Goals          Problem: Occupational Therapy    Goal Priority Disciplines Outcome Interventions   Occupational Therapy Goal     OT, PT/OT Progressing    Description: Goals to be met by: 4/19/2025     Patient will increase functional independence with ADLs by performing:    UE Dressing with Stand-by Assistance.  LE Dressing with Minimal Assistance.  Grooming while seated at sink with Stand-by Assistance.  Toileting from bedside commode with Contact Guard Assistance for hygiene and clothing management.   Toilet transfer to bedside commode with Contact Guard Assistance.                         DME Justifications:   Shabnam requires a commode for  home use because she is confined to a single room.  Shabnam Noble has a mobility limitation that significantly impairs her ability to participate in one or more mobility related activities of daily living (MRADLs) such as toileting, feeding, dressing, grooming, and bathing in customary locations in the home.  The mobility limitation cannot be completely resolved by the use of a cane or walker.   The use of a manual wheelchair will significantly improve the patients ability to participate in MRADLS and the patient will use it on regular basis outside the home for community distances. Shabnam Noble has expressed her willingness to use a manual wheelchair in the home. Patients upper body strength is sufficient for propulsion.  She also has a caregiver who is available, willing, and able to provide assistance with the wheelchair when needed.     Sahbnam's mobility limitation cannot be sufficiently resolved by the use of a cane. Her functional mobility deficit can be sufficiently resolved with the use of a Rolling Walker. Patient's mobility limitation significantly impairs their ability to participate in one of more activities of daily living.  The use of a RW will significantly improve the patient's ability to participate in MRADLS and the patient will use it on regular basis in the home.    Time Tracking:     OT Date of Treatment: 04/08/25  OT Start Time: 1610  OT Stop Time: 1637  OT Total Time (min): 27 min    Billable Minutes:Self Care/Home Management 27    OT/GERMAN: OT          4/8/2025

## 2025-04-09 VITALS
DIASTOLIC BLOOD PRESSURE: 77 MMHG | OXYGEN SATURATION: 99 % | RESPIRATION RATE: 18 BRPM | BODY MASS INDEX: 31 KG/M2 | HEIGHT: 65 IN | TEMPERATURE: 98 F | WEIGHT: 186.06 LBS | HEART RATE: 62 BPM | SYSTOLIC BLOOD PRESSURE: 174 MMHG

## 2025-04-09 LAB
ABSOLUTE EOSINOPHIL (OHS): 0.21 K/UL
ABSOLUTE MONOCYTE (OHS): 0.44 K/UL (ref 0.3–1)
ABSOLUTE NEUTROPHIL COUNT (OHS): 2.76 K/UL (ref 1.8–7.7)
ANION GAP (OHS): 7 MMOL/L (ref 8–16)
BASOPHILS # BLD AUTO: 0.03 K/UL
BASOPHILS NFR BLD AUTO: 0.5 %
BUN SERPL-MCNC: 27 MG/DL (ref 8–23)
CALCIUM SERPL-MCNC: 8.8 MG/DL (ref 8.7–10.5)
CHLORIDE SERPL-SCNC: 110 MMOL/L (ref 95–110)
CO2 SERPL-SCNC: 23 MMOL/L (ref 23–29)
CREAT SERPL-MCNC: 1.6 MG/DL (ref 0.5–1.4)
ERYTHROCYTE [DISTWIDTH] IN BLOOD BY AUTOMATED COUNT: 13.9 % (ref 11.5–14.5)
GFR SERPLBLD CREATININE-BSD FMLA CKD-EPI: 35 ML/MIN/1.73/M2
GLUCOSE SERPL-MCNC: 156 MG/DL (ref 70–110)
HCT VFR BLD AUTO: 33.5 % (ref 37–48.5)
HGB BLD-MCNC: 11 GM/DL (ref 12–16)
IMM GRANULOCYTES # BLD AUTO: 0.06 K/UL (ref 0–0.04)
IMM GRANULOCYTES NFR BLD AUTO: 1.1 % (ref 0–0.5)
LYMPHOCYTES # BLD AUTO: 2.12 K/UL (ref 1–4.8)
MAGNESIUM SERPL-MCNC: 2.1 MG/DL (ref 1.6–2.6)
MCH RBC QN AUTO: 27.3 PG (ref 27–31)
MCHC RBC AUTO-ENTMCNC: 32.8 G/DL (ref 32–36)
MCV RBC AUTO: 83 FL (ref 82–98)
NUCLEATED RBC (/100WBC) (OHS): 0 /100 WBC
PHOSPHATE SERPL-MCNC: 3.9 MG/DL (ref 2.7–4.5)
PLATELET # BLD AUTO: 233 K/UL (ref 150–450)
PMV BLD AUTO: 11.2 FL (ref 9.2–12.9)
POCT GLUCOSE: 118 MG/DL (ref 70–110)
POCT GLUCOSE: 165 MG/DL (ref 70–110)
POCT GLUCOSE: 233 MG/DL (ref 70–110)
POTASSIUM SERPL-SCNC: 4.2 MMOL/L (ref 3.5–5.1)
RBC # BLD AUTO: 4.03 M/UL (ref 4–5.4)
RELATIVE EOSINOPHIL (OHS): 3.7 %
RELATIVE LYMPHOCYTE (OHS): 37.7 % (ref 18–48)
RELATIVE MONOCYTE (OHS): 7.8 % (ref 4–15)
RELATIVE NEUTROPHIL (OHS): 49.2 % (ref 38–73)
SODIUM SERPL-SCNC: 140 MMOL/L (ref 136–145)
WBC # BLD AUTO: 5.62 K/UL (ref 3.9–12.7)

## 2025-04-09 PROCEDURE — 97116 GAIT TRAINING THERAPY: CPT | Mod: CQ

## 2025-04-09 PROCEDURE — 36415 COLL VENOUS BLD VENIPUNCTURE: CPT | Performed by: HOSPITALIST

## 2025-04-09 PROCEDURE — 25000003 PHARM REV CODE 250: Performed by: NURSE PRACTITIONER

## 2025-04-09 PROCEDURE — 80048 BASIC METABOLIC PNL TOTAL CA: CPT | Performed by: HOSPITALIST

## 2025-04-09 PROCEDURE — 63600175 PHARM REV CODE 636 W HCPCS: Performed by: NURSE PRACTITIONER

## 2025-04-09 PROCEDURE — 63600175 PHARM REV CODE 636 W HCPCS: Performed by: HOSPITALIST

## 2025-04-09 PROCEDURE — 84100 ASSAY OF PHOSPHORUS: CPT | Performed by: HOSPITALIST

## 2025-04-09 PROCEDURE — 25000003 PHARM REV CODE 250: Performed by: HOSPITALIST

## 2025-04-09 PROCEDURE — 85025 COMPLETE CBC W/AUTO DIFF WBC: CPT | Performed by: HOSPITALIST

## 2025-04-09 PROCEDURE — 83735 ASSAY OF MAGNESIUM: CPT | Performed by: HOSPITALIST

## 2025-04-09 PROCEDURE — 25000003 PHARM REV CODE 250: Performed by: INTERNAL MEDICINE

## 2025-04-09 RX ORDER — NIFEDIPINE 30 MG/1
60 TABLET, EXTENDED RELEASE ORAL 2 TIMES DAILY
Status: DISCONTINUED | OUTPATIENT
Start: 2025-04-09 | End: 2025-04-09 | Stop reason: HOSPADM

## 2025-04-09 RX ORDER — CARVEDILOL 25 MG/1
25 TABLET ORAL 2 TIMES DAILY WITH MEALS
Status: ON HOLD
Start: 2025-04-09

## 2025-04-09 RX ORDER — VALSARTAN 320 MG/1
320 TABLET ORAL DAILY
Status: ON HOLD
Start: 2025-04-09

## 2025-04-09 RX ORDER — NIFEDIPINE 60 MG/1
60 TABLET, EXTENDED RELEASE ORAL 2 TIMES DAILY
Status: ON HOLD
Start: 2025-04-09

## 2025-04-09 RX ORDER — VALSARTAN 80 MG/1
320 TABLET ORAL DAILY
Status: DISCONTINUED | OUTPATIENT
Start: 2025-04-09 | End: 2025-04-09 | Stop reason: HOSPADM

## 2025-04-09 RX ORDER — CARVEDILOL 12.5 MG/1
25 TABLET ORAL 2 TIMES DAILY WITH MEALS
Status: DISCONTINUED | OUTPATIENT
Start: 2025-04-09 | End: 2025-04-09 | Stop reason: HOSPADM

## 2025-04-09 RX ADMIN — ATORVASTATIN CALCIUM 80 MG: 20 TABLET, FILM COATED ORAL at 09:04

## 2025-04-09 RX ADMIN — NIFEDIPINE 60 MG: 30 TABLET, FILM COATED, EXTENDED RELEASE ORAL at 09:04

## 2025-04-09 RX ADMIN — VALSARTAN 320 MG: 80 TABLET, FILM COATED ORAL at 09:04

## 2025-04-09 RX ADMIN — CARVEDILOL 25 MG: 12.5 TABLET, FILM COATED ORAL at 09:04

## 2025-04-09 RX ADMIN — ASPIRIN 325 MG ORAL TABLET 325 MG: 325 PILL ORAL at 09:04

## 2025-04-09 RX ADMIN — EZETIMIBE 10 MG: 10 TABLET ORAL at 09:04

## 2025-04-09 RX ADMIN — HEPARIN SODIUM 5000 UNITS: 5000 INJECTION INTRAVENOUS; SUBCUTANEOUS at 05:04

## 2025-04-09 RX ADMIN — HEPARIN SODIUM 5000 UNITS: 5000 INJECTION INTRAVENOUS; SUBCUTANEOUS at 02:04

## 2025-04-09 RX ADMIN — CLOPIDOGREL 75 MG: 75 TABLET ORAL at 09:04

## 2025-04-09 RX ADMIN — INSULIN ASPART 4 UNITS: 100 INJECTION, SOLUTION INTRAVENOUS; SUBCUTANEOUS at 03:04

## 2025-04-09 RX ADMIN — INSULIN GLARGINE 10 UNITS: 100 INJECTION, SOLUTION SUBCUTANEOUS at 09:04

## 2025-04-09 NOTE — SUBJECTIVE & OBJECTIVE
Interval History: No acute events overnight. Await placement. Discussed with patient/daughter at bedside. No new concerns at this time.    Review of Systems   Constitutional:  Negative for chills and fever.   Respiratory:  Negative for cough and shortness of breath.    Cardiovascular:  Negative for chest pain and palpitations.   Gastrointestinal:  Negative for abdominal pain, nausea and vomiting.     Objective:     Vital Signs (Most Recent):  Temp: 98.4 °F (36.9 °C) (04/08/25 2044)  Pulse: (!) 57 (04/08/25 2044)  Resp: 18 (04/08/25 2044)  BP: (!) 176/77 (04/08/25 2044)  SpO2: 100 % (04/08/25 2044) Vital Signs (24h Range):  Temp:  [97.6 °F (36.4 °C)-98.4 °F (36.9 °C)] 98.4 °F (36.9 °C)  Pulse:  [55-64] 57  Resp:  [17-18] 18  SpO2:  [95 %-100 %] 100 %  BP: (176-201)/(73-89) 176/77     Weight: 84.4 kg (186 lb 1.1 oz)  Body mass index is 30.96 kg/m².    Intake/Output Summary (Last 24 hours) at 4/8/2025 2051  Last data filed at 4/8/2025 1448  Gross per 24 hour   Intake 540 ml   Output 1000 ml   Net -460 ml         Physical Exam  Vitals and nursing note reviewed.   Constitutional:       General: She is not in acute distress.     Appearance: She is well-developed.   HENT:      Head: Normocephalic and atraumatic.   Eyes:      General:         Right eye: No discharge.         Left eye: No discharge.      Conjunctiva/sclera: Conjunctivae normal.   Cardiovascular:      Rate and Rhythm: Normal rate.      Pulses: Normal pulses.   Pulmonary:      Effort: Pulmonary effort is normal. No respiratory distress.   Abdominal:      Palpations: Abdomen is soft.      Tenderness: There is no abdominal tenderness.   Musculoskeletal:         General: Normal range of motion.      Right lower leg: No edema.      Left lower leg: No edema.   Skin:     General: Skin is warm and dry.   Neurological:      Mental Status: She is alert and oriented to person, place, and time.      Comments: L facial droop. Mild L-sided weakness. Coordination deficits.        Significant Labs:   CBC:  Recent Labs   Lab 04/06/25  0625 04/08/25  0408   WBC 5.07 6.99   HGB 11.2* 10.6*   HCT 34.0* 33.8*    213   LYMPH 1.46  28.8 2.34  33.5   MONO 8.9  0.45 6.7  0.47   EOS 4.1  0.21 3.7  0.26   CMP:  Recent Labs   Lab 04/06/25  0625 04/07/25  1249 04/08/25  0408    140 140   K 4.0 4.4 4.5   * 110 110   CO2 21* 22* 22*   BUN 21 21 23   CREATININE 1.4 1.4 1.5*   CALCIUM 8.9 8.9 9.0   MG  --  1.8 1.7   PHOS  --  4.2 3.9   ALKPHOS 70 70  --    AST 11 11  --    ALT 12 10  --    BILITOT 0.2 0.2  --    ALBUMIN 3.4* 3.3*  --    ANIONGAP 9 8 8      Significant Imaging: I have reviewed and interpreted all pertinent imaging results/findings within the past 24 hours.

## 2025-04-09 NOTE — PLAN OF CARE
Case Management Final Discharge Note    Discharge Disposition: [facility]    New DME ordered / company name: None    Relevant SDOH / Transition of Care Barriers:  None    Person available to provide assistance at home when needed and their contact information: Self    Scheduled followup appointment: None    Referrals placed: [facility]    Transportation: Wheelchair van        Patient educated on discharge services and updated on DC plan. Bedside RN notified, patient clear to discharge from Case Management Perspective.      04/09/25 1328   Final Note   Assessment Type Final Discharge Note   Anticipated Discharge Disposition Rehab   Hospital Resources/Appts/Education Provided Provided patient/caregiver with written discharge plan information   Post-Acute Status   Post-Acute Authorization Placement   Post-Acute Placement Status Set-up Complete/Auth obtained   Discharge Delays None known at this time     [facility] ([place]  Discharge Final Note    Primary Care Provider: Krys Michael MD    Expected Discharge Date: 4/9/2025    Final Discharge Note (most recent)       Final Note - 04/09/25 1328          Final Note    Assessment Type Final Discharge Note     Anticipated Discharge Disposition Rehab Facility (P)      Hospital Resources/Appts/Education Provided Provided patient/caregiver with written discharge plan information (P)         Post-Acute Status    Post-Acute Authorization Placement (P)      Post-Acute Placement Status Set-up Complete/Auth obtained (P)      Discharge Delays None known at this time (P)                      Important Message from Medicare  Important Message from Medicare regarding Discharge Appeal Rights: Given to patient/caregiver, Explained to patient/caregiver, Signed/date by patient/caregiver     Date IMM was signed: 04/06/25  Time IMM was signed: 0941    Contact Info       *[facility]   Specialty: Inpatient Rehabilitation Facility    [address]  [place] [address]   Phone: [phone]       Next Steps: Follow up

## 2025-04-09 NOTE — PROGRESS NOTES
[facility] Medicine  Progress Note    Patient Name: Shabnam Noble  MRN: [id number]  Patient Class: IP- Inpatient   Admission Date: 4/4/2025  Length of Stay: 4 days  Attending Physician: LINDSAY Marrufo MD  Primary Care Provider: Krys Michael MD        Subjective     Principal Problem:Cerebellar stroke    HPI:  Shabnam Noble is a 69 year old female with a PMHx of hypertension, diabetes mellitus, CKD and dementia who presents with generalized weakness and dizziness for the past 2 days.  She reports associated subjective fever, nausea, tremors, diaphoresis and headache.  Per EMS, patient had 1 episode of vomiting PTA that was relieved with IV Zofran.  ED note reports patient has dizziness worsens when she stands up and she has difficulty maintaining her balance.  No recent falls reported and she does not use a cane or walker.  Patient resides home alone and is usually able to manage.  She denies chest pain and has had no further episodes of vomiting since arriving to the ED. patient lethargic on exam, arouses to verbal stimuli.  Information obtained per patient and sister at bedside.    Upon arrival to the ED, patient found to be hypertensive with systolic 220s.  Creatinine 1.5 (near baseline), , potassium 3.2 and magnesium 1.5.  COVID and flu negative.  CBC unremarkable.  Chest x-ray with stable cardiomegaly.  MRI revealed multifocal acute infarcts within the left cerebellar hemisphere and CTA head and neck showed small acute infarcts in the left hemisphere and small remote infarcts in the right cerebellar hemisphere.  No focal stenosis, occlusion or dissection.  Small 1-2 mm aneurysm involving the supraclinoid portion of the left distal ICA.  Tele neurology consulted in the ED recommendations provided.  Patient referred to Hospital Medicine and will be admitted for further evaluation and management.    Overview/Hospital Course:  Ms. Noble presented weakness and dizziness for  greater than 2 days.  Admitted with acute CVA involving the left cerebellar and brainstem.  Treatment initiated with permissive hypertension, aspirin and Plavix, but statin not given due to reported allergy.  Vascular neurology consulted. Blood pressure medications restarted. Working with PT and OT, and seeking high intensity placement.    Interval History: No acute events overnight. Await placement. Discussed with patient/daughter at bedside. No new concerns at this time.    Review of Systems   Constitutional:  Negative for chills and fever.   Respiratory:  Negative for cough and shortness of breath.    Cardiovascular:  Negative for chest pain and palpitations.   Gastrointestinal:  Negative for abdominal pain, nausea and vomiting.     Objective:     Vital Signs (Most Recent):  Temp: 98.4 °F (36.9 °C) (04/08/25 2044)  Pulse: (!) 57 (04/08/25 2044)  Resp: 18 (04/08/25 2044)  BP: (!) 176/77 (04/08/25 2044)  SpO2: 100 % (04/08/25 2044) Vital Signs (24h Range):  Temp:  [97.6 °F (36.4 °C)-98.4 °F (36.9 °C)] 98.4 °F (36.9 °C)  Pulse:  [55-64] 57  Resp:  [17-18] 18  SpO2:  [95 %-100 %] 100 %  BP: (176-201)/(73-89) 176/77     Weight: 84.4 kg (186 lb 1.1 oz)  Body mass index is 30.96 kg/m².    Intake/Output Summary (Last 24 hours) at 4/8/2025 2051  Last data filed at 4/8/2025 1448  Gross per 24 hour   Intake 540 ml   Output 1000 ml   Net -460 ml         Physical Exam  Vitals and nursing note reviewed.   Constitutional:       General: She is not in acute distress.     Appearance: She is well-developed.   HENT:      Head: Normocephalic and atraumatic.   Eyes:      General:         Right eye: No discharge.         Left eye: No discharge.      Conjunctiva/sclera: Conjunctivae normal.   Cardiovascular:      Rate and Rhythm: Normal rate.      Pulses: Normal pulses.   Pulmonary:      Effort: Pulmonary effort is normal. No respiratory distress.   Abdominal:      Palpations: Abdomen is soft.      Tenderness: There is no abdominal  tenderness.   Musculoskeletal:         General: Normal range of motion.      Right lower leg: No edema.      Left lower leg: No edema.   Skin:     General: Skin is warm and dry.   Neurological:      Mental Status: She is alert and oriented to person, place, and time.      Comments: L facial droop. Mild L-sided weakness. Coordination deficits.       Significant Labs:   CBC:  Recent Labs   Lab 04/06/25  0625 04/08/25  0408   WBC 5.07 6.99   HGB 11.2* 10.6*   HCT 34.0* 33.8*    213   LYMPH 1.46  28.8 2.34  33.5   MONO 8.9  0.45 6.7  0.47   EOS 4.1  0.21 3.7  0.26   CMP:  Recent Labs   Lab 04/06/25  0625 04/07/25  1249 04/08/25  0408    140 140   K 4.0 4.4 4.5   * 110 110   CO2 21* 22* 22*   BUN 21 21 23   CREATININE 1.4 1.4 1.5*   CALCIUM 8.9 8.9 9.0   MG  --  1.8 1.7   PHOS  --  4.2 3.9   ALKPHOS 70 70  --    AST 11 11  --    ALT 12 10  --    BILITOT 0.2 0.2  --    ALBUMIN 3.4* 3.3*  --    ANIONGAP 9 8 8      Significant Imaging: I have reviewed and interpreted all pertinent imaging results/findings within the past 24 hours.      Assessment & Plan  Cerebellar stroke  History of CVA (cerebrovascular accident)  Essential hypertension  - Patient presented with unsteady gait, weakness and dizziness for > 2 days.  Imaging revealed multifocal acute infarcts in the left cerebellar hemisphere and small remote infarcts in the right.  Facial droop and ataxia on exam.  - Tele vascular neurology consulted; appreciate recommendations  - S/p loading dose of 300 mg clopidogrel x 1 and followed by 75 mg clopidogrel daily  - Increased aspirin dosage to 325 mg p.o. daily as per Tele-Neurology recommendations  - Repeat stat head CT followed by MRI of the brain without contrast without significant change   - 2D echo overall unremarkable with EF to 60-65% and bilateral lower extremity ultrasound negative for DVT  - Patient reported she was allergic to atorvastatin, and this medication was not started on admit.   This was again addressed with patient and family at the bedside with reports of nausea being the main side effect reported, but otherwise no reported myalgias or change in liver function tests  - Trial of atorvastatin 80 mg p.o. daily started on 4/6, tolerating to date. Will need follow-up for continued monitoring.  - Continue neuro checks q.4 and monitor on telemetry  - Allowed for permissive hypertension and then re-started blood pressure medications in stepwise fashion on 4/5. Continue metoprolol, titrate valsartan upward as required. Continue amlodipine.  - PT/OT/SLP consulted and recommended high intensity; given patient's prior level of function, participation with therapy services and need for multidisciplinary therapy services / continued medical monitoring for titration of antihypertensives and monitoring after statin initiation.  Type 2 diabetes mellitus with hyperglycemia, without long-term current use of insulin  - Most recent A1c 9.0 performed 02/04/2025.  Home regimen is metformin and Jardiance.  Sister reported Jardiance was too expensive and patient has not started this medication.  Home regimen on hold.  - Glucose level reviewed, currently at targets  - Continue Lantus 10 units subQ daily, and sliding scale insulin, moderate dose range  Stage 3b chronic kidney disease  Hypokalemia  Hypomagnesemia  - Creatinine on admission was 1.5, consistent with baseline creatinine ranged between 1.3 -1.6  - Monitor I&Os  - Trend with labs and correct electrolyte abnormalities as needed  Mild dementia without behavioral disturbance, psychotic disturbance, mood disturbance, or anxiety  - Patient with history of mild vascular dementia.  She lives alone and family is available nearby  - Mentation is at baseline, and she is oriented as per family at the bedside    VTE Risk Mitigation (From admission, onward)           Ordered     heparin (porcine) injection 5,000 Units  Every 8 hours         04/04/25 2026     IP VTE  HIGH RISK PATIENT  Once         04/04/25 1810     Place sequential compression device  Until discontinued         04/04/25 1810                    Discharge Planning   SOBIA: 4/8/2025     Code Status: Full Code   Medical Readiness for Discharge Date: 4/8/2025  Discharge Plan A: Rehab   Discharge Delays: (!) Post-Acute Set-up    D Heath Marrufo MD  Department of Hospital Medicine   [facility], [place], operated by [facility] ([place])

## 2025-04-09 NOTE — ASSESSMENT & PLAN NOTE
- Creatinine on admission was 1.5, consistent with baseline creatinine ranged between 1.3 -1.6  - Monitor I&Os  - Trend with labs and correct electrolyte abnormalities as needed   Cardiology Progress Note    Date:  7/25/2022    Name: Aj Freeman  YOB: 1930  MRN: [id number]  Primary cardiologist: Dr. Murdock    SUBJECTIVE     Chief Complaint: chest pain    History of Present Illness:  Aj Freeman is a 89 year old male with history of hypothyroidism, who presented to the ER 7/23/2022 with new onset atrial fibrillation.  Woke up with chest pain and diaphoresis and went to the hospital and found to be AF RVR. In the ED given amiodarone and converted, no longer having chest pain or diaphoresis.     Interval history:   Creatinine 1.37. Hgb 11.3. Platelets and WBC within normal limits. SBP 140s-150s. Coronary angiogram scheduled today.    Patient reports feeling well. Denies chest pain, shortness of breath.    Past medical history, allergies, surgical history, social history, and family history reviewed and is without changes. See initial consultation note for details.    Current Facility-Administered Medications   Medication Dose Route Frequency Provider Last Rate Last Admin   • cloNIDine (CATAPRES) tablet 0.1 mg  0.1 mg Oral Q4H PRN Kacy Lomax MD       • hydrALAZINE (APRESOLINE) injection 10 mg  10 mg Intravenous Q4H PRN Kacy Lomax MD       • sodium chloride 0.9% infusion   Intravenous Continuous Kacy Lomax  mL/hr at 07/25/22 1042 New Bag at 07/25/22 1042   • heparin (porcine) 25,000 units/250 mL in dextrose 5 % infusion  1-30 Units/kg/hr (Dosing Weight) Intravenous Continuous Sj Henry DO 8 mL/hr at 07/25/22 1042 12 Units/kg/hr at 07/25/22 1042   • heparin (porcine) injection 4,000 Units  60 Units/kg (Dosing Weight) Intravenous PRN Sj Henry DO       • heparin (porcine) injection 2,000 Units  30 Units/kg (Dosing Weight) Intravenous PRN Sj Henry DO       • AMIODarone (PACERONE) tablet 200 mg  200 mg Oral Daily Sj Henry DO   200 mg at 07/25/22 0848   • sodium chloride 0.9 % flush bag 25 mL  25 mL Intravenous PRN Kendrick MOLINA  Lalita Guevara, DO       • sodium chloride (PF) 0.9 % injection 2 mL  2 mL Intracatheter 2 times per day Kendrick Guevara DO       • sodium chloride 0.9 % flush bag 25 mL  25 mL Intravenous PRN Kendrick Guevara DO       • Potassium Standard Replacement Protocol (Levels 3.5 and lower)   Does not apply See Admin Instructions Kendrick Guevaar DO       • Magnesium Standard Replacement Protocol   Does not apply See Admin Instructions Kendrick Guevara DO       • Phosphorus Standard Replacement Protocol   Does not apply See Admin Instructions Kendrick Guevara DO       • nitroGLYcerin (NITROSTAT) sublingual tablet 0.4 mg  0.4 mg Sublingual Q5 Min PRN Kendrick Guevara DO       • ondansetron (ZOFRAN) injection 4 mg  4 mg Intravenous BID PRN Kendrick Guevara DO       • acetaminophen (TYLENOL) tablet 650 mg  650 mg Oral Q4H PRN Kendrick Guevara DO       • polyethylene glycol (MIRALAX) packet 17 g  17 g Oral Daily PRN Kendrick Guevara DO       • docusate sodium-sennosides (SENOKOT S) 50-8.6 MG 2 tablet  2 tablet Oral Daily PRN Kendrick Guevara DO       • atorvastatin (LIPITOR) tablet 80 mg  80 mg Oral Nightly Kendrick Guevara DO   80 mg at 07/24/22 2002   • aspirin chewable 81 mg  81 mg Oral Daily Arjun Murdock MD   81 mg at 07/24/22 0758   • metoPROLOL succinate (TOPROL-XL) ER tablet 12.5 mg  12.5 mg Oral Daily Arjun Murdock MD   12.5 mg at 07/25/22 0851   • levothyroxine (SYNTHROID, LEVOTHROID) tablet 100 mcg  100 mcg Oral QAM AC Kendrick Guevara DO   100 mcg at 07/25/22 2863       OBJECTIVE     Vital Last Value 24 Hour Range   Temperature 97.6 °F (36.4 °C) Temp  Min: 97.6 °F (36.4 °C)  Max: 98.8 °F (37.1 °C)   Pulse 61 Pulse  Min: 54  Max: 62   Respiratory 20 Resp  Min: 16  Max: 20   Blood Pressure (!) 155/82 BP  Min: 131/65  Max: 155/82   Arterial Blood Pressure   No data recorded   Pulse Oximetry  96 % SpO2  Min: 96 %  Max: 97 %     Vital Today Admission   Weight 63.5 kg (140 lb) Weight: 66.3 kg (146 lb 2.6 oz)     Weight over the last 48 hours:    Patient Vitals for the past 48 hrs:   Weight   07/23/22 1330 66.3 kg (146 lb 2.6 oz)   07/23/22 1900 64.1 kg (141 lb 5 oz)   07/24/22 0555 64.6 kg (142 lb 6.7 oz)   07/25/22 0600 63.5 kg (140 lb)        Intake/output:      Intake/Output Summary (Last 24 hours) at 7/25/2022 1250  Last data filed at 7/25/2022 0928  Gross per 24 hour   Intake 860 ml   Output --   Net 860 ml       Physical Exam:  General Appearance: well appearing, in no acute distress  HEENT:  Sclera Anicteric, Conjunctivae clear, Moist Oral and nasal Mucous Membranes  Heart:  regular rhythm and normal rate. Normal S1 and S2. Grade II/VI systolic murmur heard best at RUSB. No JVD.  Lungs:  Normal respiratory effort, no accessory muscle use. clear to auscultation throughout bilateral lung fields without crackles or wheeze  Abdomen:  soft, non-tender abdomen without palpable masses  Extremities:  no lower extremity cyanosis, varicosities. Trace/minimal bilateral lower extremity edema.  Pulses:  Bilateral Radial and pedal pulses 2+  Musculoskeletal:  No tenderness over chest wall  Skin:  Warm and dry, no rashes  Neuro:  Moves all 4 extremities equally, follows directions and answers questions appropriately  Psych:  Normal mood and affect      Laboratory data:  Recent Labs   Lab 07/25/22  0609 07/24/22  0336 07/24/22  0124 07/23/22  1928 07/23/22  1533 07/23/22  1400 07/23/22  0841 07/23/22  0840 07/23/22  0840   WBC 7.6 8.4  --   --   --  8.9  --   --  7.5   HCT 34.9* 33.3*  --   --   --  37.5*  --   --  42.9   HGB 11.3* 11.0*  --   --   --  11.8*  --   --  13.8    158  --   --   --  179  --   --  188   INR  --   --   --   --   --   --   --   --  1.2   PTT 58* 57* 59* 58*  --  27  --    < >  --    SODIUM 144 143  --   --   --   --   --   --  142   POTASSIUM 3.9 4.1  --   --   --   --   --   --   4.2   CHLORIDE 113* 112*  --   --   --   --   --   --  107   CO2 27 25  --   --   --   --   --   --  28   GLUCOSE 87 86  --   --   --   --   --   --  88   BUN 32* 40*  --   --   --   --   --   --  31*   CREATININE 1.37* 1.58*  --   --   --   --  1.70*  --  1.61*   HTROPI  --  3,662*  --  3,836* 3,532* 3,048*  --    < >  --    TSH  --   --   --   --   --   --   --   --  3.014   HDL  --  38*  --   --   --   --   --   --   --    CALCLDL  --  30  --   --   --   --   --   --   --    TRIGLYCERIDE  --  42  --   --   --   --   --   --   --     < > = values in this interval not displayed.         Diagnostics:  Echocardiogram 7/24/2022  Normal left ventricular chamber size. Normal left ventricular systolic function.LVEF= 70 %.  Mildly enlarged right ventricular chamber size. Normal right ventricular systolic function.  Moderately elevated right ventricular systolic pressure 55 mmHg.  Aortic valve is severely calcified and has severely restricted leaflet motion. Moderate to severe aortic valve stenosis with peak velocity  of 3.4 m/s, mean gradient of 25 mmHg, and aortic valve area of 1.0 cm2. Mild eccentric aortic valve regurgitation.  Thickened mitral valve. Severe MAC. Mitral valve mean gradient 3 mmHg at HR=61bpm. Moderate mitral valve regurgitation.  Dilated tricuspid annulus. Moderate tricuspid valve regurgitation.  Severe biatrial enlargement.  No pericardial effusion.  There is no prior study available for direct comparison.  Results were conveyed to the referring provider.    Telemetry: NSR, short runs of NSVT on 7/23 and 7/24    ASSESSMENT AND PLAN     #Elevated Troponin, Chest pain  -Initial POC troponin <0.10-> 0.95. HS troponin [id number]  -NSTEMI vs demand ischemia in setting of AF with RVR and mod-severe VHD  -Echo with preserved EF and no RWMAs  -Continue heparin gtt, aspirin 81 mg daily, atorvastatin 80 mg nightly, metoprolol succinate 12.5 mg daily  -[facility]/Coronary angiogram today 7/25    #New onset  Paroxysmal Atrial fibrillation   -[id number] is at least 3.  -TSH within normal limits on admission  -Continue heparin gtt, eventual transition to DOAC after procedure. Primary team sent test scripts for Eliquis and Xarelto.  -Continue amiodarone 200 mg daily, metoprolol succinate 12.5 mg daily    #Valvular Heart Disease  -Echo- mod-severe AS    #NSVT  -Short runs noted on telemetry  -Continue BB as above  -Keep K >4 and Mg >2    #HLD  -Continue atorvastatin as above    #HTN  -Home losartan has been held  -Continue metoprolol and amiodarone as above    #Hypothyroidism  -TSH within normal limits on admission  -On levothyroxine     #CKD  -Baseline creatinine ~1.4-1.6    Recommendations:  · [facility]/Coronary angiogram today 7/25  · Eventual transition to DOAC after procedure. Primary team sent test scripts for Eliquis and Xarelto.    Gloria Winston PA-C/Dr. Murdock  Cardiology  Pager:  [phone]  7/25/2022    Addendum:   Pt seen/examined on date of service (late entry). The documentation recorded accurately and completely reflects the service(s) I personally performed and the \"substantive portion\" of the complex medical decisions made by me. Echo noted: moderate-severe AS. Given troponin and chest pain presentation - proceeded with cath today: demonstrates severe RCA disease. Will bring back on 7/26 for planned PCI via right femoral. No further chest pain.  S1S2, alert.      I, Arjun Murdock MD, have personally taken the medical history, performed a physical examination of the patient, reviewed the clinical data, labs, images, ecg, etc. I have reviewed and edited the above note as needed. I have personally formulated the clinical impression and recommendations as stated.      Arjun Murdock MD  [phone]

## 2025-04-09 NOTE — PLAN OF CARE
Medicare Message     Important Message from Medicare regarding Discharge Appeal Rights Given to patient/caregiver; Explained to patient/caregiver; Signed/date by patient/caregiver Explained to patient/caregiver; Signed/date by patient/caregiver   Date IMM was signed 4/6/2025 4/9/2025   Time IMM was signed [id number]

## 2025-04-09 NOTE — PT/OT/SLP PROGRESS
Physical Therapy Treatment    Patient Name:  Shabnam Noble   MRN:  [id number]    Recommendations:     Discharge Recommendations: High Intensity Therapy  Discharge Equipment Recommendations: bedside commode, bath bench, walker, rolling, to be determined by next level of care  Barriers to discharge: Decreased caregiver support    Assessment:     Shabnam Noble is a 69 y.o. female admitted with a medical diagnosis of Cerebellar stroke.  She presents with the following impairments/functional limitations: weakness, impaired endurance, impaired self care skills, impaired functional mobility, gait instability, impaired balance, impaired cognition, decreased coordination, decreased upper extremity function, decreased lower extremity function, decreased safety awareness, decreased ROM, impaired coordination ;pt with fair mobility today, amb'd  short distance in room w/ RW and Km, though had 2 major LOB events that req'd mod/maxA to recover..    Rehab Prognosis: Good; patient would benefit from acute skilled PT services to address these deficits and reach maximum level of function.    Recent Surgery: * No surgery found *      Plan:     During this hospitalization, patient to be seen 5 x/week to address the identified rehab impairments via gait training, therapeutic activities, therapeutic exercises and progress toward the following goals:    Plan of Care Expires:  05/06/25    Subjective     Chief Complaint: none stated  Patient/Family Comments/goals: pt agreeable to session, sister present in room.  Pain/Comfort:  Pain Rating 1: 0/10  Pain Rating Post-Intervention 1: 0/10      Objective:     Communicated with nurse prior to session.  Patient found HOB elevated with peripheral IV upon PT entry to room.     General Precautions: Standard, diabetic, fall (mild dementia, impulsive)  Orthopedic Precautions: N/A  Braces: N/A  Respiratory Status: Room air     Functional Mobility:  Bed Mobility:     Supine to Sit: stand by assistance  "and contact guard assistance  Transfers:     Sit to Stand:  minimum assistance with rolling walker  Gait: amb'd 15' w/ RW and mostly Km, though had 2 episodes of LOB which req'd mod/maxA to recover.      AM-PAC 6 CLICK MOBILITY  Turning over in bed (including adjusting bedclothes, sheets and blankets)?: 4  Sitting down on and standing up from a chair with arms (e.g., wheelchair, bedside commode, etc.): 3  Moving from lying on back to sitting on the side of the bed?: 3  Moving to and from a bed to a chair (including a wheelchair)?: 2  Need to walk in hospital room?: 2  Climbing 3-5 steps with a railing?: 1  Basic Mobility Total Score: 15       Treatment & Education:  Inst'd in seated LAQ"s and AP's x 5 ea.     Patient left up in chair with all lines intact, call button in reach, nruse notified, and sister present..    GOALS:   Multidisciplinary Problems       Physical Therapy Goals          Problem: Physical Therapy    Goal Priority Disciplines Outcome Interventions   Physical Therapy Goal     PT, PT/OT Progressing    Description: Goals to be met by: 2025    Patient will increase functional independence with mobility by performin. Sit<>stand with SBA with RW.  2. Gait x 50 feet with CGA with RW.  3. Ascend/descend threshold  step(s) with least restrictive assistive device and minimal assist .                           DME Justifications:   Shabnam requires a commode for home use because she is confined to a single room.   Shabnam's mobility limitation cannot be sufficiently resolved by the use of a cane. Her functional mobility deficit can be sufficiently resolved with the use of a Rolling Walker. Patient's mobility limitation significantly impairs their ability to participate in one of more activities of daily living.  The use of a RW will significantly improve the patient's ability to participate in MRADLS and the patient will use it on regular basis in the home.    Time Tracking:     PT Received On: " 04/09/25  PT Start Time: 1348     PT Stop Time: 1402  PT Total Time (min): 14 min     Billable Minutes: Gait Training 14    Treatment Type: Treatment  PT/PTA: PTA     Number of PTA visits since last PT visit: 2     04/09/2025

## 2025-04-09 NOTE — ASSESSMENT & PLAN NOTE
- Patient presented with unsteady gait, weakness and dizziness for > 2 days.  Imaging revealed multifocal acute infarcts in the left cerebellar hemisphere and small remote infarcts in the right.  Facial droop and ataxia on exam.  - Tele vascular neurology consulted; appreciate recommendations  - S/p loading dose of 300 mg clopidogrel x 1 and followed by 75 mg clopidogrel daily  - Increased aspirin dosage to 325 mg p.o. daily as per Tele-Neurology recommendations  - Repeat stat head CT followed by MRI of the brain without contrast without significant change   - 2D echo overall unremarkable with EF to 60-65% and bilateral lower extremity ultrasound negative for DVT  - Patient reported she was allergic to atorvastatin, and this medication was not started on admit.  This was again addressed with patient and family at the bedside with reports of nausea being the main side effect reported, but otherwise no reported myalgias or change in liver function tests  - Trial of atorvastatin 80 mg p.o. daily started on 4/6, tolerating to date. Will need follow-up for continued monitoring.  - Continue neuro checks q.4 and monitor on telemetry  - Allowed for permissive hypertension and then re-started blood pressure medications in stepwise fashion on 4/5. Continue metoprolol, titrate valsartan upward as required. Continue amlodipine.  - PT/OT/SLP consulted and recommended high intensity; given patient's prior level of function, participation with therapy services and need for multidisciplinary therapy services / continued medical monitoring for titration of antihypertensives and monitoring after statin initiation.

## 2025-04-09 NOTE — DISCHARGE SUMMARY
[facility] Medicine  Discharge Summary      Patient Name: Shabnam Noble  MRN: [id number]  PATRICK: [id number]  Patient Class: IP- Inpatient  Admission Date: 4/4/2025  Hospital Length of Stay: 5 days  Discharge Date and Time: {IP DISCHARGE DATE:52132653}  Attending Physician: LINDSAY Marrufo MD   Discharging Provider: JAVIER Marrufo MD  Primary Care Provider: Krys Michael MD    Primary Care Team: Networked reference to record PCT     HPI:   Shabnam Noble is a 69 year old female with a PMHx of hypertension, diabetes mellitus, CKD and dementia who presents with generalized weakness and dizziness for the past 2 days.  She reports associated subjective fever, nausea, tremors, diaphoresis and headache.  Per EMS, patient had 1 episode of vomiting PTA that was relieved with IV Zofran.  ED note reports patient has dizziness worsens when she stands up and she has difficulty maintaining her balance.  No recent falls reported and she does not use a cane or walker.  Patient resides home alone and is usually able to manage.  She denies chest pain and has had no further episodes of vomiting since arriving to the ED. patient lethargic on exam, arouses to verbal stimuli.  Information obtained per patient and sister at bedside.    Upon arrival to the ED, patient found to be hypertensive with systolic 220s.  Creatinine 1.5 (near baseline), , potassium 3.2 and magnesium 1.5.  COVID and flu negative.  CBC unremarkable.  Chest x-ray with stable cardiomegaly.  MRI revealed multifocal acute infarcts within the left cerebellar hemisphere and CTA head and neck showed small acute infarcts in the left hemisphere and small remote infarcts in the right cerebellar hemisphere.  No focal stenosis, occlusion or dissection.  Small 1-2 mm aneurysm involving the supraclinoid portion of the left distal ICA.  Tele neurology consulted in the ED recommendations provided.  Patient referred to Hospital Medicine and will be  admitted for further evaluation and management.    * No surgery found *      Hospital Course:   Ms. Noble presented weakness and dizziness for greater than 2 days.  Admitted with acute CVA involving the left cerebellar and brainstem.  Treatment initiated with permissive hypertension, aspirin and Plavix, but statin not given due to reported allergy.  Vascular neurology consulted. Blood pressure medications restarted. Working with PT and OT, and seeking high intensity placement.     Goals of Care Treatment Preferences:  Code Status: Full Code      SDOH Screening:  The patient was screened for food insecurity, housing instability, transportation needs, utility difficulties, and interpersonal safety. The social determinant(s) of health identified as a concern this admission are:  Transportation difficulties    {Will/Will not:51999} discuss with case management and/or community health workers.    Social Drivers of Health with Concerns     Transportation Needs: Unmet Transportation Needs (4/5/2025)        Consults:   Consults (From admission, onward)          Status Ordering Provider     Inpatient consult to Social Work  Once        Provider:  (Not yet assigned)    Completed BERNARD HELTON     Inpatient consult to Neurology Services (Vascular Neurology)  Once        Provider:  Ramsey Ontiveros MD    Completed GORDON FRYE     Inpatient consult to Registered Dietitian/Nutritionist  Once        Provider:  (Not yet assigned)    Completed GORDON FRYE     IP consult to case management/social work  Once        Provider:  (Not yet assigned)    Completed GORDON FRYE            Assessment & Plan  Cerebellar stroke  History of CVA (cerebrovascular accident)  Essential hypertension  - Patient presented with unsteady gait, weakness and dizziness for > 2 days.  Imaging revealed multifocal acute infarcts in the left cerebellar hemisphere and small remote infarcts in the right.  Facial droop and ataxia on exam.  -  Tele vascular neurology consulted; appreciate recommendations  - S/p loading dose of 300 mg clopidogrel x 1 and followed by 75 mg clopidogrel daily  - Increased aspirin dosage to 325 mg p.o. daily as per Tele-Neurology recommendations  - Repeat stat head CT followed by MRI of the brain without contrast without significant change   - 2D echo overall unremarkable with EF to 60-65% and bilateral lower extremity ultrasound negative for DVT  - Patient reported she was allergic to atorvastatin, and this medication was not started on admit.  This was again addressed with patient and family at the bedside with reports of nausea being the main side effect reported, but otherwise no reported myalgias or change in liver function tests  - Trial of atorvastatin 80 mg p.o. daily started on 4/6, tolerating to date. Will need follow-up for continued monitoring.  - Continue neuro checks q.4 and monitor on telemetry  - Allowed for permissive hypertension and then re-started blood pressure medications in stepwise fashion on 4/5. Continue metoprolol, titrate valsartan upward as required. Continue amlodipine.  - PT/OT/SLP consulted and recommended high intensity; given patient's prior level of function, participation with therapy services and need for multidisciplinary therapy services / continued medical monitoring for titration of antihypertensives and monitoring after statin initiation.  Type 2 diabetes mellitus with hyperglycemia, without long-term current use of insulin  - Most recent A1c 9.0 performed 02/04/2025.  Home regimen is metformin and Jardiance.  Sister reported Jardiance was too expensive and patient has not started this medication.  Home regimen on hold.  - Glucose level reviewed, currently at targets  - Continue Lantus 10 units subQ daily, and sliding scale insulin, moderate dose range  Stage 3b chronic kidney disease  Hypokalemia  Hypomagnesemia  - Creatinine on admission was 1.5, consistent with baseline creatinine  ranged between 1.3 -1.6  - Monitor I&Os  - Trend with labs and correct electrolyte abnormalities as needed  Mild dementia without behavioral disturbance, psychotic disturbance, mood disturbance, or anxiety  - Patient with history of mild vascular dementia.  She lives alone and family is available nearby  - Mentation is at baseline, and she is oriented as per family at the bedside    Final Active Diagnoses:    Diagnosis Date Noted POA    PRINCIPAL PROBLEM:  Cerebellar stroke [I63.9] 04/04/2025 Yes    Hypomagnesemia [E83.42] 04/05/2025 Yes    Hypokalemia [E87.6] 04/04/2025 Yes    Mild dementia without behavioral disturbance, psychotic disturbance, mood disturbance, or anxiety [F03.A0] 03/25/2025 Yes    Stage 3b chronic kidney disease [N18.32] 04/25/2024 Yes    Type 2 diabetes mellitus with hyperglycemia, without long-term current use of insulin [E11.65]  Yes    History of CVA (cerebrovascular accident) [Z86.73]  Not Applicable    Essential hypertension [I10]  Yes      Problems Resolved During this Admission:       Discharged Condition: {condition:96696}    Disposition: Rehab Facility    Follow Up:    Patient Instructions:      Diet Cardiac   Order Comments: See Stroke Patient Education Guide Booklet for details.     Call 911 for any of the following:   Order Comments: Call 911  right away if any of the following warning signs come on suddenly, even if the symptoms only last for a few minutes. With stroke, timing is very important.   - Warning Signs of Stroke:  - Weakness: You may feel a sudden weakness, tingling or loss of feeling on one side of your face or body.  - Vision Problems: You may have sudden double vision or trouble seeing in one or both eyes.  - Speech Problems: You may have sudden trouble talking, slured speech, or problems understanding others.  - Headache: You may have sudden, severe headache.  - Movement Problems: You may experience dizziness, a feeling of spinning, a loss of balance, a feeling of  falling or blackouts.       Significant Diagnostic Studies: {diagnostics:74805}    Pending Diagnostic Studies:       None           Medications:  {DISCHARGE MEDS OHS:26875}    Indwelling Lines/Drains at time of discharge:   Lines/Drains/Airways       Drain  Duration             Female External Urinary Catheter w/ Suction 04/04/25 2120 4 days                    Time spent on the discharge of patient: *** minutes         D Heath Marrufo MD  Department of Hospital Medicine  [facility])

## 2025-04-11 ENCOUNTER — PATIENT OUTREACH (OUTPATIENT)
Dept: ADMINISTRATIVE | Facility: HOSPITAL | Age: 70
End: 2025-04-11
Payer: MEDICARE

## 2025-04-16 ENCOUNTER — CLINICAL SUPPORT (OUTPATIENT)
Dept: NEUROLOGY | Facility: CLINIC | Age: 70
End: 2025-04-16
Payer: MEDICARE

## 2025-04-16 DIAGNOSIS — F01.A0 MILD VASCULAR DEMENTIA WITHOUT BEHAVIORAL DISTURBANCE, PSYCHOTIC DISTURBANCE, MOOD DISTURBANCE, OR ANXIETY: ICD-10-CM

## 2025-04-16 NOTE — PROGRESS NOTES
Audio Only Telehealth Visit  Social Work - Dementia Care Management:    Phone consultation with caregiver, sister Maria L.  I noted I am aware pt was hospitalized and is now in rehab; advised sister that an inpatient /discharge planner would be available to work with fmly to address plans for pt's care upon discharge, and that I have limited access to inpatient chart and therefore limited ability to provide guidance that is specific to pt's current medical condition  Sister reported they are looking at placement after discharge, and had questions about coverage, locations, and process  I provided overview of costs and coverage, financial eligibility for Medicaid nursing home coverage, and SNF placement (noting [facility] probably has contracted facilities)  Dtr inquired about listing of facilities; referred her to the inpatient , who can provide options that are best-suited to pt's current needs and insurance network

## 2025-04-17 DIAGNOSIS — I63.9 CEREBELLAR STROKE: Primary | ICD-10-CM

## 2025-04-30 DIAGNOSIS — E11.9 TYPE 2 DIABETES MELLITUS WITHOUT COMPLICATION: ICD-10-CM

## 2025-05-05 ENCOUNTER — TELEPHONE (OUTPATIENT)
Dept: INTERNAL MEDICINE | Facility: CLINIC | Age: 70
End: 2025-05-05

## 2025-05-05 ENCOUNTER — LAB VISIT (OUTPATIENT)
Dept: LAB | Facility: OTHER | Age: 70
End: 2025-05-05
Attending: INTERNAL MEDICINE
Payer: MEDICARE

## 2025-05-05 ENCOUNTER — OFFICE VISIT (OUTPATIENT)
Dept: INTERNAL MEDICINE | Facility: CLINIC | Age: 70
End: 2025-05-05
Payer: MEDICARE

## 2025-05-05 VITALS
OXYGEN SATURATION: 98 % | SYSTOLIC BLOOD PRESSURE: 155 MMHG | DIASTOLIC BLOOD PRESSURE: 84 MMHG | BODY MASS INDEX: 30.96 KG/M2 | HEART RATE: 73 BPM | HEIGHT: 65 IN

## 2025-05-05 DIAGNOSIS — I10 ESSENTIAL HYPERTENSION: ICD-10-CM

## 2025-05-05 DIAGNOSIS — E78.5 HYPERLIPIDEMIA LDL GOAL <70: ICD-10-CM

## 2025-05-05 DIAGNOSIS — I50.9 HEART FAILURE, UNSPECIFIED HF CHRONICITY, UNSPECIFIED HEART FAILURE TYPE: Primary | ICD-10-CM

## 2025-05-05 DIAGNOSIS — E11.65 TYPE 2 DIABETES MELLITUS WITH HYPERGLYCEMIA, WITHOUT LONG-TERM CURRENT USE OF INSULIN: ICD-10-CM

## 2025-05-05 DIAGNOSIS — Z86.73 HISTORY OF STROKE: ICD-10-CM

## 2025-05-05 DIAGNOSIS — I70.0 AORTIC ATHEROSCLEROSIS: ICD-10-CM

## 2025-05-05 LAB
ALBUMIN/CREAT UR: 194.9 UG/MG
CREAT UR-MCNC: 118 MG/DL (ref 15–325)
MICROALBUMIN UR-MCNC: 230 UG/ML (ref ?–5000)

## 2025-05-05 PROCEDURE — 1159F MED LIST DOCD IN RCRD: CPT | Mod: CPTII,S$GLB,, | Performed by: INTERNAL MEDICINE

## 2025-05-05 PROCEDURE — G2211 COMPLEX E/M VISIT ADD ON: HCPCS | Mod: S$GLB,,, | Performed by: INTERNAL MEDICINE

## 2025-05-05 PROCEDURE — 3288F FALL RISK ASSESSMENT DOCD: CPT | Mod: CPTII,S$GLB,, | Performed by: INTERNAL MEDICINE

## 2025-05-05 PROCEDURE — 1126F AMNT PAIN NOTED NONE PRSNT: CPT | Mod: CPTII,S$GLB,, | Performed by: INTERNAL MEDICINE

## 2025-05-05 PROCEDURE — 4010F ACE/ARB THERAPY RXD/TAKEN: CPT | Mod: CPTII,S$GLB,, | Performed by: INTERNAL MEDICINE

## 2025-05-05 PROCEDURE — 3079F DIAST BP 80-89 MM HG: CPT | Mod: CPTII,S$GLB,, | Performed by: INTERNAL MEDICINE

## 2025-05-05 PROCEDURE — 99215 OFFICE O/P EST HI 40 MIN: CPT | Mod: S$GLB,,, | Performed by: INTERNAL MEDICINE

## 2025-05-05 PROCEDURE — 3052F HG A1C>EQUAL 8.0%<EQUAL 9.0%: CPT | Mod: CPTII,S$GLB,, | Performed by: INTERNAL MEDICINE

## 2025-05-05 PROCEDURE — 1101F PT FALLS ASSESS-DOCD LE1/YR: CPT | Mod: CPTII,S$GLB,, | Performed by: INTERNAL MEDICINE

## 2025-05-05 PROCEDURE — 1160F RVW MEDS BY RX/DR IN RCRD: CPT | Mod: CPTII,S$GLB,, | Performed by: INTERNAL MEDICINE

## 2025-05-05 PROCEDURE — 1111F DSCHRG MED/CURRENT MED MERGE: CPT | Mod: CPTII,S$GLB,, | Performed by: INTERNAL MEDICINE

## 2025-05-05 PROCEDURE — 3077F SYST BP >= 140 MM HG: CPT | Mod: CPTII,S$GLB,, | Performed by: INTERNAL MEDICINE

## 2025-05-05 PROCEDURE — 82570 ASSAY OF URINE CREATININE: CPT

## 2025-05-05 PROCEDURE — 99999 PR PBB SHADOW E&M-EST. PATIENT-LVL V: CPT | Mod: PBBFAC,,, | Performed by: INTERNAL MEDICINE

## 2025-05-05 RX ORDER — VALSARTAN 320 MG/1
320 TABLET ORAL DAILY
Qty: 90 TABLET | Refills: 3 | Status: SHIPPED | OUTPATIENT
Start: 2025-05-05

## 2025-05-05 RX ORDER — METFORMIN HYDROCHLORIDE 1000 MG/1
1000 TABLET, EXTENDED RELEASE ORAL 2 TIMES DAILY WITH MEALS
Qty: 180 TABLET | Refills: 3 | Status: SHIPPED | OUTPATIENT
Start: 2025-05-05 | End: 2026-05-05

## 2025-05-05 RX ORDER — NIFEDIPINE 60 MG/1
60 TABLET, EXTENDED RELEASE ORAL 2 TIMES DAILY
Qty: 180 TABLET | Refills: 2 | Status: SHIPPED | OUTPATIENT
Start: 2025-05-05

## 2025-05-05 RX ORDER — ATORVASTATIN CALCIUM 80 MG/1
80 TABLET, FILM COATED ORAL NIGHTLY
Qty: 90 TABLET | Refills: 3 | Status: SHIPPED | OUTPATIENT
Start: 2025-05-05

## 2025-05-05 RX ORDER — CLOPIDOGREL BISULFATE 75 MG/1
75 TABLET ORAL DAILY
Qty: 90 TABLET | Refills: 3 | Status: SHIPPED | OUTPATIENT
Start: 2025-05-05 | End: 2025-08-03

## 2025-05-05 RX ORDER — DAPAGLIFLOZIN 10 MG/1
10 TABLET, FILM COATED ORAL DAILY
Qty: 90 TABLET | Refills: 3 | Status: SHIPPED | OUTPATIENT
Start: 2025-05-05

## 2025-05-05 RX ORDER — CARVEDILOL 25 MG/1
25 TABLET ORAL 2 TIMES DAILY WITH MEALS
Qty: 180 TABLET | Refills: 3 | Status: SHIPPED | OUTPATIENT
Start: 2025-05-05

## 2025-05-05 RX ORDER — EZETIMIBE 10 MG/1
10 TABLET ORAL DAILY
Qty: 90 TABLET | Refills: 3 | Status: SHIPPED | OUTPATIENT
Start: 2025-05-05 | End: 2026-05-05

## 2025-05-05 NOTE — TELEPHONE ENCOUNTER
HOME HEALTH REFERRAL  Received: Today    Regina Alonso Janet H., MD; KIRK Marcano Staff    Good morning,    Thank you for your referral to [facility] we have received the referral and are processing. We are currently able to admit with nursing on the week of 06/01/25 or sooner if possible.     Thanks,  Regina FLEMING LPN  [facility] [place] [facility]

## 2025-05-05 NOTE — PROGRESS NOTES
"Subjective:      Patient ID: Shabnam Noble is a 69 y.o. female.    Chief Complaint: Follow-up (Diabetes)      Shabnam Noble is a 69 y.o. 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     HTN: BP now elevated again. She has not been consistent with her BP medications and has been inconsistent with regimen. Stressed the importance of compliance. Reviewed all medications again today.      Poorly controlled DM2: A1c elevated to 8.7 at last physical, repeat A1c 4/2025 is 8%. She was referred to diabetes advanced NP and has had her first visit with Jina on 5/9/2022. She continues to take metformin 1000mg BID. She was unable to afford the jardiance that was started. Previously, trulicity qweekly was added to her diabetes regimen which she has discontinued herself off of. Strong preference to not start injectables.      HLD/aortic atherosclerosis: Was on crestor 20mg qhs which she stopped due to questionable side effects. Was previously on high dose pravastatin instead and zetia 10mg, but has not taken these medications since last visit. Resume regimen at this time.      CKD3B: Was seen by nephrology. Cannot afford jardiance, will send for farxiga as an alternative at this time.        The patient consents verbally to being recorded for [employer] service today.     History of Present Illness    CHIEF COMPLAINT:  Ms. Noble presents today for follow-up after recent stroke and hospitalization.    HOSPITAL VISIT:  She was also in the hospital 4/9/2025 for cerebellar stroke. She was discharged on 4/30/2025.   Hospital discharge summary as follows:  "Ms. Shabnam Noble is a 69 y.o. female with past medical history of early-onset dementia, type 2 diabetes mellitus, hypertension, chronic kidney " "disease who presented for dizziness and difficulty with ambulation times 2-3 days. She was seen and admitted to [facility] on April 4th 2025. She was brought in by her sister due to difficulty with walking and dizziness when she stands and gait instability. She was denies falls.     Upon arrival by EMS, the patient was found to be hypotensive with systolic blood pressure greater than 200. Chest x-ray demonstrated stable cardiomegaly. MRI of the brain revealed multifocal acute infarcts with in the left cerebellar hemisphere. CTA of the head and neck showed small acute infarcts in the left hemisphere and remote infarcts in the right cerebellar hemisphere. She was evaluated by tele Neurology with recommendation to admit. She did not receive TNK due to missing therapeutic window. She was started on aspirin and Plavix. Physical therapy and occupational therapy worked with the patient and recommended high-intensity therapy."    - Following the discharge, the family opted to care for her at home rather than transfer her to a nursing home  - She needs HH referral, placed today.  - BP regimen reviewed again  - ophthalmology referral placed     BLOOD PRESSURE:  She reports current home blood pressure readings of 150/78, with history of readings in the 200s. We discovered that she was taking her old regimen, not the updated regimen since the hospital visit. New regimen reviewed with the patient and the family. Written guidance provided today.     NEUROLOGICAL:  She was recently diagnosed with dementia. A neuropsychological evaluation was attempted but remained incomplete.     MEDICAL HISTORY:  She has congestive heart failure diagnosed via echocardiogram, kidney injury secondary to longstanding diabetes and uncontrolled hypertension.    DIABETES:  She received insulin injections for glucose management during her recent hospitalization. She has an upcoming appointment with a diabetes specialist scheduled for June 4th.     "   Visit today is associated with current or anticipated ongoing medical care related to this patient's single serious condition/complex condition of uncontrolled T2DM, hx CVA, mild cognitive deficit, memory loss, HLD, CKD, HTN, MDD. The patient will return to see me as these issues will be followed longitudinally.    ROS:  Eyes: +blurry vision          Current Medications[1]    Lab Results   Component Value Date    HGBA1C 8.0 (H) 04/04/2025    HGBA1C 9.0 (H) 02/04/2025    HGBA1C 8.7 (H) 07/11/2024     Lab Results   Component Value Date    MICALBCREAT 404.0 (H) 04/26/2024    MICALBCREAT 404.0 (H) 04/26/2024     Lab Results   Component Value Date    LDLCALC 123.0 04/05/2025    LDLCALC 160.0 (H) 02/04/2025    CHOL 179 04/05/2025    HDL 32 (L) 04/05/2025    TRIG 120 04/05/2025       Lab Results   Component Value Date     04/29/2025    K 4.4 04/29/2025     04/29/2025    CO2 26 04/29/2025    GLU 78 04/29/2025    BUN 43 (H) 04/29/2025    CREATININE 1.6 (H) 04/29/2025    CALCIUM 8.5 (L) 04/29/2025    PROT 6.4 04/28/2025    ALBUMIN 2.6 (L) 04/28/2025    BILITOT 0.2 04/28/2025    ALKPHOS 57 04/28/2025    AST 15 04/28/2025    ALT 19 04/28/2025    ANIONGAP 7 (L) 04/29/2025    ESTGFRAFRICA >60.0 01/18/2022    EGFRNONAA 58.8 (A) 01/18/2022    WBC 8.10 04/28/2025    HGB 9.2 (L) 04/28/2025    HGB 9.7 (L) 04/24/2025    HCT 30.3 (L) 04/28/2025    MCV 88 04/28/2025     04/28/2025    TSH 1.157 04/05/2025    HEPCAB Negative 04/04/2025       Lab Results   Component Value Date    [id number] 877 04/30/2024         Past Medical History:   Diagnosis Date    Colon polyp     CVA (cerebral vascular accident)     DM2 (diabetes mellitus, type 2)     Essential hypertension      Past Surgical History:   Procedure Laterality Date    ANKLE FRACTURE SURGERY Right     ANKLE FRACTURE SURGERY      COLONOSCOPY N/A 12/11/2020    Procedure: COLONOSCOPY;  Surgeon: Marshal Rogers MD;  Location: [address]);  Service: Endoscopy;   "Laterality: N/A;  11/18-plavix hold ok see te-covid pcw 12/8-tb    COLONOSCOPY W/ POLYPECTOMY      HYSTERECTOMY      no h/o  malignancy     Social History     Social History Narrative    Not on file     Family History   Problem Relation Name Age of Onset    Alzheimer's disease Mother      Prostate cancer Father      Diabetes Father      Colon cancer Brother      Breast cancer Paternal Aunt      Heart disease Neg Hx      Stroke Neg Hx           Vitals:    05/05/25 0804 05/05/25 0942   BP: (!) 160/80 (!) 155/84   Pulse: 73    SpO2: 98%    Height: 5' 5" (1.651 m)    PainSc: 0-No pain      Objective:      Physical Exam  Vitals reviewed.   Constitutional:       Appearance: Normal appearance.   HENT:      Head: Normocephalic.   Eyes:      Pupils: Pupils are equal, round, and reactive to light.   Cardiovascular:      Rate and Rhythm: Normal rate.      Pulses: Normal pulses.      Heart sounds: Normal heart sounds.   Pulmonary:      Effort: Pulmonary effort is normal.      Breath sounds: Normal breath sounds.   Abdominal:      General: Bowel sounds are normal.      Palpations: Abdomen is soft.   Musculoskeletal:         General: Normal range of motion.   Skin:     General: Skin is warm.   Neurological:      General: No focal deficit present.      Mental Status: She is alert. Mental status is at baseline.   Psychiatric:         Mood and Affect: Mood normal.         Assessment/Plan     Assessment & Plan    - Assessed BP control and diabetes management.  - Adjusted BP medications due to insufficient control.  - Evaluated diabetes management, considering addition of Farxiga due to kidney injury and congestive heart failure.    PLAN SUMMARY:  - Ordered urinalysis for renal function assessment  - Initiated valsartan 320 mg daily, nifedipine twice daily, and carvedilol twice daily for blood pressure management  - Continued metformin 1000 mg twice daily for diabetes management  - Initiated Farxiga daily for diabetes control " (pending affordability assessment)  - Continued Plavix daily for stroke prevention  - Continued atorvastatin and ezetimibe for cholesterol management  - Referred for home health services, including physical therapy  - Referred to ophthalmology for comprehensive eye exam  - Ordered diabetes education services  - Follow-up on June 4 for diabetes specialist appointment  - Follow-up in 3 months for in-person visit    CEREBRAL INFARCTION (STROKE):  - Continued Plavix daily for stroke prevention.  - Monitored the patient's history of cerebrovascular accident requiring hospitalization.    VISUAL DISTURBANCES:  - Evaluated visual disturbances as possibly related to cerebrovascular accident.  - Ms. Noble reports blurry vision with or without corrective lenses.  - Referred to ophthalmology for comprehensive eye exam due to post-stroke visual changes and diabetes.    DEMENTIA:  - Monitored the patient's dementia diagnosis, noting possible progression.  - Evaluated medication adherence issues related to cognitive impairment.  - Recommend regular neuropsychological evaluations every 6 months.  - Ordered home health services and therapy to assist with dementia management.    TYPE 2 DIABETES MELLITUS:  - Continued metformin 1000 mg twice daily for diabetes management.  - Initiated Farxiga daily for diabetes control (pending affordability assessment).  - Monitored glucose level, which was 147 this morning.  - Ordered diabetes education services and scheduled follow-up on June 4 for diabetes specialist appointment.  - Instructed the patient to contact the office if Farxiga is financially prohibitive.  - Educated on importance of avoiding sugary drinks, including all juices; recommended water or unsweetened tea as alternatives.  - Ms. Noble to adhere to dietary recommendations for diabetes management.    CHRONIC KIDNEY DISEASE:  - Monitored creatinine level, currently at 1.6  - Evaluated history of renal injury related to  diabetes and hypertension.    HYPERTENSION:  - Monitored blood pressure, currently 160/80, previously 150/78 at home.  - Evaluated persistent hypertension despite pharmacological intervention.  - Initiated valsartan 320 mg daily, nifedipine twice daily (replacing amlodipine), and carvedilol twice daily (replacing metoprolol) for blood pressure management.  - Instructed the patient to report blood pressure readings when contacting the office regarding medication affordability.    HYPERLIPIDEMIA:  - Continued cholesterol management with atorvastatin and ezetimibe due to cerebrovascular accident history.    FOLLOW-UP:  - Referred for home health services, including physical therapy, to assist with care and rehabilitation.  - Follow up in 3 months for in-person visit.           Heart failure, unspecified HF chronicity, unspecified heart failure type  -     Ambulatory referral/consult to Home Health; Future; Expected date: 05/06/2025    History of stroke  -     clopidogreL (PLAVIX) 75 mg tablet; Take 1 tablet (75 mg total) by mouth once daily.  Dispense: 90 tablet; Refill: 3  -     ezetimibe (ZETIA) 10 mg tablet; Take 1 tablet (10 mg total) by mouth once daily.  Dispense: 90 tablet; Refill: 3  -     Ambulatory referral/consult to Home Health; Future; Expected date: 05/06/2025  -     Ambulatory referral/consult to Ophthalmology; Future; Expected date: 05/12/2025    Hyperlipidemia LDL goal <70  -     atorvastatin (LIPITOR) 80 MG tablet; Take 1 tablet (80 mg total) by mouth every evening.  Dispense: 90 tablet; Refill: 3  -     ezetimibe (ZETIA) 10 mg tablet; Take 1 tablet (10 mg total) by mouth once daily.  Dispense: 90 tablet; Refill: 3    Aortic atherosclerosis  -     atorvastatin (LIPITOR) 80 MG tablet; Take 1 tablet (80 mg total) by mouth every evening.  Dispense: 90 tablet; Refill: 3  -     ezetimibe (ZETIA) 10 mg tablet; Take 1 tablet (10 mg total) by mouth once daily.  Dispense: 90 tablet; Refill: 3    Type 2  diabetes mellitus with hyperglycemia, without long-term current use of insulin  -     Microalbumin/Creatinine Ratio, Urine; Future; Expected date: 05/05/2025  -     Ambulatory referral/consult to Diabetes Education; Future; Expected date: 05/12/2025  -     metFORMIN (GLUMETZA) 1000 MG (MOD) 24hr tablet; Take 1 tablet (1,000 mg total) by mouth 2 (two) times daily with meals.  Dispense: 180 tablet; Refill: 3  -     dapagliflozin propanediol (FARXIGA) 10 mg tablet; Take 1 tablet (10 mg total) by mouth once daily.  Dispense: 90 tablet; Refill: 3  -     Cancel: Ambulatory referral/consult to Optometry; Future; Expected date: 05/12/2025  -     Ambulatory referral/consult to Ophthalmology; Future; Expected date: 05/12/2025    Essential hypertension  -     valsartan (DIOVAN) 320 MG tablet; Take 1 tablet (320 mg total) by mouth once daily.  Dispense: 90 tablet; Refill: 3  -     NIFEdipine (PROCARDIA-XL) 60 MG (OSM) 24 hr tablet; Take 1 tablet (60 mg total) by mouth 2 (two) times a day.  Dispense: 180 tablet; Refill: 2  -     carvediloL (COREG) 25 MG tablet; Take 1 tablet (25 mg total) by mouth 2 (two) times daily with meals.  Dispense: 180 tablet; Refill: 3        Chronic conditions status updated as per HPI.  Other than changes above, cont current medications and maintain follow up with specialists.  Return to clinic in Follow up in about 3 months (around 8/5/2025).      Krys Michael MD  [facility]    Total time spent on this encounter: 43 minutes. This includes face to face time and non-face to face time preparing to see the patient (eg, review of tests), obtaining and/or reviewing separately obtained history, documenting clinical information in the electronic or other health record, independently interpreting results and communicating results to the patient/family/caregiver, or care coordinator.    This note was generated with the assistance of ambient listening technology. Verbal consent was obtained by the  patient and accompanying visitor(s) for the recording of patient appointment to facilitate this note. I attest to having reviewed and edited the generated note for accuracy, though some syntax or spelling errors may persist. Please contact the author of this note for any clarification.       There are no Patient Instructions on file for this visit.  Tests to Keep You Healthy    Mammogram: Met on 2/4/2025  Eye Exam: SCHEDULED  Colon Cancer Screening: Met on 12/11/2020  Last Blood Pressure <= 139/89 (5/5/2025): NO  Last HbA1c < 8 (04/04/2025): NO  Tobacco Cessation: NO                                 [1]   Current Outpatient Medications:     meclizine (ANTIVERT) 25 mg tablet, Take 1 tablet (25 mg total) by mouth 3 (three) times daily as needed for Dizziness., Disp: , Rfl:     aspirin (ECOTRIN) 81 MG EC tablet, Take 1 tablet (81 mg total) by mouth once daily., Disp: 90 tablet, Rfl: 3    atorvastatin (LIPITOR) 80 MG tablet, Take 1 tablet (80 mg total) by mouth every evening., Disp: 90 tablet, Rfl: 3    blood glucose control, high Soln, 1 drop by Misc.(Non-Drug; Combo Route) route once daily. ICD 10 code: E11.59, Disp: 3 each, Rfl: 3    blood-glucose meter kit, Use as instructed. ICD 10 code E11.59, Disp: 1 each, Rfl: 0    carvediloL (COREG) 25 MG tablet, Take 1 tablet (25 mg total) by mouth 2 (two) times daily with meals., Disp: 180 tablet, Rfl: 3    clopidogreL (PLAVIX) 75 mg tablet, Take 1 tablet (75 mg total) by mouth once daily., Disp: 90 tablet, Rfl: 3    dapagliflozin propanediol (FARXIGA) 10 mg tablet, Take 1 tablet (10 mg total) by mouth once daily., Disp: 90 tablet, Rfl: 3    ezetimibe (ZETIA) 10 mg tablet, Take 1 tablet (10 mg total) by mouth once daily., Disp: 90 tablet, Rfl: 3    metFORMIN (GLUMETZA) 1000 MG (MOD) 24hr tablet, Take 1 tablet (1,000 mg total) by mouth 2 (two) times daily with meals., Disp: 180 tablet, Rfl: 3    NIFEdipine (PROCARDIA-XL) 60 MG (OSM) 24 hr tablet, Take 1 tablet (60 mg total) by  mouth 2 (two) times a day., Disp: 180 tablet, Rfl: 2    valsartan (DIOVAN) 320 MG tablet, Take 1 tablet (320 mg total) by mouth once daily., Disp: 90 tablet, Rfl: 3  No current facility-administered medications for this visit.

## 2025-05-06 ENCOUNTER — TELEPHONE (OUTPATIENT)
Dept: INTERNAL MEDICINE | Facility: CLINIC | Age: 70
End: 2025-05-06
Payer: MEDICARE

## 2025-05-06 DIAGNOSIS — E11.65 TYPE 2 DIABETES MELLITUS WITH HYPERGLYCEMIA, UNSPECIFIED WHETHER LONG TERM INSULIN USE: Primary | ICD-10-CM

## 2025-05-06 RX ORDER — SEMAGLUTIDE 0.5 MG/.5ML
0.5 INJECTION, SOLUTION SUBCUTANEOUS WEEKLY
Qty: 2 ML | Refills: 0 | Status: SHIPPED | OUTPATIENT
Start: 2025-06-04 | End: 2025-07-02

## 2025-05-06 RX ORDER — SEMAGLUTIDE 0.25 MG/.5ML
0.25 INJECTION, SOLUTION SUBCUTANEOUS WEEKLY
Qty: 2 ML | Refills: 0 | Status: SHIPPED | OUTPATIENT
Start: 2025-05-06 | End: 2025-06-03

## 2025-05-06 NOTE — TELEPHONE ENCOUNTER
----- Message from Tech Margo sent at 5/6/2025 10:49 AM CDT -----  Regarding: Patient call back  .Type: Patient Call BackWho called:Joe is the request in detail:caller states medication dapagliflozin propanediol (FARXIGA) 10 mg tablet is too expensive, was told to contact office if medication was not affordable Can the clinic reply by [facility]?noWould the patient rather a call back or a response via [facility]? Call [facility] call back number:.[phone]-DeborahAdditional Information:

## 2025-05-12 ENCOUNTER — CLINICAL SUPPORT (OUTPATIENT)
Dept: DIABETES | Facility: CLINIC | Age: 70
End: 2025-05-12
Payer: MEDICARE

## 2025-05-12 DIAGNOSIS — E11.65 TYPE 2 DIABETES MELLITUS WITH HYPERGLYCEMIA, WITHOUT LONG-TERM CURRENT USE OF INSULIN: ICD-10-CM

## 2025-05-12 DIAGNOSIS — E11.65 TYPE 2 DIABETES MELLITUS WITH HYPERGLYCEMIA, WITHOUT LONG-TERM CURRENT USE OF INSULIN: Primary | ICD-10-CM

## 2025-05-12 PROCEDURE — G0108 DIAB MANAGE TRN  PER INDIV: HCPCS | Mod: 95,,, | Performed by: DIETITIAN, REGISTERED

## 2025-05-12 RX ORDER — BLOOD-GLUCOSE CONTROL, NORMAL
1 EACH MISCELLANEOUS 2 TIMES DAILY
Qty: 50 EACH | Refills: 11 | Status: SHIPPED | OUTPATIENT
Start: 2025-05-12 | End: 2026-05-12

## 2025-05-12 RX ORDER — DEXTROSE 4 G
1 TABLET,CHEWABLE ORAL ONCE
Qty: 1 EACH | Refills: 0 | Status: SHIPPED | OUTPATIENT
Start: 2025-05-12 | End: 2025-05-12

## 2025-05-13 ENCOUNTER — PATIENT MESSAGE (OUTPATIENT)
Dept: DIABETES | Facility: CLINIC | Age: 70
End: 2025-05-13
Payer: MEDICARE

## 2025-05-13 ENCOUNTER — TELEPHONE (OUTPATIENT)
Dept: PHARMACY | Facility: CLINIC | Age: 70
End: 2025-05-13
Payer: MEDICARE

## 2025-05-13 ENCOUNTER — RESULTS FOLLOW-UP (OUTPATIENT)
Dept: PHARMACY | Facility: CLINIC | Age: 70
End: 2025-05-13

## 2025-05-13 NOTE — TELEPHONE ENCOUNTER
----- Message from Mayito Walden sent at 5/12/2025 10:09 PM CDT -----  Regarding: Order for FATMATA PARR,     Ms. Aide's case has been assigned to Deepika Mendez @[phone].       What happens next? Assigned advocate will review your patients chart and research available options.  Patient may be asked to provide specific documentation to help determine eligibility. Failure to provide the requested documentation will delay assistance.    Please note all requests are subject to program availability and patient eligibility verification.   Please note each program has it's own unique eligibility criteria (e.g., income limits, insurance status medical condition, residency).Therefore eligibility is determined by the specific program   being applied to not by the Pharmacy Patient Assistance Team.  Please note epic chart must reflect a current order for the requested medication written by an [facility] provider to begin PAP process.   Provider may review progress notes by typing pharmacy patient assistance in Epic search box.       Thank you,   [facility]  [address]  [place] [address]  Fax: [phone]  Email: [email or web address]      ----- Message -----  From: Anika Baird RD, [facility]  Sent: 5/12/2025   3:37 PM CDT  To: Pharmacy Patient Assistance Team  Subject: Order for FATMATA PARR

## 2025-05-13 NOTE — TELEPHONE ENCOUNTER
Welcome letter has been sent via phone, letter, and portal message  to inform patient of PAP application process for Farxiga. Follow-up will be made in approximately 5 to 10 business days.    Thank you  Deepika Mendez  Pharmacy Patient Assistance Team       We are currently unable to assist with [employer] PAP enrollment for the following reason:    [employer] does not offer patient assistance at this time. Patients with commercial and/or private insurance coverage may be eligible for a co-pay card. Patient may contact the number provided for assistance with obtaining co-pay card.

## 2025-05-14 NOTE — TELEPHONE ENCOUNTER
Anika Baird, MONE, Deepika Palacios!    I called and spoke with Ms. Noble' sister who is helping take care of her after stroke to let them know to be on the lookout for the pharmacy assistance paperwork. Wanted to let you know, she told me pt is currently moved in with the sister at [address], [id number]. And you can call sister at [phone] if you need to speak with her.    Many thanks!  Anika

## 2025-05-14 NOTE — PROGRESS NOTES
Diabetes Care Specialist Virtual Visit Note       The patient location is: home in [place]  The chief complaint leading to consultation is: Diabetes  Visit type: audiovisual  Total time spent with patient: 60 min   Each patient to whom he or she provides medical services by telemedicine is:  (1) informed of the relationship between the physician and patient and the respective role of any other health care provider with respect to management of the patient; and (2) notified that he or she may decline to receive medical services by telemedicine and may withdraw from such care at any time.     Diabetes Care Specialist Progress Note  Author: Anika Baird RD, [facility]  Date: 5/14/2025    Intake    Program Intake  Reason for Diabetes Program Visit:: Initial Diabetes Assessment  Current diabetes risk level:: moderate  In the last month, have you used the ER or been admitted to the hospital: Yes  Was the ER or hospital admission related to diabetes?: No (stroke)    Current Diabetes Treatment: Oral Medications, DM Injectables  Oral Medication Type/Dose: metformin 1000mg BID - taking consistently, Farxiga - not currently taking d/t cost  DM Injectables Type/Dose: Wegovy - not currently taking d/t cost    Continuous Glucose Monitoring  Patient has CGM: No    Lab Results   Component Value Date    HGBA1C 8.0 (H) 04/04/2025       Lifestyle Coping Support & Clinical    Lifestyle/Coping/Support  Does anyone in your family have diabetes or does anyone in your family support you in your diabetes care?: currently living with sister s/p stroke  List anything about Diabetes that causes you stress?: cost of medication, difficulty with SMBG, unsure what to eat  Learning Barriers:: None  Culture or Mormonism beliefs that may impact ability to access healthcare: No  Psychosocial/Coping Skills Assessment Completed: : Yes  Assessment indicates:: Adequate understanding  Area of need?: No    Problem Review  Active Comorbidities: Stroke,  Hyperlipidemia/Dyslipidemia, Hypertension, Chronic Kidney Disease    Diabetes Self-Management Skills Assessment    Medication Skills Assessment  Patient is able to identify current diabetes medications, dosages, and appropriate timing of medications.: yes  Patient reports problems or concerns with current medication regimen.: yes  Medication regimen problems/concerns:: financial concerns  Pharmacy assistance referral placed?: No  Patient is  aware that some diabetes medications can cause low blood sugar?: Yes  Medication Skills Assessment Completed:: Yes  Assessment indicates:: Instruction Needed  Area of need?: Yes    Diabetes Disease Process/Treatment Options  Diabetes Type?: Type II  What are your goals for this education session?: sister is preparing meals for her and wants to know what to fix, also has been getting error messages when trying to test BG  Is patient aware of what causes diabetes?: Yes  Does patient understand the pathophysiology of diabetes?: No  Diabetes Disease Process/Treatment Options: Skills Assessment Completed: Yes  Assessment indicates:: Adequate understanding  Area of need?: No    Nutrition/Healthy Eating  Meal Plan 24 Hour Recall - Breakfast: recinos, egg, grits or oatmeal, toast, orange juice or apple juice  Meal Plan 24 Hour Recall - Lunch: green beans, chicken wings, 4 small roasted potatoes  Meal Plan 24 Hour Recall - Dinner: chicken, potatoes, salad  Meal Plan 24 Hour Recall - Snack: josé miguel crackers or baked BBQ chips  Meal Plan 24 Hour Recall - Beverage: sugar free drinks  Who shops/cooks?: sisters  Challenges to healthy eating:: other (see comments) (unsure what to cook)  Nutrition/Healthy Eating Skills Assessment Completed:: Yes  Assessment indicates:: Instruction Needed  Area of need?: Yes    Physical Activity/Exercise  Physical Activity/Exercise Skills Assessment Completed: : No  Deffered due to:: Time  Area of need?: Deferred    Home Blood Glucose Monitoring  Patient states  that blood sugar is checked at home daily.: no  Home Blood Glucose Monitoring Skills Assessment Completed: : Yes  Assessment indicates:: Instruction Needed  Area of need?: Yes    Acute Complications  Acute Complications Skills Assessment Completed: : No  Deffered due to:: Time  Area of need?: Deferred    Chronic Complications  Chronic Complications Skills Assessment Completed: : No  Deferred due to:: Time  Area of need?: Deferred      Assessment Summary and Plan    Based on today's diabetes care assessment, the following areas of need were identified:      Identified Areas of Need      Medication/Current Diabetes Treatment: Yes - see care planning   Lifestyle Coping/Support: No   Diabetes Disease Process/Treatment Options: No   Nutrition/Healthy Eating: Yes - see care planning   Physical Activity/Exercise: Deferred    Home Blood Glucose Monitoring: Yes - see care planning   Acute Complications: Deferred    Chronic Complications: Deferred       Today's interventions were provided through individual discussion, instruction, and written materials were provided.      Patient verbalized understanding of instruction and written materials.  Pt was able to return back demonstration of instructions today. Patient understood key points, needs reinforcement and further instruction.     Diabetes Self-Management Care Plan:    Today's Diabetes Self-Management Care Plan was developed with Shabnam's input. Shabnam has agreed to work toward the following goal(s) to improve his/her overall diabetes control.      Care Plan: Diabetes Management   Updates made since 5/14/2024 12:00 AM        Problem: Healthy Eating         Goal: Use plate method for controlling carb portions.    Start Date: 5/12/2025   Expected End Date: 6/13/2025   Priority: Medium   Barriers: No Barriers Identified   Note:    5/12/25 - Provided review of sources of carbohydrate, choosing mostly high fiber/complex carbs, avoiding sugary drinks including juices, portion  sizes using dry measuring cups, balancing meals with lean protein and non-starchy vegetables and plate method of meal planning.        Task: Reviewed the sources and role of Carbohydrate, Protein, and Fat and how each nutrient impacts blood sugar. Completed 2025        Task: Provided visual examples using dry measuring cups, food models, and other familiar objects such as computer mouse, deck or cards, tennis ball etc. to help with visualization of portions. Completed 2025        Task: Recommended replacing beverages containing high sugar content with noncaloric/sugar free options and/or water. Completed 2025        Task: Review the importance of balancing carbohydrates with each meal using portion control techniques to count servings of carbohydrate and label reading to identify serving size and amount of total carbs per serving. Completed 2025        Task: Provided Sample plate method and reviewed the use of the plate to estimate amounts of carbohydrate per meal. Completed 2025        Problem: Blood Glucose Self-Monitoring         Goal: Patient agrees to check and record blood sugars 1-2 times per day.    Start Date: 2025   Expected End Date: 2025   Priority: Medium   Barriers: Lack of Supplies   Note:    25 - Has been unable to get reading when attempting to SMBG - keeps getting error messages. Demonstrated over virtual visit.  First, sister indicates they tried placing sample on strip then inserting strip into meter. Explained must insert strip into meter first, ensure droplet symbol flashing on screen, then place sample on edge of strip where line is. Something wrong with their video - they are able to see me but I'm not able to see them. Demonstrated again but still pt getting error message. Encouraged can come by clinic when able and can work through in person or can ask her pharmacy for assistance. Sister checked expiration and strips are over 2 years . Also  wondering if needing new meter as well. Rxs for meter and testing supplies sent to Dr. Michael.       Task: Reviewed the importance of self-monitoring blood glucose and keeping logs. Completed 5/14/2025        Task: Instructed on how to self-monitor blood glucose using a home glucometer, how to properly dispose of used strips and lancets after use, and how to appropriately store meter and supplies. Completed 5/14/2025        Task: Discussed ways to minimize pain when monitoring blood glucose. Completed 5/14/2025        Problem: Medications         Goal: Pt and caregiver will complete and return paperwork for medication assistance to [facility] pharmacy assistance team.    Start Date: 5/12/2025   Expected End Date: 6/13/2025   Priority: High   Barriers: Financial   Note:    5/12/25 - Pt has high copays for both Farxiga and Wegovy and cannot afford them. Each well over $100/month. Pt currently only taking Metformin. Communicated with Dr. Michael. Referral placed for patient pharmacy assistance. Explained with sister over the phone what to expect.       Task: Reviewed with patient all current diabetes medications and provided basic review of the purpose, dosage, frequency, side effects, and storage of both oral and injectable diabetes medications. Completed 5/14/2025        Task: Reviewed possible resources for acquiring cost prohibitive medication. Completed 5/14/2025          Follow Up Plan     Follow up in about 4 weeks (around 6/9/2025) for 1 month follow-up.    Today's care plan and follow up schedule was discussed with patient.  Shabnam verbalized understanding of the care plan, goals, and agrees to follow up plan.        The patient was encouraged to communicate with his/her health care provider/physician and care team regarding his/her condition(s) and treatment.  I provided the patient with my contact information today and encouraged to contact me via phone or [facility] Patient Portal as needed.     Length of Visit   Total  Time: 60 Minutes

## 2025-05-14 NOTE — PROGRESS NOTES
Diabetes Care Specialist Virtual Visit Note       The patient location is: home in [place]  The chief complaint leading to consultation is: Diabetes  Visit type: audiovisual  Total time spent with patient: 60 min   Each patient to whom he or she provides medical services by telemedicine is:  (1) informed of the relationship between the physician and patient and the respective role of any other health care provider with respect to management of the patient; and (2) notified that he or she may decline to receive medical services by telemedicine and may withdraw from such care at any time.     Diabetes Care Specialist Progress Note  Author: Anika Baird RD, [facility]  Date: 5/14/2025    Intake    Program Intake  Reason for Diabetes Program Visit:: Initial Diabetes Assessment  Current diabetes risk level:: moderate  In the last month, have you used the ER or been admitted to the hospital: Yes  Was the ER or hospital admission related to diabetes?: No (stroke)    Current Diabetes Treatment: Oral Medications, DM Injectables  Oral Medication Type/Dose: metformin 1000mg BID - taking consistently, Farxiga - not currently taking d/t cost  DM Injectables Type/Dose: Wegovy - not currently taking d/t cost    Continuous Glucose Monitoring  Patient has CGM: No    Lab Results   Component Value Date    HGBA1C 8.0 (H) 04/04/2025       Lifestyle Coping Support & Clinical    Lifestyle/Coping/Support  Does anyone in your family have diabetes or does anyone in your family support you in your diabetes care?: currently living with sister s/p stroke  List anything about Diabetes that causes you stress?: cost of medication, difficulty with SMBG, unsure what to eat  Learning Barriers:: None  Culture or Moravian beliefs that may impact ability to access healthcare: No  Psychosocial/Coping Skills Assessment Completed: : Yes  Assessment indicates:: Adequate understanding  Area of need?: No    Problem Review  Active Comorbidities: Stroke,  Hyperlipidemia/Dyslipidemia, Hypertension, Chronic Kidney Disease    Diabetes Self-Management Skills Assessment    Medication Skills Assessment  Patient is able to identify current diabetes medications, dosages, and appropriate timing of medications.: yes  Patient reports problems or concerns with current medication regimen.: yes  Medication regimen problems/concerns:: financial concerns  Pharmacy assistance referral placed?: No  Patient is  aware that some diabetes medications can cause low blood sugar?: Yes  Medication Skills Assessment Completed:: Yes  Assessment indicates:: Instruction Needed  Area of need?: Yes    Diabetes Disease Process/Treatment Options  Diabetes Type?: Type II  What are your goals for this education session?: sister is preparing meals for her and wants to know what to fix, also has been getting error messages when trying to test BG  Is patient aware of what causes diabetes?: Yes  Does patient understand the pathophysiology of diabetes?: No  Diabetes Disease Process/Treatment Options: Skills Assessment Completed: Yes  Assessment indicates:: Adequate understanding  Area of need?: No    Nutrition/Healthy Eating  Meal Plan 24 Hour Recall - Breakfast: recinos, egg, grits or oatmeal, toast, orange juice or apple juice  Meal Plan 24 Hour Recall - Lunch: green beans, chicken wings, 4 small roasted potatoes  Meal Plan 24 Hour Recall - Dinner: chicken, potatoes, salad  Meal Plan 24 Hour Recall - Snack: josé miguel crackers or baked BBQ chips  Meal Plan 24 Hour Recall - Beverage: sugar free drinks  Who shops/cooks?: sisters  Challenges to healthy eating:: other (see comments) (unsure what to cook)  Nutrition/Healthy Eating Skills Assessment Completed:: Yes  Assessment indicates:: Instruction Needed  Area of need?: Yes    Physical Activity/Exercise  Physical Activity/Exercise Skills Assessment Completed: : No  Deffered due to:: Time  Area of need?: Deferred    Home Blood Glucose Monitoring  Patient states  that blood sugar is checked at home daily.: no  Home Blood Glucose Monitoring Skills Assessment Completed: : Yes  Assessment indicates:: Instruction Needed  Area of need?: Yes    Acute Complications  Acute Complications Skills Assessment Completed: : No  Deffered due to:: Time  Area of need?: Deferred    Chronic Complications  Chronic Complications Skills Assessment Completed: : No  Deferred due to:: Time  Area of need?: Deferred      Assessment Summary and Plan    Based on today's diabetes care assessment, the following areas of need were identified:      Identified Areas of Need      Medication/Current Diabetes Treatment: Yes - see care planning   Lifestyle Coping/Support: No   Diabetes Disease Process/Treatment Options: No   Nutrition/Healthy Eating: Yes - see care planning   Physical Activity/Exercise: Deferred    Home Blood Glucose Monitoring: Yes - see care planning   Acute Complications: Deferred    Chronic Complications: Deferred       Today's interventions were provided through individual discussion, instruction, and written materials were provided.      Patient verbalized understanding of instruction and written materials.  Pt was able to return back demonstration of instructions today. Patient understood key points, needs reinforcement and further instruction.     Diabetes Self-Management Care Plan:    Today's Diabetes Self-Management Care Plan was developed with Shabnam's input. Shabnam has agreed to work toward the following goal(s) to improve his/her overall diabetes control.      There are no recently modified care plans to display for this patient.      Follow Up Plan     Follow up in about 4 weeks (around 6/9/2025) for 1 month follow-up.    Today's care plan and follow up schedule was discussed with patient.  Shabnam verbalized understanding of the care plan, goals, and agrees to follow up plan.        The patient was encouraged to communicate with his/her health care provider/physician and care team  regarding his/her condition(s) and treatment.  I provided the patient with my contact information today and encouraged to contact me via phone or [facility] Patient Portal as needed.     Length of Visit   Total Time: 60 Minutes

## 2025-06-02 ENCOUNTER — HOSPITAL ENCOUNTER (EMERGENCY)
Facility: OTHER | Age: 70
Discharge: HOME | End: 2025-06-02
Attending: EMERGENCY MEDICINE
Payer: MEDICARE

## 2025-06-02 VITALS
HEART RATE: 65 BPM | HEIGHT: 65 IN | TEMPERATURE: 98 F | BODY MASS INDEX: 26.66 KG/M2 | WEIGHT: 160 LBS | SYSTOLIC BLOOD PRESSURE: 170 MMHG | RESPIRATION RATE: 18 BRPM | DIASTOLIC BLOOD PRESSURE: 74 MMHG | OXYGEN SATURATION: 100 %

## 2025-06-02 DIAGNOSIS — Z71.1 FEARED CONDITION NOT DEMONSTRATED: Primary | ICD-10-CM

## 2025-06-02 PROCEDURE — 99281 EMR DPT VST MAYX REQ PHY/QHP: CPT

## 2025-06-03 ENCOUNTER — TELEPHONE (OUTPATIENT)
Dept: INTERNAL MEDICINE | Facility: CLINIC | Age: 70
End: 2025-06-03
Payer: MEDICARE

## 2025-06-03 ENCOUNTER — PATIENT OUTREACH (OUTPATIENT)
Facility: OTHER | Age: 70
End: 2025-06-03
Payer: MEDICARE

## 2025-06-03 DIAGNOSIS — F41.9 ANXIETY: Primary | ICD-10-CM

## 2025-06-03 DIAGNOSIS — F03.90 DEMENTIA, UNSPECIFIED DEMENTIA SEVERITY, UNSPECIFIED DEMENTIA TYPE, UNSPECIFIED WHETHER BEHAVIORAL, PSYCHOTIC, OR MOOD DISTURBANCE OR ANXIETY: ICD-10-CM

## 2025-06-03 PROCEDURE — G0180 MD CERTIFICATION HHA PATIENT: HCPCS | Mod: ,,, | Performed by: INTERNAL MEDICINE

## 2025-06-04 ENCOUNTER — TELEPHONE (OUTPATIENT)
Dept: INTERNAL MEDICINE | Facility: CLINIC | Age: 70
End: 2025-06-04
Payer: MEDICARE

## 2025-06-05 ENCOUNTER — PATIENT OUTREACH (OUTPATIENT)
Dept: ADMINISTRATIVE | Facility: HOSPITAL | Age: 70
End: 2025-06-05
Payer: MEDICARE

## 2025-06-05 RX ORDER — HYDROXYZINE HYDROCHLORIDE 25 MG/1
25 TABLET, FILM COATED ORAL 3 TIMES DAILY PRN
Qty: 90 TABLET | Refills: 3 | Status: SHIPPED | OUTPATIENT
Start: 2025-06-05

## 2025-06-09 ENCOUNTER — PATIENT MESSAGE (OUTPATIENT)
Dept: ADMINISTRATIVE | Facility: HOSPITAL | Age: 70
End: 2025-06-09
Payer: MEDICARE

## 2025-06-09 ENCOUNTER — CLINICAL SUPPORT (OUTPATIENT)
Dept: DIABETES | Facility: CLINIC | Age: 70
End: 2025-06-09
Payer: MEDICARE

## 2025-06-09 DIAGNOSIS — E11.65 TYPE 2 DIABETES MELLITUS WITH HYPERGLYCEMIA, WITHOUT LONG-TERM CURRENT USE OF INSULIN: Primary | ICD-10-CM

## 2025-06-09 PROCEDURE — G0108 DIAB MANAGE TRN  PER INDIV: HCPCS | Mod: 95,,, | Performed by: DIETITIAN, REGISTERED

## 2025-06-09 NOTE — PROGRESS NOTES
Diabetes Care Specialist Progress Note  Author: Anika Baird RD, [facility]  Date: 6/9/2025    Intake    Program Intake  Reason for Diabetes Program Visit:: Intervention  Type of Intervention:: Individual  Individual: Education  Education: Self-Management Skill Review  Current diabetes risk level:: moderate  In the last month, have you used the ER or been admitted to the hospital: Yes  Was the ER or hospital admission related to diabetes?: No    Current Diabetes Treatment: Oral Medications, DM Injectables  Oral Medication Type/Dose: metformin 1000mg BID - taking consistently, Farxiga - not currently taking d/t cost  DM Injectables Type/Dose: Wegovy - not currently taking d/t cost    Continuous Glucose Monitoring  Patient has CGM: No    Lab Results   Component Value Date    HGBA1C 8.0 (H) 04/04/2025       Today's interventions were provided through individual discussion, instruction, and written materials were provided.      Patient verbalized understanding of instruction and written materials.  Pt was able to return back demonstration of instructions today. Patient understood key points, needs reinforcement and further instruction.     Diabetes Self-Management Care Plan:    Today's Diabetes Self-Management Care Plan was developed with Shabnam's input. Shabnam has agreed to work toward the following goal(s) to improve his/her overall diabetes control.      Care Plan: Diabetes Management   Updates made since 6/9/2024 12:00 AM        Problem: Healthy Eating         Goal: Use plate method for controlling carb portions.    Start Date: 5/12/2025   Expected End Date: 6/13/2025   This Visit's Progress: Met   Priority: Medium   Barriers: No Barriers Identified   Note:    5/12/25 - Provided review of sources of carbohydrate, choosing mostly high fiber/complex carbs, avoiding sugary drinks including juices, portion sizes using dry measuring cups, balancing meals with lean protein and non-starchy vegetables and plate method of meal  planning.     6/9/25 - Sister is preparing meals according to meal planning guide discussed at last visit. She reports some struggles - pt has dementia and sometimes gets upset when she wants something she isn't supposed to have. Sister also reports has been challenging to prepare meals for her. Asked about meals on wheels or other prepared meal resources. Discussed [employer] could help identify resources pt qualifies for. Rec'd calling pt's insurance plan as well as some plans will provide some meal delivery (although usually only provided for a certain amount of time).        Task: Reviewed the sources and role of Carbohydrate, Protein, and Fat and how each nutrient impacts blood sugar. Completed 5/14/2025        Task: Provided visual examples using dry measuring cups, food models, and other familiar objects such as computer mouse, deck or cards, tennis ball etc. to help with visualization of portions. Completed 5/14/2025        Task: Recommended replacing beverages containing high sugar content with noncaloric/sugar free options and/or water. Completed 5/14/2025        Task: Review the importance of balancing carbohydrates with each meal using portion control techniques to count servings of carbohydrate and label reading to identify serving size and amount of total carbs per serving. Completed 5/14/2025        Task: Provided Sample plate method and reviewed the use of the plate to estimate amounts of carbohydrate per meal. Completed 5/14/2025        Problem: Blood Glucose Self-Monitoring         Goal: Patient agrees to check and record blood sugars 1-2 times per day.    Start Date: 5/12/2025   Expected End Date: 6/13/2025   This Visit's Progress: Met   Priority: Medium   Barriers: Lack of Supplies   Note:    5/12/25 - Has been unable to get reading when attempting to SMBG - keeps getting error messages. Demonstrated over virtual visit.  First, sister indicates they tried placing sample on strip then inserting strip  into meter. Explained must insert strip into meter first, ensure droplet symbol flashing on screen, then place sample on edge of strip where line is. Something wrong with their video - they are able to see me but I'm not able to see them. Demonstrated again but still pt getting error message. Encouraged can come by clinic when able and can work through in person or can ask her pharmacy for assistance. Sister checked expiration and strips are over 2 years . Also wondering if needing new meter as well. Rxs for meter and testing supplies sent to Dr. Michael.    25 - Sisters have been testing BG daily, mostly fasting. Verbally reported readings: 121, 131, 107, 117. Tested once after meal and was 206. Applauded efforts and encouraged continuing to test. Reviewed goal BG ranges and overall fasting reading trend is in goal.        Task: Reviewed the importance of self-monitoring blood glucose and keeping logs. Completed 2025        Task: Instructed on how to self-monitor blood glucose using a home glucometer, how to properly dispose of used strips and lancets after use, and how to appropriately store meter and supplies. Completed 2025        Task: Discussed ways to minimize pain when monitoring blood glucose. Completed 2025        Problem: Medications         Goal: Pt and caregiver will complete and return paperwork for medication assistance to [facility] pharmacy assistance team.    Start Date: 2025   Expected End Date: 2025   This Visit's Progress: No change   Priority: High   Barriers: Financial   Note:    25 - Pt has high copays for both Farxiga and Wegovy and cannot afford them. Each well over $100/month. Pt currently only taking Metformin. Communicated with Dr. Michael. Referral placed for patient pharmacy assistance. Explained with sister over the phone what to expect.    25 - Currently pt is still only on Metformin. They did not receive the [facility] message or letter sent by  pharmacy assistance. Provided phone number for [employer] Mendez and encouraged calling to get application process started.       Task: Reviewed with patient all current diabetes medications and provided basic review of the purpose, dosage, frequency, side effects, and storage of both oral and injectable diabetes medications. Completed 5/14/2025        Task: Reviewed possible resources for acquiring cost prohibitive medication. Completed 5/14/2025          Follow Up Plan     Follow up in about 3 months (around 9/9/2025) for follow-up and check in.    Today's care plan and follow up schedule was discussed with patient.  Shabnam verbalized understanding of the care plan, goals, and agrees to follow up plan.        The patient was encouraged to communicate with his/her health care provider/physician and care team regarding his/her condition(s) and treatment.  I provided the patient with my contact information today and encouraged to contact me via phone or [facility] Patient Portal as needed.     Length of Visit   Total Time: 30 Minutes

## 2025-06-20 ENCOUNTER — TELEPHONE (OUTPATIENT)
Dept: INTERNAL MEDICINE | Facility: CLINIC | Age: 70
End: 2025-06-20
Payer: MEDICARE

## 2025-06-20 NOTE — TELEPHONE ENCOUNTER
Staff spoke with patient and got her rescheduled in person on 6/23 for leg swelling. Patient is in agreement to the above and verbalized understanding.

## 2025-06-23 ENCOUNTER — OFFICE VISIT (OUTPATIENT)
Dept: INTERNAL MEDICINE | Facility: CLINIC | Age: 70
End: 2025-06-23
Payer: MEDICARE

## 2025-06-23 ENCOUNTER — HOSPITAL ENCOUNTER (OUTPATIENT)
Dept: RADIOLOGY | Facility: OTHER | Age: 70
Discharge: HOME | End: 2025-06-23
Attending: NURSE PRACTITIONER
Payer: MEDICARE

## 2025-06-23 VITALS
HEART RATE: 76 BPM | SYSTOLIC BLOOD PRESSURE: 136 MMHG | WEIGHT: 199.19 LBS | DIASTOLIC BLOOD PRESSURE: 63 MMHG | OXYGEN SATURATION: 96 % | BODY MASS INDEX: 33.19 KG/M2 | HEIGHT: 65 IN

## 2025-06-23 DIAGNOSIS — R60.0 BILATERAL EDEMA OF LOWER EXTREMITY: Primary | ICD-10-CM

## 2025-06-23 DIAGNOSIS — I50.9 HEART FAILURE, UNSPECIFIED HF CHRONICITY, UNSPECIFIED HEART FAILURE TYPE: ICD-10-CM

## 2025-06-23 DIAGNOSIS — R60.0 BILATERAL EDEMA OF LOWER EXTREMITY: ICD-10-CM

## 2025-06-23 PROCEDURE — 3052F HG A1C>EQUAL 8.0%<EQUAL 9.0%: CPT | Mod: CPTII,S$GLB,, | Performed by: NURSE PRACTITIONER

## 2025-06-23 PROCEDURE — 93970 EXTREMITY STUDY: CPT | Mod: 26,,, | Performed by: RADIOLOGY

## 2025-06-23 PROCEDURE — 3078F DIAST BP <80 MM HG: CPT | Mod: CPTII,S$GLB,, | Performed by: NURSE PRACTITIONER

## 2025-06-23 PROCEDURE — G2211 COMPLEX E/M VISIT ADD ON: HCPCS | Mod: S$GLB,,, | Performed by: NURSE PRACTITIONER

## 2025-06-23 PROCEDURE — 3008F BODY MASS INDEX DOCD: CPT | Mod: CPTII,S$GLB,, | Performed by: NURSE PRACTITIONER

## 2025-06-23 PROCEDURE — 3288F FALL RISK ASSESSMENT DOCD: CPT | Mod: CPTII,S$GLB,, | Performed by: NURSE PRACTITIONER

## 2025-06-23 PROCEDURE — 3066F NEPHROPATHY DOC TX: CPT | Mod: CPTII,S$GLB,, | Performed by: NURSE PRACTITIONER

## 2025-06-23 PROCEDURE — 1126F AMNT PAIN NOTED NONE PRSNT: CPT | Mod: CPTII,S$GLB,, | Performed by: NURSE PRACTITIONER

## 2025-06-23 PROCEDURE — 99214 OFFICE O/P EST MOD 30 MIN: CPT | Mod: S$GLB,,, | Performed by: NURSE PRACTITIONER

## 2025-06-23 PROCEDURE — 3060F POS MICROALBUMINURIA REV: CPT | Mod: CPTII,S$GLB,, | Performed by: NURSE PRACTITIONER

## 2025-06-23 PROCEDURE — 99999 PR PBB SHADOW E&M-EST. PATIENT-LVL III: CPT | Mod: PBBFAC,,, | Performed by: NURSE PRACTITIONER

## 2025-06-23 PROCEDURE — 3075F SYST BP GE 130 - 139MM HG: CPT | Mod: CPTII,S$GLB,, | Performed by: NURSE PRACTITIONER

## 2025-06-23 PROCEDURE — 4010F ACE/ARB THERAPY RXD/TAKEN: CPT | Mod: CPTII,S$GLB,, | Performed by: NURSE PRACTITIONER

## 2025-06-23 PROCEDURE — 1100F PTFALLS ASSESS-DOCD GE2>/YR: CPT | Mod: CPTII,S$GLB,, | Performed by: NURSE PRACTITIONER

## 2025-06-23 PROCEDURE — 93970 EXTREMITY STUDY: CPT | Mod: TC

## 2025-06-23 RX ORDER — FUROSEMIDE 20 MG/1
20 TABLET ORAL DAILY
Qty: 14 TABLET | Refills: 0 | Status: SHIPPED | OUTPATIENT
Start: 2025-06-23 | End: 2025-07-07

## 2025-06-23 NOTE — PATIENT INSTRUCTIONS
Consider chest x-ray and echocardiogram if CHF exacerbation occurs.  Continue leg elevation as tolerated.    Consider compression stockings if no DVT is found.  Consider adding low dose diuretic such as Lasix, if no DVT and CHF exacerbation.   Counseled family on signs/symptoms of worsening CHF, DVT, and when to seek immediate care.  Consider Cardiology or Nephrology referral dependent on findings.  Follow-up in 3-5 days or sooner with worsening symptoms.

## 2025-06-23 NOTE — PROGRESS NOTES
Subjective     Patient ID: Shabnam Noble is a 69 y.o. female.    Chief Complaint: Leg Swelling      Shabnam Noble is a 69-year-old female who presents in a wheelchair, accompanied by her two family members, with a 2-week history of bilateral lower extremity swelling, worse on the right leg. She describes the swelling as waxing and waning, aggravated by prolonged sitting or standing without leg elevation. Elevating the legs provides mild relief. She notes a firm area behind the right leg, possibly in the popliteal region. Denies associated symptoms such as redness, warmth, pain, chest pain, shortness of breath, or fever.     Edema  This is a new (both legs) problem. The current episode started 1 to 4 weeks ago (2 week history). The problem occurs daily. The problem has been waxing and waning. Pertinent negatives include no abdominal pain, anorexia, arthralgias, change in bowel habit, chest pain, chills, congestion, coughing, diaphoresis, fatigue, fever, headaches, joint swelling, myalgias, nausea, neck pain, numbness, rash, sore throat, swollen glands, urinary symptoms, vertigo, visual change, vomiting or weakness. Exacerbated by: not elevating legs. Treatments tried: elevation of legs. The treatment provided mild relief.       Review of Systems   Constitutional:  Negative for chills, diaphoresis, fatigue and fever.   HENT:  Negative for nasal congestion and sore throat.    Respiratory:  Negative for cough.    Cardiovascular:  Positive for leg swelling. Negative for chest pain.   Gastrointestinal:  Negative for abdominal pain, anorexia, change in bowel habit, nausea and vomiting.   Musculoskeletal:  Negative for arthralgias, joint swelling, myalgias and neck pain.   Integumentary:  Negative for rash.   Neurological:  Negative for vertigo, weakness, numbness and headaches.          Objective     Physical Exam  Vitals reviewed.   Constitutional:       Appearance: She is well-developed. She is obese.   HENT:      Head:  Normocephalic and atraumatic. Not macrocephalic and not microcephalic. No raccoon eyes, Emery's sign, abrasion, contusion, right periorbital erythema, left periorbital erythema or laceration. Hair is normal.      Right Ear: No decreased hearing noted. No laceration, drainage, swelling or tenderness. No middle ear effusion. No foreign body. No mastoid tenderness. No hemotympanum. Tympanic membrane is not injected, scarred, perforated, erythematous, retracted or bulging. Tympanic membrane has normal mobility.      Left Ear: No decreased hearing noted. No laceration, drainage, swelling or tenderness.  No middle ear effusion. No foreign body. No mastoid tenderness. No hemotympanum. Tympanic membrane is not injected, scarred, perforated, erythematous, retracted or bulging. Tympanic membrane has normal mobility.      Nose: Nose normal. No nasal deformity, laceration or mucosal edema.      Mouth/Throat:      Pharynx: Uvula midline.   Eyes:      General: Lids are normal. No scleral icterus.     Conjunctiva/sclera: Conjunctivae normal.   Neck:      Thyroid: No thyroid mass or thyromegaly.      Trachea: Trachea normal.   Pulmonary:      Effort: Pulmonary effort is normal. No respiratory distress.      Breath sounds: Normal breath sounds.   Abdominal:      Palpations: Abdomen is soft.   Musculoskeletal:         General: Normal range of motion.      Cervical back: Normal range of motion and neck supple. No edema, erythema or rigidity. No spinous process tenderness or muscular tenderness. Normal range of motion.      Comments: Bilateral lower extremity edema, right greater than left  Bilateral lower extremity skin is tight and shiny, no erythema, warmth, or skin breakdown noted.   No pitting edema.  Area of firmness palpated in popliteal region of the right leg   Lymphadenopathy:      Head:      Right side of head: No submental, submandibular, tonsillar, preauricular or posterior auricular adenopathy.      Left side of head: No  submental, submandibular, tonsillar, preauricular, posterior auricular or occipital adenopathy.      Cervical: No cervical adenopathy.   Skin:     General: Skin is warm and dry.   Neurological:      Mental Status: She is alert and oriented to person, place, and time.      Comments: Alert and oriented x1 (history of dementia).   Psychiatric:         Behavior: Behavior normal.         Thought Content: Thought content normal.         Judgment: Judgment normal.            Assessment and Plan     1. Bilateral edema of lower extremity  -     BNP; Future; Expected date: 06/23/2025  -     Basic Metabolic Panel; Future; Expected date: 06/23/2025  -     CBC Auto Differential; Future; Expected date: 06/23/2025  -     Urinalysis; Future; Expected date: 06/23/2025  -     Cancel: CV Ultrasound doppler venous legs bilat; Future  -     SCHEDULED EKG 12-LEAD (to Muse); Future  -     Cancel: US Lower Extremity Veins Bilateral; Future; Expected date: 06/23/2025  -     US Lower Extremity Veins Bilateral; Future; Expected date: 06/23/2025        Consider chest x-ray and echocardiogram if CHF exacerbation occurs.  Continue leg elevation as tolerated.    Compression stockings.  Consider adding low dose diuretic such as Lasix.  Counseled family on signs/symptoms of worsening CHF, DVT, and when to seek immediate care.  Consider Cardiology or Nephrology.  Follow-up in 3-5 days or sooner with worsening symptoms.          No follow-ups on file.

## 2025-06-27 ENCOUNTER — HOSPITAL ENCOUNTER (OUTPATIENT)
Dept: CARDIOLOGY | Facility: OTHER | Age: 70
Discharge: HOME | End: 2025-06-27
Attending: NURSE PRACTITIONER
Payer: MEDICARE

## 2025-06-27 DIAGNOSIS — R60.0 BILATERAL EDEMA OF LOWER EXTREMITY: ICD-10-CM

## 2025-06-27 LAB
OHS QRS DURATION: 100 MS
OHS QTC CALCULATION: 454 MS

## 2025-06-27 PROCEDURE — 93005 ELECTROCARDIOGRAM TRACING: CPT

## 2025-06-27 PROCEDURE — 93010 ELECTROCARDIOGRAM REPORT: CPT | Mod: ,,, | Performed by: INTERNAL MEDICINE

## 2025-07-01 LAB
OHS QRS DURATION: 100 MS
OHS QTC CALCULATION: 454 MS

## 2025-07-08 ENCOUNTER — LAB VISIT (OUTPATIENT)
Dept: LAB | Facility: OTHER | Age: 70
End: 2025-07-08
Attending: INTERNAL MEDICINE
Payer: MEDICARE

## 2025-07-08 ENCOUNTER — OFFICE VISIT (OUTPATIENT)
Dept: INTERNAL MEDICINE | Facility: CLINIC | Age: 70
End: 2025-07-08
Payer: MEDICARE

## 2025-07-08 VITALS
SYSTOLIC BLOOD PRESSURE: 132 MMHG | DIASTOLIC BLOOD PRESSURE: 70 MMHG | HEART RATE: 77 BPM | OXYGEN SATURATION: 83 % | HEIGHT: 65 IN | BODY MASS INDEX: 32.95 KG/M2 | WEIGHT: 197.75 LBS

## 2025-07-08 DIAGNOSIS — F01.A0 MILD VASCULAR DEMENTIA WITHOUT BEHAVIORAL DISTURBANCE, PSYCHOTIC DISTURBANCE, MOOD DISTURBANCE, OR ANXIETY: ICD-10-CM

## 2025-07-08 DIAGNOSIS — I63.9 CEREBELLAR STROKE: ICD-10-CM

## 2025-07-08 DIAGNOSIS — I50.9 HEART FAILURE, UNSPECIFIED HF CHRONICITY, UNSPECIFIED HEART FAILURE TYPE: ICD-10-CM

## 2025-07-08 DIAGNOSIS — E11.65 TYPE 2 DIABETES MELLITUS WITH HYPERGLYCEMIA, UNSPECIFIED WHETHER LONG TERM INSULIN USE: Primary | ICD-10-CM

## 2025-07-08 DIAGNOSIS — E11.65 TYPE 2 DIABETES MELLITUS WITH HYPERGLYCEMIA, UNSPECIFIED WHETHER LONG TERM INSULIN USE: ICD-10-CM

## 2025-07-08 DIAGNOSIS — M79.89 LEG SWELLING: ICD-10-CM

## 2025-07-08 LAB
ANION GAP (OHS): 12 MMOL/L (ref 8–16)
BUN SERPL-MCNC: 15 MG/DL (ref 8–23)
CALCIUM SERPL-MCNC: 8.9 MG/DL (ref 8.7–10.5)
CHLORIDE SERPL-SCNC: 111 MMOL/L (ref 95–110)
CO2 SERPL-SCNC: 22 MMOL/L (ref 23–29)
CREAT SERPL-MCNC: 1.1 MG/DL (ref 0.5–1.4)
EAG (OHS): 154 MG/DL (ref 68–131)
GFR SERPLBLD CREATININE-BSD FMLA CKD-EPI: 55 ML/MIN/1.73/M2
GLUCOSE SERPL-MCNC: 113 MG/DL (ref 70–110)
HBA1C MFR BLD: 7 % (ref 4–5.6)
POTASSIUM SERPL-SCNC: 3.6 MMOL/L (ref 3.5–5.1)
SODIUM SERPL-SCNC: 145 MMOL/L (ref 136–145)

## 2025-07-08 PROCEDURE — 82310 ASSAY OF CALCIUM: CPT

## 2025-07-08 PROCEDURE — 99999 PR PBB SHADOW E&M-EST. PATIENT-LVL V: CPT | Mod: PBBFAC,,, | Performed by: INTERNAL MEDICINE

## 2025-07-08 PROCEDURE — 36415 COLL VENOUS BLD VENIPUNCTURE: CPT

## 2025-07-08 PROCEDURE — 83036 HEMOGLOBIN GLYCOSYLATED A1C: CPT

## 2025-07-08 RX ORDER — METOPROLOL SUCCINATE 100 MG/1
100 TABLET, EXTENDED RELEASE ORAL DAILY
COMMUNITY

## 2025-07-08 RX ORDER — POTASSIUM CHLORIDE 750 MG/1
10 CAPSULE, EXTENDED RELEASE ORAL DAILY
Qty: 90 CAPSULE | Refills: 1 | Status: SHIPPED | OUTPATIENT
Start: 2025-07-08

## 2025-07-08 RX ORDER — FUROSEMIDE 20 MG/1
TABLET ORAL
Qty: 180 TABLET | Refills: 1 | Status: SHIPPED | OUTPATIENT
Start: 2025-07-08 | End: 2026-07-08

## 2025-07-08 NOTE — PATIENT INSTRUCTIONS
High-Potassium Foods    High-potassium fruits:  Apricots  Bananas  Cantaloupe  Dried fruit  Honeydew melon  Kiwi  Mahesh  Nectarines  Oranges and orange juice  Papaya  Pomegranate and pomegranate juice  Prunes and prune juice  Pumpkin  Raisins      High-potassium vegetables:  [place] squash, butternut squash, Steel squash  Avocado  Artichoke  Beets  Baked beans, black beans, refried beans  Broccoli (cooked)  [place] sprouts  Kohlrabi  Lentils  Okra  Onions (fried)  Parsnips  Potatoes (white and sweet)  Rutabagas  Spinach (cooked)  Tomatoes, tomato sauce, and tomato paste  Vegetable juice      Other high-potassium foods:  Bran products  Chocolate  Coconut  Creamed soups  French fries  Granola  Ice cream  Milk (buttermilk, chocolate, eggnog evaporated, malted, soy and milkshakes)  Miso  Molasses  Nuts  Peanut butter  Potato chips  Salt substitutes  Seeds  Tofu  Yogurt

## 2025-07-08 NOTE — PROGRESS NOTES
Subjective:      Patient ID: Shabnam Noble is a 69 y.o. female.    Chief Complaint: Leg Swelling      Shabnam Noble is a 69 y.o. 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    Improved living situation as she is now living with her sister.      Poorly controlled DM2: A1c elevated to 8% at last physical. She was referred to diabetes advanced NP and has had her first visit with Jina on 5/9/2022. She continues to take metformin 1000mg BID. She was unable to afford the jardiance that was started. Previously, trulicity qweekly was added to her diabetes regimen which she has discontinued herself off of. Strong preference to not start injectables.      The patient consents verbally to being recorded for [facility] service today.     History of Present Illness    CHIEF COMPLAINT:  Ms. Noble presents today for follow up of multiple chronic conditions.    DIABETES:  She reports morning blood sugar readings around 118. She has been consistent with her medication regimen, and blood sugar management appears stable. She has not initiated Wegovy or Trulicity injections due to cost concerns.    CHRONIC KIDNEY DISEASE:  She has chronic kidney disease Stage III with baseline creatinine of 1.6, noting slight worsening over the past two months. She reports inadequate water intake due to concerns about frequent urination. She denies symptoms of severe kidney dysfunction and does not require dialysis.    LOWER EXTREMITY EDEMA:  She reports progressive bilateral leg swelling for approximately one month. One leg has metal hardware from previous ankle surgery contributing to persistent swelling. She recently completed a 14-day course of Lasix with minimal improvement. She is currently wheelchair bound,  which may be contributing to reduced leg circulation and ongoing edema. She denies signs of infection such as redness or warmth in the affected areas.    COGNITIVE IMPAIRMENT:  She has memory issues secondary to multiple prior strokes. She previously demonstrated medication non-adherence when living independently, with  medications found in her cabinet and inconsistent medication administration. She expresses strong desire for independence but acknowledges challenges with memory loss.     SOCIAL HISTORY:  She currently resides with her sister who assists with medication management and supervision. She reports improved health parameters since living with family, which she attributes to daily prayer and her sister's support.    Visit today is associated with current or anticipated ongoing medical care related to this patient's single serious condition/complex condition of CVA (cerebral vascular accident) with memory deficit, DM2 (diabetes mellitus, type 2), Obesity, some day cigarette smoker, Essential hypertension, left knee OA, illicit drug use (cocaine) . The patient will return to see me as these issues will be followed longitudinally.    ROS:  Cardiovascular: +lower extremity edema  Respiratory: -shortness of breath  Musculoskeletal: +diminished activity, +muscle stiffness, +joint stiffness  Neurological: +memory loss          Current Medications[1]    Lab Results   Component Value Date    HGBA1C 8.0 (H) 2025    HGBA1C 9.0 (H) 2025    HGBA1C 8.7 (H) 2024     Lab Results   Component Value Date    MICALBCREAT 194.9 (H) 2025     Lab Results   Component Value Date    LDLCALC 123.0 2025    LDLCALC 160.0 (H) 2025    CHOL 179 2025    HDL 32 (L) 2025    TRIG 120 2025       Lab Results   Component Value Date     2025    K 4.4 2025     2025    CO2 26 2025    GLU 78 2025    BUN 43 (H) 2025    CREATININE 1.6 (H)  "04/29/2025    CALCIUM 8.5 (L) 04/29/2025    PROT 6.4 04/28/2025    ALBUMIN 2.6 (L) 04/28/2025    BILITOT 0.2 04/28/2025    ALKPHOS 57 04/28/2025    AST 15 04/28/2025    ALT 19 04/28/2025    ANIONGAP 7 (L) 04/29/2025    ESTGFRAFRICA >60.0 01/18/2022    EGFRNONAA 58.8 (A) 01/18/2022    WBC 8.10 04/28/2025    HGB 9.2 (L) 04/28/2025    HGB 9.7 (L) 04/24/2025    HCT 30.3 (L) 04/28/2025    MCV 88 04/28/2025     04/28/2025    TSH 1.157 04/05/2025    HEPCAB Negative 04/04/2025       Lab Results   Component Value Date    HAJGYXKN02 877 04/30/2024         Past Medical History:   Diagnosis Date    Colon polyp     CVA (cerebral vascular accident)     DM2 (diabetes mellitus, type 2)     Essential hypertension      Past Surgical History:   Procedure Laterality Date    ANKLE FRACTURE SURGERY Right     ANKLE FRACTURE SURGERY      COLONOSCOPY N/A 12/11/2020    Procedure: COLONOSCOPY;  Surgeon: Marshal Rogers MD;  Location: [facility] ([address] [place] [address]);  Service: Endoscopy;  Laterality: N/A;  11/18-plavix hold ok see te-covid pcw 12/8-tb    COLONOSCOPY W/ POLYPECTOMY      HYSTERECTOMY      no h/o  malignancy     Social History     Social History Narrative    Not on file     Family History   Problem Relation Name Age of Onset    Alzheimer's disease Mother      Prostate cancer Father      Diabetes Father      Colon cancer Brother      Breast cancer Paternal Aunt      Heart disease Neg Hx      Stroke Neg Hx           Vitals:    07/08/25 0950 07/08/25 1050   BP: (!) 136/90 132/70   Pulse: 77    SpO2: (!) 83%    Weight: 89.7 kg (197 lb 12 oz)    Height: 5' 5" (1.651 m)    PainSc:   4    PainLoc: Leg      Objective:      Physical Exam  Vitals reviewed.   Constitutional:       Appearance: Normal appearance.   HENT:      Head: Normocephalic.   Eyes:      Pupils: Pupils are equal, round, and reactive to light.   Cardiovascular:      Rate and Rhythm: Normal rate.      Pulses: Normal pulses.      Heart sounds: Normal heart sounds. "   Pulmonary:      Effort: Pulmonary effort is normal.      Breath sounds: Normal breath sounds.   Abdominal:      General: Bowel sounds are normal.      Palpations: Abdomen is soft.   Musculoskeletal:         General: Normal range of motion.   Skin:     General: Skin is warm.   Neurological:      General: No focal deficit present.      Mental Status: She is alert. Mental status is at baseline.   Psychiatric:         Mood and Affect: Mood normal.         Assessment/Plan     Assessment & Plan    - Diabetes management improved with morning glucose readings around 118-120 mg/dL.  - Chronic leg swelling likely related to CKD Stage III.  - Fluid retention possible cause of leg swelling, with potential cardiac involvement.  - BP control improved compared to previous visits.  - Continued current diabetes medications due to improved glucose control.  - Evaluated need for repeat echocardiogram if leg swelling persists despite diuretic therapy.    PLAN SUMMARY:  - Continue Metformin twice daily for diabetes management  - Continue atorvastatin, aspirin, and Plavix daily for stroke prevention  - Start potassium supplement 10 mEq daily  - Continue carvedilol twice daily, nifedipine twice daily, and valsartan daily for blood pressure management  - Prescribe Lasix 20 mg daily in the morning with additional 20 mg as needed in the afternoon  - Order renal function tests and A1C test  - Consider injectable medication if A1C is higher than expected  - Recommend leg elevation, compression stockings, and increased physical activity as tolerated  - Evaluate memory loss for potential dementia  - Consider lymphedema therapy if swelling persists  - Follow up in 3 months to monitor chronic conditions and medication adherence    ## TYPE 2 DIABETES MELLITUS:  - Home blood sugar monitoring shows morning reading of 118, which is good.  - Ordered A1C test today; last checked in April and expecting it to be better than 8%.  - Discussed medication  options including Farxiga and Jardiance, which were not covered by insurance.  - Will continue Metformin twice daily for diabetes management.  - Will consider injectable medication if A1C is shows uncontrolled diabetes    ## CHRONIC KIDNEY DISEASE, STAGE 3:  - Kidney function has been fluctuating but has settled on a baseline creatinine of 1.6, consistent with Stage III CKD.  - Will recheck kidney function today due to fluid retention concerns.  - Started potassium supplement 10 mEq daily and advised consumption of potassium-rich foods, especially on days when extra Lasix is taken.  - Considered Farxiga for kidney protection but noted it was not covered by insurance.  - Ordered renal function tests to monitor kidney function and potassium levels with increased use of diuretics.    ## LOCALIZED EDEMA:  - Ms. Noble has experienced leg swelling for about a month, previously treated with a 14-day Lasix trial.  - EKG looks unremarkable.  - Prescribed Lasix 20 mg daily in the morning with additional 20 mg as needed in the afternoon (around 12-1 PM) if weight increases by 5 lbs or more.  - Recommend leg elevation during the day, compression stockings, limiting salt intake to 1.5-2 g per day, and increasing physical activity as tolerated to improve circulation.  - Will consider lymphedema therapy if swelling persists.  - Instructed patient to contact the office if leg swelling worsens before the next appointment.    ## SEQUELAE OF CEREBRAL INFARCTION:  - Ms. Noble has history of stroke and is currently taking atorvastatin, aspirin, and Plavix daily for stroke prevention.  - EKG shows no current heart issues.  - Will continue carvedilol twice daily, nifedipine twice daily, and valsartan daily for blood pressure management.  - Emphasized the importance of consistent medication administration due to stroke history.    ## MEMORY DEFICIT FOLLOWING CEREBRAL INFARCTION:  - Ms. Noble experiencing memory issues, likely related  to previous stroke.  - Seen by neuropsych 3/2025 with confirmation of dementia diagnosis  - Explained the relationship between stroke history and memory issues to patient.    ## DEPENDENCE ON WHEELCHAIR:  - Ms. Noble is more wheelchair-bound than previously, which contributes to fluid retention and leg swelling.  - Recommend leg elevation and compression stockings to manage these complications of reduced mobility.          Type 2 diabetes mellitus with hyperglycemia, unspecified whether long term insulin use  -     Hemoglobin A1C; Future; Expected date: 07/08/2025  -     Basic Metabolic Panel; Future; Expected date: 07/08/2025    Leg swelling  -     furosemide (LASIX) 20 MG tablet; Take 1 tablet (20 mg total) by mouth once daily. May also take 1 tablet (20 mg total) nightly as needed (with weight gain over 5lb).  Dispense: 180 tablet; Refill: 1  -     potassium chloride (MICRO-K) 10 MEQ CpSR; Take 1 capsule (10 mEq total) by mouth once daily.  Dispense: 90 capsule; Refill: 1    Cerebellar stroke    Mild vascular dementia without behavioral disturbance, psychotic disturbance, mood disturbance, or anxiety  -     Ambulatory Referral/Consult to Dementia Care Management; Future; Expected date: 07/15/2025    Heart failure, unspecified HF chronicity, unspecified heart failure type        Chronic conditions status updated as per HPI.  Other than changes above, cont current medications and maintain follow up with specialists.  Return to clinic in Follow up in about 3 months (around 10/8/2025).      Krys Michael MD  [facility]    Total time spent on this encounter: 52 minutes. This includes face to face time and non-face to face time preparing to see the patient (eg, review of tests), obtaining and/or reviewing separately obtained history, documenting clinical information in the electronic or other health record, independently interpreting results and communicating results to the patient/family/caregiver, or care  coordinator.    This note was generated with the assistance of ambient listening technology. Verbal consent was obtained by the patient and accompanying visitor(s) for the recording of patient appointment to facilitate this note. I attest to having reviewed and edited the generated note for accuracy, though some syntax or spelling errors may persist. Please contact the author of this note for any clarification.       Patient Instructions   High-Potassium Foods    High-potassium fruits:  Apricots  Bananas  Cantaloupe  Dried fruit  Honeydew melon  Kiwi  Mahesh  Nectarines  Oranges and orange juice  Papaya  Pomegranate and pomegranate juice  Prunes and prune juice  Pumpkin  Raisins      High-potassium vegetables:  [place] squash, butternut squash, Steel squash  Avocado  Artichoke  Beets  Baked beans, black beans, refried beans  Broccoli (cooked)  [place] sprouts  Kohlrabi  Lentils  Okra  Onions (fried)  Parsnips  Potatoes (white and sweet)  Rutabagas  Spinach (cooked)  Tomatoes, tomato sauce, and tomato paste  Vegetable juice      Other high-potassium foods:  Bran products  Chocolate  Coconut  Creamed soups  French fries  Granola  Ice cream  Milk (buttermilk, chocolate, eggnog evaporated, malted, soy and milkshakes)  Miso  Molasses  Nuts  Peanut butter  Potato chips  Salt substitutes  Seeds  Tofu  Yogurt   Tests to Keep You Healthy    Mammogram: Met on 2/4/2025  Eye Exam: Met on 5/9/2025  Colon Cancer Screening: Met on 12/11/2020  Last Blood Pressure <= 139/89 (7/8/2025): Yes  Last HbA1c < 8 (04/04/2025): NO  Tobacco Cessation: Yes                                 [1]   Current Outpatient Medications:     aspirin (ECOTRIN) 81 MG EC tablet, Take 1 tablet (81 mg total) by mouth once daily., Disp: 90 tablet, Rfl: 3    atorvastatin (LIPITOR) 80 MG tablet, Take 1 tablet (80 mg total) by mouth every evening., Disp: 90 tablet, Rfl: 3    blood glucose control, high Soln, 1 drop by Misc.(Non-Drug; Combo Route) route once daily.  ICD 10 code: E11.59, Disp: 3 each, Rfl: 3    blood sugar diagnostic Strp, 1 strip by Misc.(Non-Drug; Combo Route) route 2 (two) times a day., Disp: 50 strip, Rfl: 11    carvediloL (COREG) 25 MG tablet, Take 1 tablet (25 mg total) by mouth 2 (two) times daily with meals., Disp: 180 tablet, Rfl: 3    clopidogreL (PLAVIX) 75 mg tablet, Take 1 tablet (75 mg total) by mouth once daily., Disp: 90 tablet, Rfl: 3    ezetimibe (ZETIA) 10 mg tablet, Take 1 tablet (10 mg total) by mouth once daily., Disp: 90 tablet, Rfl: 3    metFORMIN (GLUMETZA) 1000 MG (MOD) 24hr tablet, Take 1 tablet (1,000 mg total) by mouth 2 (two) times daily with meals., Disp: 180 tablet, Rfl: 3    metoprolol succinate (TOPROL-XL) 100 MG 24 hr tablet, Take 100 mg by mouth once daily., Disp: , Rfl:     NIFEdipine (PROCARDIA-XL) 60 MG (OSM) 24 hr tablet, Take 1 tablet (60 mg total) by mouth 2 (two) times a day., Disp: 180 tablet, Rfl: 2    valsartan (DIOVAN) 320 MG tablet, Take 1 tablet (320 mg total) by mouth once daily., Disp: 90 tablet, Rfl: 3    blood-glucose meter Misc, 1 each by Misc.(Non-Drug; Combo Route) route once. for 1 dose, Disp: 1 each, Rfl: 0    furosemide (LASIX) 20 MG tablet, Take 1 tablet (20 mg total) by mouth once daily. May also take 1 tablet (20 mg total) nightly as needed (with weight gain over 5lb)., Disp: 180 tablet, Rfl: 1    hydrOXYzine HCL (ATARAX) 25 MG tablet, Take 1 tablet (25 mg total) by mouth 3 (three) times daily as needed for Anxiety. (Patient not taking: Reported on 7/8/2025), Disp: 90 tablet, Rfl: 3    lancets 30 gauge Misc, 1 lancet  by Misc.(Non-Drug; Combo Route) route 2 (two) times a day., Disp: 50 each, Rfl: 11    potassium chloride (MICRO-K) 10 MEQ CpSR, Take 1 capsule (10 mEq total) by mouth once daily., Disp: 90 capsule, Rfl: 1

## 2025-07-18 ENCOUNTER — PATIENT OUTREACH (OUTPATIENT)
Dept: NEUROLOGY | Facility: CLINIC | Age: 70
End: 2025-07-18
Payer: MEDICARE

## 2025-07-21 ENCOUNTER — PATIENT OUTREACH (OUTPATIENT)
Dept: NEUROLOGY | Facility: CLINIC | Age: 70
End: 2025-07-21
Payer: MEDICARE

## 2025-07-21 DIAGNOSIS — E78.5 HYPERLIPIDEMIA LDL GOAL <70: ICD-10-CM

## 2025-07-21 DIAGNOSIS — I70.0 AORTIC ATHEROSCLEROSIS: ICD-10-CM

## 2025-07-21 RX ORDER — ATORVASTATIN CALCIUM 80 MG/1
80 TABLET, FILM COATED ORAL NIGHTLY
Qty: 90 TABLET | Refills: 3 | Status: SHIPPED | OUTPATIENT
Start: 2025-07-21

## 2025-07-21 NOTE — TELEPHONE ENCOUNTER
No care due was identified.  [facility] Embedded Care Due Messages. Reference number: [id number].   7/21/2025 2:52:23 PM CDT

## 2025-07-21 NOTE — TELEPHONE ENCOUNTER
Copied from CRM #[id number]. Topic: Medications - Medication Refill  >> Jul 21, 2025 12:03 PM Mela wrote:  Type:  RX Refill Request    Who Called: Pt's sister  Refill or New Rx: refill   RX Name and Strength:atorvastatin (LIPITOR) 80 MG tablet  Preferred Pharmacy with phone number:  [facility]/pharmacy #[id number] - [place], [place] - [address]  [address] [place] [address]  Phone: [phone] Fax: [phone]  Local or Mail Order: Local   Ordering Provider: Alec   Would the patient rather a call back or a response via [facility]? Call   Best Call Back Number:[phone]    Additional Information:

## 2025-07-21 NOTE — TELEPHONE ENCOUNTER
Refill Encounter    PCP Visits: Recent Visits  Date Type Provider Dept   07/08/25 Office Visit Krys Michael MD [facility]   06/23/25 Office Visit Nahum Cameron, NP [facility]   05/05/25 Office Visit Krys Michael MD [facility]   02/04/25 Office Visit Krys Michael MD [facility]   Showing recent visits within past 360 days and meeting all other requirements  Future Appointments  Date Type Provider Dept   10/09/25 Appointment Krys Michael MD [facility]   Showing future appointments within next 720 days and meeting all other requirements      Last 3 Blood Pressure:   BP Readings from Last 3 Encounters:   07/08/25 132/70   06/23/25 136/63   06/02/25 (!) 170/74     Preferred Pharmacy:   [facility]/pharmacy #[id number] - [place], [place] - [address]  [address] [address]  Phone: [phone] Fax: [phone]    Requested RX:  Requested Prescriptions     Pending Prescriptions Disp Refills    atorvastatin (LIPITOR) 80 MG tablet 90 tablet 3     Sig: Take 1 tablet (80 mg total) by mouth every evening.      RX Route: Normal

## 2025-07-24 ENCOUNTER — TELEPHONE (OUTPATIENT)
Dept: CARDIOLOGY | Facility: CLINIC | Age: 70
End: 2025-07-24
Payer: MEDICARE

## 2025-07-24 NOTE — TELEPHONE ENCOUNTER
confirmed   Consent: - Verbal and written consent obtained. \\n- Discussed the risks that include but not limited to lid/brow ptosis, bruising, headache, asymmetry, swelling, diplopia, temporary effect, and incomplete chemical denervation.\\n- Advised that it can take up to 14 days for the full effect of the Botox to take place.\\n- Generally the effects of Botox are expected to last for 3-4 months, but length of duration can vary.

## 2025-07-27 NOTE — PROGRESS NOTES
PCP - Krys Michael MD  Referring Physician:     Subjective:   Patient ID:  Shabnam Noble is a 69 y.o. female with HTN, HLD, T2DM, aortic atherosclerosis, hx of CVA (cerebellar, L sided deficits), CKD3 (baseline GFR 50s), anemia of chronic disease, hx of tobacco use who presents as a referral from internal medicine for evaluation of lower extremity edema.    She reports she has had a few weeks of lower extremity leg swelling which has been worse on the RLE and is worse when she is up on her feet. She has hx of trauma to her R ankle requiring hardware. Currently she is in rehab due to her recent stroke and is living with her sister. She sleeps in a recliner but due to availability, claims she can sleep comfortably on her back without orthopnea. Due to her debility secondary to stroke she uses a wheelchair, when ambulating with her feet she is able to walk without dyspnea however she seldom exerts herself.     She had a TTE in 2025 with preserved EF, CVP 3.   Lower extremity venous US showed no DVT.  BNP was 441 3 months ago when her leg swelling started and initial work up was done.    She is taking coreg and toprol.  She is also still on DAPT (3 months out from stroke).    History:     Social History     Tobacco Use    Smoking status: Former     Current packs/day: 0.00     Average packs/day: 0.3 packs/day for 45.0 years (11.3 ttl pk-yrs)     Types: Cigarettes     Start date: 1976     Quit date: 2021     Years since quittin.0    Smokeless tobacco: Never   Substance Use Topics    Alcohol use: Yes     Alcohol/week: 3.0 standard drinks of alcohol     Types: 3 Cans of beer per week     Comment: 1-2x's     Family History   Problem Relation Name Age of Onset    Alzheimer's disease Mother      Prostate cancer Father      Diabetes Father      Colon cancer Brother      Breast cancer Paternal Aunt      Heart disease Neg Hx      Stroke Neg Hx         Meds:     Review of patient's allergies indicates:  "  Allergen Reactions    Hydrodiuril [hydrochlorothiazide]     Lisinopril      Current Medications[1]    Constitution: Negative for fever, chills, weight loss or gain.   HENT: Negative for sore throat, rhinorrhea, or headache.  Eyes: Negative for blurred or double vision.   Cardiovascular: See above  Pulmonary: Negative for SOB   Gastrointestinal: Negative for abdominal pain, nausea, vomiting, or diarrhea.   : Negative for dysuria.   Neurological: Negative for focal weakness or sensory changes.    Objective:   BP (!) 149/72 (Patient Position: Sitting)   Pulse 69   Ht 5' 5" (1.651 m)   Wt 82.1 kg (181 lb)   LMP  (LMP Unknown)   SpO2 99%   BMI 30.12 kg/m²   Constitutional: No distress, appropriately conversant  HEENT: Sclera anicteric, PERRLA  Neck: No JVD, no masses, good movement  CV: RRR, S1 and S2 normal, no additional heart sounds or murmurs  Pulm: Lung sounds clear bilaterally without respiratory distress   GI: Abdomen soft, non-tender, good bowel sounds  Extremities: Extremities grossly normal, warm, well perfused, no edema  Skin: No ecchymosis, erythema, or ulcers  Psych: AOx3, appropriate affect    Most Recent Lab Data:     CBC:   Lab Results   Component Value Date    WBC 8.10 04/28/2025    HGB 9.2 (L) 04/28/2025    HCT 30.3 (L) 04/28/2025     04/28/2025    MCV 88 04/28/2025    RDW 15.6 (H) 04/28/2025     BMP:   Lab Results   Component Value Date     07/08/2025    K 3.6 07/08/2025     (H) 07/08/2025    CO2 22 (L) 07/08/2025    BUN 15 07/08/2025    CREATININE 1.1 07/08/2025     (H) 07/08/2025    CALCIUM 8.9 07/08/2025    MG 2.2 04/28/2025    PHOS 4.5 04/28/2025     LFTS;   Lab Results   Component Value Date    PROT 6.4 04/28/2025    ALBUMIN 2.6 (L) 04/28/2025    BILITOT 0.2 04/28/2025    AST 15 04/28/2025    ALKPHOS 57 04/28/2025    ALT 19 04/28/2025     COAGS:   Lab Results   Component Value Date    INR 1.0 04/05/2025    PROTIME 11.3 04/05/2025     FLP:   Lab Results "   Component Value Date    CHOL 179 04/05/2025    HDL 32 (L) 04/05/2025    LDLCALC 123.0 04/05/2025    TRIG 120 04/05/2025    CHOLHDL 17.9 (L) 04/05/2025     CARDIAC:   Lab Results   Component Value Date    TROPONINI 0.040 (H) 04/05/2025     (H) 04/04/2025         Cardiovascular Imaging:     TTE 4/5/25:    Left Ventricle: The left ventricle is normal in size. Increased wall thickness. There is concentric hypertrophy. There is normal systolic function with a visually estimated ejection fraction of 60 - 65%.    Right Ventricle: The right ventricle is normal in size. Wall thickness is normal. Systolic function is normal.    Left Atrium: Mildly dilated    Bilateral LE US 6/23/25:  Impression:     No evidence of deep venous thrombosis in either lower extremity.  There is bilateral lower extremity edema and reactive appearing lymph nodes in the right groin.    Assessment & Plan:     1. Leg swelling    2. Essential hypertension    3. Hyperlipidemia associated with type 2 diabetes mellitus    4. Aortic atherosclerosis    5. History of CVA (cerebrovascular accident)        #Lower extremity swelling  Suspicion for cardiac etiology low as her EF is preserved and exam/symptomology insignificant for overt hypervolemia   Started on lasix 20 mg daily with additional 20 mg PRN recently, sister states she has noticed an improvement since.  Will continue Lasix 20 mg daily only     #HTN  /72 upon presentation to clinic  Currently managed via Coreg 25 mg, Metoprolol 100 mg, Nifedipine 60, Valsartan 320  Pressure not currently optimized, will add Spironolactone 25 mg for additional BP control  Discontinue Coreg and continue Metoprolol 100 mg daily as there is not currently an indication for dual BB therapy     #HLD  #Aortic atherosclerosis  #Hx of CVA   on lipid panel on 4/5/25 on Lipitor 80 qd, Zetia  Goal LDL < 100 given prior CVA, no cardiac events  Repeat Lipid panel, BMP in 2 weeks from visit  Patient hesitant to  start PCSK9i therapy  Unknown etiology of stroke at this time, will add cardiac event monitor to r/o arrhythmia   Continue ASA 81 mg qd        No follow-ups on file.  Patient to follow up in 2-3 months  This plan was formulated after discussion with Dr. Effron Connor Gillies, DO, PGY-VI  [facility]         [1]   Current Outpatient Medications:     aspirin (ECOTRIN) 81 MG EC tablet, Take 1 tablet (81 mg total) by mouth once daily., Disp: 90 tablet, Rfl: 3    atorvastatin (LIPITOR) 80 MG tablet, Take 1 tablet (80 mg total) by mouth every evening., Disp: 90 tablet, Rfl: 3    blood glucose control, high Soln, 1 drop by Misc.(Non-Drug; Combo Route) route once daily. ICD 10 code: E11.59, Disp: 3 each, Rfl: 3    blood sugar diagnostic Strp, 1 strip by Misc.(Non-Drug; Combo Route) route 2 (two) times a day., Disp: 50 strip, Rfl: 11    blood-glucose meter Misc, 1 each by Misc.(Non-Drug; Combo Route) route once. for 1 dose, Disp: 1 each, Rfl: 0    carvediloL (COREG) 25 MG tablet, Take 1 tablet (25 mg total) by mouth 2 (two) times daily with meals., Disp: 180 tablet, Rfl: 3    clopidogreL (PLAVIX) 75 mg tablet, Take 1 tablet (75 mg total) by mouth once daily., Disp: 90 tablet, Rfl: 3    ezetimibe (ZETIA) 10 mg tablet, Take 1 tablet (10 mg total) by mouth once daily., Disp: 90 tablet, Rfl: 3    furosemide (LASIX) 20 MG tablet, Take 1 tablet (20 mg total) by mouth once daily. May also take 1 tablet (20 mg total) nightly as needed (with weight gain over 5lb)., Disp: 180 tablet, Rfl: 1    hydrOXYzine HCL (ATARAX) 25 MG tablet, Take 1 tablet (25 mg total) by mouth 3 (three) times daily as needed for Anxiety. (Patient not taking: Reported on 7/8/2025), Disp: 90 tablet, Rfl: 3    lancets 30 gauge Misc, 1 lancet  by Misc.(Non-Drug; Combo Route) route 2 (two) times a day., Disp: 50 each, Rfl: 11    metFORMIN (GLUMETZA) 1000 MG (MOD) 24hr tablet, Take 1 tablet (1,000 mg total) by mouth 2 (two) times daily  with meals., Disp: 180 tablet, Rfl: 3    metoprolol succinate (TOPROL-XL) 100 MG 24 hr tablet, Take 100 mg by mouth once daily., Disp: , Rfl:     NIFEdipine (PROCARDIA-XL) 60 MG (OSM) 24 hr tablet, Take 1 tablet (60 mg total) by mouth 2 (two) times a day., Disp: 180 tablet, Rfl: 2    potassium chloride (MICRO-K) 10 MEQ CpSR, Take 1 capsule (10 mEq total) by mouth once daily., Disp: 90 capsule, Rfl: 1    valsartan (DIOVAN) 320 MG tablet, Take 1 tablet (320 mg total) by mouth once daily., Disp: 90 tablet, Rfl: 3

## 2025-07-28 ENCOUNTER — OFFICE VISIT (OUTPATIENT)
Dept: CARDIOLOGY | Facility: CLINIC | Age: 70
End: 2025-07-28
Payer: MEDICARE

## 2025-07-28 VITALS
SYSTOLIC BLOOD PRESSURE: 149 MMHG | HEIGHT: 65 IN | HEART RATE: 69 BPM | WEIGHT: 181 LBS | DIASTOLIC BLOOD PRESSURE: 72 MMHG | BODY MASS INDEX: 30.16 KG/M2 | OXYGEN SATURATION: 99 %

## 2025-07-28 DIAGNOSIS — M79.89 LEG SWELLING: Primary | ICD-10-CM

## 2025-07-28 DIAGNOSIS — E78.5 HYPERLIPIDEMIA ASSOCIATED WITH TYPE 2 DIABETES MELLITUS: ICD-10-CM

## 2025-07-28 DIAGNOSIS — I70.0 AORTIC ATHEROSCLEROSIS: ICD-10-CM

## 2025-07-28 DIAGNOSIS — Z86.73 HISTORY OF CVA (CEREBROVASCULAR ACCIDENT): ICD-10-CM

## 2025-07-28 DIAGNOSIS — E11.69 HYPERLIPIDEMIA ASSOCIATED WITH TYPE 2 DIABETES MELLITUS: ICD-10-CM

## 2025-07-28 DIAGNOSIS — R60.0 BILATERAL EDEMA OF LOWER EXTREMITY: ICD-10-CM

## 2025-07-28 DIAGNOSIS — I10 ESSENTIAL HYPERTENSION: ICD-10-CM

## 2025-07-28 DIAGNOSIS — I50.9 HEART FAILURE, UNSPECIFIED HF CHRONICITY, UNSPECIFIED HEART FAILURE TYPE: ICD-10-CM

## 2025-07-28 DIAGNOSIS — R60.9 SWELLING: ICD-10-CM

## 2025-07-28 PROCEDURE — 99204 OFFICE O/P NEW MOD 45 MIN: CPT | Mod: S$GLB,,, | Performed by: INTERNAL MEDICINE

## 2025-07-28 PROCEDURE — 4010F ACE/ARB THERAPY RXD/TAKEN: CPT | Mod: CPTII,S$GLB,, | Performed by: INTERNAL MEDICINE

## 2025-07-28 PROCEDURE — 99999 PR PBB SHADOW E&M-EST. PATIENT-LVL IV: CPT | Mod: PBBFAC,GC,, | Performed by: STUDENT IN AN ORGANIZED HEALTH CARE EDUCATION/TRAINING PROGRAM

## 2025-07-28 PROCEDURE — 1100F PTFALLS ASSESS-DOCD GE2>/YR: CPT | Mod: CPTII,S$GLB,, | Performed by: INTERNAL MEDICINE

## 2025-07-28 PROCEDURE — 3060F POS MICROALBUMINURIA REV: CPT | Mod: CPTII,S$GLB,, | Performed by: INTERNAL MEDICINE

## 2025-07-28 PROCEDURE — 3051F HG A1C>EQUAL 7.0%<8.0%: CPT | Mod: CPTII,S$GLB,, | Performed by: INTERNAL MEDICINE

## 2025-07-28 PROCEDURE — 3078F DIAST BP <80 MM HG: CPT | Mod: CPTII,S$GLB,, | Performed by: INTERNAL MEDICINE

## 2025-07-28 PROCEDURE — 1126F AMNT PAIN NOTED NONE PRSNT: CPT | Mod: CPTII,S$GLB,, | Performed by: INTERNAL MEDICINE

## 2025-07-28 PROCEDURE — 3066F NEPHROPATHY DOC TX: CPT | Mod: CPTII,S$GLB,, | Performed by: INTERNAL MEDICINE

## 2025-07-28 PROCEDURE — 3288F FALL RISK ASSESSMENT DOCD: CPT | Mod: CPTII,S$GLB,, | Performed by: INTERNAL MEDICINE

## 2025-07-28 PROCEDURE — 1159F MED LIST DOCD IN RCRD: CPT | Mod: CPTII,S$GLB,, | Performed by: INTERNAL MEDICINE

## 2025-07-28 PROCEDURE — 3008F BODY MASS INDEX DOCD: CPT | Mod: CPTII,S$GLB,, | Performed by: INTERNAL MEDICINE

## 2025-07-28 PROCEDURE — 3077F SYST BP >= 140 MM HG: CPT | Mod: CPTII,S$GLB,, | Performed by: INTERNAL MEDICINE

## 2025-07-28 RX ORDER — FUROSEMIDE 20 MG/1
20 TABLET ORAL DAILY
Qty: 180 TABLET | Refills: 1 | Status: SHIPPED | OUTPATIENT
Start: 2025-07-28 | End: 2026-07-28

## 2025-07-28 RX ORDER — SPIRONOLACTONE 25 MG/1
25 TABLET ORAL DAILY
Qty: 30 TABLET | Refills: 11 | Status: SHIPPED | OUTPATIENT
Start: 2025-07-28 | End: 2026-07-28

## 2025-07-30 ENCOUNTER — PATIENT OUTREACH (OUTPATIENT)
Dept: NEUROLOGY | Facility: CLINIC | Age: 70
End: 2025-07-30
Payer: MEDICARE

## 2025-07-30 NOTE — PROGRESS NOTES
I have reviewed the patient's chart and the fellow's clinic note, as well as discussed the case with the fellow. I have personally spoken with and examined the patient in the clinic. I agree with the findings, assessment, and plan.      Dwight Prajapati MD  Consultative Cardiology - [facility]  .

## 2025-08-04 ENCOUNTER — PATIENT OUTREACH (OUTPATIENT)
Dept: NEUROLOGY | Facility: CLINIC | Age: 70
End: 2025-08-04
Payer: MEDICARE

## 2025-08-04 DIAGNOSIS — I10 ESSENTIAL HYPERTENSION: ICD-10-CM

## 2025-08-04 DIAGNOSIS — I10 ESSENTIAL HYPERTENSION: Primary | ICD-10-CM

## 2025-08-04 RX ORDER — METOPROLOL SUCCINATE 100 MG/1
100 TABLET, EXTENDED RELEASE ORAL DAILY
Qty: 90 TABLET | Status: CANCELLED | OUTPATIENT
Start: 2025-08-04

## 2025-08-04 RX ORDER — METOPROLOL SUCCINATE 100 MG/1
100 TABLET, EXTENDED RELEASE ORAL DAILY
Qty: 90 TABLET | Refills: 3 | Status: SHIPPED | OUTPATIENT
Start: 2025-08-04

## 2025-08-04 RX ORDER — VALSARTAN 320 MG/1
320 TABLET ORAL DAILY
Qty: 90 TABLET | Refills: 3 | Status: CANCELLED | OUTPATIENT
Start: 2025-08-04

## 2025-08-04 RX ORDER — VALSARTAN 320 MG/1
320 TABLET ORAL DAILY
Qty: 90 TABLET | Refills: 3 | Status: SHIPPED | OUTPATIENT
Start: 2025-08-04 | End: 2025-08-06 | Stop reason: SDUPTHER

## 2025-08-04 NOTE — TELEPHONE ENCOUNTER
Copied from CRM #[id number]. Topic: Medications - Medication Refill  >> Aug 4, 2025  9:45 AM Med Assistant Geraldine wrote:  Who Called: Rudy(Caregiver)    RX Name and Strength: valsartan (DIOVAN) 320 MG tablet metoprolol succinate (TOPROL-XL) 100 MG 24 hr tablet     Preferred Pharmacy with phone number: [facility]/pharmacy #[id number] - [place], [place] - [facility] [phone]    Local or Mail Order:Local: Local    Ordering Provider:  Krys Michael    Would the patient rather call back or a response via [facility]? Call Back    Best Call Back Number: [phone]    Additional Information:

## 2025-08-04 NOTE — TELEPHONE ENCOUNTER
Medication pending.  Allergies confirmed  LOV:7/8/2025          Copied from CRM #[id number]. Topic: Medications - Medication Refill  >> Aug 4, 2025  9:45 AM Med Assistant Geraldine wrote:  Who Called: Rudy(Caregiver)     RX Name and Strength: valsartan (DIOVAN) 320 MG tablet metoprolol succinate (TOPROL-XL) 100 MG 24 hr tablet      Preferred Pharmacy with phone number: [facility]/pharmacy #[id number] - [place] [id number] [phone]     Local or Mail Order:Local: Local     Ordering Provider:  Krys Michael     Would the patient rather call back or a response via [facility]? Call Back     Best Call Back Number: [phone]

## 2025-08-04 NOTE — TELEPHONE ENCOUNTER
No care due was identified.  [facility] Embedded Care Due Messages. Reference number: [id number].   8/04/2025 10:21:52 AM CDT

## 2025-08-04 NOTE — PROGRESS NOTES
Social Work - Dementia Care Management    Phone consultation with caregiver, Claudia. Caregiver reported:  Living situation: lives with sister Claudia in Claudia's home; client owns a home but no longer lives there and it will be sold  Caregivers/fmly: Claudia is primary caregiver; sister Maria L assists with care, accompanies them to appointments; pt has no children  Behavior/mood/functioning: pt asks when she can go back home, sister tells her she no longer has a house to go to and pt seems to calm down after that; pt is generally cooperative, but occasionally resists taking meds at certain times but will take later  Activities/engagement: likes to watch tv, especially westerns; both sisters try to keep pt physically active, walking up and down [place] with walker; sits out on porch  Legal/financial issues, future care planning: sister's plan is to care for pt long-term  Safety issues, technologies in place:  Caregiver needs and natural supports: sister stated situation was terrible when pt first came home from rehab and moved in with her, but it is much better now and going fairly smoothly; Claudia's son and grandson come to the home and supervise pt so Claudia can get a short break; Claudia has a strong dulce  Caregiver questions/requests: no concerns at this time    Intervention/Plan:  Provided psycho-education, support, strategies, resource information, problem-solving  Discussed difficulty of transitions, lucille to new living environments  Discussed alternative ways to respond to pt's questions about going home if current approach ceases to be effective  Encouraged caregiver to contact me as needed  Provided resources/info via email per caregiver's communication preference; message excerpted below:  Attached is a Home Safety Checklist  Attached is a Technology and Product Guide, with examples of items to assist with supervision and activities of daily living  There are several dementia caregiver support  groups and occasional educational offerings for caregivers and family members, available on Zoom and in person; these groups are a great way to access both emotional support and practical tips:  This link will take you to a landing page where you can see [facility]'s current dementia caregiver support offerings, sign up for automated reminders and updates, and check back for newly added offerings: Home ([email or web address])  Attached is a flier for our Virtual Dementia Caregiver Support Group on Zoom  Attached is a flier for an in-person Dementia Caregiver Support Group hosted at [facility]  Attached is a flier for a 6-week Dementia Education Series for caregivers on Zoom; the 4th session is August 6; each class is a free-standing event so you can hop on to whichever ones work for you  A 10-week program for caregivers called Finding Meaning & Hope will be starting in September; the new flier is not yet available, but I've attached a flier from a previous series so you can see the trailer  The Alzheimer's Association has a search page to locate Virtual groups at various days/times:  https://www.alz.org/event-search?category=Support+Groups  This page has message board discussions, and has specific pages for caregivers:  https://alzconnected.org/categories/p-rc-z-caregiver-%28general-topics%29  Attached are two general Tip Sheets    The websites for Alzheimer's Association   Alzheimer's & Dementia  Alzheimer's Association and Family Caregiver [place]  Dementia - Family Caregiver [place]  are both good general sources of information about dementia and caregiving  Adolfo Aldridge, Occupational Therapist, is a dementia-care expert and educator; she has numerous brief video clips demonstrating techniques for dealing with challenging situations, behaviors, and communications. You can search her videos by her name online or on YouTube. She is very entertaining and engaging! She also has a podcast:  [employer]how - Positive Approach to Care ,  with episodes about 10 minutes long  I have complimentary copies of the following books which I'm happy to send if you're interested: The 36-Hour Day, by Garfield Thompson, a dementia guide; and Scott Someone Who Has Dementia, by Vonda Friedman, a heavier read about the emotional impacts, losses, and relationship changes. These are also available in audiobook format

## 2025-08-06 DIAGNOSIS — E11.65 TYPE 2 DIABETES MELLITUS WITH HYPERGLYCEMIA, WITHOUT LONG-TERM CURRENT USE OF INSULIN: ICD-10-CM

## 2025-08-06 DIAGNOSIS — I10 ESSENTIAL HYPERTENSION: ICD-10-CM

## 2025-08-06 NOTE — TELEPHONE ENCOUNTER
No care due was identified.  [facility] Embedded Care Due Messages. Reference number: [id number].   8/06/2025 4:12:09 PM CDT

## 2025-08-07 DIAGNOSIS — E11.65 TYPE 2 DIABETES MELLITUS WITH HYPERGLYCEMIA, WITHOUT LONG-TERM CURRENT USE OF INSULIN: ICD-10-CM

## 2025-08-07 RX ORDER — VALSARTAN 320 MG/1
320 TABLET ORAL DAILY
Qty: 90 TABLET | Refills: 3 | Status: SHIPPED | OUTPATIENT
Start: 2025-08-07

## 2025-08-07 RX ORDER — METFORMIN HYDROCHLORIDE 500 MG/1
TABLET, EXTENDED RELEASE ORAL
Qty: 180 TABLET | Refills: 1 | Status: SHIPPED | OUTPATIENT
Start: 2025-08-07

## 2025-08-07 RX ORDER — BLOOD-GLUCOSE CONTROL, NORMAL
1 EACH MISCELLANEOUS 2 TIMES DAILY
Qty: 50 EACH | Refills: 11 | Status: SHIPPED | OUTPATIENT
Start: 2025-08-07 | End: 2026-08-07

## 2025-08-07 RX ORDER — METFORMIN HYDROCHLORIDE 1000 MG/1
1000 TABLET, EXTENDED RELEASE ORAL 2 TIMES DAILY WITH MEALS
Qty: 180 TABLET | Refills: 3 | Status: SHIPPED | OUTPATIENT
Start: 2025-08-07 | End: 2025-08-07

## 2025-08-07 NOTE — TELEPHONE ENCOUNTER
No care due was identified.  [facility] Embedded Care Due Messages. Reference number: [id number].   8/07/2025 12:10:20 PM CDT

## 2025-08-07 NOTE — TELEPHONE ENCOUNTER
Refill Routing Note   Medication(s) are not appropriate for processing by [facility] for the following reason(s):        Med affordability    [facility] action(s):  Defer      Medication Therapy Plan: Pharmacy comment: Alternative Requested:THE PRESCRIBED MEDICATION IS NOT COVERED BY INSURANCE. PLEASE CONSIDER CHANGING TO ONE OF THE SUGGESTED COVERED ALTERNATIVES.      Appointments  past 12m or future 3m with PCP    Date Provider   Last Visit   7/8/2025 Krys Michael MD   Next Visit   10/9/2025 Krys Michael MD   ED visits in past 90 days: 1        Note composed:12:10 PM 08/07/2025

## 2025-08-12 ENCOUNTER — EXTERNAL HOME HEALTH (OUTPATIENT)
Dept: HOME HEALTH SERVICES | Facility: HOSPITAL | Age: 70
End: 2025-08-12
Payer: MEDICARE

## 2025-08-14 ENCOUNTER — HOSPITAL ENCOUNTER (OUTPATIENT)
Dept: CARDIOLOGY | Facility: HOSPITAL | Age: 70
Discharge: HOME | End: 2025-08-14
Attending: STUDENT IN AN ORGANIZED HEALTH CARE EDUCATION/TRAINING PROGRAM
Payer: MEDICARE

## 2025-08-14 DIAGNOSIS — M79.89 LEG SWELLING: ICD-10-CM

## 2025-08-14 DIAGNOSIS — R60.9 SWELLING: ICD-10-CM

## 2025-08-14 LAB
LEFT GREAT SAPHENOUS DISTAL THIGH DIA: 0.33 CM
LEFT GREAT SAPHENOUS JUNCTION DIA: 0.35 CM
LEFT GREAT SAPHENOUS KNEE DIA: 0.23 CM
LEFT GREAT SAPHENOUS MIDDLE THIGH DIA: 0.33 CM
LEFT GREAT SAPHENOUS PROXIMAL CALF DIA: 0.23 CM
LEFT SMALL SAPHENOUS KNEE DIA: 0.27 CM
LEFT SMALL SAPHENOUS SPJ DIA: 0.37 CM
RIGHT GREAT SAPHENOUS DISTAL THIGH DIA: 0.45 CM
RIGHT GREAT SAPHENOUS JUNCTION DIA: 0.53 CM
RIGHT GREAT SAPHENOUS KNEE DIA: 0.35 CM
RIGHT GREAT SAPHENOUS MIDDLE THIGH DIA: 0.43 CM
RIGHT GREAT SAPHENOUS PROXIMAL CALF DIA: 0.38 CM
RIGHT SMALL SAPHENOUS KNEE DIA: 0.2 CM
RIGHT SMALL SAPHENOUS SPJ DIA: 0.2 CM

## 2025-08-14 PROCEDURE — 93970 EXTREMITY STUDY: CPT | Mod: TC

## 2025-08-22 ENCOUNTER — RESULTS FOLLOW-UP (OUTPATIENT)
Dept: CARDIOLOGY | Facility: HOSPITAL | Age: 70
End: 2025-08-22
Payer: MEDICARE

## 2025-09-03 ENCOUNTER — CLINICAL SUPPORT (OUTPATIENT)
Dept: CARDIOLOGY | Facility: HOSPITAL | Age: 70
End: 2025-09-03
Attending: STUDENT IN AN ORGANIZED HEALTH CARE EDUCATION/TRAINING PROGRAM
Payer: MEDICARE

## 2025-09-03 DIAGNOSIS — Z86.73 HISTORY OF CVA (CEREBROVASCULAR ACCIDENT): ICD-10-CM

## 2025-09-03 PROCEDURE — 93270 REMOTE 30 DAY ECG REV/REPORT: CPT
